# Patient Record
Sex: MALE | Race: OTHER
[De-identification: names, ages, dates, MRNs, and addresses within clinical notes are randomized per-mention and may not be internally consistent; named-entity substitution may affect disease eponyms.]

---

## 2017-03-15 ENCOUNTER — HOSPITAL ENCOUNTER (INPATIENT)
Dept: HOSPITAL 10 - E/R | Age: 67
LOS: 5 days | Discharge: HOME | DRG: 190 | End: 2017-03-20
Attending: INTERNAL MEDICINE | Admitting: INTERNAL MEDICINE
Payer: MEDICARE

## 2017-03-15 VITALS
BODY MASS INDEX: 42.66 KG/M2 | WEIGHT: 315 LBS | BODY MASS INDEX: 42.66 KG/M2 | HEIGHT: 72 IN | WEIGHT: 315 LBS | HEIGHT: 72 IN

## 2017-03-15 VITALS — RESPIRATION RATE: 22 BRPM | SYSTOLIC BLOOD PRESSURE: 130 MMHG | DIASTOLIC BLOOD PRESSURE: 75 MMHG | HEART RATE: 103 BPM

## 2017-03-15 VITALS — HEART RATE: 95 BPM

## 2017-03-15 VITALS — DIASTOLIC BLOOD PRESSURE: 72 MMHG | RESPIRATION RATE: 21 BRPM | SYSTOLIC BLOOD PRESSURE: 126 MMHG

## 2017-03-15 DIAGNOSIS — D64.9: ICD-10-CM

## 2017-03-15 DIAGNOSIS — E66.2: ICD-10-CM

## 2017-03-15 DIAGNOSIS — J98.11: ICD-10-CM

## 2017-03-15 DIAGNOSIS — E11.9: ICD-10-CM

## 2017-03-15 DIAGNOSIS — Z79.52: ICD-10-CM

## 2017-03-15 DIAGNOSIS — R94.31: ICD-10-CM

## 2017-03-15 DIAGNOSIS — I50.33: ICD-10-CM

## 2017-03-15 DIAGNOSIS — J44.1: Primary | ICD-10-CM

## 2017-03-15 DIAGNOSIS — Z79.82: ICD-10-CM

## 2017-03-15 DIAGNOSIS — R09.89: ICD-10-CM

## 2017-03-15 DIAGNOSIS — I11.0: ICD-10-CM

## 2017-03-15 LAB
ABNORMAL IP MESSAGE: 1
ADD SCAN DIFF: NO
ANION GAP SERPL CALC-SCNC: 11 MMOL/L (ref 8–16)
APTT BLD: 30.3 SEC (ref 25–35)
BASOPHILS # BLD AUTO: 0 10^3/UL (ref 0–0.1)
BASOPHILS NFR BLD: 0.5 % (ref 0–2)
BUN SERPL-MCNC: 10 MG/DL (ref 7–20)
CALCIUM SERPL-MCNC: 8.8 MG/DL (ref 8.4–10.2)
CHLORIDE SERPL-SCNC: 102 MMOL/L (ref 97–110)
CK MB SERPL-MCNC: 0.44 NG/ML (ref 0–2.4)
CK SERPL-CCNC: 59 IU/L (ref 23–200)
CO2 SERPL-SCNC: 34 MMOL/L (ref 21–31)
CREAT SERPL-MCNC: 0.68 MG/DL (ref 0.61–1.24)
EOSINOPHIL # BLD: 0.2 10^3/UL (ref 0–0.5)
EOSINOPHIL NFR BLD: 2.6 % (ref 0–7)
ERYTHROCYTE [DISTWIDTH] IN BLOOD BY AUTOMATED COUNT: 17.2 % (ref 11.5–14.5)
GLUCOSE SERPL-MCNC: 88 MG/DL (ref 70–220)
HCT VFR BLD CALC: 38.6 % (ref 42–52)
HGB BLD-MCNC: 10.9 G/DL (ref 14–18)
INR PPP: 0.96
LYMPHOCYTES # BLD AUTO: 1.9 10^3/UL (ref 0.8–2.9)
LYMPHOCYTES NFR BLD AUTO: 21.3 % (ref 15–51)
MCH RBC QN AUTO: 23.4 PG (ref 29–33)
MCHC RBC AUTO-ENTMCNC: 28.2 G/DL (ref 32–37)
MCV RBC AUTO: 82.8 FL (ref 82–101)
MONOCYTES # BLD: 0.9 10^3/UL (ref 0.3–0.9)
MONOCYTES NFR BLD: 10.6 % (ref 0–11)
NEUTROPHILS # BLD: 5.8 10^3/UL (ref 1.6–7.5)
NEUTROPHILS NFR BLD AUTO: 64.8 % (ref 39–77)
NRBC # BLD MANUAL: 0 10^3/UL (ref 0–0)
NRBC BLD QL: 0 /100WBC (ref 0–0)
PLATELET # BLD: 311 10^3/UL (ref 140–415)
PMV BLD AUTO: 10.2 FL (ref 7.4–10.4)
POTASSIUM SERPL-SCNC: 4.3 MMOL/L (ref 3.5–5.1)
PROTHROMBIN TIME: 12.8 SEC (ref 12.2–14.2)
PT RATIO: 1
RBC # BLD AUTO: 4.66 10^6/UL (ref 4.7–6.1)
SODIUM SERPL-SCNC: 143 MMOL/L (ref 135–144)
TROPONIN I SERPL-MCNC: < 0.012 NG/ML (ref 0–0.12)
TROPONIN I SERPL-MCNC: < 0.012 NG/ML (ref 0–0.12)
WBC # BLD AUTO: 8.9 10^3/UL (ref 4.8–10.8)

## 2017-03-15 PROCEDURE — 96376 TX/PRO/DX INJ SAME DRUG ADON: CPT

## 2017-03-15 PROCEDURE — 80053 COMPREHEN METABOLIC PANEL: CPT

## 2017-03-15 PROCEDURE — 96374 THER/PROPH/DIAG INJ IV PUSH: CPT

## 2017-03-15 PROCEDURE — 71010: CPT

## 2017-03-15 PROCEDURE — 94640 AIRWAY INHALATION TREATMENT: CPT

## 2017-03-15 PROCEDURE — 85610 PROTHROMBIN TIME: CPT

## 2017-03-15 PROCEDURE — 96375 TX/PRO/DX INJ NEW DRUG ADDON: CPT

## 2017-03-15 PROCEDURE — 80048 BASIC METABOLIC PNL TOTAL CA: CPT

## 2017-03-15 PROCEDURE — 85025 COMPLETE CBC W/AUTO DIFF WBC: CPT

## 2017-03-15 PROCEDURE — 85730 THROMBOPLASTIN TIME PARTIAL: CPT

## 2017-03-15 PROCEDURE — 80061 LIPID PANEL: CPT

## 2017-03-15 PROCEDURE — 94664 DEMO&/EVAL PT USE INHALER: CPT

## 2017-03-15 PROCEDURE — 84484 ASSAY OF TROPONIN QUANT: CPT

## 2017-03-15 PROCEDURE — 82553 CREATINE MB FRACTION: CPT

## 2017-03-15 PROCEDURE — 36415 COLL VENOUS BLD VENIPUNCTURE: CPT

## 2017-03-15 PROCEDURE — 82550 ASSAY OF CK (CPK): CPT

## 2017-03-15 PROCEDURE — 93005 ELECTROCARDIOGRAM TRACING: CPT

## 2017-03-15 NOTE — RADRPT
PROCEDURE:   XR Chest. 

 

CLINICAL INDICATION:   Shortness of breath.

 

TECHNIQUE:   Single frontal view. 

 

COMPARISON:   12/17/2016. 

 

FINDINGS:

Mild atelectasis at the lung bases is unchanged.  The lungs are otherwise clear. 

The heart size is normal. 

There is no pleural effusion. 

There is no pneumothorax.   

 

IMPRESSION:

1.  Mild atelectasis at the lung bases, unchanged.

2.  Otherwise normal chest radiograph.

3.  No change from 12/17/2016.  

 

RPTAT: QQ

_____________________________________________ 

.Shun Barber MD, MD           Date    Time 

Electronically viewed and signed by .Shun Barber MD, MD on 03/15/2017 17:30 

 

D:  03/15/2017 17:30  T:  03/15/2017 17:30

.R/

## 2017-03-15 NOTE — ERA
ER Documentation


Chief Complaint


Date/Time


DATE: 3/15/17 


TIME: 20:07


Chief Complaint


CHEST PAIN WITH SOB SUDDEN ONSET 1 HR PTA. NO RECENT URI





HPI


Patient is a 66-year-old male with COPD and hypertension who presents with 

chest pain and shortness of breath.  He said this started 1 hour ago.  He has 

also had a cough for the past 3 days.  He has had chills but no fevers.  He had 

2 episodes of vomiting.  He says "I think it is COPD again".  Upon review of 

old medical records the patient has multiple visits with admissions to Dr. Phillips.  He says that he does not currently have a primary doctor.





ROS


All systems reviewed and are negative except as per history of present illness.





Medications


Home Meds


Active Scripts


Prednisone (Prednisone) 20 Mg Tab, 10 MG PO DAILY for 7 Days, TAB


   Prov:DELORIS PHILLIPS MD         12/21/16


Isosorbide Dinitrate* (Isordil*) 10 Mg Tablet, 10 MG PO TID for 28 Days, TAB


   Prov:DELORIS PHILLIPS MD         12/21/16


Reported Medications


Morphine Sulfate* (Ms Contin*) 15 Mg Tablet.sa, 15 MG PO Q12, TAB


   12/18/16


Gabapentin* (Gabapentin*) 600 Mg Tablet, 600 MG PO TID, #90 TAB


   12/17/16


Simvastatin* (Zocor*) 10 Mg Tablet, 10 MG PO QHS, #30 TAB


   12/17/16


Pantoprazole* (Pantoprazole*) 40 Mg Tablet.dr, 40 MG PO AC BREAKFAST, TAB


   12/17/16


Cholecalciferol* (Vitamin D3*) 1,000 Unit Tablet, 1000 UNIT PO DAILY, TAB


   6/28/15


Lisinopril* (Lisinopril*) 20 Mg Tablet, 20 MG PO DAILY, TAB


   5/20/15


Ipratropium Bromide* (Atrovent*) 0.5 Mg/2.5 Ml Neb, 0.5 MG NEB Q4H Y for 

WHEEZING AND SOB, EA


   2/19/15


Amlodipine Besylate* (Amlodipine Besylate*) 10 Mg Tablet, 10 MG PO DAILY, TAB


   2/19/15


Furosemide* (Lasix*) 40 Mg Tablet, 40 MG PO BID, TAB


   2/19/15


Multivitamins* (Multivitamin*) 1 Udcap Capsule, 1 TAB PO DAILY, TAB


   1/31/15


Docusate Sodium* (Colace*) 250 Mg Capsule, 250 MG PO BID, CAP


   1/31/15


Ascorbic Acid (Vitamin C) 500 Mg Tab, 1000 MG PO DAILY, TAB


   12/23/14


Albuterol Sulfate* (Albuterol Sulfate* Neb) 0.083%-3 Ml Neb, 2.5 MG NEB DAILY Y 

for WHEEZING AND SOB, EA


   10/16/14


Potassium Chloride* (K-Dur*) 10 Meq Tab.prt.sr, 10 MEQ PO DAILY, TAB


   10/16/14


Hydrocodone Bit-Acetaminophen* (Norco*)  Mg Tablet, 1 TAB PO QID Y for 

PAIN, TAB


   10/16/14


Aspirin* (Aspirin* Chew) 81 Mg Tab.chew, 81 MG PO DAILY, TAB.CHEW


   10/16/14





Allergies


Allergies:  


Coded Allergies:  


     hydromorphone (Verified  Allergy, Severe, 12/17/16)





PMhx/Soc


History of Surgery:  Yes


Anesthesia Reaction:  No


Hx Neurological Disorder:  No


Hx Respiratory Disorders:  Yes (COPD, ASTHMA, SLEEP APNEA)


Hx Cardiac Disorders:  Yes (HTN, CHF,)


Hx Psychiatric Problems:  Yes (DEPRESSION)


Hx Miscellaneous Medical Probl:  Yes (OSTEO ARTHRITIS, MORBID OBESITY, 

NUERAPATHY)


Hx Alcohol Use:  No


Hx Substance Use:  No


Hx Tobacco Use:  No


Smoking Status:  Former smoker





FmHx


Family History:  No diabetes





Physical Exam


Vitals





Vital Signs








  Date Time  Temp Pulse Resp B/P Pulse Ox O2 Delivery O2 Flow Rate FiO2


 


3/15/17 19:59  92 17 138/98 94 Nasal Cannula 5.0 


 


3/15/17 19:11  88 16 149/77 97 Nasal Cannula 2.0 


 


3/15/17 18:35  91 16 145/98 97 Room Air  


 


3/15/17 17:10       4.0 


 


3/15/17 17:10  89 20  100 Nasal Cannula 4.0 


 


3/15/17 16:10      Nasal Cannula 2 


 


3/15/17 15:25 98.9 90 20 142/84 96   








Physical Exam


Const: No acute distress


Head:   Atraumatic 


Eyes:    Normal Conjunctiva


ENT:    Normal External Ears, Nose and Mouth.


Neck:               Full range of motion..~ No meningismus.


Resp:    Clear to auscultation bilaterally


Cardio:    Regular rate and rhythm, no murmurs


Abd:    Soft, non tender, non distended. Normal bowel sounds


Skin:    No petechiae or rashes


Back:    No midline or flank tenderness


Ext:    No cyanosis, or edema


Neur:    Awake and alert


Psych:    Normal Mood and Affect


Result Diagram:  


3/15/17 1645                                                                   

             3/15/17 1645





Results 24 hrs





 Laboratory Tests








Test


  3/15/17


16:45


 


Activated Partial


Thromboplast Time 30.3Sec 


 


 


Anion Gap 11 


 


Basophils # 0.010^3/ul 


 


Basophils % 0.5% 


 


Blood Urea Nitrogen 10mg/dl 


 


Calcium Level 8.8mg/dl 


 


Carbon Dioxide Level 34mmol/L 


 


Chloride Level 102mmol/L 


 


Creatinine 0.68mg/dl 


 


Eosinophils # 0.210^3/ul 


 


Eosinophils % 2.6% 


 


Glucose Level 88mg/dl 


 


Hematocrit 38.6% 


 


Hemoglobin 10.9g/dl 


 


INR International Normalized


Ratio 0.96 


 


 


Lymphocytes # 1.910^3/ul 


 


Lymphocytes % 21.3% 


 


Mean Corpuscular Hemoglobin 23.4pg 


 


Mean Corpuscular Hemoglobin


Concent 28.2g/dl 


 


 


Mean Corpuscular Volume 82.8fl 


 


Mean Platelet Volume 10.2fl 


 


Monocytes # 0.910^3/ul 


 


Monocytes % 10.6% 


 


Neutrophils # 5.810^3/ul 


 


Neutrophils % 64.8% 


 


Nucleated Red Blood Cells # 0.010^3/ul 


 


Nucleated Red Blood Cells % 0.0/100WBC 


 


Platelet Count 72638^3/UL 


 


Potassium Level 4.3mmol/L 


 


Prothrombin Time 12.8Sec 


 


Prothrombin Time Ratio 1.0 


 


Red Blood Count 4.6610^6/ul 


 


Red Cell Distribution Width 17.2% 


 


Sodium Level 143mmol/L 


 


Troponin I < 0.012ng/ml 


 


White Blood Count 8.910^3/ul 








 Current Medications








 Medications


  (Trade)  Dose


 Ordered  Sig/Adán


 Route


 PRN Reason  Start Time


 Stop Time Status Last Admin


Dose Admin


 


 Aspirin


  (Aspirin)  162 mg  ONCE  STAT


 PO


   3/15/17 16:13


 3/15/17 16:14 DC 3/15/17 16:26


 


 


 Albuterol


  (Proventil


 0.083% (Neb))  5 mg  ONCE  STAT


 NEB


   3/15/17 16:13


 3/15/17 16:14 DC 3/15/17 17:10


 


 


 Ipratropium


 Bromide


  (Atrovent 0.02%


  (Neb))  0.5 mg  ONCE  STAT


 NEB


   3/15/17 16:13


 3/15/17 16:14 DC 3/15/17 17:10


 


 


 Methylprednisolone


 Sodium Succinate


 125 mg  125 mg  ONCE  STAT


 IV


   3/15/17 16:13


 3/15/17 16:14 DC 3/15/17 16:58


 


 


 Azithromycin


  (Zithromax 500mg/


 NS (Pmx))  250 ml @ 


 250 mls/hr  ONCE  ONCE


 IVPB


   3/15/17 16:30


 3/15/17 17:29 DC 3/15/17 16:59


 


 


 Morphine Sulfate


  (morphine)  4 mg  ONCE  STAT


 IV


   3/15/17 17:05


 3/15/17 17:06 DC 3/15/17 17:20


 


 


 Ondansetron HCl


  (Zofran Inj)  4 mg  ONCE  STAT


 IV


   3/15/17 17:05


 3/15/17 17:06 DC 3/15/17 17:20


 


 


 Morphine Sulfate


  (morphine)  4 mg  ONCE  STAT


 IV


   3/15/17 19:04


 3/15/17 19:05 DC 3/15/17 19:07


 


 


 Ondansetron HCl


  (Zofran Inj)  4 mg  ER BRIDGE PRN


 IV


 NAUSEA AND/OR VOMITING  3/15/17 19:30


 3/16/17 19:29   


 


 


 Acetaminophen


  (Tylenol Tab)  650 mg  ER BRIDGE PRN


 PO


 MILD PAIN/FEVER  3/15/17 19:30


 3/16/17 19:29   


 











Procedures/MDM


EKG read by me: 


Rate/Rhythm: Regular rate and rhythm at a normal rate


Intervals:    Normal


Impression:     No evidence of ischemia or arrhythmia





PROCEDURE:   XR Chest. 


 


CLINICAL INDICATION:   Shortness of breath.


 


TECHNIQUE:   Single frontal view. 


 


COMPARISON:   12/17/2016. 


 


FINDINGS:


Mild atelectasis at the lung bases is unchanged.  The lungs are otherwise 

clear. 


The heart size is normal. 


There is no pleural effusion. 


There is no pneumothorax.   


 


IMPRESSION:


1.  Mild atelectasis at the lung bases, unchanged.


2.  Otherwise normal chest radiograph.


3.  No change from 12/17/2016.  


 


RPTAT: QQ


_____________________________________________ 


.Shun Barber MD, MD           Date    Time 


Electronically viewed and signed by .Shun Barber MD, MD on 03/15/2017 17:30 





Patient is a 66-year-old male with COPD and hypertension who presents with 

chest pain and shortness of breath.  I believe he likely has an acute COPD 

exacerbation and he was given Solu-Medrol, albuterol, Atrovent, and Zithromax.  

However I am also concerned about his chest pain as this could be acute 

coronary syndrome.  Therefore he was given aspirin.  The patient will be 

admitted to the care of Dr. Phillips as he has been admitted to Dr. Phillips in the 

past and has Medicare insurance.  I did initially speak with Dr. De Leon per Dr. De Leon said that it Dr. Phillips would like to admit the patient he would be happy 

to allow him to admit as he knows him from previous admissions.  The patient 

will be admitted to a telemetry bed.  I believe that inpatient admission is 

appropriate given the COPD exacerbation combined with chest pain.





Departure


Diagnosis:  


 Primary Impression:  


 COPD exacerbation


 Additional Impression:  


 Chest pain


 Qualified Code:  R07.9 - Chest pain, unspecified type


Condition:  HORTENCIA Clay MD Mar 15, 2017 20:08

## 2017-03-16 VITALS — RESPIRATION RATE: 21 BRPM | SYSTOLIC BLOOD PRESSURE: 139 MMHG | DIASTOLIC BLOOD PRESSURE: 63 MMHG

## 2017-03-16 VITALS — DIASTOLIC BLOOD PRESSURE: 75 MMHG | SYSTOLIC BLOOD PRESSURE: 135 MMHG | RESPIRATION RATE: 22 BRPM

## 2017-03-16 VITALS — DIASTOLIC BLOOD PRESSURE: 70 MMHG | RESPIRATION RATE: 20 BRPM | SYSTOLIC BLOOD PRESSURE: 135 MMHG

## 2017-03-16 VITALS — DIASTOLIC BLOOD PRESSURE: 72 MMHG | SYSTOLIC BLOOD PRESSURE: 145 MMHG | RESPIRATION RATE: 22 BRPM | HEART RATE: 94 BPM

## 2017-03-16 VITALS — SYSTOLIC BLOOD PRESSURE: 142 MMHG | RESPIRATION RATE: 20 BRPM | DIASTOLIC BLOOD PRESSURE: 87 MMHG

## 2017-03-16 VITALS — HEART RATE: 94 BPM

## 2017-03-16 VITALS — HEART RATE: 88 BPM

## 2017-03-16 VITALS — HEART RATE: 82 BPM

## 2017-03-16 VITALS — HEART RATE: 100 BPM

## 2017-03-16 VITALS — HEART RATE: 92 BPM

## 2017-03-16 VITALS — HEART RATE: 104 BPM

## 2017-03-16 LAB
CK MB SERPL-MCNC: 0.67 NG/ML (ref 0–2.4)
CK SERPL-CCNC: 52 IU/L (ref 23–200)
TROPONIN I SERPL-MCNC: < 0.012 NG/ML (ref 0–0.12)

## 2017-03-16 RX ADMIN — THERA TABS SCH TAB: TAB at 08:38

## 2017-03-16 RX ADMIN — OXYCODONE HYDROCHLORIDE AND ACETAMINOPHEN SCH MG: 500 TABLET ORAL at 08:38

## 2017-03-16 RX ADMIN — ISOSORBIDE DINITRATE SCH MG: 10 TABLET ORAL at 09:00

## 2017-03-16 RX ADMIN — VITAMIN D, TAB 1000IU (100/BT) SCH UNIT: 25 TAB at 08:38

## 2017-03-16 RX ADMIN — ISOSORBIDE DINITRATE SCH MG: 10 TABLET ORAL at 13:00

## 2017-03-16 RX ADMIN — ISOSORBIDE DINITRATE SCH MG: 10 TABLET ORAL at 20:46

## 2017-03-16 RX ADMIN — GABAPENTIN SCH MG: 300 CAPSULE ORAL at 20:45

## 2017-03-16 RX ADMIN — MORPHINE SULFATE SCH MG: 15 TABLET, EXTENDED RELEASE ORAL at 20:45

## 2017-03-16 RX ADMIN — IPRATROPIUM BROMIDE AND ALBUTEROL SULFATE SCH ML: .5; 3 SOLUTION RESPIRATORY (INHALATION) at 08:48

## 2017-03-16 RX ADMIN — MORPHINE SULFATE SCH MG: 15 TABLET, EXTENDED RELEASE ORAL at 08:39

## 2017-03-16 RX ADMIN — ENOXAPARIN SODIUM SCH MG: 100 INJECTION SUBCUTANEOUS at 09:00

## 2017-03-16 RX ADMIN — GABAPENTIN SCH MG: 300 CAPSULE ORAL at 13:07

## 2017-03-16 RX ADMIN — IPRATROPIUM BROMIDE AND ALBUTEROL SULFATE SCH ML: .5; 3 SOLUTION RESPIRATORY (INHALATION) at 14:55

## 2017-03-16 RX ADMIN — IPRATROPIUM BROMIDE AND ALBUTEROL SULFATE SCH ML: .5; 3 SOLUTION RESPIRATORY (INHALATION) at 21:08

## 2017-03-16 RX ADMIN — LISINOPRIL SCH MG: 20 TABLET ORAL at 08:41

## 2017-03-16 RX ADMIN — AMLODIPINE BESYLATE SCH MG: 10 TABLET ORAL at 08:40

## 2017-03-16 RX ADMIN — ASPIRIN 81 MG SCH MG: 81 TABLET ORAL at 08:37

## 2017-03-16 RX ADMIN — GABAPENTIN SCH MG: 300 CAPSULE ORAL at 08:37

## 2017-03-16 RX ADMIN — POTASSIUM CHLORIDE SCH MEQ: 10 TABLET, EXTENDED RELEASE ORAL at 08:38

## 2017-03-16 RX ADMIN — CEFTRIAXONE SCH MLS/HR: 1 INJECTION, SOLUTION INTRAVENOUS at 01:05

## 2017-03-16 RX ADMIN — FUROSEMIDE SCH MG: 40 TABLET ORAL at 05:54

## 2017-03-16 RX ADMIN — PANTOPRAZOLE SODIUM SCH MG: 40 TABLET, DELAYED RELEASE ORAL at 08:47

## 2017-03-16 RX ADMIN — FUROSEMIDE SCH MG: 40 TABLET ORAL at 17:31

## 2017-03-16 NOTE — HP
DATE OF ADMISSION: 03/15/2017

 

HISTORY OF PRESENT ILLNESS:  The patient is a 66-year-old male who has history of COPD, CHF, present
ed with shortness of breath, lung congestion.  Previously the patient was discharged with diagnosis 
of COPD exacerbation, congestive heart failure, lung atelectasis, obesity, obesity hypoventilation, 
anemia.  The patient now presents with current complaint.  Previously had a 2D echo done, shows ejec
tion fraction of 65%.

 

PAST MEDICAL HISTORY:  Positive for congestive heart failure, COPD, obesity, obesity hypoventilation
, hypertension, dyslipidemia, diabetes mellitus, history of noncompliance.

 

ALLERGY HISTORY:  HYDROMORPHONE.

 

SOCIAL HISTORY:  Negative at this point.

 

MEDICATION HISTORY:  The patient is on:

1.  Norco.

2.  Albuterol.

3.  Amlodipine.

4.  Ascorbic acid.

5.  Aspirin.

6.  Vitamin D3.

7.  Docusate sodium.

8.  Furosemide.

9.  Gabapentin.

10.  Atrovent.

11.  Isosorbide.

12.  Lisinopril.

13.  Morphine.

14.  Multiple vitamin.

15.  Protonix.

16.  Potassium. 

17.  Prednisone. 

18.  Simvastatin.

 

REVIEW OF SYSTEMS:

HEENT:  Unremarkable.

RESPIRATORY:  Short of breath.

CARDIOVASCULAR:  No chest pain right now.

ABDOMEN:  Unremarkable.

EXTREMITIES:  Swelling.

CENTRAL NERVOUS SYSTEM:  Unremarkable.

 

PHYSICAL EXAMINATION:

GENERAL:  Obese, overweight male, awake, alert.

VITAL SIGNS:  Pulse 94, blood pressure 139/63.

HEAD:  Atraumatic, normocephalic.  

EYES:  Pupils are equal, reactive to light.  No pale conjunctivae or icterus.

NECK:  Supple.

LUNGS:  Rhonchi ____ rales at the bases.

CARDIOVASCULAR:  S1, S2 normal.

ABDOMEN:  Soft, obese.  Bowel sounds present.  No palpable mass.

EXTREMITIES:  No cyanosis, clubbing.  Edema positive of both lower extremities.

CENTRAL NERVOUS SYSTEM:  The patient is awake, alert.  No deficit.

 

LABORATORY DATA:  Hematocrit 38.6.  Sodium 143, potassium 4.3.  

 

CHEST X-RAY:  Lung atelectasis, otherwise normal chest.

 

IMPRESSION:  The patient has: 

1.  Chronic obstructive pulmonary disease exacerbation. 

2.  Lung atelectasis.

3.  Lung congestion.

4.  Hypertension.

5.  Diabetes mellitus history.

6.  Anemia.

7.  Obesity hypoventilation.

 

PLAN:  Continue current treatment.  Cardiology consultation, oxygen, bronchodilator.  The patient wi
ll benefit from weight-losing diet.  The patient also will be on a low-salt diet.

 

 

Dictated By: DELORIS CLEMENS/CORRIE

DD:    03/16/2017 18:27:37

DT:    03/16/2017 19:14:43

Conf#: [id number]

DID#:  [id number]

## 2017-03-16 NOTE — CONS
DATE OF ADMISSION: 03/15/2017

DATE OF CONSULTATION:  03/16/2017

 

 

 

CARDIOLOGY CONSULTATION

 

REASON FOR CONSULTATION:  Congestive heart failure exacerbation, chest pain, assess for acute corona
ry syndrome.

 

REQUESTING PHYSICIAN:  Domingo Phillips MD 

 

HISTORY OF PRESENT ILLNESS:  Mr. Zacarias is a 66-year-old male, known to myself due to multiple prior h
ospital admissions, history of congestive heart failure with preserved left ventricular ejection fra
ction by echo December 2016, hypertension, chronic obstructive pulmonary disease, obesity, hypoventi
lation syndrome, dyslipidemia, diabetes mellitus, who initially presented with complaints of shortne
ss of breath, lower extremity edema and substernal chest pain - somewhat poorly described.  Upon arr
ival in the emergency department, temperature 98.9, blood pressure 142/84, pulse 90, respiratory rat
e 20, saturating 96%.  The patient's labs were notable for white count of 8.9, hemoglobin 10.9, a pl
atelet count 311.  Sodium 143, potassium 4.3, creatinine 0.68, BUN 10, troponin negative.  INR 0.96.
  

 

The patient underwent a chest x-ray revealing mild atelectasis at the lung bases.  The patient's eleanor
ctrocardiogram relieved a normal sinus rhythm, rate 87, with left axis deviation and lateral T-wave 
flattening.  

 

The patient subsequently admitted to the floor, and since admit to the floor, has been initiated on 
baseline angiotensives, bronchodilators, antibiotics, steroids with Lasix diuresis, per patient impr
ovement in symptoms.

 

PAST MEDICAL HISTORY:  As above in HPI.

 

MEDICATIONS CURRENTLY IN HOSPITAL

1.  Norvasc 10 mg daily.

2.  Vitamin C.

3.  Aspirin 81 mg daily.

4.  Vitamin D.

5.  Colace.

6.  Neurontin 600 mg p.o. t.i.d.

7.  Isordil 10 mg t.i.d.

8.  Zestril 20 mg daily.

9.  MS Contin.

10.  Theragran. 

11.  Potassium chloride 10 mEq daily.

12.  Lovenox 40 mg q. daily.

13.  DuoNeb. 

14.  Protonix 40 mg daily.

15.  Lasix 40 mg p.o. b.i.d.

16.  Ceftriaxone.

17.  Solu-Medrol IV q. 6.

18.  Albuterol p.r.n. 

19.  Atrovent p.r.n.

 

ALLERGIES: DILAUDID.

 

SOCIAL HISTORY:  No current tobacco, EtOH or illicit drug use.

 

FAMILY HISTORY:  No history of sudden cardiac death or early CAD.

 

REVIEW OF SYSTEMS

As above in HPI:

CONSTITUTIONAL:  No fevers, chills.

PULMONARY:  No current shortness of breath.

CARDIOVASCULAR:  Positive chest pain.  Congestive heart failure.

GASTROINTESTINAL:  No vomiting.

GENITOURINARY:  No hematuria.

MUSCULOSKELETAL:  Degenerative joint disease.

PSYCHIATRIC:  No documented psychiatric history.

NEUROLOGIC:  No documented history of CVA.

 

PHYSICAL EXAMINATION

VITAL SIGNS:  Temperature of 98.2, blood pressure most recently 139/63, pulse 94, respiratory rate 2
1, saturating 90% on 2 L.

GENERAL:  The patient is alert, awake, complaining of mild shortness of breath.

NECK:  JVP difficult to assess secondary to body habitus.

HEART:  Regular rate and rhythm.  Normal S1, increased S2; I/VI systolic murmur, nondisplaced PMI.

ABDOMEN:  Positive bowel sounds, soft, obese.

EXTREMITIES:  1 to 2+ edema bilaterally, 1+ pulses bilaterally posterior tibial.

 

LABORATORIES:  As above in HPI; since admit, the patient had 2 further troponins return negative, 3 
negative troponins.

 

IMAGING STUDIES:  As above in HPI; no further imaging studies for my review at this time.

 

ELECTROCARDIOGRAM:  As above in HPI; no further electrocardiograms for my review at this time.

 

IMPRESSION

1.  Chest pain, assess for acute coronary syndrome with negative troponins x3 at this time and impro
vement in chest pain.

2.  Congestive heart failure exacerbation, diastolic; acute-on-chronic.

3.  Hypertension, under reasonable control.

4.  Abnormal electrocardiogram, assess for acute coronary syndrome with negative troponins x3 at thi
s time; previously preserved ejection fraction.

5.  Chronic obstructive pulmonary disease.

6.  Shortness of breath, secondary to congestive heart failure and chronic obstructive pulmonary dis
ease.

7.  Anemia.

 

RECOMMENDATIONS

1.  At this time, would maintain patient on telemetry monitoring to follow rhythm and rate control c
losely.

2.  Would continue the patient's Lasix diuresis.  We will change to IV in order to improve diuresis 
increase, following strict I's and O's to grade diuresis closely.

3.  Continue the patient's current Norvasc, Isordil and Zestril to control blood pressure at this ti
me. 

4.  Continue the patient's aspirin for prophylaxis against cardiovascular events.

5.  Continue the patient's steroids, bronchodilators and antibiotics for treatment of COPD exacerbat
ion and we will check a fasting lipid panel for general risk stratification and initiate lipid-lower
ing medication as necessary.

 

Thank you for allowing me to take part in the care of this patient.  I will continue to follow along
 very closely with you, with further recommendations to be made as the patient progresses through hi
s inpatient hospital clinical course.

 

 

Dictated By: HARLEY MALHOTRA/CORRIE

DD:    03/16/2017 20:07:09

DT:    03/16/2017 20:51:27

Conf#: [id number]

DID#:  [id number]

CC: DOMINGO PHILLIPS MD;*EndCC*

## 2017-03-17 VITALS — RESPIRATION RATE: 20 BRPM | SYSTOLIC BLOOD PRESSURE: 139 MMHG | DIASTOLIC BLOOD PRESSURE: 64 MMHG

## 2017-03-17 VITALS — SYSTOLIC BLOOD PRESSURE: 153 MMHG | RESPIRATION RATE: 18 BRPM | DIASTOLIC BLOOD PRESSURE: 77 MMHG

## 2017-03-17 VITALS — DIASTOLIC BLOOD PRESSURE: 68 MMHG | SYSTOLIC BLOOD PRESSURE: 132 MMHG | RESPIRATION RATE: 20 BRPM

## 2017-03-17 VITALS — RESPIRATION RATE: 22 BRPM | SYSTOLIC BLOOD PRESSURE: 133 MMHG | DIASTOLIC BLOOD PRESSURE: 73 MMHG

## 2017-03-17 VITALS — HEART RATE: 80 BPM

## 2017-03-17 VITALS — HEART RATE: 95 BPM

## 2017-03-17 VITALS — HEART RATE: 90 BPM

## 2017-03-17 VITALS — SYSTOLIC BLOOD PRESSURE: 134 MMHG | RESPIRATION RATE: 17 BRPM | DIASTOLIC BLOOD PRESSURE: 62 MMHG

## 2017-03-17 VITALS — HEART RATE: 78 BPM

## 2017-03-17 VITALS — HEART RATE: 79 BPM

## 2017-03-17 VITALS — DIASTOLIC BLOOD PRESSURE: 96 MMHG | RESPIRATION RATE: 18 BRPM | SYSTOLIC BLOOD PRESSURE: 171 MMHG

## 2017-03-17 VITALS — HEART RATE: 85 BPM

## 2017-03-17 LAB
CHOLEST SERPL-MCNC: 160 MG/DL (ref 100–200)
CHOLEST/HDLC SERPL: 1.7 RATIO
CK MB SERPL-MCNC: 0.85 NG/ML (ref 0–2.4)
CK SERPL-CCNC: 32 IU/L (ref 23–200)
HDLC SERPL-MCNC: 92 MG/DL (ref 30–78)
TRIGL SERPL-MCNC: 47 MG/DL (ref 0–149)
TROPONIN I SERPL-MCNC: 0.01 NG/ML (ref 0–0.12)

## 2017-03-17 RX ADMIN — ISOSORBIDE DINITRATE SCH MG: 10 TABLET ORAL at 21:00

## 2017-03-17 RX ADMIN — GABAPENTIN SCH MG: 300 CAPSULE ORAL at 21:03

## 2017-03-17 RX ADMIN — MORPHINE SULFATE SCH MG: 15 TABLET, EXTENDED RELEASE ORAL at 21:04

## 2017-03-17 RX ADMIN — VITAMIN D, TAB 1000IU (100/BT) SCH UNIT: 25 TAB at 08:50

## 2017-03-17 RX ADMIN — LISINOPRIL SCH MG: 20 TABLET ORAL at 08:52

## 2017-03-17 RX ADMIN — PANTOPRAZOLE SODIUM SCH MG: 40 TABLET, DELAYED RELEASE ORAL at 08:50

## 2017-03-17 RX ADMIN — CEFTRIAXONE SCH MLS/HR: 1 INJECTION, SOLUTION INTRAVENOUS at 00:19

## 2017-03-17 RX ADMIN — THERA TABS SCH TAB: TAB at 08:51

## 2017-03-17 RX ADMIN — AMLODIPINE BESYLATE SCH MG: 10 TABLET ORAL at 08:51

## 2017-03-17 RX ADMIN — ENOXAPARIN SODIUM SCH MG: 100 INJECTION SUBCUTANEOUS at 09:00

## 2017-03-17 RX ADMIN — GABAPENTIN SCH MG: 300 CAPSULE ORAL at 12:35

## 2017-03-17 RX ADMIN — IPRATROPIUM BROMIDE AND ALBUTEROL SULFATE SCH ML: .5; 3 SOLUTION RESPIRATORY (INHALATION) at 19:46

## 2017-03-17 RX ADMIN — GABAPENTIN SCH MG: 300 CAPSULE ORAL at 08:50

## 2017-03-17 RX ADMIN — METOLAZONE SCH MG: 5 TABLET ORAL at 17:37

## 2017-03-17 RX ADMIN — POTASSIUM CHLORIDE SCH MEQ: 10 TABLET, EXTENDED RELEASE ORAL at 08:50

## 2017-03-17 RX ADMIN — MORPHINE SULFATE SCH MG: 15 TABLET, EXTENDED RELEASE ORAL at 08:51

## 2017-03-17 RX ADMIN — ASPIRIN 81 MG SCH MG: 81 TABLET ORAL at 08:49

## 2017-03-17 RX ADMIN — IPRATROPIUM BROMIDE AND ALBUTEROL SULFATE SCH ML: .5; 3 SOLUTION RESPIRATORY (INHALATION) at 14:20

## 2017-03-17 RX ADMIN — ISOSORBIDE DINITRATE SCH MG: 10 TABLET ORAL at 09:00

## 2017-03-17 RX ADMIN — OXYCODONE HYDROCHLORIDE AND ACETAMINOPHEN SCH MG: 500 TABLET ORAL at 08:51

## 2017-03-17 RX ADMIN — ISOSORBIDE DINITRATE SCH MG: 10 TABLET ORAL at 12:36

## 2017-03-17 RX ADMIN — IPRATROPIUM BROMIDE AND ALBUTEROL SULFATE SCH ML: .5; 3 SOLUTION RESPIRATORY (INHALATION) at 07:55

## 2017-03-17 NOTE — PN
Date/Time of Note


Date/Time of Note


DATE: 3/17/17 


TIME: 12:03





Assessment/Plan


VTE Prophylaxis


VTE Prophylaxis Intervention:  ambulation





Lines/Catheters


IV Catheter Type (from [facility]):  Saline Lock


Central line still needed:  No


Urinary Cath still in place:  No





Assessment/Plan


Chief Complaint/Hosp Course


1. Chronic obstructive pulmonary disease exacerbation. 


2.  Lung atelectasis, Lung congestion.


3. Morbid obesity


4.  Hypertension.


5.  Diabetes mellitus history.


6.  Anemia.


Problems:  


Assessment/Plan


1. Continue Telemonitoring, today SR


2. Continue breathing treatments





Subjective


24 Hr Interval Summary


Constitutional:  no complaints


Eyes:  no complaints


ENT:  no complaints


Respiratory:  no complaints


Cardiovascular:  no complaints


Gastrointestinal:  no complaints





Exam/Review of Systems


Vital Signs


Vitals





 Vital Signs








  Date Time  Temp Pulse Resp B/P Pulse Ox O2 Delivery O2 Flow Rate FiO2


 


3/17/17 12:00 97.9 83 17 134/62 93   


 


3/17/17 06:16       2.0 


 


3/16/17 21:08      Nasal Cannula  














 Intake and Output   


 


 3/16/17 3/16/17 3/17/17





 15:00 23:00 07:00


 


Intake Total  950 ml 500 ml


 


Output Total  1000 ml 1200 ml


 


Balance  -50 ml -700 ml











Exam


Constitutional:  alert, oriented, other (morbidly obese)


Psych:  nl mood/affect, no complaints


Head:  normocephalic


Eyes:  nl conjunctiva


ENMT:  nl external ears & nose


Neck:  supple


Respiratory:  clear to auscultation


Cardiovascular:  regular rate and rhythm


Gastrointestinal:  soft


Genitourinary - Male:  nl penis





Results


Result Diagram:  


3/15/17 1645                                                                   

             3/15/17 1645





Results 24 hrs





Laboratory Tests








Test


  3/17/17


09:48


 


Cholesterol Level 160  


 


Cholesterol/HDL Ratio 1.7  


 


Creatine Kinase 32  


 


Creatine Kinase Index 2.7  


 


Creatinine Kinase MB (Mass) 0.85  


 


HDL Cholesterol 92  H


 


LDL Cholesterol, Calculated 59  


 


Triglycerides Level 47  


 


Troponin I 0.014  











Medications


Medications





 Current Medications


Amlodipine Besylate (Norvasc) 10 mg DAILY PO  Last administered on 3/17/17at 08:

51; Admin Dose 10 MG;  Start 3/16/17 at 09:00


Ascorbic Acid (Vitamin C) 1,000 mg DAILY PO  Last administered on 3/17/17at 08:

51; Admin Dose 1,000 MG;  Start 3/16/17 at 09:00


Aspirin (Aspirin) 81 mg DAILY PO  Last administered on 3/17/17at 08:49; Admin 

Dose 81 MG;  Start 3/16/17 at 09:00


Cholecalciferol (Vitamin D) 1,000 unit DAILY PO  Last administered on 3/17/17at 

08:50; Admin Dose 1,000 UNIT;  Start 3/16/17 at 09:00


Docusate Sodium (Colace) 250 mg BID PO  Last administered on 3/17/17at 08:49; 

Admin Dose 250 MG;  Start 3/16/17 at 09:00


Gabapentin (Neurontin) 600 mg TID PO  Last administered on 3/17/17at 08:50; 

Admin Dose 600 MG;  Start 3/16/17 at 09:00


Acetaminophen/ Hydrocodone Bitart (Norco (10/325)) 1 tab QID  PRN PO PAIN Last 

administered on 3/16/17at 22:17; Admin Dose 1 TAB;  Start 3/15/17 at 23:30


Isosorbide Dinitrate (Isordil) 10 mg TID PO ;  Start 3/16/17 at 09:00


Lisinopril (Zestril) 20 mg DAILY PO  Last administered on 3/17/17at 08:52; 

Admin Dose 20 MG;  Start 3/16/17 at 09:00


Morphine Sulfate (Ms Contin (Er)) 15 mg Q12 PO  Last administered on 3/17/17at 

08:51; Admin Dose 15 MG;  Start 3/16/17 at 09:00


Multivitamins Therapeutic (Theragran) 1 tab DAILY PO  Last administered on 3/17/

17at 08:51; Admin Dose 1 TAB;  Start 3/16/17 at 09:00


Potassium Chloride (Klor-Con 10) 10 meq DAILY PO  Last administered on 3/17/

17at 08:50; Admin Dose 10 MEQ;  Start 3/16/17 at 09:00


Ondansetron HCl (Zofran Inj) 4 mg Q6H  PRN IV NAUSEA AND/OR VOMITING Last 

administered on 3/16/17at 01:17; Admin Dose 4 MG;  Start 3/15/17 at 23:30


Acetaminophen (Tylenol Tab) 650 mg Q6H  PRN PO PAIN LEVEL 1-3 OR FEVER;  Start 3

/15/17 at 23:30


Acetaminophen (Tylenol Supp) 650 mg Q6H  PRN ID PAIN LEVEL 1-3 OR FEVER;  Start 

3/15/17 at 23:30


Docusate Sodium (Colace) 100 mg Q12H  PRN PO CONSTIPATION;  Start 3/15/17 at 23:

30


Magnesium Hydroxide (Milk Of Mag) 30 ml DAILY  PRN PO CONSTIPATION;  Start 3/15/

17 at 23:30


Bisacodyl (Dulcolax) 5 mg DAILY  PRN PO CONSTIPATION;  Start 3/15/17 at 23:30


Enoxaparin Sodium 40 mg 40 mg DAILY SC ;  Start 3/16/17 at 09:00


Ceftriaxone Sodium (Rocephin) 50 ml @  100 mls/hr Q24H IVPB  Last administered 

on 3/17/17at 00:19; Admin Dose 100 MLS/HR;  Start 3/16/17 at 00:00


Methylprednisolone Sodium Succinate (Solu-Medrol) 40 mg Q6 IV  Last 

administered on 3/17/17at 06:04; Admin Dose 40 MG;  Start 3/16/17 at 00:00











RENAE COON 17, 2017 12:06

## 2017-03-17 NOTE — CONS
Date/Time of Note


Date/Time of Note


DATE: 3/17/17 


TIME: 13:57





Assessment/Plan


Assessment/Plan


Chief Complaint/Hosp Course


IMPRESSION


1.  Chest pain, assess for acute coronary syndrome with negative troponins x3 

at this time and improvement in chest pain.


2.  Congestive heart failure exacerbation, diastolic; acute-on-chronic.-volume 

status slowly improving


3.  Hypertension, under reasonable control.


4.  Abnormal electrocardiogram, assess for acute coronary syndrome with 

negative troponins x3 at this time; previously preserved ejection fraction.


5.  Chronic obstructive pulmonary disease.


6.  Shortness of breath, secondary to congestive heart failure and chronic 

obstructive pulmonary disease.


7.  Anemia.





Recc:


-Tele


-serial ecg's


-Continue isordil/norvasc/zestril


-Continue lasix diuresis


-Consider adding metolazone to increase diuresis


-Contineu steroids/bronchodilators/abx's for treatment of COPD exacerbation


Problems:  





Consultation Date/Type/Reason


Admit Date/Time


Mar 15, 2017 at 19:06


Initial Consult Date


3/16/2017


Type of Consultation:  Cardiology


Reason for Consultation


CHF/chest pain


Referring Provider:  DELORIS PHILLIPS MD





Exam/Review of Systems


Vital Signs


Vitals





 Vital Signs








  Date Time  Temp Pulse Resp B/P Pulse Ox O2 Delivery O2 Flow Rate FiO2


 


3/17/17 12:19  90      


 


3/17/17 12:00 97.9  17 134/62 93   


 


3/17/17 06:16       2.0 


 


3/16/17 21:08      Nasal Cannula  














 Intake and Output   


 


 3/16/17 3/16/17 3/17/17





 15:00 23:00 07:00


 


Intake Total  950 ml 500 ml


 


Output Total  1000 ml 1200 ml


 


Balance  -50 ml -700 ml











Exam


Review of Systems:


CONSTITUTIONAL:  No fevers, chills.


PULMONARY:  mild sob


CARDIOVASCULAR: No chest pain/palpitations


GASTROINTESTINAL:  No nausea/vomiting.


GENITOURINARY:  No hematuria/dysuria.


MUSCULOSKELETAL:  No myagias/arthalgias.


PSYCHIATRIC:  The patient denies depression.


NEUROLOGIC:   No weakness


Constitutional:  alert, oriented


Psych:  no complaints


Head:  normocephalic


ENMT:  mucosa pink and moist


Neck:  jvd (9 cm water), supple


Respiratory:  diminished breath sounds (at bases/B)


Cardiovascular:  regular rate and rhythm


Gastrointestinal:  non-tender, soft


Musculoskeletal:  muscle tone (normal)


Extremities:  pitting pedal edema (trace/B)


Neurological:  other (No focal deficits)





Results


Result Diagram:  


3/15/17 1645                                                                   

             3/15/17 1645





Results 24 hrs





Laboratory Tests








Test


  3/17/17


09:48


 


Cholesterol Level 160  


 


Cholesterol/HDL Ratio 1.7  


 


Creatine Kinase 32  


 


Creatine Kinase Index 2.7  


 


Creatinine Kinase MB (Mass) 0.85  


 


HDL Cholesterol 92  H


 


LDL Cholesterol, Calculated 59  


 


Triglycerides Level 47  


 


Troponin I 0.014  











Medications


Medications





 Current Medications


Amlodipine Besylate (Norvasc) 10 mg DAILY PO  Last administered on 3/17/17at 08:

51; Admin Dose 10 MG;  Start 3/16/17 at 09:00


Ascorbic Acid (Vitamin C) 1,000 mg DAILY PO  Last administered on 3/17/17at 08:

51; Admin Dose 1,000 MG;  Start 3/16/17 at 09:00


Aspirin (Aspirin) 81 mg DAILY PO  Last administered on 3/17/17at 08:49; Admin 

Dose 81 MG;  Start 3/16/17 at 09:00


Cholecalciferol (Vitamin D) 1,000 unit DAILY PO  Last administered on 3/17/17at 

08:50; Admin Dose 1,000 UNIT;  Start 3/16/17 at 09:00


Docusate Sodium (Colace) 250 mg BID PO  Last administered on 3/17/17at 08:49; 

Admin Dose 250 MG;  Start 3/16/17 at 09:00


Gabapentin (Neurontin) 600 mg TID PO  Last administered on 3/17/17at 12:35; 

Admin Dose 600 MG;  Start 3/16/17 at 09:00


Acetaminophen/ Hydrocodone Bitart (Norco (10/325)) 1 tab QID  PRN PO PAIN Last 

administered on 3/16/17at 22:17; Admin Dose 1 TAB;  Start 3/15/17 at 23:30


Isosorbide Dinitrate (Isordil) 10 mg TID PO ;  Start 3/16/17 at 09:00


Lisinopril (Zestril) 20 mg DAILY PO  Last administered on 3/17/17at 08:52; 

Admin Dose 20 MG;  Start 3/16/17 at 09:00


Morphine Sulfate (Ms Contin (Er)) 15 mg Q12 PO  Last administered on 3/17/17at 

08:51; Admin Dose 15 MG;  Start 3/16/17 at 09:00


Multivitamins Therapeutic (Theragran) 1 tab DAILY PO  Last administered on 3/17/

17at 08:51; Admin Dose 1 TAB;  Start 3/16/17 at 09:00


Potassium Chloride (Klor-Con 10) 10 meq DAILY PO  Last administered on 3/17/

17at 08:50; Admin Dose 10 MEQ;  Start 3/16/17 at 09:00


Ondansetron HCl (Zofran Inj) 4 mg Q6H  PRN IV NAUSEA AND/OR VOMITING Last 

administered on 3/16/17at 01:17; Admin Dose 4 MG;  Start 3/15/17 at 23:30


Acetaminophen (Tylenol Tab) 650 mg Q6H  PRN PO PAIN LEVEL 1-3 OR FEVER;  Start 3

/15/17 at 23:30


Acetaminophen (Tylenol Supp) 650 mg Q6H  PRN [place] PAIN LEVEL 1-3 OR FEVER;  Start 

3/15/17 at 23:30


Docusate Sodium (Colace) 100 mg Q12H  PRN PO CONSTIPATION;  Start 3/15/17 at 23:

30


Magnesium Hydroxide (Milk Of Mag) 30 ml DAILY  PRN PO CONSTIPATION;  Start 3/15/

17 at 23:30


Bisacodyl (Dulcolax) 5 mg DAILY  PRN PO CONSTIPATION;  Start 3/15/17 at 23:30


Enoxaparin Sodium 40 mg 40 mg DAILY SC ;  Start 3/16/17 at 09:00


Ceftriaxone Sodium (Rocephin) 50 ml @  100 mls/hr Q24H IVPB  Last administered 

on 3/17/17at 00:19; Admin Dose 100 MLS/HR;  Start 3/16/17 at 00:00


Methylprednisolone Sodium Succinate (Solu-Medrol) 40 mg Q6 IV  Last 

administered on 3/17/17at 12:35; Admin Dose 40 MG;  Start 3/16/17 at 00:00











HARLEY ADAMS 17, 2017 14:01

## 2017-03-18 VITALS — DIASTOLIC BLOOD PRESSURE: 64 MMHG | SYSTOLIC BLOOD PRESSURE: 127 MMHG | RESPIRATION RATE: 19 BRPM

## 2017-03-18 VITALS — DIASTOLIC BLOOD PRESSURE: 57 MMHG | SYSTOLIC BLOOD PRESSURE: 112 MMHG | RESPIRATION RATE: 19 BRPM

## 2017-03-18 VITALS — DIASTOLIC BLOOD PRESSURE: 75 MMHG | SYSTOLIC BLOOD PRESSURE: 124 MMHG | RESPIRATION RATE: 20 BRPM

## 2017-03-18 VITALS — RESPIRATION RATE: 20 BRPM | SYSTOLIC BLOOD PRESSURE: 120 MMHG | DIASTOLIC BLOOD PRESSURE: 66 MMHG

## 2017-03-18 VITALS — HEART RATE: 87 BPM

## 2017-03-18 VITALS — HEART RATE: 81 BPM

## 2017-03-18 VITALS — HEART RATE: 86 BPM

## 2017-03-18 VITALS — SYSTOLIC BLOOD PRESSURE: 152 MMHG | DIASTOLIC BLOOD PRESSURE: 79 MMHG | RESPIRATION RATE: 19 BRPM

## 2017-03-18 VITALS — HEART RATE: 85 BPM

## 2017-03-18 VITALS — SYSTOLIC BLOOD PRESSURE: 138 MMHG | DIASTOLIC BLOOD PRESSURE: 81 MMHG | RESPIRATION RATE: 18 BRPM

## 2017-03-18 VITALS — HEART RATE: 71 BPM

## 2017-03-18 VITALS — HEART RATE: 83 BPM

## 2017-03-18 RX ADMIN — GABAPENTIN SCH MG: 300 CAPSULE ORAL at 20:28

## 2017-03-18 RX ADMIN — ISOSORBIDE DINITRATE SCH MG: 10 TABLET ORAL at 20:28

## 2017-03-18 RX ADMIN — PANTOPRAZOLE SODIUM SCH MG: 40 TABLET, DELAYED RELEASE ORAL at 07:09

## 2017-03-18 RX ADMIN — THERA TABS SCH TAB: TAB at 08:14

## 2017-03-18 RX ADMIN — ENOXAPARIN SODIUM SCH MG: 100 INJECTION SUBCUTANEOUS at 08:20

## 2017-03-18 RX ADMIN — OXYCODONE HYDROCHLORIDE AND ACETAMINOPHEN SCH MG: 500 TABLET ORAL at 08:14

## 2017-03-18 RX ADMIN — IPRATROPIUM BROMIDE AND ALBUTEROL SULFATE SCH ML: .5; 3 SOLUTION RESPIRATORY (INHALATION) at 14:00

## 2017-03-18 RX ADMIN — GABAPENTIN SCH MG: 300 CAPSULE ORAL at 08:14

## 2017-03-18 RX ADMIN — MORPHINE SULFATE SCH MG: 15 TABLET, EXTENDED RELEASE ORAL at 20:27

## 2017-03-18 RX ADMIN — IPRATROPIUM BROMIDE AND ALBUTEROL SULFATE SCH ML: .5; 3 SOLUTION RESPIRATORY (INHALATION) at 08:00

## 2017-03-18 RX ADMIN — IPRATROPIUM BROMIDE AND ALBUTEROL SULFATE SCH ML: .5; 3 SOLUTION RESPIRATORY (INHALATION) at 10:19

## 2017-03-18 RX ADMIN — ISOSORBIDE DINITRATE SCH MG: 10 TABLET ORAL at 12:43

## 2017-03-18 RX ADMIN — VITAMIN D, TAB 1000IU (100/BT) SCH UNIT: 25 TAB at 08:14

## 2017-03-18 RX ADMIN — MORPHINE SULFATE SCH MG: 15 TABLET, EXTENDED RELEASE ORAL at 08:13

## 2017-03-18 RX ADMIN — ISOSORBIDE DINITRATE SCH MG: 10 TABLET ORAL at 08:19

## 2017-03-18 RX ADMIN — ASPIRIN 81 MG SCH MG: 81 TABLET ORAL at 08:14

## 2017-03-18 RX ADMIN — METOLAZONE SCH MG: 5 TABLET ORAL at 05:30

## 2017-03-18 RX ADMIN — POTASSIUM CHLORIDE SCH MEQ: 10 TABLET, EXTENDED RELEASE ORAL at 08:14

## 2017-03-18 RX ADMIN — AMLODIPINE BESYLATE SCH MG: 10 TABLET ORAL at 08:15

## 2017-03-18 RX ADMIN — GABAPENTIN SCH MG: 300 CAPSULE ORAL at 12:53

## 2017-03-18 RX ADMIN — CEFTRIAXONE SCH MLS/HR: 1 INJECTION, SOLUTION INTRAVENOUS at 01:56

## 2017-03-18 RX ADMIN — IPRATROPIUM BROMIDE AND ALBUTEROL SULFATE SCH ML: .5; 3 SOLUTION RESPIRATORY (INHALATION) at 19:36

## 2017-03-18 RX ADMIN — LISINOPRIL SCH MG: 20 TABLET ORAL at 09:00

## 2017-03-18 NOTE — CONS
Date/Time of Note


Date/Time of Note


DATE: 3/18/17 


TIME: 14:55





Assessment/Plan


Assessment/Plan


Additional Assessment/Plan


1.  Chest pain, assess for acute coronary syndrome with negative troponins x3 

at this time and improvement in chest pain- R/o MI - no intervention planned. 


2.  Congestive heart failure exacerbation, diastolic; acute-on-chronic.-volume 

status slowly improving - better fluid status now. 


3.  Hypertension, under reasonable control- BP controlled betyer. 


4.  Abnormal electrocardiogram, assess for acute coronary syndrome with 

negative troponins x3 at this time; previously preserved ejection fraction.


5.  Chronic obstructive pulmonary disease.


6.  Shortness of breath, secondary to congestive heart failure and chronic 

obstructive pulmonary disease- keep euvolemic. 


7.  Anemia.





Consultation Date/Type/Reason


Admit Date/Time


Mar 17, 2017 at 16:45


Initial Consult Date





Type of Consultation:  Cardiology


Referring Provider:  DELORIS PHILLIPS MD





24 HR Interval Summary


Free Text/Dictation


No acute change - better fluid status - con't Me rx now. 





ROS: No fever, no chills, no nausea, no vomiting, no diarrhea/constipation


        No recent weight changes


        No chest pain, no PND, no orthopnea


        No dizziness, blurred vision


        No thirst, no heat or cold intolerance





Exam/Review of Systems


Vital Signs


Vitals





 Vital Signs








  Date Time  Temp Pulse Resp B/P Pulse Ox O2 Delivery O2 Flow Rate FiO2


 


3/18/17 12:30  87      


 


3/18/17 11:49 97.4  19 112/57 95   


 


3/18/17 10:19       2.0 


 


3/18/17 10:19      Nasal Cannula  














 Intake and Output   


 


 3/17/17 3/17/17 3/18/17





 15:00 23:00 07:00


 


Intake Total  1200 ml 


 


Output Total  1900 ml 600 ml


 


Balance  -700 ml -600 ml











Exam


General: WN/WD/NAD, AOx 2-3


HEENT: Unicetric/atraumatic/EOMI (follow commands)


NECK: JVD elevated, no thyromegaly


Lymph: no lymphadenopathy


HEART: regular with no S3, II/VI systolic murmur at apex


LUNGS: Coarse sounds


ABD: soft, NT, ND, +BS


: Intact


Neuro: non focal


SKIN: chronic changes


EXT: trace edema





Results


Result Diagram:  


3/15/17 0645                                                                   

             3/15/17 1645








Medications


Medications





 Current Medications


Amlodipine Besylate (Norvasc) 10 mg DAILY PO  Last administered on 3/18/17at 08:

15; Admin Dose 10 MG;  Start 3/16/17 at 09:00


Ascorbic Acid (Vitamin C) 1,000 mg DAILY PO  Last administered on 3/18/17at 08:

14; Admin Dose 1,000 MG;  Start 3/16/17 at 09:00


Aspirin (Aspirin) 81 mg DAILY PO  Last administered on 3/18/17at 08:14; Admin 

Dose 81 MG;  Start 3/16/17 at 09:00


Cholecalciferol (Vitamin D) 1,000 unit DAILY PO  Last administered on 3/18/17at 

08:14; Admin Dose 1,000 UNIT;  Start 3/16/17 at 09:00


Docusate Sodium (Colace) 250 mg BID PO  Last administered on 3/18/17at 08:14; 

Admin Dose 250 MG;  Start 3/16/17 at 09:00


Gabapentin (Neurontin) 600 mg TID PO  Last administered on 3/18/17at 12:53; 

Admin Dose 600 MG;  Start 3/16/17 at 09:00


Acetaminophen/ Hydrocodone Bitart (Norco (10/325)) 1 tab QID  PRN PO PAIN Last 

administered on 3/18/17at 02:11; Admin Dose 1 TAB;  Start 3/15/17 at 23:30


Isosorbide Dinitrate (Isordil) 10 mg TID PO ;  Start 3/16/17 at 09:00


Lisinopril (Zestril) 20 mg DAILY PO  Last administered on 3/18/17at 09:00; 

Admin Dose 20 MG;  Start 3/16/17 at 09:00


Morphine Sulfate (Ms Contin (Er)) 15 mg Q12 PO  Last administered on 3/18/17at 

08:13; Admin Dose 15 MG;  Start 3/16/17 at 09:00


Multivitamins Therapeutic (Theragran) 1 tab DAILY PO  Last administered on 3/18/

17at 08:14; Admin Dose 1 TAB;  Start 3/16/17 at 09:00


Potassium Chloride (Klor-Con 10) 10 meq DAILY PO  Last administered on 3/18/

17at 08:14; Admin Dose 10 MEQ;  Start 3/16/17 at 09:00


Ondansetron HCl (Zofran Inj) 4 mg Q6H  PRN IV NAUSEA AND/OR VOMITING Last 

administered on 3/16/17at 01:17; Admin Dose 4 MG;  Start 3/15/17 at 23:30


Acetaminophen (Tylenol Tab) 650 mg Q6H  PRN PO PAIN LEVEL 1-3 OR FEVER;  Start 3

/15/17 at 23:30


Acetaminophen (Tylenol Supp) 650 mg Q6H  PRN [place] PAIN LEVEL 1-3 OR FEVER;  Start 

3/15/17 at 23:30


Docusate Sodium (Colace) 100 mg Q12H  PRN PO CONSTIPATION;  Start 3/15/17 at 23:

30


Magnesium Hydroxide (Milk Of Mag) 30 ml DAILY  PRN PO CONSTIPATION Last 

administered on 3/18/17at 02:11; Admin Dose 30 ML;  Start 3/15/17 at 23:30


Bisacodyl (Dulcolax) 5 mg DAILY  PRN PO CONSTIPATION;  Start 3/15/17 at 23:30


Enoxaparin Sodium 40 mg 40 mg DAILY SC ;  Start 3/16/17 at 09:00


Ceftriaxone Sodium (Rocephin) 50 ml @  100 mls/hr Q24H IVPB  Last administered 

on 3/18/17at 01:56; Admin Dose 100 MLS/HR;  Start 3/16/17 at 00:00


Methylprednisolone Sodium Succinate (Solu-Medrol) 40 mg Q6 IV  Last 

administered on 3/18/17at 12:50; Admin Dose 40 MG;  Start 3/16/17 at 00:00


Metolazone (Zaroxolyn) 5 mg DAILY@0530 PO  Last administered on 3/17/17at 17:37

; Admin Dose 5 MG;  Start 3/17/17 at 17:30











ASH LOW MD Mar 18, 2017 14:57

## 2017-03-18 NOTE — PN
Date/Time of Note


Date/Time of Note


DATE: 3/18/17 


TIME: 13:33





Assessment/Plan


VTE Prophylaxis


VTE Prophylaxis Intervention:  ambulation





Lines/Catheters


IV Catheter Type (from [facility]):  Saline Lock


Central line still needed:  No


Urinary Cath still in place:  No





Assessment/Plan


Chief Complaint/Hosp Course


1. Chronic obstructive pulmonary disease exacerbation, pt hasd two episodes of 

chest pain last night relieved with pain meds. 


2.  Lung atelectasis, Lung congestion.


3. Morbid obesity


4.  Hypertension.


5.  Diabetes mellitus history.


6.  Anemia.


Problems:  


Assessment/Plan


1. Contie telemetry monitoring


2, Cardiac enzymes tomorrow





Subjective


24 Hr Interval Summary


Constitutional:  requiring O2


Eyes:  no complaints


ENT:  no complaints


Respiratory:  no complaints


Cardiovascular:  chest pain


Gastrointestinal:  no complaints


Genitourinary:  no complaints


Musculoskeletal:  no complaints


Skin:  no complaints





Exam/Review of Systems


Vital Signs


Vitals





 Vital Signs








  Date Time  Temp Pulse Resp B/P Pulse Ox O2 Delivery O2 Flow Rate FiO2


 


3/18/17 12:30  87      


 


3/18/17 11:49 97.4  19 112/57 95   


 


3/18/17 10:19       2.0 


 


3/18/17 10:19      Nasal Cannula  














 Intake and Output   


 


 3/17/17 3/17/17 3/18/17





 15:00 23:00 07:00


 


Intake Total  1200 ml 


 


Output Total  1900 ml 600 ml


 


Balance  -700 ml -600 ml











Exam


Constitutional:  alert, oriented, well developed


Psych:  nl mood/affect, no complaints


Head:  atraumatic, normocephalic


Eyes:  nl conjunctiva


ENMT:  nl external ears & nose


Neck:  supple


Respiratory:  clear to auscultation


Cardiovascular:  regular rate and rhythm


Gastrointestinal:  soft


Genitourinary - Male:  nl penis





Results


Result Diagram:  


3/15/17 1645                                                                   

             3/15/17 1645








Medications


Medications





 Current Medications


Amlodipine Besylate (Norvasc) 10 mg DAILY PO  Last administered on 3/18/17at 08:

15; Admin Dose 10 MG;  Start 3/16/17 at 09:00


Ascorbic Acid (Vitamin C) 1,000 mg DAILY PO  Last administered on 3/18/17at 08:

14; Admin Dose 1,000 MG;  Start 3/16/17 at 09:00


Aspirin (Aspirin) 81 mg DAILY PO  Last administered on 3/18/17at 08:14; Admin 

Dose 81 MG;  Start 3/16/17 at 09:00


Cholecalciferol (Vitamin D) 1,000 unit DAILY PO  Last administered on 3/18/17at 

08:14; Admin Dose 1,000 UNIT;  Start 3/16/17 at 09:00


Docusate Sodium (Colace) 250 mg BID PO  Last administered on 3/18/17at 08:14; 

Admin Dose 250 MG;  Start 3/16/17 at 09:00


Gabapentin (Neurontin) 600 mg TID PO  Last administered on 3/18/17at 12:53; 

Admin Dose 600 MG;  Start 3/16/17 at 09:00


Acetaminophen/ Hydrocodone Bitart (Norco (10/325)) 1 tab QID  PRN PO PAIN Last 

administered on 3/18/17at 02:11; Admin Dose 1 TAB;  Start 3/15/17 at 23:30


Isosorbide Dinitrate (Isordil) 10 mg TID PO ;  Start 3/16/17 at 09:00


Lisinopril (Zestril) 20 mg DAILY PO  Last administered on 3/18/17at 09:00; 

Admin Dose 20 MG;  Start 3/16/17 at 09:00


Morphine Sulfate (Ms Contin (Er)) 15 mg Q12 PO  Last administered on 3/18/17at 

08:13; Admin Dose 15 MG;  Start 3/16/17 at 09:00


Multivitamins Therapeutic (Theragran) 1 tab DAILY PO  Last administered on 3/18/

17at 08:14; Admin Dose 1 TAB;  Start 3/16/17 at 09:00


Potassium Chloride (Klor-Con 10) 10 meq DAILY PO  Last administered on 3/18/

17at 08:14; Admin Dose 10 MEQ;  Start 3/16/17 at 09:00


Ondansetron HCl (Zofran Inj) 4 mg Q6H  PRN IV NAUSEA AND/OR VOMITING Last 

administered on 3/16/17at 01:17; Admin Dose 4 MG;  Start 3/15/17 at 23:30


Acetaminophen (Tylenol Tab) 650 mg Q6H  PRN PO PAIN LEVEL 1-3 OR FEVER;  Start 3

/15/17 at 23:30


Acetaminophen (Tylenol Supp) 650 mg Q6H  PRN [place] PAIN LEVEL 1-3 OR FEVER;  Start 

3/15/17 at 23:30


Docusate Sodium (Colace) 100 mg Q12H  PRN PO CONSTIPATION;  Start 3/15/17 at 23:

30


Magnesium Hydroxide (Milk Of Mag) 30 ml DAILY  PRN PO CONSTIPATION Last 

administered on 3/18/17at 02:11; Admin Dose 30 ML;  Start 3/15/17 at 23:30


Bisacodyl (Dulcolax) 5 mg DAILY  PRN PO CONSTIPATION;  Start 3/15/17 at 23:30


Enoxaparin Sodium 40 mg 40 mg DAILY SC ;  Start 3/16/17 at 09:00


Ceftriaxone Sodium (Rocephin) 50 ml @  100 mls/hr Q24H IVPB  Last administered 

on 3/18/17at 01:56; Admin Dose 100 MLS/HR;  Start 3/16/17 at 00:00


Methylprednisolone Sodium Succinate (Solu-Medrol) 40 mg Q6 IV  Last 

administered on 3/18/17at 12:50; Admin Dose 40 MG;  Start 3/16/17 at 00:00


Metolazone (Zaroxolyn) 5 mg DAILY@0530 PO  Last administered on 3/17/17at 17:37

; Admin Dose 5 MG;  Start 3/17/17 at 17:30











RENAE COON Mar 18, 2017 13:35

## 2017-03-19 VITALS — RESPIRATION RATE: 20 BRPM | DIASTOLIC BLOOD PRESSURE: 69 MMHG | SYSTOLIC BLOOD PRESSURE: 139 MMHG

## 2017-03-19 VITALS — RESPIRATION RATE: 18 BRPM | SYSTOLIC BLOOD PRESSURE: 140 MMHG | DIASTOLIC BLOOD PRESSURE: 83 MMHG

## 2017-03-19 VITALS — RESPIRATION RATE: 18 BRPM | DIASTOLIC BLOOD PRESSURE: 75 MMHG | SYSTOLIC BLOOD PRESSURE: 130 MMHG

## 2017-03-19 VITALS — HEART RATE: 90 BPM

## 2017-03-19 VITALS — HEART RATE: 82 BPM

## 2017-03-19 VITALS — RESPIRATION RATE: 17 BRPM | DIASTOLIC BLOOD PRESSURE: 64 MMHG | SYSTOLIC BLOOD PRESSURE: 125 MMHG

## 2017-03-19 VITALS — DIASTOLIC BLOOD PRESSURE: 69 MMHG | SYSTOLIC BLOOD PRESSURE: 132 MMHG | RESPIRATION RATE: 18 BRPM

## 2017-03-19 VITALS — HEART RATE: 96 BPM

## 2017-03-19 VITALS — RESPIRATION RATE: 18 BRPM | SYSTOLIC BLOOD PRESSURE: 178 MMHG | DIASTOLIC BLOOD PRESSURE: 98 MMHG

## 2017-03-19 VITALS — HEART RATE: 84 BPM

## 2017-03-19 VITALS — HEART RATE: 69 BPM

## 2017-03-19 VITALS — HEART RATE: 81 BPM

## 2017-03-19 LAB
ADD SCAN DIFF: NO
ALBUMIN SERPL-MCNC: 4 G/DL (ref 3.3–4.9)
ALBUMIN/GLOB SERPL: 1.21 {RATIO}
ALP SERPL-CCNC: 81 IU/L (ref 42–121)
ALT SERPL-CCNC: 21 IU/L (ref 13–69)
ANION GAP SERPL CALC-SCNC: 15 MMOL/L (ref 8–16)
AST SERPL-CCNC: 18 IU/L (ref 15–46)
BASOPHILS # BLD AUTO: 0 10^3/UL (ref 0–0.1)
BASOPHILS NFR BLD: 0 % (ref 0–2)
BILIRUB DIRECT SERPL-MCNC: 0 MG/DL (ref 0–0.2)
BILIRUB SERPL-MCNC: 0 MG/DL (ref 0.2–1.3)
BUN SERPL-MCNC: 20 MG/DL (ref 7–20)
CALCIUM SERPL-MCNC: 8.5 MG/DL (ref 8.4–10.2)
CHLORIDE SERPL-SCNC: 87 MMOL/L (ref 97–110)
CO2 SERPL-SCNC: 39 MMOL/L (ref 21–31)
CREAT SERPL-MCNC: 0.71 MG/DL (ref 0.61–1.24)
EOSINOPHIL # BLD: 0 10^3/UL (ref 0–0.5)
EOSINOPHIL NFR BLD: 0 % (ref 0–7)
ERYTHROCYTE [DISTWIDTH] IN BLOOD BY AUTOMATED COUNT: 16.7 % (ref 11.5–14.5)
GLOBULIN SER-MCNC: 3.3 G/DL (ref 1.3–3.2)
GLUCOSE SERPL-MCNC: 126 MG/DL (ref 70–220)
HCT VFR BLD CALC: 38 % (ref 42–52)
HGB BLD-MCNC: 11.3 G/DL (ref 14–18)
LYMPHOCYTES # BLD AUTO: 0.9 10^3/UL (ref 0.8–2.9)
LYMPHOCYTES NFR BLD AUTO: 8 % (ref 15–51)
MCH RBC QN AUTO: 24.1 PG (ref 29–33)
MCHC RBC AUTO-ENTMCNC: 29.7 G/DL (ref 32–37)
MCV RBC AUTO: 81.2 FL (ref 82–101)
MONOCYTES # BLD: 0.7 10^3/UL (ref 0.3–0.9)
MONOCYTES NFR BLD: 5.7 % (ref 0–11)
NEUTROPHILS # BLD: 10.1 10^3/UL (ref 1.6–7.5)
NEUTROPHILS NFR BLD AUTO: 85.8 % (ref 39–77)
NRBC # BLD MANUAL: 0.1 10^3/UL (ref 0–0)
NRBC BLD QL: 0.5 /100WBC (ref 0–0)
PLATELET # BLD: 315 10^3/UL (ref 140–415)
PMV BLD AUTO: 9.3 FL (ref 7.4–10.4)
POTASSIUM SERPL-SCNC: 3.6 MMOL/L (ref 3.5–5.1)
PROT SERPL-MCNC: 7.3 G/DL (ref 6.1–8.1)
RBC # BLD AUTO: 4.68 10^6/UL (ref 4.7–6.1)
SODIUM SERPL-SCNC: 137 MMOL/L (ref 135–144)
WBC # BLD AUTO: 11.8 10^3/UL (ref 4.8–10.8)

## 2017-03-19 RX ADMIN — MORPHINE SULFATE SCH MG: 15 TABLET, EXTENDED RELEASE ORAL at 08:36

## 2017-03-19 RX ADMIN — CEFTRIAXONE SCH MLS/HR: 1 INJECTION, SOLUTION INTRAVENOUS at 00:00

## 2017-03-19 RX ADMIN — IPRATROPIUM BROMIDE AND ALBUTEROL SULFATE SCH ML: .5; 3 SOLUTION RESPIRATORY (INHALATION) at 20:00

## 2017-03-19 RX ADMIN — ISOSORBIDE DINITRATE SCH MG: 10 TABLET ORAL at 13:11

## 2017-03-19 RX ADMIN — GABAPENTIN SCH MG: 300 CAPSULE ORAL at 13:09

## 2017-03-19 RX ADMIN — MORPHINE SULFATE SCH MG: 15 TABLET, EXTENDED RELEASE ORAL at 21:06

## 2017-03-19 RX ADMIN — ASPIRIN 81 MG SCH MG: 81 TABLET ORAL at 08:36

## 2017-03-19 RX ADMIN — PANTOPRAZOLE SODIUM SCH MG: 40 TABLET, DELAYED RELEASE ORAL at 06:53

## 2017-03-19 RX ADMIN — OXYCODONE HYDROCHLORIDE AND ACETAMINOPHEN SCH MG: 500 TABLET ORAL at 08:36

## 2017-03-19 RX ADMIN — POTASSIUM CHLORIDE SCH MEQ: 10 TABLET, EXTENDED RELEASE ORAL at 08:35

## 2017-03-19 RX ADMIN — ISOSORBIDE DINITRATE SCH MG: 10 TABLET ORAL at 08:36

## 2017-03-19 RX ADMIN — METOLAZONE SCH MG: 5 TABLET ORAL at 06:14

## 2017-03-19 RX ADMIN — THERA TABS SCH TAB: TAB at 08:36

## 2017-03-19 RX ADMIN — AMLODIPINE BESYLATE SCH MG: 10 TABLET ORAL at 08:36

## 2017-03-19 RX ADMIN — GABAPENTIN SCH MG: 300 CAPSULE ORAL at 21:05

## 2017-03-19 RX ADMIN — IPRATROPIUM BROMIDE AND ALBUTEROL SULFATE SCH ML: .5; 3 SOLUTION RESPIRATORY (INHALATION) at 08:00

## 2017-03-19 RX ADMIN — LISINOPRIL SCH MG: 20 TABLET ORAL at 08:36

## 2017-03-19 RX ADMIN — VITAMIN D, TAB 1000IU (100/BT) SCH UNIT: 25 TAB at 08:35

## 2017-03-19 RX ADMIN — GABAPENTIN SCH MG: 300 CAPSULE ORAL at 08:35

## 2017-03-19 RX ADMIN — ENOXAPARIN SODIUM SCH MG: 100 INJECTION SUBCUTANEOUS at 08:39

## 2017-03-19 RX ADMIN — IPRATROPIUM BROMIDE AND ALBUTEROL SULFATE SCH ML: .5; 3 SOLUTION RESPIRATORY (INHALATION) at 13:45

## 2017-03-19 RX ADMIN — ISOSORBIDE DINITRATE SCH MG: 10 TABLET ORAL at 21:07

## 2017-03-19 NOTE — CONS
Date/Time of Note


Date/Time of Note


DATE: 3/19/17 


TIME: 19:24





Assessment/Plan


Assessment/Plan


Additional Assessment/Plan


1.  Chest pain, assess for acute coronary syndrome with negative troponins x3 

at this time and improvement in chest pain- R/o MI - no intervention planned. 


2.  Congestive heart failure exacerbation, diastolic; acute-on-chronic.-volume 

status slowly improving - better fluid status now. 


3.  Hypertension, under reasonable control- BP controlled better. STABLE. 


4.  Abnormal electrocardiogram, assess for acute coronary syndrome with 

negative troponins x3 at this time; previously preserved ejection fraction.


5.  Chronic obstructive pulmonary disease. No wheezing now. 


6.  Shortness of breath, secondary to congestive heart failure and chronic 

obstructive pulmonary disease- keep euvolemic. 


7.  Anemia.





Consultation Date/Type/Reason


Admit Date/Time


Mar 17, 2017 at 16:45


Type of Consultation:  Cardiology


Referring Provider:  DELORIS PHILLIPS MD





24 HR Interval Summary


Free Text/Dictation


NO acute events - Bp stable - con't to follow. 





ROS: No fever, no chills, no nausea, no vomiting, no diarrhea/constipation


        No recent weight changes


        No chest pain, no PND, no orthopnea


        No dizziness, blurred vision


        No thirst, no heat or cold intolerance





Exam/Review of Systems


Vital Signs


Vitals





 Vital Signs








  Date Time  Temp Pulse Resp B/P Pulse Ox O2 Delivery O2 Flow Rate FiO2


 


3/19/17 16:21 98.6 91 18 132/69 94   


 


3/19/17 04:55       2.0 


 


3/19/17 03:59        21


 


3/18/17 19:36      Nasal Cannula  














 Intake and Output   


 


 3/18/17 3/18/17 3/19/17





 15:00 23:00 07:00


 


Intake Total  1200 ml 


 


Output Total  650 ml 300 ml


 


Balance  550 ml -300 ml











Exam


General: WN/WD/NAD, AOx 2-3


HEENT: Unicetric/atraumatic/EOMI (follow commands)


NECK: JVD elevated, no thyromegaly


Lymph: no lymphadenopathy


HEART: regular with no S3, II/VI systolic murmur at apex


LUNGS: Coarse sounds


ABD: soft, NT, ND, +BS


: Intact


Neuro: non focal


SKIN: chronic changes


EXT: trace edema





Results


Result Diagram:  


3/19/17 1212                                                                   

             3/19/17 1212





Results 24 hrs





Laboratory Tests








Test


  3/19/17


12:12


 


Alanine Aminotransferase


(ALT/SGPT) 21  


 


 


Albumin 4.0  


 


Albumin/Globulin Ratio 1.21  


 


Alkaline Phosphatase 81  


 


Anion Gap 15  


 


Aspartate Amino Transf


(AST/SGOT) 18  


 


 


Basophils # 0.0  


 


Basophils % 0.0  


 


Blood Urea Nitrogen 20  


 


Calcium Level 8.5  


 


Carbon Dioxide Level 39  H


 


Chloride Level 87  L


 


Creatinine 0.71  


 


Direct Bilirubin 0.00  


 


Eosinophils # 0.0  


 


Eosinophils % 0.0  


 


Globulin 3.30  H


 


Glucose Level 126  


 


Hematocrit 38.0  L


 


Hemoglobin 11.3  L


 


Indirect Bilirubin 0.0  


 


Lymphocytes # 0.9  


 


Lymphocytes % 8.0  L


 


Mean Corpuscular Hemoglobin 24.1  L


 


Mean Corpuscular Hemoglobin


Concent 29.7  L


 


 


Mean Corpuscular Volume 81.2  L


 


Mean Platelet Volume 9.3  


 


Monocytes # 0.7  


 


Monocytes % 5.7  


 


Neutrophils # 10.1  H


 


Neutrophils % 85.8  H


 


Nucleated Red Blood Cells # 0.1  H


 


Nucleated Red Blood Cells % 0.5  H


 


Platelet Count 315  


 


Potassium Level 3.6  


 


Red Blood Count 4.68  L


 


Red Cell Distribution Width 16.7  H


 


Sodium Level 137  


 


Total Bilirubin 0.0  L


 


Total Protein 7.3  


 


Troponin I < 0.012  


 


White Blood Count 11.8  #H











Medications


Medications





 Current Medications


Amlodipine Besylate (Norvasc) 10 mg DAILY PO  Last administered on 3/19/17at 08:

36; Admin Dose 10 MG;  Start 3/16/17 at 09:00


Ascorbic Acid (Vitamin C) 1,000 mg DAILY PO  Last administered on 3/19/17at 08:

36; Admin Dose 1,000 MG;  Start 3/16/17 at 09:00


Aspirin (Aspirin) 81 mg DAILY PO  Last administered on 3/19/17at 08:36; Admin 

Dose 81 MG;  Start 3/16/17 at 09:00


Cholecalciferol (Vitamin D) 1,000 unit DAILY PO  Last administered on 3/19/17at 

08:35; Admin Dose 1,000 UNIT;  Start 3/16/17 at 09:00


Docusate Sodium (Colace) 250 mg BID PO  Last administered on 3/19/17at 08:35; 

Admin Dose 250 MG;  Start 3/16/17 at 09:00


Gabapentin (Neurontin) 600 mg TID PO  Last administered on 3/19/17at 13:09; 

Admin Dose 600 MG;  Start 3/16/17 at 09:00


Acetaminophen/ Hydrocodone Bitart (Norco (10/325)) 1 tab QID  PRN PO PAIN Last 

administered on 3/19/17at 04:06; Admin Dose 1 TAB;  Start 3/15/17 at 23:30


Isosorbide Dinitrate (Isordil) 10 mg TID PO  Last administered on 3/19/17at 13:

11; Admin Dose 10 MG;  Start 3/16/17 at 09:00


Lisinopril (Zestril) 20 mg DAILY PO  Last administered on 3/19/17at 08:36; 

Admin Dose 20 MG;  Start 3/16/17 at 09:00


Morphine Sulfate (Ms Contin (Er)) 15 mg Q12 PO  Last administered on 3/19/17at 

08:36; Admin Dose 15 MG;  Start 3/16/17 at 09:00


Multivitamins Therapeutic (Theragran) 1 tab DAILY PO  Last administered on 3/19/

17at 08:36; Admin Dose 1 TAB;  Start 3/16/17 at 09:00


Potassium Chloride (Klor-Con 10) 10 meq DAILY PO  Last administered on 3/19/

17at 08:35; Admin Dose 10 MEQ;  Start 3/16/17 at 09:00


Ondansetron HCl (Zofran Inj) 4 mg Q6H  PRN IV NAUSEA AND/OR VOMITING Last 

administered on 3/16/17at 01:17; Admin Dose 4 MG;  Start 3/15/17 at 23:30


Acetaminophen (Tylenol Tab) 650 mg Q6H  PRN PO PAIN LEVEL 1-3 OR FEVER;  Start 3

/15/17 at 23:30


Acetaminophen (Tylenol Supp) 650 mg Q6H  PRN [place] PAIN LEVEL 1-3 OR FEVER;  Start 

3/15/17 at 23:30


Docusate Sodium (Colace) 100 mg Q12H  PRN PO CONSTIPATION Last administered on 3

/18/17at 18:50; Admin Dose 100 MG;  Start 3/15/17 at 23:30


Magnesium Hydroxide (Milk Of Mag) 30 ml DAILY  PRN PO CONSTIPATION Last 

administered on 3/18/17at 18:50; Admin Dose 30 ML;  Start 3/15/17 at 23:30


Bisacodyl (Dulcolax) 5 mg DAILY  PRN PO CONSTIPATION;  Start 3/15/17 at 23:30


Enoxaparin Sodium 40 mg 40 mg DAILY SC  Last administered on 3/19/17at 08:39; 

Admin Dose 40 MG;  Start 3/16/17 at 09:00


Ceftriaxone Sodium (Rocephin) 50 ml @  100 mls/hr Q24H IVPB  Last administered 

on 3/19/17at 00:00; Admin Dose 100 MLS/HR;  Start 3/16/17 at 00:00


Methylprednisolone Sodium Succinate (Solu-Medrol) 40 mg Q6 IV  Last 

administered on 3/19/17at 17:34; Admin Dose 40 MG;  Start 3/16/17 at 00:00


Metolazone (Zaroxolyn) 5 mg DAILY@0530 PO  Last administered on 3/19/17at 06:14

; Admin Dose 5 MG;  Start 3/17/17 at 17:30











ASH LOW MD Mar 19, 2017 19:25

## 2017-03-19 NOTE — PN
Date/Time of Note


Date/Time of Note


DATE: 3/19/17 


TIME: 15:38





Assessment/Plan


VTE Prophylaxis


VTE Prophylaxis Intervention:  other





Lines/Catheters


IV Catheter Type (from [facility]):  Saline Lock


Urinary Cath still in place:  No





Assessment/Plan


Chief Complaint/Hosp Course


 


IMPRESSION:  The patient has: 


1.  Chronic obstructive pulmonary disease exacerbation. 


2.  Lung atelectasis.


3.  Lung congestion.


4.  Hypertension.


5.  Diabetes mellitus history.


6.  Anemia.


7.  Obesity hypoventilation.


plan same


Problems:  





Subjective


24 Hr Interval Summary


Respiratory:  shortness of breath (+)


Cardiovascular:  no complaints


Gastrointestinal:  no complaints





Exam/Review of Systems


Vital Signs


Vitals





 Vital Signs








  Date Time  Temp Pulse Resp B/P Pulse Ox O2 Delivery O2 Flow Rate FiO2


 


3/19/17 12:09 97.9 86 20 139/69 93   


 


3/19/17 04:55       2.0 


 


3/19/17 03:59        21


 


3/18/17 19:36      Nasal Cannula  














 Intake and Output   


 


 3/18/17 3/18/17 3/19/17





 15:00 23:00 07:00


 


Intake Total  1200 ml 


 


Output Total  650 ml 300 ml


 


Balance  550 ml -300 ml











Exam


Neck:  supple


Respiratory:  diminished breath sounds


Cardiovascular:  regular rate and rhythm


Gastrointestinal:  soft





Results


Result Diagram:  


3/19/17 1212                                                                   

             3/19/17 1212





Results 24 hrs





Laboratory Tests








Test


  3/19/17


12:12


 


Alanine Aminotransferase


(ALT/SGPT) 21  


 


 


Albumin 4.0  


 


Albumin/Globulin Ratio 1.21  


 


Alkaline Phosphatase 81  


 


Anion Gap 15  


 


Aspartate Amino Transf


(AST/SGOT) 18  


 


 


Basophils # 0.0  


 


Basophils % 0.0  


 


Blood Urea Nitrogen 20  


 


Calcium Level 8.5  


 


Carbon Dioxide Level 39  H


 


Chloride Level 87  L


 


Creatinine 0.71  


 


Direct Bilirubin 0.00  


 


Eosinophils # 0.0  


 


Eosinophils % 0.0  


 


Globulin 3.30  H


 


Glucose Level 126  


 


Hematocrit 38.0  L


 


Hemoglobin 11.3  L


 


Indirect Bilirubin 0.0  


 


Lymphocytes # 0.9  


 


Lymphocytes % 8.0  L


 


Mean Corpuscular Hemoglobin 24.1  L


 


Mean Corpuscular Hemoglobin


Concent 29.7  L


 


 


Mean Corpuscular Volume 81.2  L


 


Mean Platelet Volume 9.3  


 


Monocytes # 0.7  


 


Monocytes % 5.7  


 


Neutrophils # 10.1  H


 


Neutrophils % 85.8  H


 


Nucleated Red Blood Cells # 0.1  H


 


Nucleated Red Blood Cells % 0.5  H


 


Platelet Count 315  


 


Potassium Level 3.6  


 


Red Blood Count 4.68  L


 


Red Cell Distribution Width 16.7  H


 


Sodium Level 137  


 


Total Bilirubin 0.0  L


 


Total Protein 7.3  


 


Troponin I < 0.012  


 


White Blood Count 11.8  #H











Medications


Medications





 Current Medications


Amlodipine Besylate (Norvasc) 10 mg DAILY PO  Last administered on 3/19/17at 08:

36; Admin Dose 10 MG;  Start 3/16/17 at 09:00


Ascorbic Acid (Vitamin C) 1,000 mg DAILY PO  Last administered on 3/19/17at 08:

36; Admin Dose 1,000 MG;  Start 3/16/17 at 09:00


Aspirin (Aspirin) 81 mg DAILY PO  Last administered on 3/19/17at 08:36; Admin 

Dose 81 MG;  Start 3/16/17 at 09:00


Cholecalciferol (Vitamin D) 1,000 unit DAILY PO  Last administered on 3/19/17at 

08:35; Admin Dose 1,000 UNIT;  Start 3/16/17 at 09:00


Docusate Sodium (Colace) 250 mg BID PO  Last administered on 3/19/17at 08:35; 

Admin Dose 250 MG;  Start 3/16/17 at 09:00


Gabapentin (Neurontin) 600 mg TID PO  Last administered on 3/19/17at 13:09; 

Admin Dose 600 MG;  Start 3/16/17 at 09:00


Acetaminophen/ Hydrocodone Bitart (Norco (10/325)) 1 tab QID  PRN PO PAIN Last 

administered on 3/19/17at 04:06; Admin Dose 1 TAB;  Start 3/15/17 at 23:30


Isosorbide Dinitrate (Isordil) 10 mg TID PO  Last administered on 3/19/17at 13:

11; Admin Dose 10 MG;  Start 3/16/17 at 09:00


Lisinopril (Zestril) 20 mg DAILY PO  Last administered on 3/19/17at 08:36; 

Admin Dose 20 MG;  Start 3/16/17 at 09:00


Morphine Sulfate (Ms Contin (Er)) 15 mg Q12 PO  Last administered on 3/19/17at 

08:36; Admin Dose 15 MG;  Start 3/16/17 at 09:00


Multivitamins Therapeutic (Theragran) 1 tab DAILY PO  Last administered on 3/19/

17at 08:36; Admin Dose 1 TAB;  Start 3/16/17 at 09:00


Potassium Chloride (Klor-Con 10) 10 meq DAILY PO  Last administered on 3/19/

17at 08:35; Admin Dose 10 MEQ;  Start 3/16/17 at 09:00


Ondansetron HCl (Zofran Inj) 4 mg Q6H  PRN IV NAUSEA AND/OR VOMITING Last 

administered on 3/16/17at 01:17; Admin Dose 4 MG;  Start 3/15/17 at 23:30


Acetaminophen (Tylenol Tab) 650 mg Q6H  PRN PO PAIN LEVEL 1-3 OR FEVER;  Start 3

/15/17 at 23:30


Acetaminophen (Tylenol Supp) 650 mg Q6H  PRN DC PAIN LEVEL 1-3 OR FEVER;  Start 

3/15/17 at 23:30


Docusate Sodium (Colace) 100 mg Q12H  PRN PO CONSTIPATION Last administered on 3

/18/17at 18:50; Admin Dose 100 MG;  Start 3/15/17 at 23:30


Magnesium Hydroxide (Milk Of Mag) 30 ml DAILY  PRN PO CONSTIPATION Last 

administered on 3/18/17at 18:50; Admin Dose 30 ML;  Start 3/15/17 at 23:30


Bisacodyl (Dulcolax) 5 mg DAILY  PRN PO CONSTIPATION;  Start 3/15/17 at 23:30


Enoxaparin Sodium 40 mg 40 mg DAILY SC  Last administered on 3/19/17at 08:39; 

Admin Dose 40 MG;  Start 3/16/17 at 09:00


Ceftriaxone Sodium (Rocephin) 50 ml @  100 mls/hr Q24H IVPB  Last administered 

on 3/19/17at 00:00; Admin Dose 100 MLS/HR;  Start 3/16/17 at 00:00


Methylprednisolone Sodium Succinate (Solu-Medrol) 40 mg Q6 IV  Last 

administered on 3/19/17at 13:09; Admin Dose 40 MG;  Start 3/16/17 at 00:00


Metolazone (Zaroxolyn) 5 mg DAILY@0530 PO  Last administered on 3/19/17at 06:14

; Admin Dose 5 MG;  Start 3/17/17 at 17:30











DELORIS PHILLIPS MD Mar 19, 2017 15:39

## 2017-03-20 VITALS — SYSTOLIC BLOOD PRESSURE: 111 MMHG | RESPIRATION RATE: 17 BRPM | DIASTOLIC BLOOD PRESSURE: 62 MMHG

## 2017-03-20 VITALS — SYSTOLIC BLOOD PRESSURE: 137 MMHG | RESPIRATION RATE: 17 BRPM | DIASTOLIC BLOOD PRESSURE: 83 MMHG

## 2017-03-20 VITALS — RESPIRATION RATE: 20 BRPM | SYSTOLIC BLOOD PRESSURE: 128 MMHG | DIASTOLIC BLOOD PRESSURE: 60 MMHG

## 2017-03-20 VITALS — RESPIRATION RATE: 20 BRPM | SYSTOLIC BLOOD PRESSURE: 134 MMHG | DIASTOLIC BLOOD PRESSURE: 74 MMHG

## 2017-03-20 VITALS — DIASTOLIC BLOOD PRESSURE: 71 MMHG | SYSTOLIC BLOOD PRESSURE: 148 MMHG | RESPIRATION RATE: 20 BRPM

## 2017-03-20 VITALS — HEART RATE: 3 BPM

## 2017-03-20 VITALS — HEART RATE: 75 BPM

## 2017-03-20 VITALS — HEART RATE: 86 BPM

## 2017-03-20 RX ADMIN — GABAPENTIN SCH MG: 300 CAPSULE ORAL at 14:33

## 2017-03-20 RX ADMIN — IPRATROPIUM BROMIDE AND ALBUTEROL SULFATE SCH ML: .5; 3 SOLUTION RESPIRATORY (INHALATION) at 14:51

## 2017-03-20 RX ADMIN — METOLAZONE SCH MG: 5 TABLET ORAL at 05:34

## 2017-03-20 RX ADMIN — PANTOPRAZOLE SODIUM SCH MG: 40 TABLET, DELAYED RELEASE ORAL at 09:33

## 2017-03-20 RX ADMIN — POTASSIUM CHLORIDE SCH MEQ: 10 TABLET, EXTENDED RELEASE ORAL at 09:31

## 2017-03-20 RX ADMIN — CEFTRIAXONE SCH MLS/HR: 1 INJECTION, SOLUTION INTRAVENOUS at 02:32

## 2017-03-20 RX ADMIN — OXYCODONE HYDROCHLORIDE AND ACETAMINOPHEN SCH MG: 500 TABLET ORAL at 09:33

## 2017-03-20 RX ADMIN — GABAPENTIN SCH MG: 300 CAPSULE ORAL at 09:33

## 2017-03-20 RX ADMIN — AMLODIPINE BESYLATE SCH MG: 10 TABLET ORAL at 09:33

## 2017-03-20 RX ADMIN — ASPIRIN 81 MG SCH MG: 81 TABLET ORAL at 09:34

## 2017-03-20 RX ADMIN — ISOSORBIDE DINITRATE SCH MG: 10 TABLET ORAL at 09:34

## 2017-03-20 RX ADMIN — VITAMIN D, TAB 1000IU (100/BT) SCH UNIT: 25 TAB at 09:34

## 2017-03-20 RX ADMIN — MORPHINE SULFATE SCH MG: 15 TABLET, EXTENDED RELEASE ORAL at 09:32

## 2017-03-20 RX ADMIN — IPRATROPIUM BROMIDE AND ALBUTEROL SULFATE SCH ML: .5; 3 SOLUTION RESPIRATORY (INHALATION) at 08:00

## 2017-03-20 RX ADMIN — THERA TABS SCH TAB: TAB at 09:31

## 2017-03-20 RX ADMIN — LISINOPRIL SCH MG: 20 TABLET ORAL at 09:35

## 2017-03-20 RX ADMIN — ENOXAPARIN SODIUM SCH MG: 100 INJECTION SUBCUTANEOUS at 09:00

## 2017-03-20 RX ADMIN — ISOSORBIDE DINITRATE SCH MG: 10 TABLET ORAL at 14:33

## 2017-03-20 NOTE — PDOCDIS
Discharge Instructions


CONDITION


Patient Condition:  Good





HOME CARE INSTRUCTIONS:


Special Diet:  2 GM NA





ACTIVITY:








Activity Restrictions:   Slowly Increase Activity











FOLLOW UP/APPOINTMENTS


Appointments


f/u dr gao 3 wks,


see dr schulz 2 wks


see pcp 1 wk











DELORIS PHILLIPS MD Mar 20, 2017 15:14

## 2017-03-20 NOTE — CONS
Date/Time of Note


Date/Time of Note


DATE: 3/20/17 


TIME: 17:18





Assessment/Plan


Assessment/Plan


Chief Complaint/Hosp Course


IMPRESSION


1.  Chest pain, assess for acute coronary syndrome with negative troponins x3 

at this time and improvement in chest pain.


2.  Congestive heart failure exacerbation, diastolic; acute-on-chronic.-volume 

status slowly improving


3.  Hypertension, under reasonable control.


4.  Abnormal electrocardiogram, assess for acute coronary syndrome with 

negative troponins x3 at this time; previously preserved ejection fraction.


5.  Chronic obstructive pulmonary disease.


6.  Shortness of breath, secondary to congestive heart failure and chronic 

obstructive pulmonary disease.


7.  Anemia.





Recc:


-Tele


-serial ecg's


-Continue isordil/norvasc/zestril


-Change lasix to PO and contnue metolazone


-Continue steroids/bronchodilators/abx's for treatment of COPD exacerbation


-D/C planning if stable and asymptomatic


Problems:  





Consultation Date/Type/Reason


Admit Date/Time


Mar 17, 2017 at 16:45


Initial Consult Date


3/16/2017


Type of Consultation:  Cardiology


Reason for Consultation


CHF


Referring Provider:  DELORIS PHILLIPS MD





Exam/Review of Systems


Vital Signs


Vitals





 Vital Signs








  Date Time  Temp Pulse Resp B/P Pulse Ox O2 Delivery O2 Flow Rate FiO2


 


3/20/17 15:56 98.3 93 20 128/60 92   


 


3/20/17 13:37        21


 


3/19/17 04:55       2.0 


 


3/18/17 19:36      Nasal Cannula  














 Intake and Output   


 


 3/19/17 3/19/17 3/20/17





 15:00 23:00 07:00


 


Intake Total  2200 ml 2000 ml


 


Output Total   1800 ml


 


Balance  2200 ml 200 ml











Exam


Review of Systems:


CONSTITUTIONAL:  No fevers, chills.


PULMONARY:  No sob


CARDIOVASCULAR: No chest pain/palpitations


GASTROINTESTINAL:  No nausea/vomiting.


GENITOURINARY:  No hematuria/dysuria.


MUSCULOSKELETAL:  No myagias/arthalgias.


PSYCHIATRIC:  The patient denies depression.


NEUROLOGIC:   No weakness


Constitutional:  alert, oriented


Psych:  no complaints


Head:  normocephalic


ENMT:  mucosa pink and moist


Neck:  jvd (8 cm water), supple


Respiratory:  clear to auscultation


Cardiovascular:  regular rate and rhythm


Gastrointestinal:  non-tender, soft


Musculoskeletal:  muscle tone (normal)


Extremities:  edema (none)


Neurological:  other (No focal deficits)





Results


Result Diagram:  


3/19/17 1212                                                                   

             3/19/17 1212








Medications


Medications





 Current Medications


Amlodipine Besylate (Norvasc) 10 mg DAILY PO  Last administered on 3/20/17at 09:

33; Admin Dose 10 MG;  Start 3/16/17 at 09:00


Ascorbic Acid (Vitamin C) 1,000 mg DAILY PO  Last administered on 3/20/17at 09:

33; Admin Dose 1,000 MG;  Start 3/16/17 at 09:00


Aspirin (Aspirin) 81 mg DAILY PO  Last administered on 3/20/17at 09:34; Admin 

Dose 81 MG;  Start 3/16/17 at 09:00


Cholecalciferol (Vitamin D) 1,000 unit DAILY PO  Last administered on 3/20/17at 

09:34; Admin Dose 1,000 UNIT;  Start 3/16/17 at 09:00


Docusate Sodium (Colace) 250 mg BID PO  Last administered on 3/20/17at 09:33; 

Admin Dose 250 MG;  Start 3/16/17 at 09:00


Gabapentin (Neurontin) 600 mg TID PO  Last administered on 3/20/17at 14:33; 

Admin Dose 600 MG;  Start 3/16/17 at 09:00


Acetaminophen/ Hydrocodone Bitart (Norco (10/325)) 1 tab QID  PRN PO PAIN Last 

administered on 3/19/17at 04:06; Admin Dose 1 TAB;  Start 3/15/17 at 23:30


Isosorbide Dinitrate (Isordil) 10 mg TID PO  Last administered on 3/20/17at 14:

33; Admin Dose 10 MG;  Start 3/16/17 at 09:00


Lisinopril (Zestril) 20 mg DAILY PO  Last administered on 3/20/17at 09:35; 

Admin Dose 20 MG;  Start 3/16/17 at 09:00


Morphine Sulfate (Ms Contin (Er)) 15 mg Q12 PO  Last administered on 3/20/17at 

09:32; Admin Dose 15 MG;  Start 3/16/17 at 09:00


Multivitamins Therapeutic (Theragran) 1 tab DAILY PO  Last administered on 3/20/

17at 09:31; Admin Dose 1 TAB;  Start 3/16/17 at 09:00


Potassium Chloride (Klor-Con 10) 10 meq DAILY PO  Last administered on 3/20/

17at 09:31; Admin Dose 10 MEQ;  Start 3/16/17 at 09:00


Ondansetron HCl (Zofran Inj) 4 mg Q6H  PRN IV NAUSEA AND/OR VOMITING Last 

administered on 3/16/17at 01:17; Admin Dose 4 MG;  Start 3/15/17 at 23:30


Acetaminophen (Tylenol Tab) 650 mg Q6H  PRN PO PAIN LEVEL 1-3 OR FEVER;  Start 3

/15/17 at 23:30


Acetaminophen (Tylenol Supp) 650 mg Q6H  PRN VT PAIN LEVEL 1-3 OR FEVER;  Start 

3/15/17 at 23:30


Docusate Sodium (Colace) 100 mg Q12H  PRN PO CONSTIPATION Last administered on 3

/18/17at 18:50; Admin Dose 100 MG;  Start 3/15/17 at 23:30


Magnesium Hydroxide (Milk Of Mag) 30 ml DAILY  PRN PO CONSTIPATION Last 

administered on 3/18/17at 18:50; Admin Dose 30 ML;  Start 3/15/17 at 23:30


Bisacodyl (Dulcolax) 5 mg DAILY  PRN PO CONSTIPATION;  Start 3/15/17 at 23:30


Enoxaparin Sodium 40 mg 40 mg DAILY SC  Last administered on 3/19/17at 08:39; 

Admin Dose 40 MG;  Start 3/16/17 at 09:00


Ceftriaxone Sodium (Rocephin) 50 ml @  100 mls/hr Q24H IVPB  Last administered 

on 3/20/17at 02:32; Admin Dose 100 MLS/HR;  Start 3/16/17 at 00:00


Methylprednisolone Sodium Succinate (Solu-Medrol) 40 mg Q6 IV  Last 

administered on 3/20/17at 05:34; Admin Dose 40 MG;  Start 3/16/17 at 00:00


Metolazone (Zaroxolyn) 5 mg DAILY@0530 PO  Last administered on 3/20/17at 05:34

; Admin Dose 5 MG;  Start 3/17/17 at 17:30











HARLEY ADAMS 20, 2017 17:20

## 2017-03-21 NOTE — RADRPT
Vent Rate: 70 bpm

RR Interval: 0 msec

AK Interval: 176 msec

QRS Duration: 100 msec

QT Interval: 416 msec

QTC Interval: 449 msec

P-R-T Axis: 56 - -10 - 45 degrees

 

 Normal sinus rhythm

 Normal ECG

 

Electronically Signed By: Tonny Blount

[id number]

## 2017-03-25 NOTE — DS
Date/Time of Note


Date/Time of Note


DATE: 3/25/17 


TIME: 20:43





Discharge Summary


Admission/Discharge Info


Admit Date/Time


Mar 17, 2017 at 16:45


Discharge Date/Time


Mar 20, 2017 at 17:52


Final Diagnosis


1. CHF exacerbation


2. COPD


3. Anemia


4. Sleep apnea


5. Abnormal ECG


6. Morbid obesity


7. Hypertension


Patient Condition:  Serious


Consults


Dr Manrique


Procedures


ECG


Hx of Present Illness


Pt was admitted for CHF exacerbation, was stabilized, diuretics were used, 

oxygen therapy


Hospital Course


IMPRESSION


1.  Chest pain, assess for acute coronary syndrome with negative troponins x3 

at this time and improvement in chest pain.


2.  Congestive heart failure exacerbation, diastolic; acute-on-chronic.-volume 

status slowly improving


3.  Hypertension, under reasonable control.


4.  Abnormal electrocardiogram, assess for acute coronary syndrome with 

negative troponins x3 at this time; previously preserved ejection fraction.


5.  Chronic obstructive pulmonary disease.


6.  Shortness of breath, secondary to congestive heart failure and chronic 

obstructive pulmonary disease.


7.  Anemia.





Recc:


-Tele


-serial ecg's


-Continue isordil/norvasc/zestril


-Change lasix to PO and contnue metolazone


-Continue steroids/bronchodilators/abx's for treatment of COPD exacerbation


-D/C planning if stable and asymptomatic


Home Meds


Active Scripts


Prednisone* (Prednisone*) 10 Mg Tab, 10 MG PO DAILY for 10 Days, TAB


   Prov:DELORIS PHILLIPS MD         3/20/17


[Furosemide] 10 MG/ML SOLN No Conflict Check, 40 MG IV BID DIURETICS for 14 Days


   Prov:DELORIS PHILLIPS MD         3/20/17


Isosorbide Dinitrate* (Isordil*) 10 Mg Tablet, 10 MG PO TID for 28 Days, TAB


   Prov:DELORIS PHILLIPS MD         12/21/16


Reported Medications


Morphine Sulfate* (Ms Contin*) 15 Mg Tablet.sa, 15 MG PO Q12, TAB


   12/18/16


Gabapentin* (Gabapentin*) 600 Mg Tablet, 600 MG PO TID, #90 TAB


   12/17/16


Simvastatin* (Zocor*) 10 Mg Tablet, 10 MG PO QHS, #30 TAB


   12/17/16


Pantoprazole* (Pantoprazole*) 40 Mg Tablet.dr, 40 MG PO AC BREAKFAST, TAB


   12/17/16


Cholecalciferol* (Vitamin D3*) 1,000 Unit Tablet, 1000 UNIT PO DAILY, TAB


   6/28/15


Lisinopril* (Lisinopril*) 20 Mg Tablet, 20 MG PO DAILY, TAB


   5/20/15


Ipratropium Bromide* (Atrovent*) 0.5 Mg/2.5 Ml Neb, 0.5 MG NEB Q4H Y for 

WHEEZING AND SOB, EA


   2/19/15


Amlodipine Besylate* (Amlodipine Besylate*) 10 Mg Tablet, 10 MG PO DAILY, TAB


   2/19/15


Furosemide* (Lasix*) 40 Mg Tablet, 40 MG PO BID, TAB


   2/19/15


Multivitamins* (Multivitamin*) 1 Udcap Capsule, 1 TAB PO DAILY, TAB


   1/31/15


Docusate Sodium* (Colace*) 250 Mg Capsule, 250 MG PO BID, CAP


   1/31/15


Ascorbic Acid (Vitamin C) 500 Mg Tab, 1000 MG PO DAILY, TAB


   12/23/14


Albuterol Sulfate* (Albuterol Sulfate* Neb) 0.083%-3 Ml Neb, 2.5 MG NEB DAILY Y 

for WHEEZING AND SOB, EA


   10/16/14


Potassium Chloride* (K-Dur*) 10 Meq Tab.prt.sr, 10 MEQ PO DAILY, TAB


   10/16/14


Aspirin* (Aspirin* Chew) 81 Mg Tab.chew, 81 MG PO DAILY, TAB.CHEW


   10/16/14


Discontinued Reported Medications


Hydrocodone Bit-Acetaminophen* (Norco*)  Mg Tablet, 1 TAB PO QID Y for 

PAIN, TAB


   10/16/14


Discontinued Scripts


Prednisone (Prednisone) 20 Mg Tab, 10 MG PO DAILY for 7 Days, TAB


   Prov:DELORIS PHILLIPS MD         12/21/16


Follow-up Plan


1. Follow up PCP in 1 week











RENAE COON Mar 25, 2017 20:46

## 2017-04-15 ENCOUNTER — HOSPITAL ENCOUNTER (INPATIENT)
Dept: HOSPITAL 10 - E/R | Age: 67
LOS: 3 days | Discharge: HOME | DRG: 191 | End: 2017-04-18
Attending: INTERNAL MEDICINE | Admitting: INTERNAL MEDICINE
Payer: MEDICARE

## 2017-04-15 VITALS
HEIGHT: 72 IN | BODY MASS INDEX: 42.66 KG/M2 | WEIGHT: 315 LBS | WEIGHT: 315 LBS | HEIGHT: 72 IN | BODY MASS INDEX: 42.66 KG/M2

## 2017-04-15 DIAGNOSIS — E11.9: ICD-10-CM

## 2017-04-15 DIAGNOSIS — I11.0: ICD-10-CM

## 2017-04-15 DIAGNOSIS — E66.01: ICD-10-CM

## 2017-04-15 DIAGNOSIS — J81.1: ICD-10-CM

## 2017-04-15 DIAGNOSIS — J44.1: Primary | ICD-10-CM

## 2017-04-15 DIAGNOSIS — I50.9: ICD-10-CM

## 2017-04-15 DIAGNOSIS — G47.33: ICD-10-CM

## 2017-04-15 LAB
ADD SCAN DIFF: NO
ANION GAP SERPL CALC-SCNC: 12 MMOL/L (ref 8–16)
APTT BLD: 26.1 SEC (ref 25–35)
BASOPHILS # BLD AUTO: 0 10^3/UL (ref 0–0.1)
BASOPHILS NFR BLD: 0.1 % (ref 0–2)
BUN SERPL-MCNC: 25 MG/DL (ref 7–20)
CALCIUM SERPL-MCNC: 8 MG/DL (ref 8.4–10.2)
CHLORIDE SERPL-SCNC: 95 MMOL/L (ref 97–110)
CO2 SERPL-SCNC: 36 MMOL/L (ref 21–31)
CREAT SERPL-MCNC: 0.98 MG/DL (ref 0.61–1.24)
EOSINOPHIL # BLD: 0.1 10^3/UL (ref 0–0.5)
EOSINOPHIL NFR BLD: 1.2 % (ref 0–7)
ERYTHROCYTE [DISTWIDTH] IN BLOOD BY AUTOMATED COUNT: 17.1 % (ref 11.5–14.5)
GLUCOSE SERPL-MCNC: 99 MG/DL (ref 70–220)
HCT VFR BLD CALC: 38.9 % (ref 42–52)
HGB BLD-MCNC: 11.3 G/DL (ref 14–18)
INR PPP: 0.91
LYMPHOCYTES # BLD AUTO: 2.1 10^3/UL (ref 0.8–2.9)
LYMPHOCYTES NFR BLD AUTO: 19.9 % (ref 15–51)
MCH RBC QN AUTO: 24.6 PG (ref 29–33)
MCHC RBC AUTO-ENTMCNC: 29 G/DL (ref 32–37)
MCV RBC AUTO: 84.6 FL (ref 82–101)
MONOCYTES # BLD: 1.4 10^3/UL (ref 0.3–0.9)
MONOCYTES NFR BLD: 13.4 % (ref 0–11)
NEUTROPHILS # BLD: 6.8 10^3/UL (ref 1.6–7.5)
NEUTROPHILS NFR BLD AUTO: 63.4 % (ref 39–77)
NRBC # BLD MANUAL: 0.2 10^3/UL (ref 0–0)
NRBC BLD QL: 1.5 /100WBC (ref 0–0)
PLATELET # BLD: 345 10^3/UL (ref 140–415)
PMV BLD AUTO: 8.5 FL (ref 7.4–10.4)
POTASSIUM SERPL-SCNC: 3.8 MMOL/L (ref 3.5–5.1)
PROTHROMBIN TIME: 12.2 SEC (ref 12.2–14.2)
PT RATIO: 1
RBC # BLD AUTO: 4.6 10^6/UL (ref 4.7–6.1)
SODIUM SERPL-SCNC: 139 MMOL/L (ref 135–144)
TROPONIN I SERPL-MCNC: < 0.012 NG/ML (ref 0–0.12)
WBC # BLD AUTO: 10.8 10^3/UL (ref 4.8–10.8)

## 2017-04-15 PROCEDURE — 80053 COMPREHEN METABOLIC PANEL: CPT

## 2017-04-15 PROCEDURE — 94664 DEMO&/EVAL PT USE INHALER: CPT

## 2017-04-15 PROCEDURE — 80048 BASIC METABOLIC PNL TOTAL CA: CPT

## 2017-04-15 PROCEDURE — 82550 ASSAY OF CK (CPK): CPT

## 2017-04-15 PROCEDURE — 96375 TX/PRO/DX INJ NEW DRUG ADDON: CPT

## 2017-04-15 PROCEDURE — 36415 COLL VENOUS BLD VENIPUNCTURE: CPT

## 2017-04-15 PROCEDURE — 85610 PROTHROMBIN TIME: CPT

## 2017-04-15 PROCEDURE — 93005 ELECTROCARDIOGRAM TRACING: CPT

## 2017-04-15 PROCEDURE — 71010: CPT

## 2017-04-15 PROCEDURE — 96374 THER/PROPH/DIAG INJ IV PUSH: CPT

## 2017-04-15 PROCEDURE — 84484 ASSAY OF TROPONIN QUANT: CPT

## 2017-04-15 PROCEDURE — 82553 CREATINE MB FRACTION: CPT

## 2017-04-15 PROCEDURE — 83880 ASSAY OF NATRIURETIC PEPTIDE: CPT

## 2017-04-15 PROCEDURE — 85730 THROMBOPLASTIN TIME PARTIAL: CPT

## 2017-04-15 PROCEDURE — 85025 COMPLETE CBC W/AUTO DIFF WBC: CPT

## 2017-04-15 NOTE — ERA
ER Documentation


Chief Complaint


Date/Time


DATE: 4/15/17 


TIME: 22:34


Chief Complaint


CP, SOB x 30 min, was code green in main lobby, won't state means





HPI


This 66-year-old male comes emergency room because he had chest pain that was 

sudden onset 30 minutes prior on his left chest described as a sharp pressure-

like pain. He also has shortness of breath and believes it may be his COPD.  

States that he has congestive heart failure. Accompanied by nausea without 

vomiting. Patient immediately requests narcotic pain medication with IV 

Benadryl.





ROS


All systems reviewed and are negative except as per history of present illness.





Medications


Home Meds


Active Scripts


Prednisone* (Prednisone*) 10 Mg Tab, 10 MG PO DAILY for 10 Days, TAB


   Prov:DELORIS PHILLIPS MD         3/20/17


[Furosemide] 10 MG/ML SOLN No Conflict Check, 40 MG IV BID DIURETICS for 14 Days


   Prov:DELORIS PHILLIPS MD         3/20/17


Isosorbide Dinitrate* (Isordil*) 10 Mg Tablet, 10 MG PO TID for 28 Days, TAB


   Prov:DELORIS PHILLIPS MD         12/21/16


Reported Medications


Morphine Sulfate* (Ms Contin*) 15 Mg Tablet.sa, 15 MG PO Q12, TAB


   12/18/16


Gabapentin* (Gabapentin*) 600 Mg Tablet, 600 MG PO TID, #90 TAB


   12/17/16


Simvastatin* (Zocor*) 10 Mg Tablet, 10 MG PO QHS, #30 TAB


   12/17/16


Pantoprazole* (Pantoprazole*) 40 Mg Tablet.dr, 40 MG PO AC BREAKFAST, TAB


   12/17/16


Cholecalciferol* (Vitamin D3*) 1,000 Unit Tablet, 1000 UNIT PO DAILY, TAB


   6/28/15


Lisinopril* (Lisinopril*) 20 Mg Tablet, 20 MG PO DAILY, TAB


   5/20/15


Ipratropium Bromide* (Atrovent*) 0.5 Mg/2.5 Ml Neb, 0.5 MG NEB Q4H Y for 

WHEEZING AND SOB, EA


   2/19/15


Amlodipine Besylate* (Amlodipine Besylate*) 10 Mg Tablet, 10 MG PO DAILY, TAB


   2/19/15


Furosemide* (Lasix*) 40 Mg Tablet, 40 MG PO BID, TAB


   2/19/15


Multivitamins* (Multivitamin*) 1 Udcap Capsule, 1 TAB PO DAILY, TAB


   1/31/15


Docusate Sodium* (Colace*) 250 Mg Capsule, 250 MG PO BID, CAP


   1/31/15


Ascorbic Acid (Vitamin C) 500 Mg Tab, 1000 MG PO DAILY, TAB


   12/23/14


Albuterol Sulfate* (Albuterol Sulfate* Neb) 0.083%-3 Ml Neb, 2.5 MG NEB DAILY Y 

for WHEEZING AND SOB, EA


   10/16/14


Potassium Chloride* (K-Dur*) 10 Meq Tab.prt.sr, 10 MEQ PO DAILY, TAB


   10/16/14


Aspirin* (Aspirin* Chew) 81 Mg Tab.chew, 81 MG PO DAILY, TAB.CHEW


   10/16/14





Allergies


Allergies:  


Coded Allergies:  


     hydromorphone (Verified  Allergy, Severe, 12/17/16)





PMhx/Soc


Medical and Surgical Hx:  pt denies Surgical Hx


History of Surgery:  No


Anesthesia Reaction:  No


Hx Neurological Disorder:  Yes (Nueropathy)


Hx Respiratory Disorders:  Yes (COPD,Asthma sleep apnea)


Hx Cardiac Disorders:  Yes (HTN CHF)


Hx Psychiatric Problems:  No


Hx Miscellaneous Medical Probl:  No


Hx Alcohol Use:  No


Hx Substance Use:  No


Hx Tobacco Use:  No


Smoking Status:  Never smoker





Physical Exam


Vitals





Vital Signs








  Date Time  Temp Pulse Resp B/P Pulse Ox O2 Delivery O2 Flow Rate FiO2


 


4/15/17 22:14  89 22 129/71 92 Nasal Cannula 2.0 


 


4/15/17 21:10  82 18 170/108 100 Room Air  


 


4/15/17 21:01     95  3.0 


 


4/15/17 20:59  92 16  95 Nasal Cannula 3.0 


 


4/15/17 20:47      Nasal Cannula 2 


 


4/15/17 20:07 100.0 102 33 144/71 90   








Physical Exam


Const:  []           No distress


Head:   Atraumatic 


Eyes:    Normal Conjunctiva


ENT:    Normal External Ears, Nose and Mouth.


Neck:               Full range of motion..~ No meningismus.


Resp:   Mild bilateral expiratory wheezing, coughing on exam. Wheezing clears 

with cough.


Cardio:    Regular rate and rhythm, no murmurs


Abd:    Soft, non tender, non distended. Normal bowel sounds


Skin:    No petechiae or rashes


Back:    No midline or flank tenderness


Ext:    No cyanosis, or edema


Neur:    Awake and alert and oriented 3, no focal deficits


Psych:    Normal Mood and Affect


Result Diagram:  


4/15/17 2030                                                                   

             4/15/17 2030





Results 24 hrs





 Laboratory Tests








Test


  4/15/17


20:30


 


White Blood Count 10.810^3/ul 


 


Red Blood Count 4.6010^6/ul 


 


Hemoglobin 11.3g/dl 


 


Hematocrit 38.9% 


 


Mean Corpuscular Volume 84.6fl 


 


Mean Corpuscular Hemoglobin 24.6pg 


 


Mean Corpuscular Hemoglobin


Concent 29.0g/dl 


 


 


Red Cell Distribution Width 17.1% 


 


Platelet Count 35367^3/UL 


 


Mean Platelet Volume 8.5fl 


 


Neutrophils % 63.4% 


 


Lymphocytes % 19.9% 


 


Monocytes % 13.4% 


 


Eosinophils % 1.2% 


 


Basophils % 0.1% 


 


Nucleated Red Blood Cells % 1.5/100WBC 


 


Neutrophils # 6.810^3/ul 


 


Lymphocytes # 2.110^3/ul 


 


Monocytes # 1.410^3/ul 


 


Eosinophils # 0.110^3/ul 


 


Basophils # 0.010^3/ul 


 


Nucleated Red Blood Cells # 0.210^3/ul 


 


Prothrombin Time 12.2Sec 


 


Prothrombin Time Ratio 1.0 


 


INR International Normalized


Ratio 0.91 


 


 


Activated Partial


Thromboplast Time 26.1Sec 


 


 


Sodium Level 139mmol/L 


 


Potassium Level 3.8mmol/L 


 


Chloride Level 95mmol/L 


 


Carbon Dioxide Level 36mmol/L 


 


Anion Gap 12 


 


Blood Urea Nitrogen 25mg/dl 


 


Creatinine 0.98mg/dl 


 


Glucose Level 99mg/dl 


 


Calcium Level 8.0mg/dl 


 


Troponin I < 0.012ng/ml 


 


B-Type Natriuretic Peptide 159PG/ML 








 Current Medications








 Medications


  (Trade)  Dose


 Ordered  Sig/Adán


 Route


 PRN Reason  Start Time


 Stop Time Status Last Admin


Dose Admin


 


 Aspirin


  (Aspirin)  325 mg  ONCE  STAT


 PO


   4/15/17 20:08


 4/15/17 20:09 DC 4/15/17 20:38


 


 


 Ondansetron HCl


  (Zofran Inj)  8 mg  ONCE  STAT


 IV


   4/15/17 20:36


 4/15/17 20:38 DC 4/15/17 20:59


 


 


 Nitroglycerin


  (Nitroglycerin


  (Sl Tab) 0.4 Mg)  1 tab  ONCE  ONCE


 SL


   4/15/17 21:00


 4/15/17 21:01 DC 4/15/17 20:58


 


 


 Albuterol


  (Proventil


 0.083% (Neb))  5 mg  ONCE  STAT


 HHN


   4/15/17 20:42


 4/15/17 20:43 DC 4/15/17 20:56


 


 


 Ipratropium


 Bromide


  (Atrovent 0.02%


  (Neb))  0.5 mg  ONCE  ONCE


 HHN


   4/15/17 21:00


 4/15/17 21:01 DC 4/15/17 20:56


 


 


 Furosemide


  (Lasix)  40 mg  STK-MED ONCE


 .ROUTE


   4/15/17 21:00


 4/15/17 21:01 DC  


 


 


 Morphine Sulfate


  (morphine)  4 mg  ONCE  STAT


 IV


   4/15/17 22:01


 4/15/17 22:02 DC 4/15/17 22:08


 


 


 Prednisone


  (Prednisone)  60 mg  ONCE  ONCE


 PO


   4/15/17 22:30


 4/15/17 22:31 DC 4/15/17 22:08


 


 


 Ondansetron HCl


  (Zofran Inj)  4 mg  ER BRIDGE PRN


 IV


 NAUSEA AND/OR VOMITING  4/15/17 22:30


 4/16/17 22:29   


 


 


 Acetaminophen


  (Tylenol Tab)  650 mg  ER BRIDGE PRN


 PO


 MILD PAIN/FEVER  4/15/17 22:30


 4/16/17 22:29   


 











Procedures/MDM


66-year-old male with hypertension as a risk factor with acute onset chest 

pain. Initial troponin is negative. Patient also had mild wheezing is likely 

experiencing a COPD exacerbation. He was given a breathing treatment and 60 mg 

prednisone pill. He is also given 325 mg aspirin. Was given 3 nitroglycerin 

which did not change his chest pain significantly. He has a nonischemic EKG.  

does have normocytic anemia with no reports of dark stools or GI bleeding. 

Elevated lab bicarbonate consistent with contraction alkalosis. I reviewed his 

EMR which does show an echocardiogram with some diastolic heart failure. No 

signs of heart failure currently. Wheezing was completely resolved after 

breathing treatment. I then did give the patient 4 mg of morphine but refused 

to give Benadryl IV for chest pain.  Chest pain was decreased at this point. I 

have low suspicion for PE as the patient is without risk factors. He did have a 

transient mildly elevated heart rate but he was yelling at staff that were 

trying to start his IV at the time. Spoke with Dr. Phillips who will be admitting 

the patient for chest pain and COPD exacerbation.





EKG interpretation: Sinus tachycardia rate of 101, left axis deviation, no ST-T 

wave changes concerning for acute ischemia.


Cardiac monitor interpretation: Normal sinus rhythm without arrhythmia


Chest x-ray interpretation:  I see no acute process. Cardiomegaly. No infiltrate

, no pneumothorax, no pulmonary edema, no acute fractures.





Departure


Diagnosis:  


 Primary Impression:  


 Chest pain


 Additional Impression:  


 COPD exacerbation


Condition:  Stable











HUSAM ERVIN DO Apr 15, 2017 22:42

## 2017-04-15 NOTE — RADRPT
PROCEDURE:   XR Chest. 

 

CLINICAL INDICATION:  Chest pain.

 

TECHNIQUE:  Single frontal view of the chest.

 

COMPARISON:   03/15/2017. No prior report is available for correlation.

 

FINDINGS:

Cardiomegaly.  Mild bibasilar atelectasis versus airspace disease. No signs of pleural fluid or pneu
mothorax are seen. The osseous structures and soft tissues are unremarkable. 

 

IMPRESSION:

Mild failure.

 

RPTAT: UU

_____________________________________________ 

Physician Camille           Date    Time 

Electronically viewed and signed by JOSEFA Wong Physician on 04/15/2017 21:50 

 

D:  04/15/2017 21:50  T:  04/15/2017 21:50

RS/

## 2017-04-16 VITALS — DIASTOLIC BLOOD PRESSURE: 72 MMHG | RESPIRATION RATE: 22 BRPM | SYSTOLIC BLOOD PRESSURE: 155 MMHG

## 2017-04-16 VITALS — RESPIRATION RATE: 20 BRPM | SYSTOLIC BLOOD PRESSURE: 121 MMHG | DIASTOLIC BLOOD PRESSURE: 75 MMHG

## 2017-04-16 VITALS — HEART RATE: 104 BPM

## 2017-04-16 VITALS — RESPIRATION RATE: 20 BRPM | DIASTOLIC BLOOD PRESSURE: 76 MMHG | SYSTOLIC BLOOD PRESSURE: 146 MMHG

## 2017-04-16 VITALS — RESPIRATION RATE: 20 BRPM | DIASTOLIC BLOOD PRESSURE: 71 MMHG | SYSTOLIC BLOOD PRESSURE: 124 MMHG

## 2017-04-16 VITALS — RESPIRATION RATE: 20 BRPM | SYSTOLIC BLOOD PRESSURE: 118 MMHG | DIASTOLIC BLOOD PRESSURE: 74 MMHG

## 2017-04-16 VITALS — HEART RATE: 92 BPM

## 2017-04-16 VITALS — SYSTOLIC BLOOD PRESSURE: 133 MMHG | DIASTOLIC BLOOD PRESSURE: 70 MMHG | RESPIRATION RATE: 16 BRPM

## 2017-04-16 VITALS — HEART RATE: 89 BPM

## 2017-04-16 VITALS — HEART RATE: 93 BPM

## 2017-04-16 VITALS — HEART RATE: 94 BPM

## 2017-04-16 VITALS — HEART RATE: 80 BPM

## 2017-04-16 LAB
ABNORMAL IP MESSAGE: 1
ADD SCAN DIFF: NO
ALBUMIN SERPL-MCNC: 3.4 G/DL (ref 3.3–4.9)
ALBUMIN/GLOB SERPL: 1.17 {RATIO}
ALP SERPL-CCNC: 84 IU/L (ref 42–121)
ALT SERPL-CCNC: 27 IU/L (ref 13–69)
ANION GAP SERPL CALC-SCNC: 12 MMOL/L (ref 8–16)
AST SERPL-CCNC: 30 IU/L (ref 15–46)
BASOPHILS # BLD AUTO: 0 10^3/UL (ref 0–0.1)
BASOPHILS NFR BLD: 0.2 % (ref 0–2)
BILIRUB DIRECT SERPL-MCNC: 0 MG/DL (ref 0–0.2)
BILIRUB SERPL-MCNC: 0 MG/DL (ref 0.2–1.3)
BUN SERPL-MCNC: 20 MG/DL (ref 7–20)
CALCIUM SERPL-MCNC: 8.3 MG/DL (ref 8.4–10.2)
CHLORIDE SERPL-SCNC: 97 MMOL/L (ref 97–110)
CK MB SERPL-MCNC: 0.98 NG/ML (ref 0–2.4)
CK SERPL-CCNC: 24 IU/L (ref 23–200)
CO2 SERPL-SCNC: 32 MMOL/L (ref 21–31)
CREAT SERPL-MCNC: 0.73 MG/DL (ref 0.61–1.24)
EOSINOPHIL # BLD: 0 10^3/UL (ref 0–0.5)
EOSINOPHIL NFR BLD: 0.1 % (ref 0–7)
ERYTHROCYTE [DISTWIDTH] IN BLOOD BY AUTOMATED COUNT: 16.8 % (ref 11.5–14.5)
GLOBULIN SER-MCNC: 2.9 G/DL (ref 1.3–3.2)
GLUCOSE SERPL-MCNC: 101 MG/DL (ref 70–220)
HCT VFR BLD CALC: 36.3 % (ref 42–52)
HGB BLD-MCNC: 10.5 G/DL (ref 14–18)
LYMPHOCYTES # BLD AUTO: 0.9 10^3/UL (ref 0.8–2.9)
LYMPHOCYTES NFR BLD AUTO: 8.8 % (ref 15–51)
MCH RBC QN AUTO: 24.1 PG (ref 29–33)
MCHC RBC AUTO-ENTMCNC: 28.9 G/DL (ref 32–37)
MCV RBC AUTO: 83.3 FL (ref 82–101)
MONOCYTES # BLD: 0.9 10^3/UL (ref 0.3–0.9)
MONOCYTES NFR BLD: 9.5 % (ref 0–11)
NEUTROPHILS # BLD: 7.7 10^3/UL (ref 1.6–7.5)
NEUTROPHILS NFR BLD AUTO: 78.9 % (ref 39–77)
NRBC # BLD MANUAL: 0.1 10^3/UL (ref 0–0)
NRBC BLD QL: 1.1 /100WBC (ref 0–0)
PLATELET # BLD: 321 10^3/UL (ref 140–415)
PMV BLD AUTO: 8.7 FL (ref 7.4–10.4)
POTASSIUM SERPL-SCNC: 4.4 MMOL/L (ref 3.5–5.1)
PROT SERPL-MCNC: 6.3 G/DL (ref 6.1–8.1)
RBC # BLD AUTO: 4.36 10^6/UL (ref 4.7–6.1)
SODIUM SERPL-SCNC: 137 MMOL/L (ref 135–144)
TROPONIN I SERPL-MCNC: < 0.012 NG/ML (ref 0–0.12)
WBC # BLD AUTO: 9.8 10^3/UL (ref 4.8–10.8)

## 2017-04-16 RX ADMIN — GABAPENTIN SCH MG: 300 CAPSULE ORAL at 10:21

## 2017-04-16 RX ADMIN — MORPHINE SULFATE SCH MG: 15 TABLET, EXTENDED RELEASE ORAL at 10:23

## 2017-04-16 RX ADMIN — OXYCODONE HYDROCHLORIDE AND ACETAMINOPHEN SCH MG: 500 TABLET ORAL at 10:21

## 2017-04-16 RX ADMIN — AMLODIPINE BESYLATE SCH MG: 10 TABLET ORAL at 10:22

## 2017-04-16 RX ADMIN — LISINOPRIL SCH MG: 20 TABLET ORAL at 10:22

## 2017-04-16 RX ADMIN — MORPHINE SULFATE PRN MG: 2 INJECTION, SOLUTION INTRAMUSCULAR; INTRAVENOUS at 17:18

## 2017-04-16 RX ADMIN — VITAMIN D, TAB 1000IU (100/BT) SCH UNIT: 25 TAB at 10:21

## 2017-04-16 RX ADMIN — ASPIRIN 81 MG SCH MG: 81 TABLET ORAL at 10:21

## 2017-04-16 RX ADMIN — MORPHINE SULFATE PRN MG: 2 INJECTION, SOLUTION INTRAMUSCULAR; INTRAVENOUS at 13:01

## 2017-04-16 RX ADMIN — THERA TABS SCH TAB: TAB at 10:21

## 2017-04-16 RX ADMIN — GABAPENTIN SCH MG: 300 CAPSULE ORAL at 20:40

## 2017-04-16 RX ADMIN — MORPHINE SULFATE PRN MG: 2 INJECTION, SOLUTION INTRAMUSCULAR; INTRAVENOUS at 06:42

## 2017-04-16 RX ADMIN — MORPHINE SULFATE SCH MG: 15 TABLET, EXTENDED RELEASE ORAL at 20:40

## 2017-04-16 RX ADMIN — PANTOPRAZOLE SODIUM SCH MG: 40 TABLET, DELAYED RELEASE ORAL at 17:14

## 2017-04-16 RX ADMIN — POTASSIUM CHLORIDE SCH MEQ: 10 TABLET, EXTENDED RELEASE ORAL at 10:22

## 2017-04-16 RX ADMIN — GABAPENTIN SCH MG: 300 CAPSULE ORAL at 13:01

## 2017-04-17 VITALS — DIASTOLIC BLOOD PRESSURE: 67 MMHG | SYSTOLIC BLOOD PRESSURE: 133 MMHG | RESPIRATION RATE: 20 BRPM

## 2017-04-17 VITALS — HEART RATE: 77 BPM

## 2017-04-17 VITALS — DIASTOLIC BLOOD PRESSURE: 72 MMHG | RESPIRATION RATE: 18 BRPM | SYSTOLIC BLOOD PRESSURE: 125 MMHG

## 2017-04-17 VITALS — RESPIRATION RATE: 20 BRPM | SYSTOLIC BLOOD PRESSURE: 131 MMHG | DIASTOLIC BLOOD PRESSURE: 70 MMHG

## 2017-04-17 VITALS — HEART RATE: 88 BPM

## 2017-04-17 VITALS — HEART RATE: 86 BPM

## 2017-04-17 VITALS — DIASTOLIC BLOOD PRESSURE: 88 MMHG | RESPIRATION RATE: 18 BRPM | SYSTOLIC BLOOD PRESSURE: 142 MMHG

## 2017-04-17 VITALS — HEART RATE: 90 BPM | DIASTOLIC BLOOD PRESSURE: 72 MMHG | SYSTOLIC BLOOD PRESSURE: 135 MMHG | RESPIRATION RATE: 18 BRPM

## 2017-04-17 VITALS — HEART RATE: 89 BPM

## 2017-04-17 VITALS — HEART RATE: 82 BPM

## 2017-04-17 VITALS — HEART RATE: 91 BPM

## 2017-04-17 LAB
ALBUMIN SERPL-MCNC: 3.4 G/DL (ref 3.3–4.9)
ALBUMIN/GLOB SERPL: 1.21 {RATIO}
ALP SERPL-CCNC: 113 IU/L (ref 42–121)
ALT SERPL-CCNC: 26 IU/L (ref 13–69)
ANION GAP SERPL CALC-SCNC: 13 MMOL/L (ref 8–16)
AST SERPL-CCNC: 19 IU/L (ref 15–46)
BILIRUB DIRECT SERPL-MCNC: 0 MG/DL (ref 0–0.2)
BILIRUB SERPL-MCNC: 0 MG/DL (ref 0.2–1.3)
BUN SERPL-MCNC: 20 MG/DL (ref 7–20)
CALCIUM SERPL-MCNC: 8.4 MG/DL (ref 8.4–10.2)
CHLORIDE SERPL-SCNC: 93 MMOL/L (ref 97–110)
CO2 SERPL-SCNC: 35 MMOL/L (ref 21–31)
CREAT SERPL-MCNC: 0.75 MG/DL (ref 0.61–1.24)
GLOBULIN SER-MCNC: 2.8 G/DL (ref 1.3–3.2)
GLUCOSE SERPL-MCNC: 133 MG/DL (ref 70–220)
POTASSIUM SERPL-SCNC: 4 MMOL/L (ref 3.5–5.1)
PROT SERPL-MCNC: 6.2 G/DL (ref 6.1–8.1)
SODIUM SERPL-SCNC: 137 MMOL/L (ref 135–144)

## 2017-04-17 RX ADMIN — MORPHINE SULFATE SCH MG: 15 TABLET, EXTENDED RELEASE ORAL at 08:35

## 2017-04-17 RX ADMIN — GABAPENTIN SCH MG: 300 CAPSULE ORAL at 08:34

## 2017-04-17 RX ADMIN — LISINOPRIL SCH MG: 20 TABLET ORAL at 08:36

## 2017-04-17 RX ADMIN — PANTOPRAZOLE SODIUM SCH MG: 40 TABLET, DELAYED RELEASE ORAL at 08:34

## 2017-04-17 RX ADMIN — POTASSIUM CHLORIDE SCH MEQ: 10 TABLET, EXTENDED RELEASE ORAL at 08:36

## 2017-04-17 RX ADMIN — FUROSEMIDE SCH MG: 40 TABLET ORAL at 05:15

## 2017-04-17 RX ADMIN — AMLODIPINE BESYLATE SCH MG: 10 TABLET ORAL at 08:35

## 2017-04-17 RX ADMIN — ASPIRIN 81 MG SCH MG: 81 TABLET ORAL at 08:34

## 2017-04-17 RX ADMIN — MORPHINE SULFATE SCH MG: 15 TABLET, EXTENDED RELEASE ORAL at 21:53

## 2017-04-17 RX ADMIN — OXYCODONE HYDROCHLORIDE AND ACETAMINOPHEN SCH MG: 500 TABLET ORAL at 08:34

## 2017-04-17 RX ADMIN — THERA TABS SCH TAB: TAB at 08:35

## 2017-04-17 RX ADMIN — PANTOPRAZOLE SODIUM SCH MG: 40 TABLET, DELAYED RELEASE ORAL at 17:37

## 2017-04-17 RX ADMIN — GABAPENTIN SCH MG: 300 CAPSULE ORAL at 12:17

## 2017-04-17 RX ADMIN — FUROSEMIDE SCH MG: 40 TABLET ORAL at 17:37

## 2017-04-17 RX ADMIN — VITAMIN D, TAB 1000IU (100/BT) SCH UNIT: 25 TAB at 08:35

## 2017-04-17 RX ADMIN — GABAPENTIN SCH MG: 300 CAPSULE ORAL at 21:52

## 2017-04-17 NOTE — PDOCDIS
Discharge Instructions


CONDITION


Patient Condition:  Stable





HOME CARE INSTRUCTIONS:


Special Diet:  2GM NA





ACTIVITY:








Activity Restrictions:   Slowly Increase Activity











FOLLOW UP/APPOINTMENTS


Appointments


f/u pcp 1 wk











DELORIS PHILLIPS MD Apr 17, 2017 17:58

## 2017-04-17 NOTE — PQ
Date/Time of Note


Date/Time of Note


DATE: 4/17/17 


TIME: 08:48





Physician Query


                                     Documentation Clarification





Dear Dr. Phillips,





A review of the medical record found a need for documentation clarification. 





ER progress note - COPD exacerbation  + prednisone 60 mg p.o.


                             Wheezing was completely resolved after breathing 

treatment.


H & P underlying COPD








Do you concur with the ER Dx of COPD exacerbation?





(  x ) Yes


(   )  No 


(   ) unable to determine


(     ) ______________








Please provide your response by clicking edit document,  making  your choice (

x ), click ok/save and finally click sign. 


You may also  document your response  on  your progress notes.





Thank you for your time.





Param Palmer RN, BSN, CCS, CCDS


Clinical Documentation Improvement Specialist


Health Information Management, [facility] Coding Services


[phone]


Room # 1525 - Coding


[address]~ [address]











PARAM PALMER Apr 17, 2017 08:53


DELORIS PHILLIPS MD Apr 18, 2017 10:04

## 2017-04-17 NOTE — PN
Date/Time of Note


Date/Time of Note


DATE: 4/17/17 


TIME: 17:56





Assessment/Plan


VTE Prophylaxis


VTE Prophylaxis Intervention:  other





Lines/Catheters


IV Catheter Type (from Nrsg):  Saline Lock





Assessment/Plan


Chief Complaint/Hosp Course


IMPRESSION:


1.  Pulmonary edema.better


2.  Rule out ischemic heart disease.


3.  EKG shows sinus rhythm, nonspecific ST-T changes.


4.  Diabetes mellitus.


5.  Hypertension.


6.  Obesity.


7.  Sleep apnea.  


8.  The patient has underlying chronic obstructive pulmonary disease.


plan diuretic


Problems:  





Subjective


24 Hr Interval Summary


Respiratory:  shortness of breath (better)





Exam/Review of Systems


Vital Signs


Vitals





 Vital Signs








  Date Time  Temp Pulse Resp B/P Pulse Ox O2 Delivery O2 Flow Rate FiO2


 


4/17/17 17:46       2.0 


 


4/17/17 16:09  82      


 


4/17/17 15:35 97.8  20 131/70 94   


 


4/17/17 08:00      Nasal Cannula  


 


4/16/17 05:00        96














 Intake and Output   


 


 4/16/17 4/16/17 4/17/17





 15:00 23:00 07:00


 


Intake Total  1560 ml 610 ml


 


Output Total  2750 ml 1240 ml


 


Balance  -1190 ml -630 ml











Exam


Respiratory:  diminished breath sounds


Cardiovascular:  regular rate and rhythm


Gastrointestinal:  soft


Musculoskeletal:  nl extremities to inspection


Extremities:  normal pulses





Results


Result Diagram:  


4/16/17 0700                                                                   

             4/17/17 1335





Results 24 hrs





Laboratory Tests








Test


  4/17/17


13:35


 


Sodium Level 137  


 


Potassium Level 4.0  


 


Chloride Level 93  L


 


Carbon Dioxide Level 35  H


 


Anion Gap 13  


 


Blood Urea Nitrogen 20  


 


Creatinine 0.75  


 


Glucose Level 133  


 


Calcium Level 8.4  


 


Total Bilirubin 0.0  L


 


Direct Bilirubin 0.00  


 


Indirect Bilirubin 0.0  


 


Aspartate Amino Transf


(AST/SGOT) 19  


 


 


Alanine Aminotransferase


(ALT/SGPT) 26  


 


 


Alkaline Phosphatase 113  


 


Total Protein 6.2  


 


Albumin 3.4  


 


Globulin 2.80  


 


Albumin/Globulin Ratio 1.21  











Medications


Medications





 Current Medications


Amlodipine Besylate (Norvasc) 10 mg DAILY PO  Last administered on 4/17/17at 08:

35; Admin Dose 10 MG;  Start 4/16/17 at 09:00


Ascorbic Acid (Vitamin C) 1,000 mg DAILY PO  Last administered on 4/17/17at 08:

34; Admin Dose 1,000 MG;  Start 4/16/17 at 09:00


Aspirin (Aspirin) 81 mg DAILY PO  Last administered on 4/17/17at 08:34; Admin 

Dose 81 MG;  Start 4/16/17 at 09:00


Cholecalciferol (Vitamin D) 1,000 unit DAILY PO  Last administered on 4/17/17at 

08:35; Admin Dose 1,000 UNIT;  Start 4/16/17 at 09:00


Docusate Sodium (Colace) 250 mg BID PO  Last administered on 4/17/17at 08:35; 

Admin Dose 250 MG;  Start 4/16/17 at 09:00


Gabapentin (Neurontin) 600 mg TID PO  Last administered on 4/17/17at 12:17; 

Admin Dose 600 MG;  Start 4/16/17 at 09:00


Lisinopril (Zestril) 20 mg DAILY PO  Last administered on 4/17/17at 08:36; 

Admin Dose 20 MG;  Start 4/16/17 at 09:00


Multivitamins Therapeutic (Theragran) 1 tab DAILY PO  Last administered on 4/17/ 17at 08:35; Admin Dose 1 TAB;  Start 4/16/17 at 09:00


Potassium Chloride (Klor-Con 10) 10 meq DAILY PO  Last administered on 4/17/ 17at 08:36; Admin Dose 10 MEQ;  Start 4/16/17 at 09:00


Morphine Sulfate (Ms Contin (Er)) 15 mg Q12 PO  Last administered on 4/17/17at 

08:35; Admin Dose 15 MG;  Start 4/16/17 at 09:00


Morphine Sulfate (morphine) 2 mg Q4H  PRN IV PAIN Last administered on 4/16/ 17at 17:18; Admin Dose 2 MG;  Start 4/16/17 at 05:30


Prednisone (Prednisone) 10 mg DAILY PO  Last administered on 4/17/17at 08:34; 

Admin Dose 10 MG;  Start 4/17/17 at 09:00


Acetaminophen/ Hydrocodone Bitart (Norco (5/325)) 1 tab Q6H  PRN PO PAIN;  

Start 4/17/17 at 18:00











DELORIS PHILLIPS MD Apr 17, 2017 17:57

## 2017-04-18 VITALS — DIASTOLIC BLOOD PRESSURE: 69 MMHG | RESPIRATION RATE: 22 BRPM | SYSTOLIC BLOOD PRESSURE: 136 MMHG

## 2017-04-18 VITALS — DIASTOLIC BLOOD PRESSURE: 73 MMHG | RESPIRATION RATE: 20 BRPM | SYSTOLIC BLOOD PRESSURE: 132 MMHG

## 2017-04-18 VITALS — RESPIRATION RATE: 19 BRPM | DIASTOLIC BLOOD PRESSURE: 89 MMHG | SYSTOLIC BLOOD PRESSURE: 168 MMHG

## 2017-04-18 VITALS — HEART RATE: 82 BPM

## 2017-04-18 VITALS — HEART RATE: 90 BPM

## 2017-04-18 VITALS — HEART RATE: 81 BPM

## 2017-04-18 RX ADMIN — GABAPENTIN SCH MG: 300 CAPSULE ORAL at 08:24

## 2017-04-18 RX ADMIN — THERA TABS SCH TAB: TAB at 08:25

## 2017-04-18 RX ADMIN — POTASSIUM CHLORIDE SCH MEQ: 10 TABLET, EXTENDED RELEASE ORAL at 08:24

## 2017-04-18 RX ADMIN — FUROSEMIDE SCH MG: 40 TABLET ORAL at 06:00

## 2017-04-18 RX ADMIN — AMLODIPINE BESYLATE SCH MG: 10 TABLET ORAL at 08:25

## 2017-04-18 RX ADMIN — PANTOPRAZOLE SODIUM SCH MG: 40 TABLET, DELAYED RELEASE ORAL at 08:24

## 2017-04-18 RX ADMIN — VITAMIN D, TAB 1000IU (100/BT) SCH UNIT: 25 TAB at 08:26

## 2017-04-18 RX ADMIN — OXYCODONE HYDROCHLORIDE AND ACETAMINOPHEN SCH MG: 500 TABLET ORAL at 08:26

## 2017-04-18 RX ADMIN — LISINOPRIL SCH MG: 20 TABLET ORAL at 08:25

## 2017-04-18 RX ADMIN — ASPIRIN 81 MG SCH MG: 81 TABLET ORAL at 08:24

## 2017-04-18 RX ADMIN — MORPHINE SULFATE SCH MG: 15 TABLET, EXTENDED RELEASE ORAL at 08:24

## 2017-05-24 ENCOUNTER — HOSPITAL ENCOUNTER (INPATIENT)
Dept: HOSPITAL 10 - E/R | Age: 67
LOS: 8 days | Discharge: LEFT BEFORE DISCHARGE | DRG: 190 | End: 2017-06-01
Attending: INTERNAL MEDICINE | Admitting: INTERNAL MEDICINE
Payer: COMMERCIAL

## 2017-05-24 VITALS
WEIGHT: 315 LBS | BODY MASS INDEX: 42.66 KG/M2 | HEIGHT: 72 IN | HEIGHT: 72 IN | BODY MASS INDEX: 42.66 KG/M2 | WEIGHT: 315 LBS

## 2017-05-24 VITALS — TEMPERATURE: 98.8 F

## 2017-05-24 DIAGNOSIS — I11.0: ICD-10-CM

## 2017-05-24 DIAGNOSIS — E11.9: ICD-10-CM

## 2017-05-24 DIAGNOSIS — Z87.891: ICD-10-CM

## 2017-05-24 DIAGNOSIS — E66.2: ICD-10-CM

## 2017-05-24 DIAGNOSIS — I50.33: ICD-10-CM

## 2017-05-24 DIAGNOSIS — Z79.82: ICD-10-CM

## 2017-05-24 DIAGNOSIS — J20.9: ICD-10-CM

## 2017-05-24 DIAGNOSIS — D64.9: ICD-10-CM

## 2017-05-24 DIAGNOSIS — I25.10: ICD-10-CM

## 2017-05-24 DIAGNOSIS — E78.5: ICD-10-CM

## 2017-05-24 DIAGNOSIS — J44.1: Primary | ICD-10-CM

## 2017-05-24 DIAGNOSIS — J44.0: ICD-10-CM

## 2017-05-24 LAB
ADD SCAN DIFF: NO
ALBUMIN SERPL-MCNC: 3.5 G/DL (ref 3.3–4.9)
ALBUMIN/GLOB SERPL: 1.12 {RATIO}
ALP SERPL-CCNC: 76 IU/L (ref 42–121)
ALT SERPL-CCNC: 34 IU/L (ref 13–69)
ANION GAP SERPL CALC-SCNC: 8 MMOL/L (ref 8–16)
APTT BLD: 29.6 SEC (ref 25–35)
AST SERPL-CCNC: 18 IU/L (ref 15–46)
BASOPHILS # BLD AUTO: 0 10^3/UL (ref 0–0.1)
BASOPHILS NFR BLD: 0.3 % (ref 0–2)
BILIRUB DIRECT SERPL-MCNC: 0 MG/DL (ref 0–0.2)
BILIRUB SERPL-MCNC: 0 MG/DL (ref 0.2–1.3)
BNP SERPL-MCNC: 80 PG/ML (ref 0–125)
BUN SERPL-MCNC: 14 MG/DL (ref 7–20)
CALCIUM SERPL-MCNC: 8.6 MG/DL (ref 8.4–10.2)
CHLORIDE SERPL-SCNC: 101 MMOL/L (ref 97–110)
CO2 SERPL-SCNC: 34 MMOL/L (ref 21–31)
CREAT SERPL-MCNC: 0.72 MG/DL (ref 0.61–1.24)
EOSINOPHIL # BLD: 0.2 10^3/UL (ref 0–0.5)
EOSINOPHIL NFR BLD: 2.8 % (ref 0–7)
ERYTHROCYTE [DISTWIDTH] IN BLOOD BY AUTOMATED COUNT: 18.6 % (ref 11.5–14.5)
GLOBULIN SER-MCNC: 3.1 G/DL (ref 1.3–3.2)
GLUCOSE SERPL-MCNC: 95 MG/DL (ref 70–220)
HCT VFR BLD CALC: 35.6 % (ref 42–52)
HGB BLD-MCNC: 10.4 G/DL (ref 14–18)
INR PPP: 0.96
LYMPHOCYTES # BLD AUTO: 1.7 10^3/UL (ref 0.8–2.9)
LYMPHOCYTES NFR BLD AUTO: 19.1 % (ref 15–51)
MCH RBC QN AUTO: 24.4 PG (ref 29–33)
MCHC RBC AUTO-ENTMCNC: 29.2 G/DL (ref 32–37)
MCV RBC AUTO: 83.6 FL (ref 82–101)
MONOCYTES # BLD: 0.9 10^3/UL (ref 0.3–0.9)
MONOCYTES NFR BLD: 10.6 % (ref 0–11)
NEUTROPHILS # BLD: 5.8 10^3/UL (ref 1.6–7.5)
NEUTROPHILS NFR BLD AUTO: 66.5 % (ref 39–77)
NRBC # BLD MANUAL: 0 10^3/UL (ref 0–0)
NRBC BLD QL: 0 /100WBC (ref 0–0)
PLATELET # BLD: 250 10^3/UL (ref 140–415)
PMV BLD AUTO: 8.7 FL (ref 7.4–10.4)
POTASSIUM SERPL-SCNC: 3.8 MMOL/L (ref 3.5–5.1)
PROT SERPL-MCNC: 6.6 G/DL (ref 6.1–8.1)
PROTHROMBIN TIME: 12.8 SEC (ref 12.2–14.2)
PT RATIO: 1
RBC # BLD AUTO: 4.26 10^6/UL (ref 4.7–6.1)
SODIUM SERPL-SCNC: 139 MMOL/L (ref 135–144)
TROPONIN I SERPL-MCNC: < 0.012 NG/ML (ref 0–0.12)
WBC # BLD AUTO: 8.7 10^3/UL (ref 4.8–10.8)

## 2017-05-24 PROCEDURE — 82728 ASSAY OF FERRITIN: CPT

## 2017-05-24 PROCEDURE — 84484 ASSAY OF TROPONIN QUANT: CPT

## 2017-05-24 PROCEDURE — 93005 ELECTROCARDIOGRAM TRACING: CPT

## 2017-05-24 PROCEDURE — 83880 ASSAY OF NATRIURETIC PEPTIDE: CPT

## 2017-05-24 PROCEDURE — 96375 TX/PRO/DX INJ NEW DRUG ADDON: CPT

## 2017-05-24 PROCEDURE — 94664 DEMO&/EVAL PT USE INHALER: CPT

## 2017-05-24 PROCEDURE — 80053 COMPREHEN METABOLIC PANEL: CPT

## 2017-05-24 PROCEDURE — 84439 ASSAY OF FREE THYROXINE: CPT

## 2017-05-24 PROCEDURE — 84443 ASSAY THYROID STIM HORMONE: CPT

## 2017-05-24 PROCEDURE — 94640 AIRWAY INHALATION TREATMENT: CPT

## 2017-05-24 PROCEDURE — 82270 OCCULT BLOOD FECES: CPT

## 2017-05-24 PROCEDURE — 85025 COMPLETE CBC W/AUTO DIFF WBC: CPT

## 2017-05-24 PROCEDURE — 85730 THROMBOPLASTIN TIME PARTIAL: CPT

## 2017-05-24 PROCEDURE — 70450 CT HEAD/BRAIN W/O DYE: CPT

## 2017-05-24 PROCEDURE — 71010: CPT

## 2017-05-24 PROCEDURE — 83540 ASSAY OF IRON: CPT

## 2017-05-24 PROCEDURE — 82607 VITAMIN B-12: CPT

## 2017-05-24 PROCEDURE — 96374 THER/PROPH/DIAG INJ IV PUSH: CPT

## 2017-05-24 PROCEDURE — 36415 COLL VENOUS BLD VENIPUNCTURE: CPT

## 2017-05-24 PROCEDURE — 80048 BASIC METABOLIC PNL TOTAL CA: CPT

## 2017-05-24 PROCEDURE — 80061 LIPID PANEL: CPT

## 2017-05-24 PROCEDURE — 82746 ASSAY OF FOLIC ACID SERUM: CPT

## 2017-05-24 PROCEDURE — 85610 PROTHROMBIN TIME: CPT

## 2017-05-25 VITALS — RESPIRATION RATE: 17 BRPM | HEART RATE: 89 BPM | SYSTOLIC BLOOD PRESSURE: 153 MMHG | DIASTOLIC BLOOD PRESSURE: 86 MMHG

## 2017-05-25 VITALS — SYSTOLIC BLOOD PRESSURE: 135 MMHG | RESPIRATION RATE: 18 BRPM | DIASTOLIC BLOOD PRESSURE: 75 MMHG

## 2017-05-25 VITALS — SYSTOLIC BLOOD PRESSURE: 148 MMHG | DIASTOLIC BLOOD PRESSURE: 64 MMHG | RESPIRATION RATE: 17 BRPM

## 2017-05-25 VITALS — RESPIRATION RATE: 18 BRPM | DIASTOLIC BLOOD PRESSURE: 79 MMHG | SYSTOLIC BLOOD PRESSURE: 118 MMHG

## 2017-05-25 VITALS — RESPIRATION RATE: 18 BRPM | SYSTOLIC BLOOD PRESSURE: 123 MMHG | DIASTOLIC BLOOD PRESSURE: 70 MMHG

## 2017-05-25 VITALS — DIASTOLIC BLOOD PRESSURE: 62 MMHG | RESPIRATION RATE: 24 BRPM | SYSTOLIC BLOOD PRESSURE: 133 MMHG

## 2017-05-25 VITALS — HEART RATE: 91 BPM

## 2017-05-25 VITALS — HEART RATE: 90 BPM

## 2017-05-25 VITALS — HEART RATE: 102 BPM

## 2017-05-25 VITALS — HEART RATE: 95 BPM

## 2017-05-25 VITALS — HEART RATE: 86 BPM

## 2017-05-25 VITALS — HEART RATE: 89 BPM

## 2017-05-25 LAB
ABNORMAL IP MESSAGE: 1
ADD SCAN DIFF: NO
ANION GAP SERPL CALC-SCNC: 13 MMOL/L (ref 8–16)
BASOPHILS # BLD AUTO: 0 10^3/UL (ref 0–0.1)
BASOPHILS NFR BLD: 0.3 % (ref 0–2)
BUN SERPL-MCNC: 11 MG/DL (ref 7–20)
CALCIUM SERPL-MCNC: 8.1 MG/DL (ref 8.4–10.2)
CHLORIDE SERPL-SCNC: 98 MMOL/L (ref 97–110)
CO2 SERPL-SCNC: 34 MMOL/L (ref 21–31)
CREAT SERPL-MCNC: 0.67 MG/DL (ref 0.61–1.24)
EOSINOPHIL # BLD: 0.2 10^3/UL (ref 0–0.5)
EOSINOPHIL NFR BLD: 2.8 % (ref 0–7)
ERYTHROCYTE [DISTWIDTH] IN BLOOD BY AUTOMATED COUNT: 18.6 % (ref 11.5–14.5)
GLUCOSE SERPL-MCNC: 124 MG/DL (ref 70–220)
HCT VFR BLD CALC: 34.9 % (ref 42–52)
HGB BLD-MCNC: 10 G/DL (ref 14–18)
LYMPHOCYTES # BLD AUTO: 1.3 10^3/UL (ref 0.8–2.9)
LYMPHOCYTES NFR BLD AUTO: 17.2 % (ref 15–51)
MCH RBC QN AUTO: 24.4 PG (ref 29–33)
MCHC RBC AUTO-ENTMCNC: 28.7 G/DL (ref 32–37)
MCV RBC AUTO: 85.1 FL (ref 82–101)
MONOCYTES # BLD: 1.1 10^3/UL (ref 0.3–0.9)
MONOCYTES NFR BLD: 14.9 % (ref 0–11)
NEUTROPHILS # BLD: 4.7 10^3/UL (ref 1.6–7.5)
NEUTROPHILS NFR BLD AUTO: 64.1 % (ref 39–77)
NRBC # BLD MANUAL: 0 10^3/UL (ref 0–0)
NRBC BLD QL: 0 /100WBC (ref 0–0)
PLATELET # BLD: 247 10^3/UL (ref 140–415)
PMV BLD AUTO: 8.7 FL (ref 7.4–10.4)
POTASSIUM SERPL-SCNC: 3.7 MMOL/L (ref 3.5–5.1)
RBC # BLD AUTO: 4.1 10^6/UL (ref 4.7–6.1)
SODIUM SERPL-SCNC: 141 MMOL/L (ref 135–144)
WBC # BLD AUTO: 7.4 10^3/UL (ref 4.8–10.8)

## 2017-05-25 RX ADMIN — GABAPENTIN SCH MG: 300 CAPSULE ORAL at 12:56

## 2017-05-25 RX ADMIN — IPRATROPIUM BROMIDE AND ALBUTEROL SULFATE SCH ML: .5; 3 SOLUTION RESPIRATORY (INHALATION) at 15:12

## 2017-05-25 RX ADMIN — GABAPENTIN SCH MG: 300 CAPSULE ORAL at 21:18

## 2017-05-25 RX ADMIN — IPRATROPIUM BROMIDE AND ALBUTEROL SULFATE SCH ML: .5; 3 SOLUTION RESPIRATORY (INHALATION) at 21:07

## 2017-05-25 RX ADMIN — LISINOPRIL SCH MG: 20 TABLET ORAL at 08:21

## 2017-05-25 RX ADMIN — MORPHINE SULFATE SCH MG: 15 TABLET, EXTENDED RELEASE ORAL at 21:19

## 2017-05-25 RX ADMIN — DEXAMETHASONE SODIUM PHOSPHATE PRN MG: 10 INJECTION, SOLUTION INTRAMUSCULAR; INTRAVENOUS at 08:19

## 2017-05-25 RX ADMIN — MORPHINE SULFATE PRN MG: 2 INJECTION, SOLUTION INTRAMUSCULAR; INTRAVENOUS at 12:56

## 2017-05-25 RX ADMIN — MORPHINE SULFATE PRN MG: 2 INJECTION, SOLUTION INTRAMUSCULAR; INTRAVENOUS at 03:28

## 2017-05-25 RX ADMIN — ASPIRIN SCH MG: 325 TABLET, DELAYED RELEASE ORAL at 08:21

## 2017-05-25 RX ADMIN — ENOXAPARIN SODIUM SCH MG: 100 INJECTION SUBCUTANEOUS at 08:22

## 2017-05-25 RX ADMIN — CEFTRIAXONE SCH MLS/HR: 1 INJECTION, SOLUTION INTRAVENOUS at 14:59

## 2017-05-25 RX ADMIN — PANTOPRAZOLE SODIUM SCH MG: 40 TABLET, DELAYED RELEASE ORAL at 06:34

## 2017-05-25 RX ADMIN — MORPHINE SULFATE SCH MG: 15 TABLET, EXTENDED RELEASE ORAL at 08:21

## 2017-05-25 RX ADMIN — MORPHINE SULFATE PRN MG: 2 INJECTION, SOLUTION INTRAMUSCULAR; INTRAVENOUS at 08:19

## 2017-05-25 RX ADMIN — MORPHINE SULFATE PRN MG: 2 INJECTION, SOLUTION INTRAMUSCULAR; INTRAVENOUS at 17:26

## 2017-05-25 RX ADMIN — OXYCODONE HYDROCHLORIDE AND ACETAMINOPHEN SCH MG: 500 TABLET ORAL at 08:20

## 2017-05-25 RX ADMIN — POTASSIUM CHLORIDE SCH MEQ: 10 TABLET, EXTENDED RELEASE ORAL at 08:21

## 2017-05-25 RX ADMIN — VITAMIN D, TAB 1000IU (100/BT) SCH UNIT: 25 TAB at 08:21

## 2017-05-25 RX ADMIN — AMLODIPINE BESYLATE SCH MG: 10 TABLET ORAL at 08:20

## 2017-05-25 RX ADMIN — THERA TABS SCH TAB: TAB at 08:20

## 2017-05-25 RX ADMIN — ATORVASTATIN CALCIUM SCH MG: 10 TABLET, FILM COATED ORAL at 21:19

## 2017-05-25 RX ADMIN — DEXAMETHASONE SODIUM PHOSPHATE PRN MG: 10 INJECTION, SOLUTION INTRAMUSCULAR; INTRAVENOUS at 03:28

## 2017-05-25 RX ADMIN — GABAPENTIN SCH MG: 300 CAPSULE ORAL at 08:20

## 2017-05-25 RX ADMIN — MORPHINE SULFATE PRN MG: 2 INJECTION, SOLUTION INTRAMUSCULAR; INTRAVENOUS at 21:22

## 2017-05-25 NOTE — RADRPT
PROCEDURE:   XR Chest. 

 

CLINICAL INDICATION:   Chest Pain. 

 

TECHNIQUE:   Single frontal view of the chest.

 

COMPARISON:  03/15/2017

 

FINDINGS:

Cardiomegaly with mild bibasilar atelectasis versus airspace disease. No signs of pleural fluid or p
neumothorax are seen. The osseous structures and soft tissues are unremarkable. 

 

IMPRESSION:

Mild failure.

 

RPTAT: UU

_____________________________________________ 

Physician Camille           Date    Time 

Electronically viewed and signed by JOSEFA Wong Physician on 05/25/2017 00:44 

 

D:  05/25/2017 00:44  T:  05/25/2017 00:44

RS/

## 2017-05-25 NOTE — HP
DATE OF ADMISSION: 2017

 

CHIEF COMPLAINT:  Chest pain and shortness of breath.

 

HISTORY OF PRESENT ILLNESS:  The patient is a 66-year-old gentleman, well known to me from previous 
several admissions.  The patient has a history of hypertension, COPD, obesity, chronic diastolic hea
rt failure, with recurrent acute on chronic exacerbation.  Patient came to ER yesterday with left up
per chest pain on and off for the last several days and also increasing shortness of breath, a dry c
ough and increasing leg edema.  The patient did not have any documented fever or chills.  The patien
t did not have any hemoptysis.  No reported diaphoresis.  No reported dysuria or hematuria.  The pat
ient, however, was orthopneic.  Pain in the chest was in the left upper chest.  It was not increasin
g with breathing or cough, and there was no correlation with activity.  Patient is morbidly obese.  
The patient did not have any dysuria or hematuria.  The patient was seen in the ER and was noted to 
have a hemoglobin of 10.  The patient has chronic anemia and his hemoglobin back in  was as low 
as 9.8.  The patient denied any rectal bleed, no reported bleeding from any site.  The patient's gutierrez
rayray revealed a BUN of 14, creatinine of 0.7.  BNP, however, was only 80.  Despite a history of di
astolic heart failure the patient does tend to have normal or only a slightly elevated BNP during hi
s previous admissions.  Chest x-ray revealed mild failure.  The patient is being admitted for [facility] evaluation and management.  Patient did not have any headache, dizziness or syncope.

 

REVIEW OF SYSTEMS:  Rest of review of systems was unremarkable.

 

PAST MEDICAL HISTORY:  As stated above.  In addition, the patient has a history of anxiety in the [facility].

 

ALLERGIES: ALLERGIC TO HYDROMORPHONE, although the patient is able to take morphine.

 

SOCIAL HISTORY:  The patient has exposure to secondhand smoking.  No history of alcohol abuse.

 

FAMILY HISTORY:  Noncontributory.  The patient had a negative Lexiscan stress test back in .

 

ALLERGIES:  NONE.

 

PAST SURGICAL HISTORY:  None.

 

FAMILY HISTORY:  Mother  with a history of coronary artery disease and chronic kidney diseas
e.  Father  with a history of prostate cancer.

 

SOCIAL HISTORY:  The patient was born in [place].  , has 4 children. No smoking, no alcohol
.  Previously employed as a .

 

PHYSICAL EXAMINATION:

GENERAL:  The patient is conscious, awake and alert.

VITAL SIGNS:  Temperature 97.5, pulse 86, respirations 18, blood pressure 138/75, O2 saturation 94% 
on 2 liters nasal cannula.

HEENT:  Atraumatic, normocephalic.  No eye discharge or redness.  Oropharynx is clear.

NECK:  Supple.  No mass, no thyromegaly.

CHEST:  Revealed diminished air entry at the bases and bilateral rhonchi.

CARDIOVASCULAR:  S1, S2 normal.  No murmur.

ABDOMEN:  Soft, obese, nontender.

EXTREMITIES:  Grossly edematous.  No clubbing or cyanosis.

NEUROLOGIC:  The patient is awake, alert, with no gross focal deficits.

 

IMPRESSION:

1.  Chronic obstructive pulmonary disease exacerbation.

2.  Possible decompensated diastolic heart failure.

3.  Hypertension.

4.  Morbid obesity.

5.  Hypertension.

6.  Morbid obesity.

 

PLAN: The patient will be admitted on the telemetry floor.  Patient will be given IV Lasix and will 
be continued on aspirin, amlodipine and lisinopril.  The patient has chronic pain syndrome for which
 he has been taking morphine, which will be continued.  Will use Lovenox for deep venous thrombosis 
prophylaxis.  The patient will also be given breathing treatments, steroid and IV Rocephin for possi
ble chronic obstructive pulmonary disease exacerbation.  The patient has been advised to lose weight
.  The patient so far has ruled out for a MI.  A cardiac consult from Dr. Manrique has been requested
.  The patient did have an echocardiogram done back in 2016 which revealed an EF of 65%.
  Will continue to follow him closely.

 

 

Dictated By: DEVAUGHN ELIAS/CORRIE

DD:    2017 13:50:31

DT:    2017 14:36:05

Conf#: [id number]

DID#:  [id number]

## 2017-05-25 NOTE — CONS
DATE OF ADMISSION: 05/25/2017

DATE OF CONSULTATION:  05/25/2017

 

 

 

TYPE OF CONSULTATION:  Cardiology. 

 

REASON FOR CONSULTATION:  Shortness of breath, congestive heart failure.

 

REQUESTING PHYSICIAN:  Dr. John Anaya 

 

HISTORY OF PRESENT ILLNESS:  Mr. Zacarias is a 66-year-old male known to myself from multiple prior hosp
ital admissions with history of congestive heart failure with most recently preserved left ventricul
ar ejection fraction, hypertension, chronic obstructive pulmonary disease, obesity hypoventilation s
yndrome, dyslipidemia, diabetes mellitus who presented with complaints of shortness of breath, lower
 extremity edema.  Upon arrival, temperature 98.5, blood pressure 171/80, pulse 100, respiratory rat
e 20, saturating 95%.  The patient's labs revealed a white blood cell count of 8.7, hemoglobin 10.4,
 platelet count of 250.  Sodium 139, potassium 3.8, creatinine 0.7, BUN 14, AST 18, ALT 34.  INR 0.9
6.  The patient underwent a chest x-ray revealing mild bibasilar atelectasis versus airspace disease
 and mild failure.  The patient's electrocardiogram was normal sinus rhythm, rate of 96 with left ax
is deviation, incomplete right bundle branch block, secondary repolarization abnormalities.  The pat
ient subsequently admitted to the floor and since admit to the floor has had improvement in systolic
 blood pressures.  The patient has been started on bronchodilators, steroids and has been given Lasi
x diuresis.

 

PAST MEDICAL HISTORY:  As above in HPI.

 

MEDICATIONS CURRENTLY IN HOSPITAL:

1.  Lipitor 5 mg daily.

2.  DuoNeb.

3.  Solu-Medrol 40 mg IV q.12.

4.  Ceftriaxone.

5.  Norvasc 10 mg daily.

6.  Vitamin C.

7.  Vitamin D.

8.  Colace.

9.  Neurontin 900 mg p.o. t.i.d.

10.  Zestril 20 mg daily.

11.  Morphine ____ mg q.12.

12.  Theragran.

13.  Potassium chloride 10 mEq daily.

14.  Aspirin 325 mg daily.

15.  Lovenox ____ mg subQ daily.

16.  Lasix 40 mg IV b.i.d.

17.  Protonix 40 mg p.o. daily.

18.  Zofran p.r.n.

19.  Norco p.r.n. 

20.  Tylenol p.r.n.

21.  Morphine p.r.n.

 

ALLERGIES:  DILAUDID.

 

SOCIAL HISTORY:  No current tobacco, ETOH, illicit drug use.

 

FAMILY HISTORY:  No history of sudden cardiac death or early CAD.

 

REVIEW OF SYSTEMS:  As above in HPI.

CONSTITUTIONAL:  No fevers, chills.

PULMONARY:  Shortness of breath.

CARDIOVASCULAR:  Congestive heart failure.

GASTROINTESTINAL:  No vomiting.

GENITOURINARY:  No hematuria.

MUSCULOSKELETAL:  Degenerative joint disease.

PSYCHIATRIC:  No documented psychiatric history.

NEUROLOGIC:  No documented history of CVA.

ENDOCRINE:  No documented history of diabetes mellitus.

 

PHYSICAL EXAMINATION:

VITAL SIGNS:  Temperature of 98.3, blood pressure 133/62, pulse 91, respiratory rate 24, saturating 
92%.

GENERAL:  The patient is alert, awake, complaining of shortness of breath.

NECK:  JVP difficult to assess secondary to body habitus.

HEART:  Regular rate and rhythm.  Normal S1, increased S2.  I/VI systolic murmur.  Nondisplaced PMI.

ABDOMEN:  Positive bowel sounds.  Soft.

EXTREMITIES:  2+ to 3+ edema bilaterally.  Difficult to palpate distal pulses bilaterally at posteri
or tibial.

 

LABORATORY DATA:  As above in HPI with most recent from today:  Sodium 141, potassium 3.7, creatinin
e 0.6, BUN 11.  White blood cell count 7.4, hemoglobin 10.0, platelet count of 247.  INR 0.96.

 

IMAGING STUDIES:  As above in HPI.  No further imaging studies for my review at this time.

 

ELECTROCARDIOGRAM:  As above in HPI.  No further electrocardiograms for my review at this time.

 

IMPRESSION:

1.  Congestive heart failure exacerbation, diastolic by echocardiogram 12/2016, acute on chronic.

2.  Hypertension, under reasonable control currently.

3.  Shortness of breath secondary to #1.

4.  Chronic obstructive pulmonary disease exacerbation.

5.  Coronary artery disease by medications.

6.  Dyslipidemia.

7.  Obesity.

 

RECOMMENDATIONS:

1.  At this time will continue to follow the patient's rhythm closely on telemetry monitoring.

2.  Would continue the patient's Lasix diuresis, following strict I's and O's to grade diuresis clos
ely as well as creatinine.

3.  Continue the patient's current Norvasc and Zestril for control of blood pressure with currently 
reasonable blood pressures.

4.  Continue the patient's current statin therapy and adjust it according to a fasting lipid panel t
hat should be checked.

5.  Continue the patient's current steroids and bronchodilators and antibiotics.  Continue to follow
 the patient's respiratory status closely.

6.  Will consider repeat 2D echo to re-assess the patient's ejection fraction.

7.  Will continue to check serial EKGs, assess for any ischemic changes and complete a rule-out for 
myocardial infarction to ensure that the patient's EKG abnormalities are chronic in nature and not d
ue to any recent acute coronary syndrome.

 

Thank you for allowing me to take part in the care of this patient.  I will continue to follow along
 very closely with you with further recommendations to be made as the patient progresses through his
 inpatient hospital clinical course.

 

 

Dictated By: HARLEY MALHOTRA/CORRIE

DD:    05/25/2017 20:26:23

DT:    05/25/2017 22:08:42

Conf#: [id number]

DID#:  [id number]

CC: JOHN ANAYA MD;*EndCC*

## 2017-05-25 NOTE — ERA
ER Documentation


Chief Complaint


Date/Time


DATE: 5/25/17 


TIME: 00:33


Chief Complaint


chest pain w/  sob since 1 hour ago





HPI


This is a pleasant 66-year-old male with a history of high blood pressure, high 

cholesterol, CHF who said he has had a cough for 1 week that has developed into 

some green productive sputum.  Is complaining of some left sided anterior chest 

pain is described as sharp and constant for the past 6 hours.  He is also 

having shortness of breath past hour.  No diaphoresis no radiation of pain no 

palpitations syncope dizziness abdominal pain nausea vomiting diarrhea.  

Patient says he has had chills but has not taken his temperature does not know 

if he had a fever.





ROS


All systems reviewed and are negative except as per history of present illness.





Medications


Home Meds


Reported Medications


Ondansetron Hcl* (Ondansetron Hcl*) 4 Mg Tablet, 4 MG PO Q4H Y for NAUSEA AND 

OR VOMITING, TAB


   5/24/17


Gabapentin* (Gabapentin*) 300 Mg Capsule, 900 MG PO TID, #270 CAP


   5/24/17


Hydrocodone Bit-Acetaminophen (Hydrocodone Bit-Acetaminophen)  Mg Tablet, 

1 TAB PO TID Y for PAIN, TAB


   4/16/17


Morphine Sulfate* (Ms Contin*) 15 Mg Tablet.sa, 15 MG PO Q12, TAB


   4/16/17


Simvastatin* (Zocor*) 10 Mg Tablet, 10 MG PO QHS, #30 TAB


   12/17/16


Pantoprazole* (Pantoprazole*) 40 Mg Tablet.dr, 40 MG PO AC BREAKFAST, TAB


   12/17/16


Cholecalciferol* (Vitamin D3*) 1,000 Unit Tablet, 1000 UNIT PO DAILY, TAB


   6/28/15


Lisinopril* (Lisinopril*) 20 Mg Tablet, 20 MG PO DAILY, TAB


   5/20/15


Amlodipine Besylate* (Amlodipine Besylate*) 10 Mg Tablet, 10 MG PO DAILY, TAB


   2/19/15


Furosemide* (Lasix*) 40 Mg Tablet, 40 MG PO BID, TAB


   2/19/15


Multivitamins* (Multivitamin*) 1 Udcap Capsule, 1 TAB PO DAILY, TAB


   1/31/15


Docusate Sodium* (Colace*) 250 Mg Capsule, 250 MG PO BID, CAP


   1/31/15


Ascorbic Acid (Vitamin C) 500 Mg Tab, 1000 MG PO DAILY, TAB


   12/23/14


Potassium Chloride* (K-Dur*) 10 Meq Tab.prt.sr, 10 MEQ PO DAILY, TAB


   10/16/14


Aspirin* (Aspirin* Chew) 81 Mg Tab.chew, 81 MG PO DAILY, TAB.CHEW


   10/16/14


Discontinued Reported Medications


Carisoprodol-Aspirin (Soma Compound) 300-200MG Tab, 1 TAB PO TID Y for MUSCLE 

SPASMS, TAB


   4/16/17


Morphine Sulfate* (Ms Contin*) 15 Mg Tablet.sa, 15 MG PO Q12, TAB


   12/18/16


Gabapentin* (Gabapentin*) 600 Mg Tablet, 600 MG PO TID, #90 TAB


   12/17/16


Ipratropium Bromide* (Atrovent*) 0.5 Mg/2.5 Ml Neb, 0.5 MG NEB Q4H Y for 

WHEEZING AND SOB, EA


   2/19/15


Albuterol Sulfate* (Albuterol Sulfate* Neb) 0.083%-3 Ml Neb, 2.5 MG NEB DAILY Y 

for WHEEZING AND SOB, EA


   10/16/14


Discontinued Scripts


Prednisone* (Prednisone*) 10 Mg Tab, 10 MG PO DAILY for 10 Days, TAB


   Prov:DELORIS PHILLIPS MD         3/20/17


[Furosemide] 10 MG/ML SOLN No Conflict Check, 40 MG IV BID DIURETICS for 14 Days


   Prov:DELORIS PHILLIPS MD         3/20/17


Isosorbide Dinitrate* (Isordil*) 10 Mg Tablet, 10 MG PO TID for 28 Days, TAB


   Prov:DELORIS PHILLIPS MD         12/21/16





Allergies


Allergies:  


Coded Allergies:  


     hydromorphone (Unverified  Allergy, Severe, 5/24/17)





PMhx/Soc


History of Surgery:  No


Anesthesia Reaction:  No


Hx Neurological Disorder:  No


Hx Respiratory Disorders:  Yes (COPD)


Hx Cardiac Disorders:  Yes (HTN , CHF)


Hx Psychiatric Problems:  No


Hx Miscellaneous Medical Probl:  No


Hx Alcohol Use:  No


Hx Substance Use:  No


Hx Tobacco Use:  Yes


Smoking Status:  Former smoker





FmHx


Family History:  No coronary disease





Physical Exam


Vitals





Vital Signs








  Date Time  Temp Pulse Resp B/P Pulse Ox O2 Delivery O2 Flow Rate FiO2


 


5/24/17 23:53  77 22  96 Nasal Cannula 2.0 


 


5/24/17 23:50     96  2.0 


 


5/24/17 22:33 98.8 82 22 135/73 95 Nasal Cannula 2.0 


 


5/24/17 22:33      Nasal Cannula 2 


 


5/24/17 20:05 98.5 100 20 171/80 95   








Physical Exam


Const:      Well-developed, well-nourished, obese


 Head:        Atraumatic, normocephalic


 Eyes:       Normal Conjunctiva, PERRLA, EOMI, normal sclera, no nystagmus


 ENT:         Normal External Ears, Nose and Mouth, moist mucus membranes.


 Neck:        Full range of motion.  No meningismus, no lymphadenopathy.


 Resp:         Clear to auscultation bilaterally, no wheezing, rhonchi, rales


 Cardio:       Regular rate and rhythm, no murmurs, S1 S2 present


 Abd:         Soft, non tender x 4, non distended. Normal bowel sounds, no 

guarding or rebound, no pulsitile abdominal masses or bruits


 Skin:         No petechiae or rashes, no ecchymosis , no maculopapular rash


 Back:        No midline or flank tenderness


 Ext:          No cyanosis, or edema, FROM x 4, normal inspection, 

neurovascularly intact x 4


 Neur:        Awake and alert, STR 5/5 x 4, sensation intact x 4, no focal 

findings, cerebellum intact


 Psych:        Normal Mood and Affect


Result Diagram:  


5/24/17 2245 5/24/17 2245





Results 24 hrs





 Laboratory Tests








Test


  5/24/17


22:45


 


White Blood Count 8.710^3/ul 


 


Red Blood Count 4.2610^6/ul 


 


Hemoglobin 10.4g/dl 


 


Hematocrit 35.6% 


 


Mean Corpuscular Volume 83.6fl 


 


Mean Corpuscular Hemoglobin 24.4pg 


 


Mean Corpuscular Hemoglobin


Concent 29.2g/dl 


 


 


Red Cell Distribution Width 18.6% 


 


Platelet Count 00559^3/UL 


 


Mean Platelet Volume 8.7fl 


 


Neutrophils % 66.5% 


 


Lymphocytes % 19.1% 


 


Monocytes % 10.6% 


 


Eosinophils % 2.8% 


 


Basophils % 0.3% 


 


Nucleated Red Blood Cells % 0.0/100WBC 


 


Neutrophils # 5.810^3/ul 


 


Lymphocytes # 1.710^3/ul 


 


Monocytes # 0.910^3/ul 


 


Eosinophils # 0.210^3/ul 


 


Basophils # 0.010^3/ul 


 


Nucleated Red Blood Cells # 0.010^3/ul 


 


Prothrombin Time 12.8Sec 


 


Prothrombin Time Ratio 1.0 


 


INR International Normalized


Ratio 0.96 


 


 


Activated Partial


Thromboplast Time 29.6Sec 


 


 


Sodium Level 139mmol/L 


 


Potassium Level 3.8mmol/L 


 


Chloride Level 101mmol/L 


 


Carbon Dioxide Level 34mmol/L 


 


Anion Gap 8 


 


Blood Urea Nitrogen 14mg/dl 


 


Creatinine 0.72mg/dl 


 


Glucose Level 95mg/dl 


 


Calcium Level 8.6mg/dl 


 


Total Bilirubin 0.0mg/dl 


 


Direct Bilirubin 0.00mg/dl 


 


Indirect Bilirubin 0.0mg/dl 


 


Aspartate Amino Transf


(AST/SGOT) 18IU/L 


 


 


Alanine Aminotransferase


(ALT/SGPT) 34IU/L 


 


 


Alkaline Phosphatase 76IU/L 


 


Troponin I < 0.012ng/ml 


 


B-Type Natriuretic Peptide 80PG/ML 


 


Total Protein 6.6g/dl 


 


Albumin 3.5g/dl 


 


Globulin 3.10g/dl 


 


Albumin/Globulin Ratio 1.12 








 Current Medications








 Medications


  (Trade)  Dose


 Ordered  Sig/Adán


 Route


 PRN Reason  Start Time


 Stop Time Status Last Admin


Dose Admin


 


 Aspirin


  (Aspirin)  325 mg  ONCE  STAT


 PO


   5/24/17 21:48


 5/24/17 21:50 DC 5/24/17 22:45


 


 


 Morphine Sulfate


  (morphine)  4 mg  ONCE  STAT


 IV


   5/24/17 21:48


 5/24/17 21:50 DC 5/24/17 22:45


 


 


 Ondansetron HCl


  (Zofran Inj)  4 mg  ONCE  STAT


 IV


   5/24/17 21:48


 5/24/17 21:50 DC 5/24/17 22:45


 


 


 Albuterol


  (Proventil


 0.083% (Neb))  5 mg  ONCE  STAT


 NEB


   5/24/17 23:44


 5/24/17 23:46 DC 5/24/17 23:53


 


 


 Ipratropium


 Bromide


  (Atrovent 0.02%


  (Neb))  0.5 mg  ONCE  STAT


 NEB


   5/24/17 23:44


 5/24/17 23:46 DC 5/24/17 23:52


 











Procedures/MDM


EKG: 


Rate/Rhythm:             Will sinus rhythm, left axis deviation


QRS, ST, QT:    NORMAL NH, QRS, QT]


Impression:      NORMAL EKG














PROCEDURE:   XR Chest. 


 


CLINICAL INDICATION:   Chest Pain. 


 


TECHNIQUE:   Single frontal view of the chest.


 


COMPARISON:  03/15/2017


 


FINDINGS:


Cardiomegaly with mild bibasilar atelectasis versus airspace disease. No signs 

of pleural fluid or pneumothorax are seen. The osseous structures and soft 

tissues are unremarkable. 


 


IMPRESSION:


Mild failure.


 


RPTAT: UU


_____________________________________________ 


Physician Camille           Date    Time 


Electronically viewed and signed by JOSEFA Wong Physician on 05/25/2017 00:44 


 


D:  05/25/2017 00:44  T:  05/25/2017 00:44


RS/





CC: MERA CALDERON DO

















No evidence of pneumonia.  The patient's cough is likely from bronchitis.  The 

patient's chest pain is not reproducible to palpation it is not clear the 

etiology of his pain





I will admit the patient for pulmonary therapy and cardiac workup/rule out.





Patient's oxygen saturation is 90-93% on 2 L nasal cannula.





Departure


Diagnosis:  


 Primary Impression:  


 CHF (congestive heart failure)


 Qualified Code:  I50.9 - Acute on chronic congestive heart failure, 

unspecified congestive heart failure type


 Additional Impressions:  


 Bronchitis


 Hypoxia


Condition:  Stable











MERA CALDERON DO May 25, 2017 00:43

## 2017-05-26 VITALS — DIASTOLIC BLOOD PRESSURE: 78 MMHG | SYSTOLIC BLOOD PRESSURE: 131 MMHG | RESPIRATION RATE: 24 BRPM

## 2017-05-26 VITALS — DIASTOLIC BLOOD PRESSURE: 59 MMHG | SYSTOLIC BLOOD PRESSURE: 130 MMHG | RESPIRATION RATE: 15 BRPM

## 2017-05-26 VITALS — SYSTOLIC BLOOD PRESSURE: 131 MMHG | RESPIRATION RATE: 18 BRPM | DIASTOLIC BLOOD PRESSURE: 80 MMHG

## 2017-05-26 VITALS — RESPIRATION RATE: 19 BRPM | SYSTOLIC BLOOD PRESSURE: 125 MMHG | DIASTOLIC BLOOD PRESSURE: 67 MMHG

## 2017-05-26 VITALS — RESPIRATION RATE: 18 BRPM | SYSTOLIC BLOOD PRESSURE: 137 MMHG | DIASTOLIC BLOOD PRESSURE: 64 MMHG

## 2017-05-26 VITALS — HEART RATE: 94 BPM

## 2017-05-26 VITALS — HEART RATE: 90 BPM

## 2017-05-26 VITALS — HEART RATE: 92 BPM

## 2017-05-26 VITALS — SYSTOLIC BLOOD PRESSURE: 129 MMHG | DIASTOLIC BLOOD PRESSURE: 62 MMHG | RESPIRATION RATE: 22 BRPM

## 2017-05-26 VITALS — HEART RATE: 103 BPM

## 2017-05-26 VITALS — HEART RATE: 98 BPM

## 2017-05-26 LAB
ABNORMAL IP MESSAGE: 1
ADD SCAN DIFF: NO
ANION GAP SERPL CALC-SCNC: 8 MMOL/L (ref 8–16)
BASOPHILS # BLD AUTO: 0 10^3/UL (ref 0–0.1)
BASOPHILS NFR BLD: 0.1 % (ref 0–2)
BUN SERPL-MCNC: 10 MG/DL (ref 7–20)
CALCIUM SERPL-MCNC: 8.7 MG/DL (ref 8.4–10.2)
CHLORIDE SERPL-SCNC: 98 MMOL/L (ref 97–110)
CHOLEST SERPL-MCNC: 192 MG/DL (ref 100–200)
CHOLEST/HDLC SERPL: 2.4 RATIO
CO2 SERPL-SCNC: 34 MMOL/L (ref 21–31)
CREAT SERPL-MCNC: 0.6 MG/DL (ref 0.61–1.24)
EOSINOPHIL # BLD: 0 10^3/UL (ref 0–0.5)
EOSINOPHIL NFR BLD: 0 % (ref 0–7)
ERYTHROCYTE [DISTWIDTH] IN BLOOD BY AUTOMATED COUNT: 18.2 % (ref 11.5–14.5)
FERRITIN SERPL-MCNC: 9.1 NG/ML (ref 11.1–264)
FOLATE SERPL-MCNC: > 20 NG/ML (ref 2.8–20)
GLUCOSE SERPL-MCNC: 148 MG/DL (ref 70–220)
HCT VFR BLD CALC: 35.8 % (ref 42–52)
HDLC SERPL-MCNC: 77 MG/DL (ref 30–78)
HGB BLD-MCNC: 10.2 G/DL (ref 14–18)
IRON SERPL-MCNC: 32 UG/DL (ref 35–150)
LYMPHOCYTES # BLD AUTO: 0.8 10^3/UL (ref 0.8–2.9)
LYMPHOCYTES NFR BLD AUTO: 7.2 % (ref 15–51)
MCH RBC QN AUTO: 24.2 PG (ref 29–33)
MCHC RBC AUTO-ENTMCNC: 28.5 G/DL (ref 32–37)
MCV RBC AUTO: 85 FL (ref 82–101)
MONOCYTES # BLD: 0.6 10^3/UL (ref 0.3–0.9)
MONOCYTES NFR BLD: 5.4 % (ref 0–11)
NEUTROPHILS # BLD: 10 10^3/UL (ref 1.6–7.5)
NEUTROPHILS NFR BLD AUTO: 86.4 % (ref 39–77)
NRBC # BLD MANUAL: 0 10^3/UL (ref 0–0)
NRBC BLD QL: 0 /100WBC (ref 0–0)
PLATELET # BLD: 257 10^3/UL (ref 140–415)
PMV BLD AUTO: 9 FL (ref 7.4–10.4)
POTASSIUM SERPL-SCNC: 4.4 MMOL/L (ref 3.5–5.1)
RBC # BLD AUTO: 4.21 10^6/UL (ref 4.7–6.1)
SODIUM SERPL-SCNC: 136 MMOL/L (ref 135–144)
TIBC SERPL-MCNC: 342 UG/DL (ref 241–421)
TRIGL SERPL-MCNC: 93 MG/DL (ref 0–149)
TSH SERPL-ACNC: 0.12 MIU/L (ref 0.47–4.68)
VIT B12 SERPL-MCNC: 541 PG/ML (ref 239–931)
WBC # BLD AUTO: 11.6 10^3/UL (ref 4.8–10.8)

## 2017-05-26 RX ADMIN — METOPROLOL TARTRATE SCH MG: 25 TABLET, FILM COATED ORAL at 21:43

## 2017-05-26 RX ADMIN — LISINOPRIL SCH MG: 20 TABLET ORAL at 08:51

## 2017-05-26 RX ADMIN — VITAMIN D, TAB 1000IU (100/BT) SCH UNIT: 25 TAB at 08:53

## 2017-05-26 RX ADMIN — IPRATROPIUM BROMIDE AND ALBUTEROL SULFATE SCH ML: .5; 3 SOLUTION RESPIRATORY (INHALATION) at 07:56

## 2017-05-26 RX ADMIN — MORPHINE SULFATE PRN MG: 2 INJECTION, SOLUTION INTRAMUSCULAR; INTRAVENOUS at 13:43

## 2017-05-26 RX ADMIN — IPRATROPIUM BROMIDE AND ALBUTEROL SULFATE SCH ML: .5; 3 SOLUTION RESPIRATORY (INHALATION) at 14:38

## 2017-05-26 RX ADMIN — MORPHINE SULFATE SCH MG: 15 TABLET, EXTENDED RELEASE ORAL at 21:42

## 2017-05-26 RX ADMIN — GABAPENTIN SCH MG: 300 CAPSULE ORAL at 08:37

## 2017-05-26 RX ADMIN — MORPHINE SULFATE SCH MG: 15 TABLET, EXTENDED RELEASE ORAL at 08:52

## 2017-05-26 RX ADMIN — OXYCODONE HYDROCHLORIDE AND ACETAMINOPHEN SCH MG: 500 TABLET ORAL at 08:53

## 2017-05-26 RX ADMIN — CEFTRIAXONE SCH MLS/HR: 1 INJECTION, SOLUTION INTRAVENOUS at 16:27

## 2017-05-26 RX ADMIN — GABAPENTIN SCH MG: 300 CAPSULE ORAL at 13:42

## 2017-05-26 RX ADMIN — AMLODIPINE BESYLATE SCH MG: 10 TABLET ORAL at 08:37

## 2017-05-26 RX ADMIN — IPRATROPIUM BROMIDE AND ALBUTEROL SULFATE SCH ML: .5; 3 SOLUTION RESPIRATORY (INHALATION) at 20:11

## 2017-05-26 RX ADMIN — GABAPENTIN SCH MG: 300 CAPSULE ORAL at 21:40

## 2017-05-26 RX ADMIN — MORPHINE SULFATE PRN MG: 2 INJECTION, SOLUTION INTRAMUSCULAR; INTRAVENOUS at 03:02

## 2017-05-26 RX ADMIN — MORPHINE SULFATE PRN MG: 2 INJECTION, SOLUTION INTRAMUSCULAR; INTRAVENOUS at 22:26

## 2017-05-26 RX ADMIN — ASPIRIN SCH MG: 325 TABLET, DELAYED RELEASE ORAL at 08:52

## 2017-05-26 RX ADMIN — PANTOPRAZOLE SODIUM SCH MG: 40 TABLET, DELAYED RELEASE ORAL at 05:53

## 2017-05-26 RX ADMIN — ENOXAPARIN SODIUM SCH MG: 100 INJECTION SUBCUTANEOUS at 08:54

## 2017-05-26 RX ADMIN — THERA TABS SCH TAB: TAB at 08:52

## 2017-05-26 RX ADMIN — POTASSIUM CHLORIDE SCH MEQ: 10 TABLET, EXTENDED RELEASE ORAL at 08:51

## 2017-05-26 RX ADMIN — ATORVASTATIN CALCIUM SCH MG: 10 TABLET, FILM COATED ORAL at 21:41

## 2017-05-26 RX ADMIN — MORPHINE SULFATE PRN MG: 2 INJECTION, SOLUTION INTRAMUSCULAR; INTRAVENOUS at 18:03

## 2017-05-26 NOTE — CONS
Date/Time of Note


Date/Time of Note


DATE: 5/26/17 


TIME: 16:16





Assessment/Plan


Assessment/Plan


Chief Complaint/Hosp Course


IMPRESSION:


1.  Congestive heart failure exacerbation, diastolic by echocardiogram 12/2016, 

acute on chronic.


2.  Hypertension, under reasonable control currently.


3.  Shortness of breath secondary to #1.


4.  Chronic obstructive pulmonary disease exacerbation.


5.  Coronary artery disease by medications.-negative troponin x 3


6.  Dyslipidemia-LDL (^ HDL 77.


7.  Obesity.





Recc:


-Tele


-Continue norvasc/zestril


-Continue asa


-Continue lasix diuresis and follow volume status closely


-Continue steroids/bronchodilators/abx's


-F/U cx data


-Continue statin


Problems:  





Consultation Date/Type/Reason


Admit Date/Time


May 25, 2017 at 00:53


Initial Consult Date


5/25/2017


Type of Consultation:  Cardiology


Reason for Consultation


CHF


Referring Provider:  DEVAUGHN TALAMANTES MD





Exam/Review of Systems


Vital Signs


Vitals





 Vital Signs








  Date Time  Temp Pulse Resp B/P Pulse Ox O2 Delivery O2 Flow Rate FiO2


 


5/26/17 16:08 98.1 103 18 137/64 91   


 


5/26/17 14:39      Nasal Cannula 3.0 














 Intake and Output   


 


 5/25/17 5/25/17 5/26/17





 15:00 23:00 07:00


 


Intake Total  1010 ml 


 


Output Total  1900 ml 


 


Balance  -890 ml 











Exam


Review of Systems:


CONSTITUTIONAL:  No fevers, chills.


PULMONARY:  moderate sob


CARDIOVASCULAR: No chest pain/palpitations


GASTROINTESTINAL:  No nausea/vomiting.


GENITOURINARY:  No hematuria/dysuria.


MUSCULOSKELETAL:  No myagias/arthalgias.


PSYCHIATRIC:  The patient denies depression.


NEUROLOGIC:   No weakness


Constitutional:  alert


Psych:  no complaints


Head:  normocephalic


ENMT:  mucosa pink and moist


Neck:  jvd (9 cm water), supple


Respiratory:  diminished breath sounds (at bases/B)


Cardiovascular:  regular rate and rhythm


Gastrointestinal:  non-tender, soft


Musculoskeletal:  muscle tone (normal)


Extremities:  pitting pedal edema


Neurological:  other (No focal deficits)





Results


Result Diagram:  


5/26/17 1129                                                                   

             5/26/17 1129





Results 24 hrs





Laboratory Tests








Test


  5/26/17


00:29 5/26/17


11:29 5/26/17


14:37


 


Troponin I < 0.012   < 0.012   < 0.012  


 


White Blood Count  11.6  #H 


 


Red Blood Count  4.21  L 


 


Hemoglobin  10.2  L 


 


Hematocrit  35.8  L 


 


Mean Corpuscular Volume  85.0   


 


Mean Corpuscular Hemoglobin  24.2  L 


 


Mean Corpuscular Hemoglobin


Concent 


  28.5  L


  


 


 


Red Cell Distribution Width  18.2  H 


 


Platelet Count  257   


 


Mean Platelet Volume  9.0   


 


Neutrophils %  86.4  H 


 


Lymphocytes %  7.2  L 


 


Monocytes %  5.4   


 


Eosinophils %  0.0   


 


Basophils %  0.1   


 


Nucleated Red Blood Cells %  0.0   


 


Neutrophils #  10.0  H 


 


Lymphocytes #  0.8   


 


Monocytes #  0.6   


 


Eosinophils #  0.0   


 


Basophils #  0.0   


 


Nucleated Red Blood Cells #  0.0   


 


Sodium Level  136   


 


Potassium Level  4.4   


 


Chloride Level  98   


 


Carbon Dioxide Level  34  H 


 


Anion Gap  8   


 


Blood Urea Nitrogen  10   


 


Creatinine  0.60  L 


 


Glucose Level  148   


 


Calcium Level  8.7   


 


Iron Level  32  L 


 


Total Iron Binding Capacity  342   


 


Percent Iron Saturation  9  L 


 


Ferritin  9.1  L 


 


Triglycerides Level  93   


 


Cholesterol Level  192   


 


LDL Cholesterol, Calculated  96   


 


HDL Cholesterol  77   


 


Cholesterol/HDL Ratio  2.4   


 


Vitamin B12 Level  541   


 


Folate  > 20.0  H 


 


Thyroid Stimulating Hormone


(TSH) 


  0.125  L


  


 


 


Free Thyroxine  0.80   











Medications


Medications





 Current Medications


Amlodipine Besylate (Norvasc) 10 mg DAILY PO  Last administered on 5/26/17at 08:

37; Admin Dose 10 MG;  Start 5/25/17 at 09:00


Ascorbic Acid (Vitamin C) 1,000 mg DAILY PO  Last administered on 5/26/17at 08:

53; Admin Dose 1,000 MG;  Start 5/25/17 at 09:00


Cholecalciferol (Vitamin D) 1,000 unit DAILY PO  Last administered on 5/26/17at 

08:53; Admin Dose 1,000 UNIT;  Start 5/25/17 at 09:00


Docusate Sodium (Colace) 250 mg BID PO  Last administered on 5/26/17at 08:52; 

Admin Dose 250 MG;  Start 5/25/17 at 09:00


Gabapentin (Neurontin) 900 mg TID PO  Last administered on 5/26/17at 13:42; 

Admin Dose 900 MG;  Start 5/25/17 at 09:00


Lisinopril (Zestril) 20 mg DAILY PO  Last administered on 5/26/17at 08:51; 

Admin Dose 20 MG;  Start 5/25/17 at 09:00


Morphine Sulfate (Ms Contin (Er)) 15 mg Q12 PO  Last administered on 5/26/17at 

08:52; Admin Dose 15 MG;  Start 5/25/17 at 09:00


Multivitamins Therapeutic (Theragran) 1 tab DAILY PO  Last administered on 5/26/ 17at 08:52; Admin Dose 1 TAB;  Start 5/25/17 at 09:00


Ondansetron HCl (Zofran Tab) 4 mg Q4H  PRN PO NAUSEA AND/OR VOMITING;  Start 5/ 25/17 at 03:00


Potassium Chloride (Klor-Con 10) 10 meq DAILY PO  Last administered on 5/26/ 17at 08:51; Admin Dose 10 MEQ;  Start 5/25/17 at 09:00


Atorvastatin Calcium (Lipitor) 5 mg DAILY@21 PO  Last administered on 5/25/17at 

21:19; Admin Dose 5 MG;  Start 5/25/17 at 21:00


Aspirin (Ecotrin) 325 mg DAILY PO  Last administered on 5/26/17at 08:52; Admin 

Dose 325 MG;  Start 5/25/17 at 09:00


Acetaminophen (Tylenol Tab) 650 mg Q6H  PRN PO PAIN AND OR ELEVATED TEMP;  

Start 5/25/17 at 03:00


Morphine Sulfate (morphine) 2 mg Q4H  PRN IV PAIN Last administered on 5/26/ 17at 13:43; Admin Dose 2 MG;  Start 5/25/17 at 03:00


Enoxaparin Sodium (Lovenox) 40 mg DAILY SC  Last administered on 5/26/17at 08:54

; Admin Dose 40 MG;  Start 5/25/17 at 09:00


Ondansetron HCl (Zofran Inj) 4 mg Q4H  PRN IV NAUSEA AND/OR VOMITING Last 

administered on 5/25/17at 08:19; Admin Dose 4 MG;  Start 5/25/17 at 03:30


Pantoprazole (Protonix Tab) 40 mg DAILY@06 PO  Last administered on 5/26/17at 05

:53; Admin Dose 40 MG;  Start 5/25/17 at 06:00


Acetaminophen/ Hydrocodone Bitart (Norco (10/325)) 1 tab Q6H  PRN PO PAIN;  

Start 5/25/17 at 03:30


Methylprednisolone Sodium Succinate 40 mg 40 mg Q12 IV  Last administered on 5/ 26/17at 08:51; Admin Dose 40 MG;  Start 5/25/17 at 14:30


Ceftriaxone Sodium (Rocephin) 50 ml @  100 mls/hr Q24H IVPB  Last administered 

on 5/25/17at 14:59; Admin Dose 100 MLS/HR;  Start 5/25/17 at 14:30











HARLEY ADAMS May 26, 2017 16:21

## 2017-05-26 NOTE — RADRPT
Vent Rate: 97 bpm

RR Interval: 0 msec

DC Interval: 168 msec

QRS Duration: 120 msec

QT Interval: 376 msec

QTC Interval: 477 msec

P-R-T Axis: 59 - -37 - 61 degrees

 

 Normal sinus rhythm

 Left axis deviation

 Nonspecific intraventricular conduction delay

 Abnormal ECG

 

Electronically Signed By: Sina David

[id number]

## 2017-05-26 NOTE — PN
Date/Time of Note


Date/Time of Note


DATE: 5/26/17 


TIME: 20:15





Assessment/Plan


VTE Prophylaxis


VTE Prophylaxis Intervention:  LMWH





Lines/Catheters


IV Catheter Type (from [facility]):  Saline Lock


Urinary Cath still in place:  No





Assessment/Plan


Chief Complaint/Hosp Course


Patient states that he feels  slightly better, continues on supplemental oxygen

, complains of cough, tachycardic.


Problems:  


Assessment/Plan


- COPD exacerbation, continue steroids, breathing treatments.


- Acute on chronic Diastolic Congestive heart failure with exacerbation, 

Continue Lasix, monitor fluid balance. Dr Manrique is following in cardiology 

consultation.


- Acute respiratory failure 2 to #1and #2.


- Bronchitis, continue Ceftriaxone.


- Hypertension, continue current antihypertensive regimen.


- Dyslipidemia, continue statin


- Morbid obesity.


Plan of care d/w Dr Anaya.





Exam/Review of Systems


Vital Signs


Vitals





 Vital Signs








  Date Time  Temp Pulse Resp B/P Pulse Ox O2 Delivery O2 Flow Rate FiO2


 


5/26/17 20:11  96 18  93 Nasal Cannula 3.0 


 


5/26/17 19:42 98.2   130/59    














 Intake and Output   


 


 5/25/17 5/25/17 5/26/17





 15:00 23:00 07:00


 


Intake Total  1010 ml 


 


Output Total  1900 ml 


 


Balance  -890 ml 











Exam


Constitutional:  alert, oriented


Head:  normocephalic


Neck:  supple


Respiratory:  diminished breath sounds


Cardiovascular:  nl pulses, regular rate and rhythm


Extremities:  edema, normal pulses


Skin:  nl turgor





Results


Result Diagram:  


5/26/17 1129                                                                   

             5/26/17 1129





Results 24 hrs





Laboratory Tests








Test


  5/26/17


00:29 5/26/17


11:29 5/26/17


14:37


 


Troponin I < 0.012   < 0.012   < 0.012  


 


White Blood Count  11.6  #H 


 


Red Blood Count  4.21  L 


 


Hemoglobin  10.2  L 


 


Hematocrit  35.8  L 


 


Mean Corpuscular Volume  85.0   


 


Mean Corpuscular Hemoglobin  24.2  L 


 


Mean Corpuscular Hemoglobin


Concent 


  28.5  L


  


 


 


Red Cell Distribution Width  18.2  H 


 


Platelet Count  257   


 


Mean Platelet Volume  9.0   


 


Neutrophils %  86.4  H 


 


Lymphocytes %  7.2  L 


 


Monocytes %  5.4   


 


Eosinophils %  0.0   


 


Basophils %  0.1   


 


Nucleated Red Blood Cells %  0.0   


 


Neutrophils #  10.0  H 


 


Lymphocytes #  0.8   


 


Monocytes #  0.6   


 


Eosinophils #  0.0   


 


Basophils #  0.0   


 


Nucleated Red Blood Cells #  0.0   


 


Sodium Level  136   


 


Potassium Level  4.4   


 


Chloride Level  98   


 


Carbon Dioxide Level  34  H 


 


Anion Gap  8   


 


Blood Urea Nitrogen  10   


 


Creatinine  0.60  L 


 


Glucose Level  148   


 


Calcium Level  8.7   


 


Iron Level  32  L 


 


Total Iron Binding Capacity  342   


 


Percent Iron Saturation  9  L 


 


Ferritin  9.1  L 


 


Triglycerides Level  93   


 


Cholesterol Level  192   


 


LDL Cholesterol, Calculated  96   


 


HDL Cholesterol  77   


 


Cholesterol/HDL Ratio  2.4   


 


Vitamin B12 Level  541   


 


Folate  > 20.0  H 


 


Thyroid Stimulating Hormone


(TSH) 


  0.125  L


  


 


 


Free Thyroxine  0.80   











Medications


Medications





 Current Medications


Amlodipine Besylate (Norvasc) 10 mg DAILY PO  Last administered on 5/26/17at 08:

37; Admin Dose 10 MG;  Start 5/25/17 at 09:00


Ascorbic Acid (Vitamin C) 1,000 mg DAILY PO  Last administered on 5/26/17at 08:

53; Admin Dose 1,000 MG;  Start 5/25/17 at 09:00


Cholecalciferol (Vitamin D) 1,000 unit DAILY PO  Last administered on 5/26/17at 

08:53; Admin Dose 1,000 UNIT;  Start 5/25/17 at 09:00


Docusate Sodium (Colace) 250 mg BID PO  Last administered on 5/26/17at 08:52; 

Admin Dose 250 MG;  Start 5/25/17 at 09:00


Gabapentin (Neurontin) 900 mg TID PO  Last administered on 5/26/17at 13:42; 

Admin Dose 900 MG;  Start 5/25/17 at 09:00


Lisinopril (Zestril) 20 mg DAILY PO  Last administered on 5/26/17at 08:51; 

Admin Dose 20 MG;  Start 5/25/17 at 09:00


Morphine Sulfate (Ms Contin (Er)) 15 mg Q12 PO  Last administered on 5/26/17at 

08:52; Admin Dose 15 MG;  Start 5/25/17 at 09:00


Multivitamins Therapeutic (Theragran) 1 tab DAILY PO  Last administered on 5/26/ 17at 08:52; Admin Dose 1 TAB;  Start 5/25/17 at 09:00


Ondansetron HCl (Zofran Tab) 4 mg Q4H  PRN PO NAUSEA AND/OR VOMITING;  Start 5/ 25/17 at 03:00


Potassium Chloride (Klor-Con 10) 10 meq DAILY PO  Last administered on 5/26/ 17at 08:51; Admin Dose 10 MEQ;  Start 5/25/17 at 09:00


Atorvastatin Calcium (Lipitor) 5 mg DAILY@21 PO  Last administered on 5/25/17at 

21:19; Admin Dose 5 MG;  Start 5/25/17 at 21:00


Aspirin (Ecotrin) 325 mg DAILY PO  Last administered on 5/26/17at 08:52; Admin 

Dose 325 MG;  Start 5/25/17 at 09:00


Acetaminophen (Tylenol Tab) 650 mg Q6H  PRN PO PAIN AND OR ELEVATED TEMP;  

Start 5/25/17 at 03:00


Morphine Sulfate (morphine) 2 mg Q4H  PRN IV PAIN Last administered on 5/26/ 17at 18:03; Admin Dose 2 MG;  Start 5/25/17 at 03:00


Enoxaparin Sodium (Lovenox) 40 mg DAILY SC  Last administered on 5/26/17at 08:54

; Admin Dose 40 MG;  Start 5/25/17 at 09:00


Ondansetron HCl (Zofran Inj) 4 mg Q4H  PRN IV NAUSEA AND/OR VOMITING Last 

administered on 5/25/17at 08:19; Admin Dose 4 MG;  Start 5/25/17 at 03:30


Pantoprazole (Protonix Tab) 40 mg DAILY@06 PO  Last administered on 5/26/17at 05

:53; Admin Dose 40 MG;  Start 5/25/17 at 06:00


Acetaminophen/ Hydrocodone Bitart (Norco (10/325)) 1 tab Q6H  PRN PO PAIN;  

Start 5/25/17 at 03:30


Methylprednisolone Sodium Succinate 40 mg 40 mg Q12 IV  Last administered on 5/ 26/17at 08:51; Admin Dose 40 MG;  Start 5/25/17 at 14:30


Ceftriaxone Sodium (Rocephin) 50 ml @  100 mls/hr Q24H IVPB  Last administered 

on 5/26/17at 16:27; Admin Dose 100 MLS/HR;  Start 5/25/17 at 14:30


Metoprolol Tartrate (Lopressor) 25 mg BID PO ;  Start 5/26/17 at 21:00











DARIO NORRIS May 26, 2017 20:24

## 2017-05-27 VITALS — SYSTOLIC BLOOD PRESSURE: 128 MMHG | RESPIRATION RATE: 18 BRPM | DIASTOLIC BLOOD PRESSURE: 68 MMHG

## 2017-05-27 VITALS — DIASTOLIC BLOOD PRESSURE: 71 MMHG | RESPIRATION RATE: 18 BRPM | SYSTOLIC BLOOD PRESSURE: 134 MMHG

## 2017-05-27 VITALS — HEART RATE: 78 BPM

## 2017-05-27 VITALS — HEART RATE: 93 BPM

## 2017-05-27 VITALS — RESPIRATION RATE: 18 BRPM | DIASTOLIC BLOOD PRESSURE: 72 MMHG | SYSTOLIC BLOOD PRESSURE: 154 MMHG

## 2017-05-27 VITALS — SYSTOLIC BLOOD PRESSURE: 132 MMHG | DIASTOLIC BLOOD PRESSURE: 68 MMHG | RESPIRATION RATE: 15 BRPM

## 2017-05-27 VITALS — HEART RATE: 90 BPM

## 2017-05-27 VITALS — SYSTOLIC BLOOD PRESSURE: 132 MMHG | DIASTOLIC BLOOD PRESSURE: 62 MMHG

## 2017-05-27 VITALS — HEART RATE: 114 BPM

## 2017-05-27 VITALS — HEART RATE: 89 BPM

## 2017-05-27 VITALS — SYSTOLIC BLOOD PRESSURE: 119 MMHG | DIASTOLIC BLOOD PRESSURE: 67 MMHG | RESPIRATION RATE: 18 BRPM

## 2017-05-27 VITALS — HEART RATE: 83 BPM

## 2017-05-27 LAB
ANION GAP SERPL CALC-SCNC: 15 MMOL/L (ref 8–16)
BUN SERPL-MCNC: 14 MG/DL (ref 7–20)
CALCIUM SERPL-MCNC: 8.8 MG/DL (ref 8.4–10.2)
CHLORIDE SERPL-SCNC: 96 MMOL/L (ref 97–110)
CO2 SERPL-SCNC: 34 MMOL/L (ref 21–31)
CREAT SERPL-MCNC: 0.63 MG/DL (ref 0.61–1.24)
GLUCOSE SERPL-MCNC: 153 MG/DL (ref 70–220)
POTASSIUM SERPL-SCNC: 4.3 MMOL/L (ref 3.5–5.1)
SODIUM SERPL-SCNC: 141 MMOL/L (ref 135–144)

## 2017-05-27 RX ADMIN — MORPHINE SULFATE SCH MG: 15 TABLET, EXTENDED RELEASE ORAL at 21:05

## 2017-05-27 RX ADMIN — IPRATROPIUM BROMIDE AND ALBUTEROL SULFATE SCH ML: .5; 3 SOLUTION RESPIRATORY (INHALATION) at 19:46

## 2017-05-27 RX ADMIN — PANTOPRAZOLE SODIUM SCH MG: 40 TABLET, DELAYED RELEASE ORAL at 09:52

## 2017-05-27 RX ADMIN — GABAPENTIN SCH MG: 300 CAPSULE ORAL at 21:04

## 2017-05-27 RX ADMIN — METOPROLOL TARTRATE SCH MG: 25 TABLET, FILM COATED ORAL at 21:06

## 2017-05-27 RX ADMIN — IPRATROPIUM BROMIDE AND ALBUTEROL SULFATE SCH ML: .5; 3 SOLUTION RESPIRATORY (INHALATION) at 13:14

## 2017-05-27 RX ADMIN — GABAPENTIN SCH MG: 300 CAPSULE ORAL at 12:08

## 2017-05-27 RX ADMIN — ASPIRIN SCH MG: 325 TABLET, DELAYED RELEASE ORAL at 09:55

## 2017-05-27 RX ADMIN — MORPHINE SULFATE PRN MG: 2 INJECTION, SOLUTION INTRAMUSCULAR; INTRAVENOUS at 12:08

## 2017-05-27 RX ADMIN — MORPHINE SULFATE PRN MG: 2 INJECTION, SOLUTION INTRAMUSCULAR; INTRAVENOUS at 16:39

## 2017-05-27 RX ADMIN — ENOXAPARIN SODIUM SCH MG: 100 INJECTION SUBCUTANEOUS at 09:00

## 2017-05-27 RX ADMIN — MORPHINE SULFATE SCH MG: 15 TABLET, EXTENDED RELEASE ORAL at 09:53

## 2017-05-27 RX ADMIN — ATORVASTATIN CALCIUM SCH MG: 10 TABLET, FILM COATED ORAL at 21:05

## 2017-05-27 RX ADMIN — CEFTRIAXONE SCH MLS/HR: 1 INJECTION, SOLUTION INTRAVENOUS at 14:18

## 2017-05-27 RX ADMIN — VITAMIN D, TAB 1000IU (100/BT) SCH UNIT: 25 TAB at 09:55

## 2017-05-27 RX ADMIN — METOPROLOL TARTRATE SCH MG: 25 TABLET, FILM COATED ORAL at 09:56

## 2017-05-27 RX ADMIN — MORPHINE SULFATE PRN MG: 2 INJECTION, SOLUTION INTRAMUSCULAR; INTRAVENOUS at 02:46

## 2017-05-27 RX ADMIN — OXYCODONE HYDROCHLORIDE AND ACETAMINOPHEN SCH MG: 500 TABLET ORAL at 09:55

## 2017-05-27 RX ADMIN — MORPHINE SULFATE PRN MG: 2 INJECTION, SOLUTION INTRAMUSCULAR; INTRAVENOUS at 20:03

## 2017-05-27 RX ADMIN — AMLODIPINE BESYLATE SCH MG: 10 TABLET ORAL at 09:56

## 2017-05-27 RX ADMIN — THERA TABS SCH TAB: TAB at 09:53

## 2017-05-27 RX ADMIN — LISINOPRIL SCH MG: 20 TABLET ORAL at 09:55

## 2017-05-27 RX ADMIN — POTASSIUM CHLORIDE SCH MEQ: 10 TABLET, EXTENDED RELEASE ORAL at 09:55

## 2017-05-27 RX ADMIN — IPRATROPIUM BROMIDE AND ALBUTEROL SULFATE SCH ML: .5; 3 SOLUTION RESPIRATORY (INHALATION) at 08:15

## 2017-05-27 RX ADMIN — GABAPENTIN SCH MG: 300 CAPSULE ORAL at 09:53

## 2017-05-27 NOTE — CONS
Date/Time of Note


Date/Time of Note


DATE: 5/27/17 


TIME: 16:10





Assessment/Plan


Assessment/Plan


Additional Assessment/Plan


1.  Acute exacerbation of Congestive heart failure exacerbation, diastolic


2.  Hypertension


3.  Chronic obstructive pulmonary disease exacerbation.


4. Dyslipidemia-


5.  Obesity.


6. Possible Sleep Apnea





Heart failure clinically compensated





Continue Lasix


Continue Metoprolol and Norvasc


Continue Lisinopril


Continue ASA


Continue Lipitor


Continue empiric antibiotics


Continue nebs as scheduled


Continue Methylprednisone


Counseled to lose weight





Consultation Date/Type/Reason


Admit Date/Time


May 25, 2017 at 00:53


Psychological:  no complaints





Past Surgical History


Past Surgical Hx:  noncontributory





Social History


Smoking Status:  Former smoker





Exam/Review of Systems


Vital Signs


Vitals





 Vital Signs








  Date Time  Temp Pulse Resp B/P Pulse Ox O2 Delivery O2 Flow Rate FiO2


 


5/27/17 15:32 97.8 92 18 119/67 96   


 


5/27/17 13:15      Nasal Cannula 3.0 














 Intake and Output   


 


 5/26/17 5/26/17 5/27/17





 15:00 23:00 07:00


 


Intake Total 1600 ml  1200 ml


 


Output Total 1000 ml  1100 ml


 


Balance 600 ml  100 ml











Exam


Constitutional:  alert, oriented


Head:  atraumatic, normocephalic


Neck:  non-tender, supple


Respiratory:  diminished breath sounds


Cardiovascular:  regular rate and rhythm


Gastrointestinal:  non-tender, soft


Extremities:  edema, normal pulses





Results


Result Diagram:  


5/26/17 1129                                                                   

             5/27/17 1025





Results 24 hrs





Laboratory Tests








Test


  5/27/17


01:15 5/27/17


10:25


 


Stool Occult Blood NEGATIVE   


 


Sodium Level  141  


 


Potassium Level  4.3  


 


Chloride Level  96  L


 


Carbon Dioxide Level  34  H


 


Anion Gap  15  #


 


Blood Urea Nitrogen  14  


 


Creatinine  0.63  


 


Glucose Level  153  


 


Calcium Level  8.8  











Medications


Medications





 Current Medications


Amlodipine Besylate (Norvasc) 10 mg DAILY PO  Last administered on 5/27/17at 09:

56; Admin Dose 10 MG;  Start 5/25/17 at 09:00


Ascorbic Acid (Vitamin C) 1,000 mg DAILY PO  Last administered on 5/27/17at 09:

55; Admin Dose 1,000 MG;  Start 5/25/17 at 09:00


Cholecalciferol (Vitamin D) 1,000 unit DAILY PO  Last administered on 5/27/17at 

09:55; Admin Dose 1,000 UNIT;  Start 5/25/17 at 09:00


Docusate Sodium (Colace) 250 mg BID PO  Last administered on 5/27/17at 09:55; 

Admin Dose 250 MG;  Start 5/25/17 at 09:00


Gabapentin (Neurontin) 900 mg TID PO  Last administered on 5/27/17at 12:08; 

Admin Dose 900 MG;  Start 5/25/17 at 09:00


Lisinopril (Zestril) 20 mg DAILY PO  Last administered on 5/27/17at 09:55; 

Admin Dose 20 MG;  Start 5/25/17 at 09:00


Morphine Sulfate (Ms Contin (Er)) 15 mg Q12 PO  Last administered on 5/27/17at 

09:53; Admin Dose 15 MG;  Start 5/25/17 at 09:00


Multivitamins Therapeutic (Theragran) 1 tab DAILY PO  Last administered on 5/27/ 17at 09:53; Admin Dose 1 TAB;  Start 5/25/17 at 09:00


Ondansetron HCl (Zofran Tab) 4 mg Q4H  PRN PO NAUSEA AND/OR VOMITING Last 

administered on 5/26/17at 21:40; Admin Dose 4 MG;  Start 5/25/17 at 03:00


Potassium Chloride (Klor-Con 10) 10 meq DAILY PO  Last administered on 5/27/ 17at 09:55; Admin Dose 10 MEQ;  Start 5/25/17 at 09:00


Atorvastatin Calcium (Lipitor) 5 mg DAILY@21 PO  Last administered on 5/26/17at 

21:41; Admin Dose 5 MG;  Start 5/25/17 at 21:00


Aspirin (Ecotrin) 325 mg DAILY PO  Last administered on 5/27/17at 09:55; Admin 

Dose 325 MG;  Start 5/25/17 at 09:00


Acetaminophen (Tylenol Tab) 650 mg Q6H  PRN PO PAIN AND OR ELEVATED TEMP;  

Start 5/25/17 at 03:00


Morphine Sulfate (morphine) 2 mg Q4H  PRN IV PAIN Last administered on 5/27/ 17at 12:08; Admin Dose 2 MG;  Start 5/25/17 at 03:00


Enoxaparin Sodium (Lovenox) 40 mg DAILY SC  Last administered on 5/26/17at 08:54

; Admin Dose 40 MG;  Start 5/25/17 at 09:00


Ondansetron HCl (Zofran Inj) 4 mg Q4H  PRN IV NAUSEA AND/OR VOMITING Last 

administered on 5/25/17at 08:19; Admin Dose 4 MG;  Start 5/25/17 at 03:30


Pantoprazole (Protonix Tab) 40 mg DAILY@06 PO  Last administered on 5/26/17at 05

:53; Admin Dose 40 MG;  Start 5/25/17 at 06:00


Acetaminophen/ Hydrocodone Bitart (Norco (10/325)) 1 tab Q6H  PRN PO PAIN;  

Start 5/25/17 at 03:30


Methylprednisolone Sodium Succinate 40 mg 40 mg Q12 IV  Last administered on 5/ 26/17at 21:43; Admin Dose 40 MG;  Start 5/25/17 at 14:30


Ceftriaxone Sodium (Rocephin) 50 ml @  100 mls/hr Q24H IVPB  Last administered 

on 5/27/17at 14:18; Admin Dose 100 MLS/HR;  Start 5/25/17 at 14:30


Metoprolol Tartrate (Lopressor) 25 mg BID PO  Last administered on 5/27/17at 09:

56; Admin Dose 25 MG;  Start 5/26/17 at 21:00











ORALIA BOWSER M.D. May 27, 2017 16:15

## 2017-05-27 NOTE — PN
Date/Time of Note


Date/Time of Note


DATE: 5/27/17 


TIME: 13:20





Assessment/Plan


VTE Prophylaxis


VTE Prophylaxis Intervention:  other





Lines/Catheters


[facility] Type (from [facility]):  Saline Lock


Urinary Cath still in place:  No





Assessment/Plan


Chief Complaint/Hosp Course


- COPD exacerbation, continue steroids, breathing treatments.


- Acute on chronic Diastolic Congestive heart failure with exacerbation, 

Continue Lasix, monitor fluid balance. Dr Manrique is following in cardiology 

consultation.


- Acute respiratory failure 2 to #1and #2.


- Bronchitis, continue Ceftriaxone.


- Hypertension, continue current antihypertensive regimen.


- Dyslipidemia, continue statin


- Morbid obesity.


Problems:  





Subjective


24 Hr Interval Summary


Free Text/Dictation


Patient is still short of breath





Exam/Review of Systems


Vital Signs


Vitals





 Vital Signs








  Date Time  Temp Pulse Resp B/P Pulse Ox O2 Delivery O2 Flow Rate FiO2


 


5/27/17 13:15  99 18   Nasal Cannula 3.0 


 


5/27/17 11:20 97.9   134/71 100   














 Intake and Output   


 


 5/26/17 5/26/17 5/27/17





 15:00 23:00 07:00


 


Intake Total 1600 ml  1200 ml


 


Output Total 1000 ml  1100 ml


 


Balance 600 ml  100 ml











Exam


Constitutional:  well developed


Head:  atraumatic, normocephalic


Neck:  supple


Respiratory:  diminished breath sounds, wheezing


Cardiovascular:  regular rate and rhythm


Gastrointestinal:  non-tender, soft


Extremities:  normal pulses





Results


Result Diagram:  


5/26/17 1129                                                                   

             5/27/17 1025





Results 24 hrs





Laboratory Tests








Test


  5/26/17


14:37 5/27/17


01:15 5/27/17


10:25


 


Troponin I < 0.012    


 


Stool Occult Blood  NEGATIVE   


 


Sodium Level   141  


 


Potassium Level   4.3  


 


Chloride Level   96  L


 


Carbon Dioxide Level   34  H


 


Anion Gap   15  #


 


Blood Urea Nitrogen   14  


 


Creatinine   0.63  


 


Glucose Level   153  


 


Calcium Level   8.8  











Medications


Medications





 Current Medications


Amlodipine Besylate (Norvasc) 10 mg DAILY PO  Last administered on 5/27/17at 09:

56; Admin Dose 10 MG;  Start 5/25/17 at 09:00


Ascorbic Acid (Vitamin C) 1,000 mg DAILY PO  Last administered on 5/27/17at 09:

55; Admin Dose 1,000 MG;  Start 5/25/17 at 09:00


Cholecalciferol (Vitamin D) 1,000 unit DAILY PO  Last administered on 5/27/17at 

09:55; Admin Dose 1,000 UNIT;  Start 5/25/17 at 09:00


Docusate Sodium (Colace) 250 mg BID PO  Last administered on 5/27/17at 09:55; 

Admin Dose 250 MG;  Start 5/25/17 at 09:00


Gabapentin (Neurontin) 900 mg TID PO  Last administered on 5/27/17at 12:08; 

Admin Dose 900 MG;  Start 5/25/17 at 09:00


Lisinopril (Zestril) 20 mg DAILY PO  Last administered on 5/27/17at 09:55; 

Admin Dose 20 MG;  Start 5/25/17 at 09:00


Morphine Sulfate (Ms Contin (Er)) 15 mg Q12 PO  Last administered on 5/27/17at 

09:53; Admin Dose 15 MG;  Start 5/25/17 at 09:00


Multivitamins Therapeutic (Theragran) 1 tab DAILY PO  Last administered on 5/27/ 17at 09:53; Admin Dose 1 TAB;  Start 5/25/17 at 09:00


Ondansetron HCl (Zofran Tab) 4 mg Q4H  PRN PO NAUSEA AND/OR VOMITING Last 

administered on 5/26/17at 21:40; Admin Dose 4 MG;  Start 5/25/17 at 03:00


Potassium Chloride (Klor-Con 10) 10 meq DAILY PO  Last administered on 5/27/ 17at 09:55; Admin Dose 10 MEQ;  Start 5/25/17 at 09:00


Atorvastatin Calcium (Lipitor) 5 mg DAILY@21 PO  Last administered on 5/26/17at 

21:41; Admin Dose 5 MG;  Start 5/25/17 at 21:00


Aspirin (Ecotrin) 325 mg DAILY PO  Last administered on 5/27/17at 09:55; Admin 

Dose 325 MG;  Start 5/25/17 at 09:00


Acetaminophen (Tylenol Tab) 650 mg Q6H  PRN PO PAIN AND OR ELEVATED TEMP;  

Start 5/25/17 at 03:00


Morphine Sulfate (morphine) 2 mg Q4H  PRN IV PAIN Last administered on 5/27/ 17at 12:08; Admin Dose 2 MG;  Start 5/25/17 at 03:00


Enoxaparin Sodium (Lovenox) 40 mg DAILY SC  Last administered on 5/26/17at 08:54

; Admin Dose 40 MG;  Start 5/25/17 at 09:00


Ondansetron HCl (Zofran Inj) 4 mg Q4H  PRN IV NAUSEA AND/OR VOMITING Last 

administered on 5/25/17at 08:19; Admin Dose 4 MG;  Start 5/25/17 at 03:30


Pantoprazole (Protonix Tab) 40 mg DAILY@06 PO  Last administered on 5/26/17at 05

:53; Admin Dose 40 MG;  Start 5/25/17 at 06:00


Acetaminophen/ Hydrocodone Bitart (Norco (10/325)) 1 tab Q6H  PRN PO PAIN;  

Start 5/25/17 at 03:30


Methylprednisolone Sodium Succinate 40 mg 40 mg Q12 IV  Last administered on 5/ 26/17at 21:43; Admin Dose 40 MG;  Start 5/25/17 at 14:30


Ceftriaxone Sodium (Rocephin) 50 ml @  100 mls/hr Q24H IVPB  Last administered 

on 5/26/17at 16:27; Admin Dose 100 MLS/HR;  Start 5/25/17 at 14:30


Metoprolol Tartrate (Lopressor) 25 mg BID PO  Last administered on 5/27/17at 09:

56; Admin Dose 25 MG;  Start 5/26/17 at 21:00











SHYANN MACK May 27, 2017 13:21

## 2017-05-28 VITALS — DIASTOLIC BLOOD PRESSURE: 85 MMHG | RESPIRATION RATE: 18 BRPM | SYSTOLIC BLOOD PRESSURE: 135 MMHG

## 2017-05-28 VITALS — RESPIRATION RATE: 19 BRPM | SYSTOLIC BLOOD PRESSURE: 114 MMHG | DIASTOLIC BLOOD PRESSURE: 64 MMHG

## 2017-05-28 VITALS — RESPIRATION RATE: 16 BRPM | DIASTOLIC BLOOD PRESSURE: 75 MMHG | SYSTOLIC BLOOD PRESSURE: 138 MMHG

## 2017-05-28 VITALS — DIASTOLIC BLOOD PRESSURE: 62 MMHG | SYSTOLIC BLOOD PRESSURE: 133 MMHG | RESPIRATION RATE: 21 BRPM

## 2017-05-28 VITALS — SYSTOLIC BLOOD PRESSURE: 111 MMHG | DIASTOLIC BLOOD PRESSURE: 58 MMHG | RESPIRATION RATE: 18 BRPM

## 2017-05-28 VITALS — DIASTOLIC BLOOD PRESSURE: 66 MMHG | SYSTOLIC BLOOD PRESSURE: 129 MMHG | RESPIRATION RATE: 18 BRPM

## 2017-05-28 VITALS — HEART RATE: 70 BPM

## 2017-05-28 VITALS — HEART RATE: 78 BPM

## 2017-05-28 VITALS — HEART RATE: 75 BPM

## 2017-05-28 VITALS — HEART RATE: 87 BPM

## 2017-05-28 VITALS — HEART RATE: 88 BPM

## 2017-05-28 VITALS — HEART RATE: 83 BPM

## 2017-05-28 LAB
ANION GAP SERPL CALC-SCNC: 7 MMOL/L (ref 8–16)
BUN SERPL-MCNC: 18 MG/DL (ref 7–20)
CALCIUM SERPL-MCNC: 8.4 MG/DL (ref 8.4–10.2)
CHLORIDE SERPL-SCNC: 97 MMOL/L (ref 97–110)
CO2 SERPL-SCNC: 39 MMOL/L (ref 21–31)
CREAT SERPL-MCNC: 0.65 MG/DL (ref 0.61–1.24)
GLUCOSE SERPL-MCNC: 138 MG/DL (ref 70–220)
POTASSIUM SERPL-SCNC: 4.1 MMOL/L (ref 3.5–5.1)
SODIUM SERPL-SCNC: 139 MMOL/L (ref 135–144)

## 2017-05-28 RX ADMIN — VITAMIN D, TAB 1000IU (100/BT) SCH UNIT: 25 TAB at 09:23

## 2017-05-28 RX ADMIN — ASPIRIN SCH MG: 325 TABLET, DELAYED RELEASE ORAL at 09:21

## 2017-05-28 RX ADMIN — CEFTRIAXONE SCH MLS/HR: 1 INJECTION, SOLUTION INTRAVENOUS at 14:07

## 2017-05-28 RX ADMIN — MORPHINE SULFATE PRN MG: 2 INJECTION, SOLUTION INTRAMUSCULAR; INTRAVENOUS at 00:01

## 2017-05-28 RX ADMIN — ATORVASTATIN CALCIUM SCH MG: 10 TABLET, FILM COATED ORAL at 20:25

## 2017-05-28 RX ADMIN — THERA TABS SCH TAB: TAB at 09:21

## 2017-05-28 RX ADMIN — LISINOPRIL SCH MG: 20 TABLET ORAL at 09:23

## 2017-05-28 RX ADMIN — IPRATROPIUM BROMIDE AND ALBUTEROL SULFATE SCH ML: .5; 3 SOLUTION RESPIRATORY (INHALATION) at 08:38

## 2017-05-28 RX ADMIN — MORPHINE SULFATE PRN MG: 2 INJECTION, SOLUTION INTRAMUSCULAR; INTRAVENOUS at 18:21

## 2017-05-28 RX ADMIN — POTASSIUM CHLORIDE SCH MEQ: 10 TABLET, EXTENDED RELEASE ORAL at 09:21

## 2017-05-28 RX ADMIN — MORPHINE SULFATE PRN MG: 2 INJECTION, SOLUTION INTRAMUSCULAR; INTRAVENOUS at 23:50

## 2017-05-28 RX ADMIN — MORPHINE SULFATE SCH MG: 15 TABLET, EXTENDED RELEASE ORAL at 09:32

## 2017-05-28 RX ADMIN — AMLODIPINE BESYLATE SCH MG: 10 TABLET ORAL at 09:22

## 2017-05-28 RX ADMIN — OXYCODONE HYDROCHLORIDE AND ACETAMINOPHEN SCH MG: 500 TABLET ORAL at 09:23

## 2017-05-28 RX ADMIN — ENOXAPARIN SODIUM SCH MG: 100 INJECTION SUBCUTANEOUS at 09:26

## 2017-05-28 RX ADMIN — IPRATROPIUM BROMIDE AND ALBUTEROL SULFATE SCH ML: .5; 3 SOLUTION RESPIRATORY (INHALATION) at 13:57

## 2017-05-28 RX ADMIN — IPRATROPIUM BROMIDE AND ALBUTEROL SULFATE SCH ML: .5; 3 SOLUTION RESPIRATORY (INHALATION) at 19:36

## 2017-05-28 RX ADMIN — METOPROLOL TARTRATE SCH MG: 25 TABLET, FILM COATED ORAL at 09:22

## 2017-05-28 RX ADMIN — GABAPENTIN SCH MG: 300 CAPSULE ORAL at 20:23

## 2017-05-28 RX ADMIN — MORPHINE SULFATE PRN MG: 2 INJECTION, SOLUTION INTRAMUSCULAR; INTRAVENOUS at 14:18

## 2017-05-28 RX ADMIN — METOPROLOL TARTRATE SCH MG: 25 TABLET, FILM COATED ORAL at 20:24

## 2017-05-28 RX ADMIN — PANTOPRAZOLE SODIUM SCH MG: 40 TABLET, DELAYED RELEASE ORAL at 05:51

## 2017-05-28 RX ADMIN — GABAPENTIN SCH MG: 300 CAPSULE ORAL at 14:07

## 2017-05-28 RX ADMIN — MORPHINE SULFATE SCH MG: 15 TABLET, EXTENDED RELEASE ORAL at 20:24

## 2017-05-28 RX ADMIN — GABAPENTIN SCH MG: 300 CAPSULE ORAL at 09:25

## 2017-05-28 RX ADMIN — MORPHINE SULFATE PRN MG: 2 INJECTION, SOLUTION INTRAMUSCULAR; INTRAVENOUS at 04:14

## 2017-05-28 NOTE — CONS
Date/Time of Note


Date/Time of Note


DATE: 5/28/17 


TIME: 14:28





Assessment/Plan


Assessment/Plan


Additional Assessment/Plan


1.  Acute exacerbation of Congestive heart failure exacerbation, diastolic


2.  Hypertension


3.  Chronic obstructive pulmonary disease exacerbation.


4. Dyslipidemia-


5.  Obesity.


6. Possible Sleep Apnea





Heart failure clinically compensated





Continue Lasix


Continue Metoprolol and Norvasc


Continue Lisinopril


Continue ASA


Continue Lipitor


Continue empiric antibiotics


Continue nebs as scheduled


Continue Methylprednisone


Counseled to lose weight





Consultation Date/Type/Reason


Admit Date/Time


May 25, 2017 at 00:53


Initial Consult Date





Type of Consultation:  Cardiology


Referring Provider:  DEVAUGHN TALAMANTES MD





Exam/Review of Systems


Vital Signs


Vitals





 Vital Signs








  Date Time  Temp Pulse Resp B/P Pulse Ox O2 Delivery O2 Flow Rate FiO2


 


5/28/17 13:59  89 20  91 Nasal Cannula 3.0 


 


5/28/17 11:25 98.3   133/62    














 Intake and Output   


 


 5/27/17 5/27/17 5/28/17





 15:00 23:00 07:00


 


Intake Total   900 ml


 


Output Total   1000 ml


 


Balance   -100 ml











Exam


Constitutional:  alert, oriented


Head:  atraumatic, normocephalic


Neck:  non-tender, supple


Respiratory:  diminished breath sounds


Cardiovascular:  regular rate and rhythm


Gastrointestinal:  non-tender, soft


Extremities:  edema, normal pulses





Results


Result Diagram:  


5/26/17 1129                                                                   

             5/28/17 0930





Results 24 hrs





Laboratory Tests








Test


  5/28/17


09:30


 


Sodium Level 139  


 


Potassium Level 4.1  


 


Chloride Level 97  


 


Carbon Dioxide Level 39  H


 


Anion Gap 7  #L


 


Blood Urea Nitrogen 18  


 


Creatinine 0.65  


 


Glucose Level 138  


 


Calcium Level 8.4  











Medications


Medications





 Current Medications


Amlodipine Besylate (Norvasc) 10 mg DAILY PO  Last administered on 5/28/17at 09:

22; Admin Dose 10 MG;  Start 5/25/17 at 09:00


Ascorbic Acid (Vitamin C) 1,000 mg DAILY PO  Last administered on 5/28/17at 09:

23; Admin Dose 1,000 MG;  Start 5/25/17 at 09:00


Cholecalciferol (Vitamin D) 1,000 unit DAILY PO  Last administered on 5/28/17at 

09:23; Admin Dose 1,000 UNIT;  Start 5/25/17 at 09:00


Docusate Sodium (Colace) 250 mg BID PO  Last administered on 5/28/17at 09:21; 

Admin Dose 250 MG;  Start 5/25/17 at 09:00


Gabapentin (Neurontin) 900 mg TID PO  Last administered on 5/28/17at 14:07; 

Admin Dose 900 MG;  Start 5/25/17 at 09:00


Lisinopril (Zestril) 20 mg DAILY PO  Last administered on 5/28/17at 09:23; 

Admin Dose 20 MG;  Start 5/25/17 at 09:00


Morphine Sulfate (Ms Contin (Er)) 15 mg Q12 PO  Last administered on 5/28/17at 

09:32; Admin Dose 15 MG;  Start 5/25/17 at 09:00


Multivitamins Therapeutic (Theragran) 1 tab DAILY PO  Last administered on 5/28/ 17at 09:21; Admin Dose 1 TAB;  Start 5/25/17 at 09:00


Ondansetron HCl (Zofran Tab) 4 mg Q4H  PRN PO NAUSEA AND/OR VOMITING Last 

administered on 5/26/17at 21:40; Admin Dose 4 MG;  Start 5/25/17 at 03:00


Potassium Chloride (Klor-Con 10) 10 meq DAILY PO  Last administered on 5/28/ 17at 09:21; Admin Dose 10 MEQ;  Start 5/25/17 at 09:00


Atorvastatin Calcium (Lipitor) 5 mg DAILY@21 PO  Last administered on 5/27/17at 

21:05; Admin Dose 5 MG;  Start 5/25/17 at 21:00


Aspirin (Ecotrin) 325 mg DAILY PO  Last administered on 5/28/17at 09:21; Admin 

Dose 325 MG;  Start 5/25/17 at 09:00


Acetaminophen (Tylenol Tab) 650 mg Q6H  PRN PO PAIN AND OR ELEVATED TEMP;  

Start 5/25/17 at 03:00


Enoxaparin Sodium (Lovenox) 40 mg DAILY SC  Last administered on 5/28/17at 09:26

; Admin Dose 40 MG;  Start 5/25/17 at 09:00


Ondansetron HCl (Zofran Inj) 4 mg Q4H  PRN IV NAUSEA AND/OR VOMITING Last 

administered on 5/25/17at 08:19; Admin Dose 4 MG;  Start 5/25/17 at 03:30


Pantoprazole (Protonix Tab) 40 mg DAILY@06 PO  Last administered on 5/28/17at 05

:51; Admin Dose 40 MG;  Start 5/25/17 at 06:00


Acetaminophen/ Hydrocodone Bitart (Norco (10/325)) 1 tab Q6H  PRN PO PAIN;  

Start 5/25/17 at 03:30


Methylprednisolone Sodium Succinate 40 mg 40 mg Q12 IV  Last administered on 5/ 27/17at 21:04; Admin Dose 40 MG;  Start 5/25/17 at 14:30


Ceftriaxone Sodium (Rocephin) 50 ml @  100 mls/hr Q24H IVPB  Last administered 

on 5/28/17at 14:07; Admin Dose 100 MLS/HR;  Start 5/25/17 at 14:30


Metoprolol Tartrate (Lopressor) 25 mg BID PO  Last administered on 5/28/17at 09:

22; Admin Dose 25 MG;  Start 5/26/17 at 21:00


Morphine Sulfate (morphine) 3 mg Q4H  PRN IV PAIN Last administered on 5/28/ 17at 14:18; Admin Dose 3 MG;  Start 5/28/17 at 15:00











ORALIA BOWSER M.D. May 28, 2017 14:29

## 2017-05-28 NOTE — PN
Date/Time of Note


Date/Time of Note


DATE: 5/28/17 


TIME: 13:31





Assessment/Plan


VTE Prophylaxis


VTE Prophylaxis Intervention:  other





Lines/Catheters


IV Catheter Type (from [facility]):  Saline Lock


Urinary Cath still in place:  No





Assessment/Plan


Chief Complaint/Hosp Course


- COPD exacerbation, continue steroids, breathing treatments.


- Acute on chronic Diastolic Congestive heart failure with exacerbation, 

Continue Lasix, monitor fluid balance. Dr Manrique is following in cardiology 

consultation.


- Acute respiratory failure 2 to #1and #2.


- Bronchitis, continue Ceftriaxone.


- Hypertension, continue current antihypertensive regimen.


- Dyslipidemia, continue statin


- Morbid obesity.


Problems:  





Subjective


24 Hr Interval Summary


Free Text/Dictation


Patient continues to have shortness of breath





Exam/Review of Systems


Vital Signs


Vitals





 Vital Signs








  Date Time  Temp Pulse Resp B/P Pulse Ox O2 Delivery O2 Flow Rate FiO2


 


5/28/17 12:22  78      


 


5/28/17 11:25 98.3  21 133/62 93   


 


5/28/17 08:41      Nasal Cannula 3.0 














 Intake and Output   


 


 5/27/17 5/27/17 5/28/17





 15:00 23:00 07:00


 


Intake Total   900 ml


 


Output Total   1000 ml


 


Balance   -100 ml











Exam


Constitutional:  well developed


Head:  atraumatic, normocephalic


Neck:  supple


Respiratory:  diminished breath sounds


Cardiovascular:  regular rate and rhythm


Gastrointestinal:  non-tender, soft


Extremities:  edema





Results


Result Diagram:  


5/26/17 1129                                                                   

             5/28/17 0930





Results 24 hrs





Laboratory Tests








Test


  5/28/17


09:30


 


Sodium Level 139  


 


Potassium Level 4.1  


 


Chloride Level 97  


 


Carbon Dioxide Level 39  H


 


Anion Gap 7  #L


 


Blood Urea Nitrogen 18  


 


Creatinine 0.65  


 


Glucose Level 138  


 


Calcium Level 8.4  











Medications


Medications





 Current Medications


Amlodipine Besylate (Norvasc) 10 mg DAILY PO  Last administered on 5/28/17at 09:

22; Admin Dose 10 MG;  Start 5/25/17 at 09:00


Ascorbic Acid (Vitamin C) 1,000 mg DAILY PO  Last administered on 5/28/17at 09:

23; Admin Dose 1,000 MG;  Start 5/25/17 at 09:00


Cholecalciferol (Vitamin D) 1,000 unit DAILY PO  Last administered on 5/28/17at 

09:23; Admin Dose 1,000 UNIT;  Start 5/25/17 at 09:00


Docusate Sodium (Colace) 250 mg BID PO  Last administered on 5/28/17at 09:21; 

Admin Dose 250 MG;  Start 5/25/17 at 09:00


Gabapentin (Neurontin) 900 mg TID PO  Last administered on 5/28/17at 09:25; 

Admin Dose 900 MG;  Start 5/25/17 at 09:00


Lisinopril (Zestril) 20 mg DAILY PO  Last administered on 5/28/17at 09:23; 

Admin Dose 20 MG;  Start 5/25/17 at 09:00


Morphine Sulfate (Ms Contin (Er)) 15 mg Q12 PO  Last administered on 5/28/17at 

09:32; Admin Dose 15 MG;  Start 5/25/17 at 09:00


Multivitamins Therapeutic (Theragran) 1 tab DAILY PO  Last administered on 5/28/ 17at 09:21; Admin Dose 1 TAB;  Start 5/25/17 at 09:00


Ondansetron HCl (Zofran Tab) 4 mg Q4H  PRN PO NAUSEA AND/OR VOMITING Last 

administered on 5/26/17at 21:40; Admin Dose 4 MG;  Start 5/25/17 at 03:00


Potassium Chloride (Klor-Con 10) 10 meq DAILY PO  Last administered on 5/28/ 17at 09:21; Admin Dose 10 MEQ;  Start 5/25/17 at 09:00


Atorvastatin Calcium (Lipitor) 5 mg DAILY@21 PO  Last administered on 5/27/17at 

21:05; Admin Dose 5 MG;  Start 5/25/17 at 21:00


Aspirin (Ecotrin) 325 mg DAILY PO  Last administered on 5/28/17at 09:21; Admin 

Dose 325 MG;  Start 5/25/17 at 09:00


Acetaminophen (Tylenol Tab) 650 mg Q6H  PRN PO PAIN AND OR ELEVATED TEMP;  

Start 5/25/17 at 03:00


Morphine Sulfate (morphine) 2 mg Q4H  PRN IV PAIN Last administered on 5/28/ 17at 04:14; Admin Dose 2 MG;  Start 5/25/17 at 03:00


Enoxaparin Sodium (Lovenox) 40 mg DAILY SC  Last administered on 5/28/17at 09:26

; Admin Dose 40 MG;  Start 5/25/17 at 09:00


Ondansetron HCl (Zofran Inj) 4 mg Q4H  PRN IV NAUSEA AND/OR VOMITING Last 

administered on 5/25/17at 08:19; Admin Dose 4 MG;  Start 5/25/17 at 03:30


Pantoprazole (Protonix Tab) 40 mg DAILY@06 PO  Last administered on 5/28/17at 05

:51; Admin Dose 40 MG;  Start 5/25/17 at 06:00


Acetaminophen/ Hydrocodone Bitart (Norco (10/325)) 1 tab Q6H  PRN PO PAIN;  

Start 5/25/17 at 03:30


Methylprednisolone Sodium Succinate 40 mg 40 mg Q12 IV  Last administered on 5/ 27/17at 21:04; Admin Dose 40 MG;  Start 5/25/17 at 14:30


Ceftriaxone Sodium (Rocephin) 50 ml @  100 mls/hr Q24H IVPB  Last administered 

on 5/27/17at 14:18; Admin Dose 100 MLS/HR;  Start 5/25/17 at 14:30


Metoprolol Tartrate (Lopressor) 25 mg BID PO  Last administered on 5/28/17at 09:

22; Admin Dose 25 MG;  Start 5/26/17 at 21:00











SHYANN MACK May 28, 2017 13:32

## 2017-05-29 VITALS — HEART RATE: 75 BPM

## 2017-05-29 VITALS — SYSTOLIC BLOOD PRESSURE: 125 MMHG | DIASTOLIC BLOOD PRESSURE: 69 MMHG | RESPIRATION RATE: 18 BRPM

## 2017-05-29 VITALS — SYSTOLIC BLOOD PRESSURE: 129 MMHG | RESPIRATION RATE: 19 BRPM | DIASTOLIC BLOOD PRESSURE: 72 MMHG

## 2017-05-29 VITALS — DIASTOLIC BLOOD PRESSURE: 63 MMHG | SYSTOLIC BLOOD PRESSURE: 129 MMHG | RESPIRATION RATE: 18 BRPM

## 2017-05-29 VITALS — RESPIRATION RATE: 18 BRPM | DIASTOLIC BLOOD PRESSURE: 73 MMHG | SYSTOLIC BLOOD PRESSURE: 125 MMHG

## 2017-05-29 VITALS — HEART RATE: 77 BPM

## 2017-05-29 VITALS — SYSTOLIC BLOOD PRESSURE: 122 MMHG | RESPIRATION RATE: 21 BRPM | DIASTOLIC BLOOD PRESSURE: 59 MMHG

## 2017-05-29 VITALS — DIASTOLIC BLOOD PRESSURE: 76 MMHG | RESPIRATION RATE: 19 BRPM | SYSTOLIC BLOOD PRESSURE: 132 MMHG

## 2017-05-29 VITALS — HEART RATE: 76 BPM

## 2017-05-29 VITALS — HEART RATE: 79 BPM

## 2017-05-29 VITALS — HEART RATE: 90 BPM

## 2017-05-29 LAB
ANION GAP SERPL CALC-SCNC: 5 MMOL/L (ref 8–16)
BUN SERPL-MCNC: 17 MG/DL (ref 7–20)
CALCIUM SERPL-MCNC: 8.2 MG/DL (ref 8.4–10.2)
CHLORIDE SERPL-SCNC: 98 MMOL/L (ref 97–110)
CO2 SERPL-SCNC: 39 MMOL/L (ref 21–31)
CREAT SERPL-MCNC: 0.59 MG/DL (ref 0.61–1.24)
GLUCOSE SERPL-MCNC: 111 MG/DL (ref 70–220)
POTASSIUM SERPL-SCNC: 3.9 MMOL/L (ref 3.5–5.1)
SODIUM SERPL-SCNC: 138 MMOL/L (ref 135–144)

## 2017-05-29 RX ADMIN — MORPHINE SULFATE PRN MG: 2 INJECTION, SOLUTION INTRAMUSCULAR; INTRAVENOUS at 04:47

## 2017-05-29 RX ADMIN — MORPHINE SULFATE SCH MG: 15 TABLET, EXTENDED RELEASE ORAL at 20:41

## 2017-05-29 RX ADMIN — MORPHINE SULFATE PRN MG: 2 INJECTION, SOLUTION INTRAMUSCULAR; INTRAVENOUS at 09:42

## 2017-05-29 RX ADMIN — IPRATROPIUM BROMIDE AND ALBUTEROL SULFATE SCH ML: .5; 3 SOLUTION RESPIRATORY (INHALATION) at 13:47

## 2017-05-29 RX ADMIN — MORPHINE SULFATE SCH MG: 15 TABLET, EXTENDED RELEASE ORAL at 08:30

## 2017-05-29 RX ADMIN — VITAMIN D, TAB 1000IU (100/BT) SCH UNIT: 25 TAB at 09:43

## 2017-05-29 RX ADMIN — ENOXAPARIN SODIUM SCH MG: 100 INJECTION SUBCUTANEOUS at 09:00

## 2017-05-29 RX ADMIN — LISINOPRIL SCH MG: 20 TABLET ORAL at 08:31

## 2017-05-29 RX ADMIN — GABAPENTIN SCH MG: 300 CAPSULE ORAL at 13:23

## 2017-05-29 RX ADMIN — MORPHINE SULFATE PRN MG: 2 INJECTION, SOLUTION INTRAMUSCULAR; INTRAVENOUS at 17:35

## 2017-05-29 RX ADMIN — DEXAMETHASONE SODIUM PHOSPHATE PRN MG: 10 INJECTION, SOLUTION INTRAMUSCULAR; INTRAVENOUS at 09:42

## 2017-05-29 RX ADMIN — MORPHINE SULFATE PRN MG: 2 INJECTION, SOLUTION INTRAMUSCULAR; INTRAVENOUS at 13:23

## 2017-05-29 RX ADMIN — IPRATROPIUM BROMIDE AND ALBUTEROL SULFATE SCH ML: .5; 3 SOLUTION RESPIRATORY (INHALATION) at 08:26

## 2017-05-29 RX ADMIN — ATORVASTATIN CALCIUM SCH MG: 10 TABLET, FILM COATED ORAL at 20:41

## 2017-05-29 RX ADMIN — POTASSIUM CHLORIDE SCH MEQ: 10 TABLET, EXTENDED RELEASE ORAL at 08:30

## 2017-05-29 RX ADMIN — GABAPENTIN SCH MG: 300 CAPSULE ORAL at 20:41

## 2017-05-29 RX ADMIN — ASPIRIN SCH MG: 325 TABLET, DELAYED RELEASE ORAL at 08:30

## 2017-05-29 RX ADMIN — MORPHINE SULFATE PRN MG: 2 INJECTION, SOLUTION INTRAMUSCULAR; INTRAVENOUS at 21:30

## 2017-05-29 RX ADMIN — PANTOPRAZOLE SODIUM SCH MG: 40 TABLET, DELAYED RELEASE ORAL at 05:13

## 2017-05-29 RX ADMIN — OXYCODONE HYDROCHLORIDE AND ACETAMINOPHEN SCH MG: 500 TABLET ORAL at 09:42

## 2017-05-29 RX ADMIN — CEFTRIAXONE SCH MLS/HR: 1 INJECTION, SOLUTION INTRAVENOUS at 13:23

## 2017-05-29 RX ADMIN — GABAPENTIN SCH MG: 300 CAPSULE ORAL at 09:43

## 2017-05-29 RX ADMIN — THERA TABS SCH TAB: TAB at 09:43

## 2017-05-29 RX ADMIN — IPRATROPIUM BROMIDE AND ALBUTEROL SULFATE SCH ML: .5; 3 SOLUTION RESPIRATORY (INHALATION) at 20:15

## 2017-05-29 RX ADMIN — AMLODIPINE BESYLATE SCH MG: 10 TABLET ORAL at 08:30

## 2017-05-29 RX ADMIN — METOPROLOL TARTRATE SCH MG: 25 TABLET, FILM COATED ORAL at 20:33

## 2017-05-29 RX ADMIN — METOPROLOL TARTRATE SCH MG: 25 TABLET, FILM COATED ORAL at 08:31

## 2017-05-29 RX ADMIN — ENOXAPARIN SODIUM SCH MG: 100 INJECTION SUBCUTANEOUS at 10:00

## 2017-05-29 NOTE — CONS
Date/Time of Note


Date/Time of Note


DATE: 5/29/17 


TIME: 12:36





Assessment/Plan


Assessment/Plan


Additional Assessment/Plan


1.  Acute exacerbation of Congestive heart failure exacerbation, diastolic


2.  Hypertension


3.  Chronic obstructive pulmonary disease exacerbation.


4. Dyslipidemia-


5.  Obesity.


6. Possible Sleep Apnea





Heart failure clinically compensated





Continue Lasix


Continue Metoprolol and Norvasc


Continue Lisinopril


Continue ASA


Continue Lipitor


Continue empiric antibiotics


Continue nebs as scheduled


Continue Methylprednisone


Counseled to lose weight





Consultation Date/Type/Reason


Admit Date/Time


May 25, 2017 at 00:53


Type of Consultation:  Cardiology


Referring Provider:  DEVAUGHN TALAMANTES MD





Exam/Review of Systems


Vital Signs


Vitals





 Vital Signs








  Date Time  Temp Pulse Resp B/P Pulse Ox O2 Delivery O2 Flow Rate FiO2


 


5/29/17 12:03  77      


 


5/29/17 11:46 98.0  18 125/73 91   


 


5/29/17 10:16      Room Air  


 


5/29/17 08:26       3.0 














 Intake and Output   


 


 5/28/17 5/28/17 5/29/17





 15:00 23:00 07:00


 


Intake Total  950 ml 800 ml


 


Output Total  1400 ml 1200 ml


 


Balance  -450 ml -400 ml











Exam


Constitutional:  alert, oriented


Head:  atraumatic, normocephalic


Neck:  non-tender, supple


Respiratory:  diminished breath sounds


Cardiovascular:  regular rate and rhythm


Gastrointestinal:  non-tender, soft


Extremities:  edema, normal pulses





Results


Result Diagram:  


5/26/17 1129                                                                   

             5/29/17 0950





Results 24 hrs





Laboratory Tests








Test


  5/29/17


09:50


 


Sodium Level 138  


 


Potassium Level 3.9  


 


Chloride Level 98  


 


Carbon Dioxide Level 39  H


 


Anion Gap 5  L


 


Blood Urea Nitrogen 17  


 


Creatinine 0.59  L


 


Glucose Level 111  


 


Calcium Level 8.2  L











Medications


Medications





 Current Medications


Amlodipine Besylate (Norvasc) 10 mg DAILY PO  Last administered on 5/29/17at 08:

30; Admin Dose 10 MG;  Start 5/25/17 at 09:00


Ascorbic Acid (Vitamin C) 1,000 mg DAILY PO  Last administered on 5/29/17at 09:

42; Admin Dose 1,000 MG;  Start 5/25/17 at 09:00


Cholecalciferol (Vitamin D) 1,000 unit DAILY PO  Last administered on 5/29/17at 

09:43; Admin Dose 1,000 UNIT;  Start 5/25/17 at 09:00


Docusate Sodium (Colace) 250 mg BID PO  Last administered on 5/29/17at 09:43; 

Admin Dose 250 MG;  Start 5/25/17 at 09:00


Gabapentin (Neurontin) 900 mg TID PO  Last administered on 5/29/17at 09:43; 

Admin Dose 900 MG;  Start 5/25/17 at 09:00


Lisinopril (Zestril) 20 mg DAILY PO  Last administered on 5/29/17at 08:31; 

Admin Dose 20 MG;  Start 5/25/17 at 09:00


Morphine Sulfate (Ms Contin (Er)) 15 mg Q12 PO  Last administered on 5/29/17at 

08:30; Admin Dose 15 MG;  Start 5/25/17 at 09:00


Multivitamins Therapeutic (Theragran) 1 tab DAILY PO  Last administered on 5/29/ 17at 09:43; Admin Dose 1 TAB;  Start 5/25/17 at 09:00


Ondansetron HCl (Zofran Tab) 4 mg Q4H  PRN PO NAUSEA AND/OR VOMITING Last 

administered on 5/26/17at 21:40; Admin Dose 4 MG;  Start 5/25/17 at 03:00


Potassium Chloride (Klor-Con 10) 10 meq DAILY PO  Last administered on 5/29/ 17at 08:30; Admin Dose 10 MEQ;  Start 5/25/17 at 09:00


Atorvastatin Calcium (Lipitor) 5 mg DAILY@21 PO  Last administered on 5/28/17at 

20:25; Admin Dose 5 MG;  Start 5/25/17 at 21:00


Aspirin (Ecotrin) 325 mg DAILY PO  Last administered on 5/29/17at 08:30; Admin 

Dose 325 MG;  Start 5/25/17 at 09:00


Acetaminophen (Tylenol Tab) 650 mg Q6H  PRN PO PAIN AND OR ELEVATED TEMP;  

Start 5/25/17 at 03:00


Enoxaparin Sodium (Lovenox) 40 mg DAILY SC  Last administered on 5/28/17at 09:26

; Admin Dose 40 MG;  Start 5/25/17 at 09:00


Ondansetron HCl (Zofran Inj) 4 mg Q4H  PRN IV NAUSEA AND/OR VOMITING Last 

administered on 5/29/17at 09:42; Admin Dose 4 MG;  Start 5/25/17 at 03:30


Pantoprazole (Protonix Tab) 40 mg DAILY@06 PO  Last administered on 5/29/17at 05

:13; Admin Dose 40 MG;  Start 5/25/17 at 06:00


Acetaminophen/ Hydrocodone Bitart (Norco (10/325)) 1 tab Q6H  PRN PO PAIN;  

Start 5/25/17 at 03:30


Methylprednisolone Sodium Succinate 40 mg 40 mg Q12 IV  Last administered on 5/ 27/17at 21:04; Admin Dose 40 MG;  Start 5/25/17 at 14:30


Ceftriaxone Sodium (Rocephin) 50 ml @  100 mls/hr Q24H IVPB  Last administered 

on 5/28/17at 14:07; Admin Dose 100 MLS/HR;  Start 5/25/17 at 14:30


Metoprolol Tartrate (Lopressor) 25 mg BID PO  Last administered on 5/29/17at 08:

31; Admin Dose 25 MG;  Start 5/26/17 at 21:00


Morphine Sulfate (morphine) 3 mg Q4H  PRN IV PAIN Last administered on 5/29/ 17at 09:42; Admin Dose 3 MG;  Start 5/28/17 at 15:00











ORALIA BOWSER M.D. May 29, 2017 12:36

## 2017-05-29 NOTE — PN
Date/Time of Note


Date/Time of Note


DATE: 5/29/17 


TIME: 10:55





Assessment/Plan


VTE Prophylaxis


VTE Prophylaxis Intervention:  other





Lines/Catheters


IV Catheter Type (from [facility]):  Saline Lock


Urinary Cath still in place:  No





Assessment/Plan


Chief Complaint/Hosp Course


- COPD exacerbation, continue steroids, breathing treatments.


- Acute on chronic Diastolic Congestive heart failure with exacerbation, 

Continue Lasix, monitor fluid balance. Dr Manrique is following in cardiology 

consultation.


- Acute respiratory failure 2 to #1and #2.


- Bronchitis, continue Ceftriaxone.


- Hypertension, continue current antihypertensive regimen.


- Dyslipidemia, continue statin


- Morbid obesity.


Problems:  





Subjective


24 Hr Interval Summary


Free Text/Dictation


Patient is being noncompliant





Exam/Review of Systems


Vital Signs


Vitals





 Vital Signs








  Date Time  Temp Pulse Resp B/P Pulse Ox O2 Delivery O2 Flow Rate FiO2


 


5/29/17 10:16     93 Room Air  


 


5/29/17 08:26  92 18    3.0 


 


5/29/17 07:46 98.6   132/76    














 Intake and Output   


 


 5/28/17 5/28/17 5/29/17





 15:00 23:00 07:00


 


Intake Total  950 ml 800 ml


 


Output Total  1400 ml 1200 ml


 


Balance  -450 ml -400 ml











Exam


Constitutional:  well developed


Head:  atraumatic, normocephalic


Neck:  supple


Respiratory:  diminished breath sounds


Cardiovascular:  regular rate and rhythm


Gastrointestinal:  non-tender, soft


Extremities:  normal pulses





Results


Result Diagram:  


5/26/17 1129                                                                   

             5/29/17 0950





Results 24 hrs





Laboratory Tests








Test


  5/29/17


09:50


 


Sodium Level 138  


 


Potassium Level 3.9  


 


Chloride Level 98  


 


Carbon Dioxide Level Pending  


 


Anion Gap Pending  


 


Blood Urea Nitrogen 17  


 


Creatinine 0.59  L


 


Glucose Level 111  


 


Calcium Level 8.2  L











Medications


Medications





 Current Medications


Amlodipine Besylate (Norvasc) 10 mg DAILY PO  Last administered on 5/29/17at 08:

30; Admin Dose 10 MG;  Start 5/25/17 at 09:00


Ascorbic Acid (Vitamin C) 1,000 mg DAILY PO  Last administered on 5/29/17at 09:

42; Admin Dose 1,000 MG;  Start 5/25/17 at 09:00


Cholecalciferol (Vitamin D) 1,000 unit DAILY PO  Last administered on 5/29/17at 

09:43; Admin Dose 1,000 UNIT;  Start 5/25/17 at 09:00


Docusate Sodium (Colace) 250 mg BID PO  Last administered on 5/29/17at 09:43; 

Admin Dose 250 MG;  Start 5/25/17 at 09:00


Gabapentin (Neurontin) 900 mg TID PO  Last administered on 5/29/17at 09:43; 

Admin Dose 900 MG;  Start 5/25/17 at 09:00


Lisinopril (Zestril) 20 mg DAILY PO  Last administered on 5/29/17at 08:31; 

Admin Dose 20 MG;  Start 5/25/17 at 09:00


Morphine Sulfate (Ms Contin (Er)) 15 mg Q12 PO  Last administered on 5/29/17at 

08:30; Admin Dose 15 MG;  Start 5/25/17 at 09:00


Multivitamins Therapeutic (Theragran) 1 tab DAILY PO  Last administered on 5/29/ 17at 09:43; Admin Dose 1 TAB;  Start 5/25/17 at 09:00


Ondansetron HCl (Zofran Tab) 4 mg Q4H  PRN PO NAUSEA AND/OR VOMITING Last 

administered on 5/26/17at 21:40; Admin Dose 4 MG;  Start 5/25/17 at 03:00


Potassium Chloride (Klor-Con 10) 10 meq DAILY PO  Last administered on 5/29/ 17at 08:30; Admin Dose 10 MEQ;  Start 5/25/17 at 09:00


Atorvastatin Calcium (Lipitor) 5 mg DAILY@21 PO  Last administered on 5/28/17at 

20:25; Admin Dose 5 MG;  Start 5/25/17 at 21:00


Aspirin (Ecotrin) 325 mg DAILY PO  Last administered on 5/29/17at 08:30; Admin 

Dose 325 MG;  Start 5/25/17 at 09:00


Acetaminophen (Tylenol Tab) 650 mg Q6H  PRN PO PAIN AND OR ELEVATED TEMP;  

Start 5/25/17 at 03:00


Enoxaparin Sodium (Lovenox) 40 mg DAILY SC  Last administered on 5/28/17at 09:26

; Admin Dose 40 MG;  Start 5/25/17 at 09:00


Ondansetron HCl (Zofran Inj) 4 mg Q4H  PRN IV NAUSEA AND/OR VOMITING Last 

administered on 5/29/17at 09:42; Admin Dose 4 MG;  Start 5/25/17 at 03:30


Pantoprazole (Protonix Tab) 40 mg DAILY@06 PO  Last administered on 5/29/17at 05

:13; Admin Dose 40 MG;  Start 5/25/17 at 06:00


Acetaminophen/ Hydrocodone Bitart (Norco (10/325)) 1 tab Q6H  PRN PO PAIN;  

Start 5/25/17 at 03:30


Methylprednisolone Sodium Succinate 40 mg 40 mg Q12 IV  Last administered on 5/ 27/17at 21:04; Admin Dose 40 MG;  Start 5/25/17 at 14:30


Ceftriaxone Sodium (Rocephin) 50 ml @  100 mls/hr Q24H IVPB  Last administered 

on 5/28/17at 14:07; Admin Dose 100 MLS/HR;  Start 5/25/17 at 14:30


Metoprolol Tartrate (Lopressor) 25 mg BID PO  Last administered on 5/29/17at 08:

31; Admin Dose 25 MG;  Start 5/26/17 at 21:00


Morphine Sulfate (morphine) 3 mg Q4H  PRN IV PAIN Last administered on 5/29/ 17at 09:42; Admin Dose 3 MG;  Start 5/28/17 at 15:00











SHYANN MACK May 29, 2017 10:56

## 2017-05-30 VITALS — RESPIRATION RATE: 18 BRPM | DIASTOLIC BLOOD PRESSURE: 57 MMHG | SYSTOLIC BLOOD PRESSURE: 111 MMHG

## 2017-05-30 VITALS — HEART RATE: 78 BPM

## 2017-05-30 VITALS — DIASTOLIC BLOOD PRESSURE: 75 MMHG | SYSTOLIC BLOOD PRESSURE: 125 MMHG | RESPIRATION RATE: 19 BRPM

## 2017-05-30 VITALS — HEART RATE: 90 BPM

## 2017-05-30 VITALS — SYSTOLIC BLOOD PRESSURE: 124 MMHG | DIASTOLIC BLOOD PRESSURE: 58 MMHG | RESPIRATION RATE: 18 BRPM

## 2017-05-30 VITALS — RESPIRATION RATE: 18 BRPM | SYSTOLIC BLOOD PRESSURE: 129 MMHG | DIASTOLIC BLOOD PRESSURE: 60 MMHG

## 2017-05-30 VITALS — HEART RATE: 109 BPM

## 2017-05-30 VITALS — RESPIRATION RATE: 22 BRPM | DIASTOLIC BLOOD PRESSURE: 60 MMHG | SYSTOLIC BLOOD PRESSURE: 129 MMHG

## 2017-05-30 VITALS — HEART RATE: 80 BPM

## 2017-05-30 VITALS — SYSTOLIC BLOOD PRESSURE: 126 MMHG | DIASTOLIC BLOOD PRESSURE: 59 MMHG | RESPIRATION RATE: 20 BRPM

## 2017-05-30 VITALS — HEART RATE: 96 BPM

## 2017-05-30 RX ADMIN — PANTOPRAZOLE SODIUM SCH MG: 40 TABLET, DELAYED RELEASE ORAL at 06:17

## 2017-05-30 RX ADMIN — GABAPENTIN SCH MG: 300 CAPSULE ORAL at 14:37

## 2017-05-30 RX ADMIN — GABAPENTIN SCH MG: 300 CAPSULE ORAL at 09:30

## 2017-05-30 RX ADMIN — ATORVASTATIN CALCIUM SCH MG: 10 TABLET, FILM COATED ORAL at 22:00

## 2017-05-30 RX ADMIN — METOPROLOL TARTRATE SCH MG: 25 TABLET, FILM COATED ORAL at 09:31

## 2017-05-30 RX ADMIN — GABAPENTIN SCH MG: 300 CAPSULE ORAL at 21:29

## 2017-05-30 RX ADMIN — MORPHINE SULFATE PRN MG: 2 INJECTION, SOLUTION INTRAMUSCULAR; INTRAVENOUS at 11:39

## 2017-05-30 RX ADMIN — ATORVASTATIN CALCIUM SCH MG: 10 TABLET, FILM COATED ORAL at 21:27

## 2017-05-30 RX ADMIN — ENOXAPARIN SODIUM SCH MG: 100 INJECTION SUBCUTANEOUS at 09:00

## 2017-05-30 RX ADMIN — CEFTRIAXONE SCH MLS/HR: 1 INJECTION, SOLUTION INTRAVENOUS at 14:37

## 2017-05-30 RX ADMIN — THERA TABS SCH TAB: TAB at 09:32

## 2017-05-30 RX ADMIN — IPRATROPIUM BROMIDE AND ALBUTEROL SULFATE SCH ML: .5; 3 SOLUTION RESPIRATORY (INHALATION) at 19:15

## 2017-05-30 RX ADMIN — VITAMIN D, TAB 1000IU (100/BT) SCH UNIT: 25 TAB at 09:31

## 2017-05-30 RX ADMIN — MORPHINE SULFATE SCH MG: 15 TABLET, EXTENDED RELEASE ORAL at 09:29

## 2017-05-30 RX ADMIN — IPRATROPIUM BROMIDE AND ALBUTEROL SULFATE SCH ML: .5; 3 SOLUTION RESPIRATORY (INHALATION) at 14:37

## 2017-05-30 RX ADMIN — ATORVASTATIN CALCIUM SCH MG: 10 TABLET, FILM COATED ORAL at 21:45

## 2017-05-30 RX ADMIN — ASPIRIN SCH MG: 325 TABLET, DELAYED RELEASE ORAL at 09:30

## 2017-05-30 RX ADMIN — LISINOPRIL SCH MG: 20 TABLET ORAL at 09:32

## 2017-05-30 RX ADMIN — GABAPENTIN SCH MG: 300 CAPSULE ORAL at 22:00

## 2017-05-30 RX ADMIN — MORPHINE SULFATE PRN MG: 2 INJECTION, SOLUTION INTRAMUSCULAR; INTRAVENOUS at 23:20

## 2017-05-30 RX ADMIN — IPRATROPIUM BROMIDE AND ALBUTEROL SULFATE SCH ML: .5; 3 SOLUTION RESPIRATORY (INHALATION) at 08:38

## 2017-05-30 RX ADMIN — OXYCODONE HYDROCHLORIDE AND ACETAMINOPHEN SCH MG: 500 TABLET ORAL at 09:32

## 2017-05-30 RX ADMIN — AMLODIPINE BESYLATE SCH MG: 10 TABLET ORAL at 09:32

## 2017-05-30 RX ADMIN — METOPROLOL TARTRATE SCH MG: 25 TABLET, FILM COATED ORAL at 21:45

## 2017-05-30 RX ADMIN — METOPROLOL TARTRATE SCH MG: 25 TABLET, FILM COATED ORAL at 21:28

## 2017-05-30 RX ADMIN — POTASSIUM CHLORIDE SCH MEQ: 10 TABLET, EXTENDED RELEASE ORAL at 09:30

## 2017-05-30 RX ADMIN — MORPHINE SULFATE PRN MG: 2 INJECTION, SOLUTION INTRAMUSCULAR; INTRAVENOUS at 06:17

## 2017-05-30 RX ADMIN — MORPHINE SULFATE SCH MG: 15 TABLET, EXTENDED RELEASE ORAL at 21:28

## 2017-05-30 RX ADMIN — GABAPENTIN SCH MG: 300 CAPSULE ORAL at 21:45

## 2017-05-30 NOTE — CONS
Date/Time of Note


Date/Time of Note


DATE: 5/30/17 


TIME: 10:13





Assessment/Plan


Assessment/Plan


Additional Assessment/Plan


1.  Congestive heart failure exacerbation, diastolic by echocardiogram 12/2016, 

acute on chronic - con't to remove fluid as tolerated. 


2.  Hypertension, under reasonable control currently - well Rx, con't med rx. 


3.  Shortness of breath secondary to #1 - better now, con't to remove fluid. 


4.  Chronic obstructive pulmonary disease exacerbation - no wheezing now. 


5.  Coronary artery disease by medications.-negative troponin x 3


6.  Dyslipidemia-LDL (^ HDL 77.


7.  Obesity.





Consultation Date/Type/Reason


Admit Date/Time


May 25, 2017 at 00:53


Initial Consult Date





Type of Consultation:  Cardiology


Referring Provider:  DEVAUGHN TALAMANTES MD





24 HR Interval Summary


Free Text/Dictation


No acute change - BP stable - good urine outpt.





ROS: No fever, no chills, no nausea, no vomiting, no diarrhea/constipation


        No recent weight changes


        No chest pain, no PND, no orthopnea - BETTER SOB now


        No dizziness, blurred vision


        No thirst, no heat or cold intolerance





Exam/Review of Systems


Vital Signs


Vitals





 Vital Signs








  Date Time  Temp Pulse Resp B/P Pulse Ox O2 Delivery O2 Flow Rate FiO2


 


5/30/17 08:38  80 16  97 Nasal Cannula 3.0 


 


5/30/17 07:22 98.0   125/75    














 Intake and Output   


 


 5/29/17 5/29/17 5/30/17





 15:00 23:00 07:00


 


Intake Total  1150 ml 


 


Output Total  700 ml 


 


Balance  450 ml 











Exam


General: WN/WD/NAD, AOx 3


HEENT: Unicetric/atraumatic/EOMI (follow commands)


NECK: JVD elevated 8 cm , no thyromegaly


Lymph: no lymphadenopathy


HEART: regular with no S3, II/VI systolic murmur at apex


LUNGS: Coarse sounds


ABD: soft, NT, ND, +BS


: Intact


Neuro: non focal


SKIN: chronic changes


EXT: 2+ edema





Results


Result Diagram:  


5/26/17 1129                                                                   

             5/29/17 0950








Medications


Medications





 Current Medications


Amlodipine Besylate (Norvasc) 10 mg DAILY PO  Last administered on 5/30/17at 09:

32; Admin Dose 10 MG;  Start 5/25/17 at 09:00


Ascorbic Acid (Vitamin C) 1,000 mg DAILY PO  Last administered on 5/30/17at 09:

32; Admin Dose 1,000 MG;  Start 5/25/17 at 09:00


Cholecalciferol (Vitamin D) 1,000 unit DAILY PO  Last administered on 5/30/17at 

09:31; Admin Dose 1,000 UNIT;  Start 5/25/17 at 09:00


Docusate Sodium (Colace) 250 mg BID PO  Last administered on 5/30/17at 09:30; 

Admin Dose 250 MG;  Start 5/25/17 at 09:00


Gabapentin (Neurontin) 900 mg TID PO  Last administered on 5/30/17at 09:30; 

Admin Dose 900 MG;  Start 5/25/17 at 09:00


Lisinopril (Zestril) 20 mg DAILY PO  Last administered on 5/30/17at 09:32; 

Admin Dose 20 MG;  Start 5/25/17 at 09:00


Morphine Sulfate (Ms Contin (Er)) 15 mg Q12 PO  Last administered on 5/30/17at 

09:29; Admin Dose 15 MG;  Start 5/25/17 at 09:00


Multivitamins Therapeutic (Theragran) 1 tab DAILY PO  Last administered on 5/30/ 17at 09:32; Admin Dose 1 TAB;  Start 5/25/17 at 09:00


Ondansetron HCl (Zofran Tab) 4 mg Q4H  PRN PO NAUSEA AND/OR VOMITING Last 

administered on 5/26/17at 21:40; Admin Dose 4 MG;  Start 5/25/17 at 03:00


Potassium Chloride (Klor-Con 10) 10 meq DAILY PO  Last administered on 5/30/ 17at 09:30; Admin Dose 10 MEQ;  Start 5/25/17 at 09:00


Atorvastatin Calcium (Lipitor) 5 mg DAILY@21 PO  Last administered on 5/29/17at 

20:41; Admin Dose 5 MG;  Start 5/25/17 at 21:00


Aspirin (Ecotrin) 325 mg DAILY PO  Last administered on 5/30/17at 09:30; Admin 

Dose 325 MG;  Start 5/25/17 at 09:00


Acetaminophen (Tylenol Tab) 650 mg Q6H  PRN PO PAIN AND OR ELEVATED TEMP;  

Start 5/25/17 at 03:00


Enoxaparin Sodium (Lovenox) 40 mg DAILY SC  Last administered on 5/28/17at 09:26

; Admin Dose 40 MG;  Start 5/25/17 at 09:00


Ondansetron HCl (Zofran Inj) 4 mg Q4H  PRN IV NAUSEA AND/OR VOMITING Last 

administered on 5/29/17at 09:42; Admin Dose 4 MG;  Start 5/25/17 at 03:30


Pantoprazole (Protonix Tab) 40 mg DAILY@06 PO  Last administered on 5/30/17at 06

:17; Admin Dose 40 MG;  Start 5/25/17 at 06:00


Acetaminophen/ Hydrocodone Bitart (Norco (10/325)) 1 tab Q6H  PRN PO PAIN;  

Start 5/25/17 at 03:30


Methylprednisolone Sodium Succinate 40 mg 40 mg Q12 IV  Last administered on 5/ 27/17at 21:04; Admin Dose 40 MG;  Start 5/25/17 at 14:30


Ceftriaxone Sodium (Rocephin) 50 ml @  100 mls/hr Q24H IVPB  Last administered 

on 5/29/17at 13:23; Admin Dose 100 MLS/HR;  Start 5/25/17 at 14:30


Metoprolol Tartrate (Lopressor) 25 mg BID PO  Last administered on 5/30/17at 09:

31; Admin Dose 25 MG;  Start 5/26/17 at 21:00


Morphine Sulfate (morphine) 3 mg Q4H  PRN IV PAIN Last administered on 5/30/ 17at 06:17; Admin Dose 3 MG;  Start 5/28/17 at 15:00











ASH LOW MD May 30, 2017 10:19

## 2017-05-30 NOTE — PN
Date/Time of Note


Date/Time of Note


DATE: 5/30/17 


TIME: 14:57





Assessment/Plan


VTE Prophylaxis


VTE Prophylaxis Intervention:  SCD's





Lines/Catheters


IV Catheter Type (from [facility]):  Saline Lock


Urinary Cath still in place:  No





Assessment/Plan


Chief Complaint/Hosp Course


Complains of shortness of breath on exertion, comfortable at rest, tachycardic 

at times, denies any fever.  Complaints regarding the food choices however 

patient understand he has to be on cardiac diet, will order dietary consult


Assessment/Plan


- COPD exacerbation, continue steroids, breathing treatments.


- Acute on chronic Diastolic Congestive heart failure with exacerbation, 

Continue Lasix, monitor fluid balance. Dr Manrique is following in cardiology 

consultation.


- Acute respiratory failure 2 to #1and #2.


- Bronchitis, continue Ceftriaxone.


- Hypertension, blood pressure is well controlled on current regimen.


- Dyslipidemia, continue statin


- Morbid obesity.


Further recommendations based on clinical course.  Plan of care discussed with 

Dr. Anaya.


Problems:  





Exam/Review of Systems


Vital Signs


Vitals





 Vital Signs








  Date Time  Temp Pulse Resp B/P Pulse Ox O2 Delivery O2 Flow Rate FiO2


 


5/30/17 14:37  83 18  96 Nasal Cannula 3.0 


 


5/30/17 07:22 98.0   125/75    














 Intake and Output   


 


 5/29/17 5/29/17 5/30/17





 14:59 22:59 06:59


 


Intake Total  1150 ml 


 


Output Total  700 ml 


 


Balance  450 ml 











Exam


Constitutional:  alert, oriented


Head:  normocephalic


Neck:  supple


Respiratory:  diminished breath sounds


Cardiovascular:  nl pulses, regular rate and rhythm


Extremities:  edema, normal pulses


Skin:  nl turgor





Results


Result Diagram:  


5/26/17 1129                                                                   

             5/29/17 0950








Medications


Medications





 Current Medications


Amlodipine Besylate (Norvasc) 10 mg DAILY PO  Last administered on 5/30/17at 09:

32; Admin Dose 10 MG;  Start 5/25/17 at 09:00


Ascorbic Acid (Vitamin C) 1,000 mg DAILY PO  Last administered on 5/30/17at 09:

32; Admin Dose 1,000 MG;  Start 5/25/17 at 09:00


Cholecalciferol (Vitamin D) 1,000 unit DAILY PO  Last administered on 5/30/17at 

09:31; Admin Dose 1,000 UNIT;  Start 5/25/17 at 09:00


Docusate Sodium (Colace) 250 mg BID PO  Last administered on 5/30/17at 09:30; 

Admin Dose 250 MG;  Start 5/25/17 at 09:00


Gabapentin (Neurontin) 900 mg TID PO  Last administered on 5/30/17at 14:37; 

Admin Dose 900 MG;  Start 5/25/17 at 09:00


Lisinopril (Zestril) 20 mg DAILY PO  Last administered on 5/30/17at 09:32; 

Admin Dose 20 MG;  Start 5/25/17 at 09:00


Morphine Sulfate (Ms Contin (Er)) 15 mg Q12 PO  Last administered on 5/30/17at 

09:29; Admin Dose 15 MG;  Start 5/25/17 at 09:00


Multivitamins Therapeutic (Theragran) 1 tab DAILY PO  Last administered on 5/30/ 17at 09:32; Admin Dose 1 TAB;  Start 5/25/17 at 09:00


Ondansetron HCl (Zofran Tab) 4 mg Q4H  PRN PO NAUSEA AND/OR VOMITING Last 

administered on 5/26/17at 21:40; Admin Dose 4 MG;  Start 5/25/17 at 03:00


Potassium Chloride (Klor-Con 10) 10 meq DAILY PO  Last administered on 5/30/ 17at 09:30; Admin Dose 10 MEQ;  Start 5/25/17 at 09:00


Atorvastatin Calcium (Lipitor) 5 mg DAILY@21 PO  Last administered on 5/29/17at 

20:41; Admin Dose 5 MG;  Start 5/25/17 at 21:00


Aspirin (Ecotrin) 325 mg DAILY PO  Last administered on 5/30/17at 09:30; Admin 

Dose 325 MG;  Start 5/25/17 at 09:00


Acetaminophen (Tylenol Tab) 650 mg Q6H  PRN PO PAIN AND OR ELEVATED TEMP;  

Start 5/25/17 at 03:00


Enoxaparin Sodium (Lovenox) 40 mg DAILY SC  Last administered on 5/28/17at 09:26

; Admin Dose 40 MG;  Start 5/25/17 at 09:00


Ondansetron HCl (Zofran Inj) 4 mg Q4H  PRN IV NAUSEA AND/OR VOMITING Last 

administered on 5/29/17at 09:42; Admin Dose 4 MG;  Start 5/25/17 at 03:30


Pantoprazole (Protonix Tab) 40 mg DAILY@06 PO  Last administered on 5/30/17at 06

:17; Admin Dose 40 MG;  Start 5/25/17 at 06:00


Acetaminophen/ Hydrocodone Bitart (Norco (10/325)) 1 tab Q6H  PRN PO PAIN Last 

administered on 5/30/17at 13:00; Admin Dose 1 TAB;  Start 5/25/17 at 03:30


Methylprednisolone Sodium Succinate 40 mg 40 mg Q12 IV  Last administered on 5/ 27/17at 21:04; Admin Dose 40 MG;  Start 5/25/17 at 14:30


Ceftriaxone Sodium (Rocephin) 50 ml @  100 mls/hr Q24H IVPB  Last administered 

on 5/30/17at 14:37; Admin Dose 100 MLS/HR;  Start 5/25/17 at 14:30


Metoprolol Tartrate (Lopressor) 25 mg BID PO  Last administered on 5/30/17at 09:

31; Admin Dose 25 MG;  Start 5/26/17 at 21:00


Morphine Sulfate (morphine) 3 mg Q4H  PRN IV PAIN Last administered on 5/30/ 17at 11:39; Admin Dose 3 MG;  Start 5/28/17 at 15:00











DARIO NORRIS May 30, 2017 15:01

## 2017-05-31 VITALS — SYSTOLIC BLOOD PRESSURE: 119 MMHG | RESPIRATION RATE: 18 BRPM | DIASTOLIC BLOOD PRESSURE: 67 MMHG

## 2017-05-31 VITALS — SYSTOLIC BLOOD PRESSURE: 136 MMHG | RESPIRATION RATE: 18 BRPM | DIASTOLIC BLOOD PRESSURE: 69 MMHG

## 2017-05-31 VITALS — SYSTOLIC BLOOD PRESSURE: 124 MMHG | RESPIRATION RATE: 17 BRPM | DIASTOLIC BLOOD PRESSURE: 71 MMHG

## 2017-05-31 VITALS — HEART RATE: 86 BPM

## 2017-05-31 VITALS — RESPIRATION RATE: 18 BRPM | SYSTOLIC BLOOD PRESSURE: 121 MMHG | DIASTOLIC BLOOD PRESSURE: 59 MMHG

## 2017-05-31 VITALS — RESPIRATION RATE: 17 BRPM | DIASTOLIC BLOOD PRESSURE: 68 MMHG | SYSTOLIC BLOOD PRESSURE: 151 MMHG

## 2017-05-31 VITALS — DIASTOLIC BLOOD PRESSURE: 77 MMHG | SYSTOLIC BLOOD PRESSURE: 131 MMHG | RESPIRATION RATE: 17 BRPM

## 2017-05-31 VITALS — HEART RATE: 73 BPM

## 2017-05-31 VITALS — HEART RATE: 92 BPM

## 2017-05-31 VITALS — HEART RATE: 76 BPM

## 2017-05-31 VITALS — HEART RATE: 90 BPM

## 2017-05-31 LAB
ANION GAP SERPL CALC-SCNC: 5 MMOL/L (ref 8–16)
BUN SERPL-MCNC: 13 MG/DL (ref 7–20)
CALCIUM SERPL-MCNC: 8.8 MG/DL (ref 8.4–10.2)
CHLORIDE SERPL-SCNC: 95 MMOL/L (ref 97–110)
CO2 SERPL-SCNC: 38 MMOL/L (ref 21–31)
CREAT SERPL-MCNC: 0.6 MG/DL (ref 0.61–1.24)
GLUCOSE SERPL-MCNC: 111 MG/DL (ref 70–220)
POTASSIUM SERPL-SCNC: 4 MMOL/L (ref 3.5–5.1)
SODIUM SERPL-SCNC: 134 MMOL/L (ref 135–144)

## 2017-05-31 RX ADMIN — THERA TABS SCH TAB: TAB at 09:41

## 2017-05-31 RX ADMIN — AMLODIPINE BESYLATE SCH MG: 10 TABLET ORAL at 09:41

## 2017-05-31 RX ADMIN — MORPHINE SULFATE PRN MG: 2 INJECTION, SOLUTION INTRAMUSCULAR; INTRAVENOUS at 04:33

## 2017-05-31 RX ADMIN — METOPROLOL TARTRATE SCH MG: 25 TABLET, FILM COATED ORAL at 09:00

## 2017-05-31 RX ADMIN — POTASSIUM CHLORIDE SCH MEQ: 10 TABLET, EXTENDED RELEASE ORAL at 09:41

## 2017-05-31 RX ADMIN — IPRATROPIUM BROMIDE AND ALBUTEROL SULFATE SCH ML: .5; 3 SOLUTION RESPIRATORY (INHALATION) at 14:52

## 2017-05-31 RX ADMIN — IPRATROPIUM BROMIDE AND ALBUTEROL SULFATE SCH ML: .5; 3 SOLUTION RESPIRATORY (INHALATION) at 08:00

## 2017-05-31 RX ADMIN — ASPIRIN SCH MG: 325 TABLET, DELAYED RELEASE ORAL at 09:42

## 2017-05-31 RX ADMIN — GABAPENTIN SCH MG: 300 CAPSULE ORAL at 09:42

## 2017-05-31 RX ADMIN — VITAMIN D, TAB 1000IU (100/BT) SCH UNIT: 25 TAB at 09:42

## 2017-05-31 RX ADMIN — CEFTRIAXONE SCH MLS/HR: 1 INJECTION, SOLUTION INTRAVENOUS at 14:29

## 2017-05-31 RX ADMIN — ATORVASTATIN CALCIUM SCH MG: 10 TABLET, FILM COATED ORAL at 20:28

## 2017-05-31 RX ADMIN — GABAPENTIN SCH MG: 300 CAPSULE ORAL at 20:28

## 2017-05-31 RX ADMIN — ENOXAPARIN SODIUM SCH MG: 100 INJECTION SUBCUTANEOUS at 09:00

## 2017-05-31 RX ADMIN — IPRATROPIUM BROMIDE AND ALBUTEROL SULFATE SCH ML: .5; 3 SOLUTION RESPIRATORY (INHALATION) at 20:59

## 2017-05-31 RX ADMIN — PANTOPRAZOLE SODIUM SCH MG: 40 TABLET, DELAYED RELEASE ORAL at 06:04

## 2017-05-31 RX ADMIN — LISINOPRIL SCH MG: 20 TABLET ORAL at 09:41

## 2017-05-31 RX ADMIN — OXYCODONE HYDROCHLORIDE AND ACETAMINOPHEN SCH MG: 500 TABLET ORAL at 09:42

## 2017-05-31 RX ADMIN — MORPHINE SULFATE SCH MG: 15 TABLET, EXTENDED RELEASE ORAL at 09:42

## 2017-05-31 RX ADMIN — GABAPENTIN SCH MG: 300 CAPSULE ORAL at 12:52

## 2017-05-31 RX ADMIN — MORPHINE SULFATE SCH MG: 15 TABLET, EXTENDED RELEASE ORAL at 20:30

## 2017-05-31 RX ADMIN — MORPHINE SULFATE SCH MG: 15 TABLET, EXTENDED RELEASE ORAL at 09:00

## 2017-05-31 RX ADMIN — METOPROLOL TARTRATE SCH MG: 25 TABLET, FILM COATED ORAL at 20:30

## 2017-05-31 NOTE — CONS
Date/Time of Note


Date/Time of Note


DATE: 5/31/17 


TIME: 16:01





Assessment/Plan


Assessment/Plan


Chief Complaint/Hosp Course


IMPRESSION:


1.  Congestive heart failure exacerbation, diastolic by echocardiogram 12/2016, 

acute on chronic.


2.  Hypertension, under reasonable control currently.


3.  Shortness of breath secondary to #1.


4.  Chronic obstructive pulmonary disease exacerbation.


5.  Coronary artery disease by medications.-negative troponin x 3


6.  Dyslipidemia-LDL 96 HDL 77.


7.  Obesity.





Recc:


-Tele


-Continue norvasc/zestril


-Continue BB as patient will comply with


-Continue asa


-Continue lasix diuresis and follow volume status closely as patient will comply


-Continue steroids/bronchodilators/abx's as patient will comply


-F/U cx data


-Continue statin


Problems:  





Consultation Date/Type/Reason


Admit Date/Time


May 25, 2017 at 00:53


Initial Consult Date


5/25/2017


Type of Consultation:  Cardiology


Reason for Consultation


CHF


Referring Provider:  DEVAUGHN TALAMANTES MD





Exam/Review of Systems


Vital Signs


Vitals





 Vital Signs








  Date Time  Temp Pulse Resp B/P Pulse Ox O2 Delivery O2 Flow Rate FiO2


 


5/31/17 15:41 98.1 81 18 119/67 97   


 


5/31/17 14:52      Nasal Cannula 3.0 32














 Intake and Output   


 


 5/30/17 5/30/17 5/31/17





 15:00 23:00 07:00


 


Intake Total  850 ml 


 


Output Total  1400 ml 


 


Balance  -550 ml 











Exam


Review of Systems:


CONSTITUTIONAL:  No fevers, chills.


PULMONARY: mild sob


CARDIOVASCULAR: No chest pain/palpitations


GASTROINTESTINAL:  No nausea/vomiting.


GENITOURINARY:  No hematuria/dysuria.


MUSCULOSKELETAL:  No myagias/arthalgias.


PSYCHIATRIC:  The patient denies depression.


NEUROLOGIC:   No weakness


Constitutional:  alert, oriented


Psych:  no complaints


Head:  normocephalic


ENMT:  mucosa pink and moist


Neck:  jvd (9 cm water), supple


Respiratory:  diminished breath sounds (at bases/B)


Cardiovascular:  regular rate and rhythm


Gastrointestinal:  non-tender, other (obese), soft


Musculoskeletal:  muscle tone (normal)


Extremities:  edema (none)


Neurological:  other (No focal deficits)





Results


Result Diagram:  


5/31/17 1426





Results 24 hrs





Laboratory Tests








Test


  5/31/17


14:26


 


Sodium Level 134  L


 


Potassium Level 4.0  


 


Chloride Level 95  L


 


Carbon Dioxide Level 38  H


 


Anion Gap 5  L


 


Blood Urea Nitrogen 13  


 


Creatinine 0.60  L


 


Glucose Level 111  


 


Calcium Level 8.8  











Medications


Medications





 Current Medications


Amlodipine Besylate (Norvasc) 10 mg DAILY PO  Last administered on 5/31/17at 09:

41; Admin Dose 10 MG;  Start 5/25/17 at 09:00


Ascorbic Acid (Vitamin C) 1,000 mg DAILY PO  Last administered on 5/31/17at 09:

42; Admin Dose 1,000 MG;  Start 5/25/17 at 09:00


Cholecalciferol (Vitamin D) 1,000 unit DAILY PO  Last administered on 5/31/17at 

09:42; Admin Dose 1,000 UNIT;  Start 5/25/17 at 09:00


Docusate Sodium (Colace) 250 mg BID PO  Last administered on 5/30/17at 21:45; 

Admin Dose 250 MG;  Start 5/25/17 at 09:00


Gabapentin (Neurontin) 900 mg TID PO  Last administered on 5/31/17at 12:52; 

Admin Dose 900 MG;  Start 5/25/17 at 09:00


Lisinopril (Zestril) 20 mg DAILY PO  Last administered on 5/31/17at 09:41; 

Admin Dose 20 MG;  Start 5/25/17 at 09:00


Morphine Sulfate (Ms Contin (Er)) 15 mg Q12 PO  Last administered on 5/30/17at 

21:28; Admin Dose 15 MG;  Start 5/25/17 at 09:00


Multivitamins Therapeutic (Theragran) 1 tab DAILY PO  Last administered on 5/31/ 17at 09:41; Admin Dose 1 TAB;  Start 5/25/17 at 09:00


Ondansetron HCl (Zofran Tab) 4 mg Q4H  PRN PO NAUSEA AND/OR VOMITING Last 

administered on 5/26/17at 21:40; Admin Dose 4 MG;  Start 5/25/17 at 03:00


Potassium Chloride (Klor-Con 10) 10 meq DAILY PO  Last administered on 5/31/ 17at 09:41; Admin Dose 10 MEQ;  Start 5/25/17 at 09:00


Atorvastatin Calcium (Lipitor) 5 mg DAILY@21 PO  Last administered on 5/30/17at 

21:45; Admin Dose 5 MG;  Start 5/25/17 at 21:00


Aspirin (Ecotrin) 325 mg DAILY PO  Last administered on 5/31/17at 09:42; Admin 

Dose 325 MG;  Start 5/25/17 at 09:00


Acetaminophen (Tylenol Tab) 650 mg Q6H  PRN PO PAIN AND OR ELEVATED TEMP;  

Start 5/25/17 at 03:00


Enoxaparin Sodium (Lovenox) 40 mg DAILY SC  Last administered on 5/28/17at 09:26

; Admin Dose 40 MG;  Start 5/25/17 at 09:00


Ondansetron HCl (Zofran Inj) 4 mg Q4H  PRN IV NAUSEA AND/OR VOMITING Last 

administered on 5/29/17at 09:42; Admin Dose 4 MG;  Start 5/25/17 at 03:30


Pantoprazole (Protonix Tab) 40 mg DAILY@06 PO  Last administered on 5/31/17at 06

:04; Admin Dose 40 MG;  Start 5/25/17 at 06:00


Acetaminophen/ Hydrocodone Bitart 1 tab 1 tab Q6H  PRN PO PAIN Last 

administered on 5/31/17at 09:57; Admin Dose 1 TAB;  Start 5/25/17 at 03:30


Ceftriaxone Sodium (Rocephin) 50 ml @  100 mls/hr Q24H IVPB  Last administered 

on 5/31/17at 14:29; Admin Dose 100 MLS/HR;  Start 5/25/17 at 14:30


Metoprolol Tartrate (Lopressor) 25 mg BID PO  Last administered on 5/30/17at 09:

31; Admin Dose 25 MG;  Start 5/26/17 at 21:00


Prednisone (Prednisone) 40 mg DAILY PO ;  Start 6/1/17 at 09:00











HARLEY ADAMS May 31, 2017 16:04

## 2017-05-31 NOTE — PN
Date/Time of Note


Date/Time of Note


DATE: 5/31/17 


TIME: 14:01





Assessment/Plan


VTE Prophylaxis


VTE Prophylaxis Intervention:  SCD's





Lines/Catheters


IV C[facility]ter Type (from [facility]):  Saline Lock


Urinary Cath still in place:  No





Assessment/Plan


Chief Complaint/Hosp Course


Patient denies any chest pain denies short of shortness of breath, refused 

metoprolol Lovenox Solu-Medrol, and Lasix.  I had a long conversation with 

patient regarding medications, patient stated that he will take Lasix later 

today, however does not want to take Lovenox states that he can ambulate 

frequently. 


Assessment/Plan


- COPD exacerbation, resolving, taper steroids, breathing treatments.


- Acute on chronic Diastolic Congestive heart failure with exacerbation, 

Continue Lasix, monitor fluid balance. Dr Manrique is following in cardiology 

consultation.


- Acute respiratory failure 2 to #1and #2, resolving.


- Bronchitis, continue Ceftriaxone.


- Hypertension, blood pressure is well controlled on current regimen.


- Dyslipidemia, continue statin


- Morbid obesity.


Further recommendations based on clinical course.  Plan of care discussed with 

Dr. Anaya.


Problems:  





Exam/Review of Systems


Vital Signs


Vitals





 Vital Signs








  Date Time  Temp Pulse Resp B/P Pulse Ox O2 Delivery O2 Flow Rate FiO2


 


5/31/17 12:32  92      


 


5/31/17 11:28 98.1  18 121/59 94   


 


5/30/17 19:15      Nasal Cannula 3.0 














 Intake and Output   


 


 5/30/17 5/30/17 5/31/17





 15:00 23:00 07:00


 


Intake Total  850 ml 


 


Output Total  1400 ml 


 


Balance  -550 ml 











Exam


Constitutional:  alert, oriented


Head:  normocephalic


Neck:  supple


Respiratory:  clear to auscultation


Cardiovascular:  nl pulses, regular rate and rhythm


Gastrointestinal:  non-tender, soft


Musculoskeletal:  nl extremities to inspection


Extremities:  edema (Improved compared to admission)





Results


Result Diagram:  


5/29/17 0950








Medications


Medications





 Current Medications


Amlodipine Besylate (Norvasc) 10 mg DAILY PO  Last administered on 5/31/17at 09:

41; Admin Dose 10 MG;  Start 5/25/17 at 09:00


Ascorbic Acid (Vitamin C) 1,000 mg DAILY PO  Last administered on 5/31/17at 09:

42; Admin Dose 1,000 MG;  Start 5/25/17 at 09:00


Cholecalciferol (Vitamin D) 1,000 unit DAILY PO  Last administered on 5/31/17at 

09:42; Admin Dose 1,000 UNIT;  Start 5/25/17 at 09:00


Docusate Sodium (Colace) 250 mg BID PO  Last administered on 5/30/17at 21:45; 

Admin Dose 250 MG;  Start 5/25/17 at 09:00


Gabapentin (Neurontin) 900 mg TID PO  Last administered on 5/31/17at 12:52; 

Admin Dose 900 MG;  Start 5/25/17 at 09:00


Lisinopril (Zestril) 20 mg DAILY PO  Last administered on 5/31/17at 09:41; 

Admin Dose 20 MG;  Start 5/25/17 at 09:00


Morphine Sulfate (Ms Contin (Er)) 15 mg Q12 PO  Last administered on 5/30/17at 

21:28; Admin Dose 15 MG;  Start 5/25/17 at 09:00


Multivitamins Therapeutic (Theragran) 1 tab DAILY PO  Last administered on 5/31/ 17at 09:41; Admin Dose 1 TAB;  Start 5/25/17 at 09:00


Ondansetron HCl (Zofran Tab) 4 mg Q4H  PRN PO NAUSEA AND/OR VOMITING Last 

administered on 5/26/17at 21:40; Admin Dose 4 MG;  Start 5/25/17 at 03:00


Potassium Chloride (Klor-Con 10) 10 meq DAILY PO  Last administered on 5/31/ 17at 09:41; Admin Dose 10 MEQ;  Start 5/25/17 at 09:00


Atorvastatin Calcium (Lipitor) 5 mg DAILY@21 PO  Last administered on 5/30/17at 

21:45; Admin Dose 5 MG;  Start 5/25/17 at 21:00


Aspirin (Ecotrin) 325 mg DAILY PO  Last administered on 5/31/17at 09:42; Admin 

Dose 325 MG;  Start 5/25/17 at 09:00


Acetaminophen (Tylenol Tab) 650 mg Q6H  PRN PO PAIN AND OR ELEVATED TEMP;  

Start 5/25/17 at 03:00


Enoxaparin Sodium (Lovenox) 40 mg DAILY SC  Last administered on 5/28/17at 09:26

; Admin Dose 40 MG;  Start 5/25/17 at 09:00


Ondansetron HCl (Zofran Inj) 4 mg Q4H  PRN IV NAUSEA AND/OR VOMITING Last 

administered on 5/29/17at 09:42; Admin Dose 4 MG;  Start 5/25/17 at 03:30


Pantoprazole (Protonix Tab) 40 mg DAILY@06 PO  Last administered on 5/31/17at 06

:04; Admin Dose 40 MG;  Start 5/25/17 at 06:00


Acetaminophen/ Hydrocodone Bitart (Norco (10/325)) 1 tab Q6H  PRN PO PAIN Last 

administered on 5/31/17at 09:57; Admin Dose 1 TAB;  Start 5/25/17 at 03:30


Methylprednisolone Sodium Succinate 40 mg 40 mg Q12 IV  Last administered on 5/ 27/17at 21:04; Admin Dose 40 MG;  Start 5/25/17 at 14:30


Ceftriaxone Sodium (Rocephin) 50 ml @  100 mls/hr Q24H IVPB  Last administered 

on 5/30/17at 14:37; Admin Dose 100 MLS/HR;  Start 5/25/17 at 14:30


Metoprolol Tartrate (Lopressor) 25 mg BID PO  Last administered on 5/30/17at 09:

31; Admin Dose 25 MG;  Start 5/26/17 at 21:00











DARIO NORRIS May 31, 2017 14:04

## 2017-06-01 VITALS — SYSTOLIC BLOOD PRESSURE: 122 MMHG | DIASTOLIC BLOOD PRESSURE: 69 MMHG | RESPIRATION RATE: 18 BRPM

## 2017-06-01 VITALS — HEART RATE: 96 BPM

## 2017-06-01 VITALS — DIASTOLIC BLOOD PRESSURE: 70 MMHG | SYSTOLIC BLOOD PRESSURE: 132 MMHG | RESPIRATION RATE: 17 BRPM

## 2017-06-01 VITALS — DIASTOLIC BLOOD PRESSURE: 70 MMHG | RESPIRATION RATE: 17 BRPM | SYSTOLIC BLOOD PRESSURE: 139 MMHG

## 2017-06-01 VITALS — HEART RATE: 94 BPM

## 2017-06-01 VITALS — HEART RATE: 82 BPM

## 2017-06-01 VITALS — SYSTOLIC BLOOD PRESSURE: 136 MMHG | RESPIRATION RATE: 18 BRPM | DIASTOLIC BLOOD PRESSURE: 81 MMHG

## 2017-06-01 VITALS — HEART RATE: 78 BPM

## 2017-06-01 RX ADMIN — GABAPENTIN SCH MG: 300 CAPSULE ORAL at 12:33

## 2017-06-01 RX ADMIN — PANTOPRAZOLE SODIUM SCH MG: 40 TABLET, DELAYED RELEASE ORAL at 05:21

## 2017-06-01 RX ADMIN — METOPROLOL TARTRATE SCH MG: 25 TABLET, FILM COATED ORAL at 09:00

## 2017-06-01 RX ADMIN — GABAPENTIN SCH MG: 300 CAPSULE ORAL at 09:21

## 2017-06-01 RX ADMIN — LISINOPRIL SCH MG: 20 TABLET ORAL at 09:27

## 2017-06-01 RX ADMIN — MORPHINE SULFATE SCH MG: 15 TABLET, EXTENDED RELEASE ORAL at 09:00

## 2017-06-01 RX ADMIN — OXYCODONE HYDROCHLORIDE AND ACETAMINOPHEN SCH MG: 500 TABLET ORAL at 09:26

## 2017-06-01 RX ADMIN — IPRATROPIUM BROMIDE AND ALBUTEROL SULFATE SCH ML: .5; 3 SOLUTION RESPIRATORY (INHALATION) at 13:27

## 2017-06-01 RX ADMIN — IPRATROPIUM BROMIDE AND ALBUTEROL SULFATE SCH ML: .5; 3 SOLUTION RESPIRATORY (INHALATION) at 08:00

## 2017-06-01 RX ADMIN — THERA TABS SCH TAB: TAB at 09:23

## 2017-06-01 RX ADMIN — ENOXAPARIN SODIUM SCH MG: 100 INJECTION SUBCUTANEOUS at 09:00

## 2017-06-01 RX ADMIN — POTASSIUM CHLORIDE SCH MEQ: 10 TABLET, EXTENDED RELEASE ORAL at 09:26

## 2017-06-01 RX ADMIN — AMLODIPINE BESYLATE SCH MG: 10 TABLET ORAL at 09:27

## 2017-06-01 RX ADMIN — VITAMIN D, TAB 1000IU (100/BT) SCH UNIT: 25 TAB at 09:26

## 2017-06-01 RX ADMIN — ASPIRIN SCH MG: 325 TABLET, DELAYED RELEASE ORAL at 09:00

## 2017-06-01 NOTE — RADRPT
PROCEDURE:  Noncontrast CT Head. 

 

CLINICAL INDICATION:  Pain.

 

TECHNIQUE: Noncontrast CT of the head was obtained. The administered radiation dose was CTDI vol =  
45 mGy, DLP = 810 mGy-cm.

 

COMPARISON: 10/05/13 

 

FINDINGS:

 

The ventricles and cortical sulci are mildly enlarged.  There is mild decreased attenuation within t
he periventricular and subcortical white matter compatible with chronic microvascular changes.  Thes
e have slightly progressed compared to the prior.

 

  There is no acute intracranial hemorrhage or extra-axial fluid collection. There is no mass effect
. No midline shift is identified. There is no loss of gray-white differentiation to suggest acute in
farction.

 

The orbits are within normal limits. The paranasal sinuses are well aerated.

 

No destructive osseous lesion is identified.

 

IMPRESSION:

 

No acute findings.  Mild diffuse parenchymal volume loss and chronic microvascular changes which hav
e slightly mass.

 

RPTAT: HIKT

_____________________________________________ 

.Jonnie Mckeon MD, MD           Date    Time 

Electronically viewed and signed by .Jonnie Mckeon MD, MD on 06/01/2017 03:54 

 

D:  06/01/2017 03:54  T:  06/01/2017 03:54

.T/

## 2017-07-16 ENCOUNTER — HOSPITAL ENCOUNTER (INPATIENT)
Dept: HOSPITAL 10 - E/R | Age: 67
LOS: 7 days | Discharge: HOME | DRG: 292 | End: 2017-07-23
Attending: INTERNAL MEDICINE | Admitting: INTERNAL MEDICINE
Payer: COMMERCIAL

## 2017-07-16 VITALS
WEIGHT: 315 LBS | BODY MASS INDEX: 39.17 KG/M2 | HEIGHT: 75 IN | WEIGHT: 315 LBS | HEIGHT: 75 IN | BODY MASS INDEX: 39.17 KG/M2

## 2017-07-16 DIAGNOSIS — I50.33: ICD-10-CM

## 2017-07-16 DIAGNOSIS — J44.1: ICD-10-CM

## 2017-07-16 DIAGNOSIS — I11.0: Primary | ICD-10-CM

## 2017-07-16 DIAGNOSIS — K21.9: ICD-10-CM

## 2017-07-16 DIAGNOSIS — E78.5: ICD-10-CM

## 2017-07-16 DIAGNOSIS — E66.01: ICD-10-CM

## 2017-07-16 DIAGNOSIS — Z87.891: ICD-10-CM

## 2017-07-16 DIAGNOSIS — E87.0: ICD-10-CM

## 2017-07-16 DIAGNOSIS — R07.89: ICD-10-CM

## 2017-07-16 LAB
ABNORMAL IP MESSAGE: 1
ADD SCAN DIFF: NO
ADD UMIC: NO
ALBUMIN SERPL-MCNC: 4.7 G/DL (ref 3.3–4.9)
ALBUMIN/GLOB SERPL: 1.74 {RATIO}
ALP SERPL-CCNC: 49 IU/L (ref 42–121)
ALT SERPL-CCNC: 35 IU/L (ref 13–69)
ANION GAP SERPL CALC-SCNC: 15 MMOL/L (ref 8–16)
APTT BLD: 31.5 SEC (ref 25–35)
AST SERPL-CCNC: 21 IU/L (ref 15–46)
BASOPHILS # BLD AUTO: 0 10^3/UL (ref 0–0.1)
BASOPHILS NFR BLD: 0.4 % (ref 0–2)
BILIRUB DIRECT SERPL-MCNC: 0 MG/DL (ref 0–0.2)
BILIRUB SERPL-MCNC: 0.1 MG/DL (ref 0.2–1.3)
BNP SERPL-MCNC: 39 PG/ML (ref 0–125)
BUN SERPL-MCNC: 8 MG/DL (ref 7–20)
CALCIUM SERPL-MCNC: 9 MG/DL (ref 8.4–10.2)
CHLORIDE SERPL-SCNC: 103 MMOL/L (ref 97–110)
CK MB SERPL-MCNC: 0.47 NG/ML (ref 0–2.4)
CK SERPL-CCNC: 45 IU/L (ref 23–200)
CO2 SERPL-SCNC: 34 MMOL/L (ref 21–31)
COLOR UR: COLORLESS
CREAT SERPL-MCNC: 0.74 MG/DL (ref 0.61–1.24)
EOSINOPHIL # BLD: 0.1 10^3/UL (ref 0–0.5)
EOSINOPHIL NFR BLD: 2.7 % (ref 0–7)
ERYTHROCYTE [DISTWIDTH] IN BLOOD BY AUTOMATED COUNT: 17.6 % (ref 11.5–14.5)
GLOBULIN SER-MCNC: 2.7 G/DL (ref 1.3–3.2)
GLUCOSE SERPL-MCNC: 83 MG/DL (ref 70–220)
GLUCOSE UR STRIP-MCNC: NEGATIVE MG/DL
HCT VFR BLD CALC: 38.5 % (ref 42–52)
HGB BLD-MCNC: 11 G/DL (ref 14–18)
INR PPP: 0.97
KETONES UR STRIP.AUTO-MCNC: NEGATIVE MG/DL
LYMPHOCYTES # BLD AUTO: 1.8 10^3/UL (ref 0.8–2.9)
LYMPHOCYTES NFR BLD AUTO: 36.9 % (ref 15–51)
MCH RBC QN AUTO: 24.6 PG (ref 29–33)
MCHC RBC AUTO-ENTMCNC: 28.6 G/DL (ref 32–37)
MCV RBC AUTO: 85.9 FL (ref 82–101)
MONOCYTES # BLD: 0.6 10^3/UL (ref 0.3–0.9)
MONOCYTES NFR BLD: 13.1 % (ref 0–11)
NEUTROPHILS # BLD: 2.2 10^3/UL (ref 1.6–7.5)
NEUTROPHILS NFR BLD AUTO: 46.5 % (ref 39–77)
NITRITE UR QL STRIP.AUTO: NEGATIVE MG/DL
NRBC # BLD MANUAL: 0 10^3/UL (ref 0–0)
NRBC BLD QL: 0 /100WBC (ref 0–0)
PLATELET # BLD: 314 10^3/UL (ref 140–415)
PMV BLD AUTO: 10.1 FL (ref 7.4–10.4)
POTASSIUM SERPL-SCNC: 4 MMOL/L (ref 3.5–5.1)
PROT SERPL-MCNC: 7.4 G/DL (ref 6.1–8.1)
PROTHROMBIN TIME: 12.9 SEC (ref 12.2–14.2)
PT RATIO: 1
RBC # BLD AUTO: 4.48 10^6/UL (ref 4.7–6.1)
RBC # UR AUTO: NEGATIVE MG/DL
SODIUM SERPL-SCNC: 148 MMOL/L (ref 135–144)
TROPONIN I SERPL-MCNC: < 0.012 NG/ML (ref 0–0.12)
UR ASCORBIC ACID: NEGATIVE MG/DL
UR BILIRUBIN (DIP): NEGATIVE MG/DL
UR CLARITY: CLEAR
UR PH (DIP): 6 (ref 5–9)
UR SPECIFIC GRAVITY (DIP): 1 (ref 1–1.03)
UR TOTAL PROTEIN (DIP): NEGATIVE MG/DL
UROBILINOGEN UR STRIP-ACNC: NEGATIVE MG/DL
WBC # BLD AUTO: 4.8 10^3/UL (ref 4.8–10.8)
WBC # UR STRIP: NEGATIVE LEU/UL

## 2017-07-16 PROCEDURE — 96374 THER/PROPH/DIAG INJ IV PUSH: CPT

## 2017-07-16 PROCEDURE — 94645 CONT INHLJ TX EACH ADDL HOUR: CPT

## 2017-07-16 PROCEDURE — 93306 TTE W/DOPPLER COMPLETE: CPT

## 2017-07-16 PROCEDURE — 85610 PROTHROMBIN TIME: CPT

## 2017-07-16 PROCEDURE — 93005 ELECTROCARDIOGRAM TRACING: CPT

## 2017-07-16 PROCEDURE — 71010: CPT

## 2017-07-16 PROCEDURE — 80053 COMPREHEN METABOLIC PANEL: CPT

## 2017-07-16 PROCEDURE — 85730 THROMBOPLASTIN TIME PARTIAL: CPT

## 2017-07-16 PROCEDURE — 80048 BASIC METABOLIC PNL TOTAL CA: CPT

## 2017-07-16 PROCEDURE — 83880 ASSAY OF NATRIURETIC PEPTIDE: CPT

## 2017-07-16 PROCEDURE — 96375 TX/PRO/DX INJ NEW DRUG ADDON: CPT

## 2017-07-16 PROCEDURE — 84484 ASSAY OF TROPONIN QUANT: CPT

## 2017-07-16 PROCEDURE — 85025 COMPLETE CBC W/AUTO DIFF WBC: CPT

## 2017-07-16 PROCEDURE — 82553 CREATINE MB FRACTION: CPT

## 2017-07-16 PROCEDURE — 94644 CONT INHLJ TX 1ST HOUR: CPT

## 2017-07-16 PROCEDURE — 82550 ASSAY OF CK (CPK): CPT

## 2017-07-16 PROCEDURE — 81003 URINALYSIS AUTO W/O SCOPE: CPT

## 2017-07-16 PROCEDURE — 96376 TX/PRO/DX INJ SAME DRUG ADON: CPT

## 2017-07-16 PROCEDURE — 80061 LIPID PANEL: CPT

## 2017-07-16 NOTE — RADRPT
PROCEDURE:   XR Chest. 

 

CLINICAL INDICATION:    67-year-old male with chest pain.

 

TECHNIQUE:   Single frontal view  of the chest was obtained. 

 

COMPARISON:   Chest x-ray 03/15/2017 05:16 p.m. 

 

FINDINGS:

The soft tissues are generous.  Monitor electrodes project across the chest.  There are degenerative
 osteophytes in the thoracic spine.  The heart is enlarged. The cardiomediastinal silhouette and hil
ar structures are normal. The pulmonary vasculature is normal.  There is a left-sided aorta. There i
s a suboptimal inspiration with compressive atelectasis in the bases of the lungs.  The costophrenic
 angles are normal.

 

 

IMPRESSION:

 

1. Mild cardiomegaly.

2.  Spondylosis of the thoracic spine.

3.  Suboptimal inspiration with compressive atelectasis in the bases of the lungs

 

 

RPTAT:[id number]

_____________________________________________ 

Physician Octavio           Date    Time 

Electronically viewed and signed by Maicol Cabrera Physician on 07/16/2017 15:58 

 

D:  07/16/2017 15:58  T:  07/16/2017 15:58

/

## 2017-07-16 NOTE — ERA
ER Documentation


Chief Complaint


Date/Time


DATE: 17 


TIME: 15:59


Chief Complaint


CHEST PAIN WITH SHORTNESS OF BREATH





HPI


This is a 67-year-old -American male with a known history of COPD, 

congestive heart failure, hypertension, acid reflux, diverticulosis, neuropathy 

and chronic low back pain.  The patient indicates that 1 hour prior to arrival 

he developed a sudden onset of chest pressure that radiated to the left arm 

with no radiation to the back.  The patient had associated symptoms of feeling 

dizzy, nausea and shortness of breath.  He indicates he has had no recent 

travel or prolonged immobilization and denies any calf pain.  Indicates his 

symptoms are similar to his previous COPD exacerbations and congestive heart 

failure exacerbations.  He utilizes nebulizer inhaler at home with no 

improvement of his symptoms.  He also indicates he has had a nonproductive 

cough.  He has had no fevers or shaking or chills.  He did have 2 episodes of 

nonbloody nonbilious emesis en route to the hospital.  He indicates that the 

chest pain has been persistent for 1 hour.  He did not take aspirin or 

nitroglycerin.  He indicates had no fevers or shaking no chills.  His primary 

care physician is Dr. Gerber





ROS


All systems reviewed and are negative except as per history of present illness.





Medications


Home Meds


Reported Medications


Ondansetron Hcl* (Ondansetron Hcl*) 4 Mg Tablet, 4 MG PO Q4H Y for NAUSEA AND 

OR VOMITING, TAB


   17


Gabapentin* (Gabapentin*) 300 Mg Capsule, 900 MG PO TID, #270 CAP


   17


Hydrocodone Bit-Acetaminophen (Hydrocodone Bit-Acetaminophen)  Mg Tablet, 

1 TAB PO TID Y for PAIN, TAB


   17


Morphine Sulfate* (Ms Contin*) 15 Mg Tablet.sa, 15 MG PO Q12, TAB


   17


Simvastatin* (Zocor*) 10 Mg Tablet, 10 MG PO QHS, #30 TAB


   16


Pantoprazole* (Pantoprazole*) 40 Mg Tablet.dr, 40 MG PO AC BREAKFAST, TAB


   16


Cholecalciferol* (Vitamin D3*) 1,000 Unit Tablet, 1000 UNIT PO DAILY, TAB


   6/28/15


Lisinopril* (Lisinopril*) 20 Mg Tablet, 20 MG PO DAILY, TAB


   5/20/15


Amlodipine Besylate* (Amlodipine Besylate*) 10 Mg Tablet, 10 MG PO DAILY, TAB


   2/19/15


Furosemide* (Lasix*) 40 Mg Tablet, 40 MG PO BID, TAB


   2/19/15


Multivitamins* (Multivitamin*) 1 Udcap Capsule, 1 TAB PO DAILY, TAB


   1/31/15


Docusate Sodium* (Colace*) 250 Mg Capsule, 250 MG PO BID, CAP


   1/31/15


Ascorbic Acid (Vitamin C) 500 Mg Tab, 1000 MG PO DAILY, TAB


   14


Potassium Chloride* (K-Dur*) 10 Meq Tab.prt.sr, 10 MEQ PO DAILY, TAB


   10/16/14


Aspirin* (Aspirin* Chew) 81 Mg Tab.chew, 81 MG PO DAILY, TAB.CHEW


   10/16/14





Allergies


Allergies:  


Coded Allergies:  


     hydromorphone (Verified  Allergy, Severe, 17)





PMhx/Soc


History of Surgery:  No


Anesthesia Reaction:  No


Hx Neurological Disorder:  Yes


Hx Respiratory Disorders:  Yes (COPD)


Hx Cardiac Disorders:  Yes (HTN, CHF)


Hx Psychiatric Problems:  No


Hx Miscellaneous Medical Probl:  No


Hx Alcohol Use:  No


Hx Substance Use:  No


Hx Tobacco Use:  Yes





Physical Exam


Vitals





Vital Signs








  Date Time  Temp Pulse Resp B/P Pulse Ox O2 Delivery O2 Flow Rate FiO2


 


17 17:59      Nasal Cannula 4 


 


17 14:26 98.1 82 20 137/88 92   








Physical Exam


Constitutional:Well-developed. Well-nourished.  In mild respiratory distress.


HEENT:Normocephalic. Atraumatic.Pupils were equal round reactive to light. 

Moist mucous membranes.No tonsillar exudates.


Neck: No nuchal rigidity. No lymphadenopathy. No posterior cervical spine 

tenderness or step-offs.


Respiratory: Not using accessory muscles of respiration.  Wheezing on end 

auscultation bilaterally.  No rales or rhonchi.


Cardiovascular: Regular rate regular rhythm.No murmurs. No rubs were 

appreciated.S1, S2 normal. Distal pulses are palpable 2+ bilaterally.


GI: Abdomen was soft. Nontender. Non Distended. No pulsatile abdominal masses 

or bruits. No rebound. No guarding. Bowel sounds were present and normal. 


Muscle skeletal: Full range of motion of both the upper and lower extremities 

bilaterally.Normal muscle tone.No assymetrical calf tenderness or swelling. 


Skin: No petechia, no purpura. No lesions on the palms or the soles of the 

feet. No maculopapular rash.  2+ pitting edema the bilateral lower extremities.


NEURO: Patient was alert, awake, orientated x3.No facial droop.  Gait not 

observed as patient stated he was experiencing too much dyspnea to ambulate at 

this time.  Speech had regular rate and rhythm. No focal neurological deficits.


Result Diagram:  


17 1625





Results 24 hrs





 Laboratory Tests








Test


  17


16:25


 


White Blood Count 4.810^3/ul 


 


Red Blood Count 4.4810^6/ul 


 


Hemoglobin 11.0g/dl 


 


Hematocrit 38.5% 


 


Mean Corpuscular Volume 85.9fl 


 


Mean Corpuscular Hemoglobin 24.6pg 


 


Mean Corpuscular Hemoglobin


Concent 28.6g/dl 


 


 


Red Cell Distribution Width 17.6% 


 


Platelet Count 41278^3/UL 


 


Mean Platelet Volume 10.1fl 


 


Neutrophils % 46.5% 


 


Lymphocytes % 36.9% 


 


Monocytes % 13.1% 


 


Eosinophils % 2.7% 


 


Basophils % 0.4% 


 


Nucleated Red Blood Cells % 0.0/100WBC 


 


Neutrophils # 2.210^3/ul 


 


Lymphocytes # 1.810^3/ul 


 


Monocytes # 0.610^3/ul 


 


Eosinophils # 0.110^3/ul 


 


Basophils # 0.010^3/ul 


 


Nucleated Red Blood Cells # 0.010^3/ul 








 Current Medications








 Medications


  (Trade)  Dose


 Ordered  Sig/Adán


 Route


 PRN Reason  Start Time


 Stop Time Status Last Admin


Dose Admin


 


 Aspirin


  (Aspirin)  325 mg  ONCE  STAT


 PO


   17 15:26


 17 15:31 DC 17 16:17


 


 


 Albuterol


  (Proventil 0.5%


  (Neb))  10 mg  ONCE  STAT


 INH


   17 15:26


 17 15:31 DC 17 17:31


 


 


 Ipratropium


 Bromide


  (Atrovent 0.02%


  (Neb))  1 mg  ONCE  STAT


 INH


   17 15:26


 17 15:32 DC 17 17:31


 


 


 Morphine Sulfate


  (morphine)  4 mg  ONCE  STAT


 IV


   17 15:36


 17 15:38 DC 17 16:11


 


 


 Ondansetron HCl


  (Zofran Inj)  4 mg  ONCE  STAT


 IV


   17 15:36


 17 15:38 DC 17 16:11


 


 


 Furosemide


  (Lasix)  40 mg  ONCE  ONCE


 IV


   17 16:00


 17 16:01 DC 17 16:17


 


 


 Nitroglycerin


  (Nitroglycerin


  (Sl Tab) 0.4 Mg)  1 tab  Q5M UP TO 3 DOSES PRN


 SL


 CHEST PAIN  17 16:30


     


 











Procedures/MDM


The patient presented to the emergency department with chest pain. My clinical 

evaluation and workup was to distinguish minor causes of chest pain from acute 

life threatening conditions such as myocardial infarction, pulmonary embolism, 

aortic dissection, esophageal rupture, cardiac tamponade.





The patient was placed on a cardiac monitor and continuous pulse oximetry.  IV 

access established by nursing staff.  The patient received nebulizer treatments 

of continuous albuterol and Atrovent was also given 125 mg of Solu-Medrol.  The 

patient was requesting Lasix as he states this helps his symptoms for suspected 

congestive heart failure exacerbation.  He was given 40 mg of Lasix 

intravenously.  He was also given aspirin and nitroglycerin with improvement of 

his chest discomfort





12 Lead EKG tracing ordered and reviewed by myself showed: 


Normal sinus rhythm of 78 bpm and no arrhythmia.


[place] interval normal.


QRS duration normal.


No ST segment elevation


No ST segment depression. No changes consistent with acute ischemia. 





The patient's symptoms had improved but did not completely resolve with respect 

to his respiratory distress.  My clinical suspicion was low for pulmonary 

embolism as the patient appeared to be experiencing a CHF and COPD 

exacerbation.  He will be admitted in serious condition for continuous 

nebulizer treatments and serial 12-lead EKG tracings and cardiac set of enzymes 

under the care of his primary care physician Dr. Anaya.  He will go to the 

telemetry service





I obtained a 2 view chest radiograph which indicated followin. Mild cardiomegaly.


2.  Spondylosis of the thoracic spine.


3.  Suboptimal inspiration with compressive atelectasis in the bases of the 

lungs





Patient's respiratory distress significantly improved after the nebulizer 

treatments as stated above.  He had no leukocytosis and I did not administer 

antibiotics as this did not appear to be an infectious process such as 

bronchitis or pneumonia





Departure


Diagnosis:  


 Primary Impression:  


 Chest pain


 Qualified Code:  R07.9 - Chest pain, unspecified type


 Additional Impressions:  


 COPD (chronic obstructive pulmonary disease)


 Qualified Code:  J44.1 - Chronic obstructive pulmonary disease with acute 

exacerbation


 CHF (congestive heart failure)


 Qualified Code:  I50.43 - Acute on chronic combined systolic and diastolic 

congestive heart failure


Condition:  Serious











MARKO PENA 2017 16:03

## 2017-07-17 VITALS — DIASTOLIC BLOOD PRESSURE: 76 MMHG | SYSTOLIC BLOOD PRESSURE: 137 MMHG | RESPIRATION RATE: 18 BRPM

## 2017-07-17 VITALS — HEART RATE: 62 BPM

## 2017-07-17 VITALS — HEART RATE: 80 BPM

## 2017-07-17 VITALS — HEART RATE: 87 BPM

## 2017-07-17 VITALS — SYSTOLIC BLOOD PRESSURE: 139 MMHG | DIASTOLIC BLOOD PRESSURE: 82 MMHG | RESPIRATION RATE: 18 BRPM | HEART RATE: 80 BPM

## 2017-07-17 LAB
CK MB SERPL-MCNC: 0.61 NG/ML (ref 0–2.4)
CK SERPL-CCNC: 60 IU/L (ref 23–200)
TROPONIN I SERPL-MCNC: < 0.012 NG/ML (ref 0–0.12)

## 2017-07-17 RX ADMIN — HYDROMORPHONE HYDROCHLORIDE PRN MG: 2 INJECTION, SOLUTION INTRAMUSCULAR; INTRAVENOUS; SUBCUTANEOUS at 20:48

## 2017-07-17 RX ADMIN — LISINOPRIL SCH MG: 20 TABLET ORAL at 16:07

## 2017-07-17 RX ADMIN — AMLODIPINE BESYLATE SCH MG: 10 TABLET ORAL at 16:08

## 2017-07-17 RX ADMIN — ATORVASTATIN CALCIUM SCH MG: 10 TABLET, FILM COATED ORAL at 21:13

## 2017-07-17 RX ADMIN — LEVOFLOXACIN SCH MLS/HR: 750 INJECTION, SOLUTION INTRAVENOUS at 16:07

## 2017-07-17 RX ADMIN — OXYCODONE HYDROCHLORIDE AND ACETAMINOPHEN SCH MG: 500 TABLET ORAL at 16:08

## 2017-07-17 RX ADMIN — HYDROMORPHONE HYDROCHLORIDE PRN MG: 2 INJECTION, SOLUTION INTRAMUSCULAR; INTRAVENOUS; SUBCUTANEOUS at 16:04

## 2017-07-17 RX ADMIN — ASPIRIN 81 MG SCH MG: 81 TABLET ORAL at 16:08

## 2017-07-17 RX ADMIN — POTASSIUM CHLORIDE SCH MEQ: 20 TABLET, EXTENDED RELEASE ORAL at 21:13

## 2017-07-17 NOTE — HP
Date/Time of Note


Date/Time of Note


DATE: 7/17/17 


TIME: 14:42





Assessment/Plan


VTE Prophylaxis


VTE Prophylaxis Intervention:  LMWH





Lines/Catheters


IV Catheter Type (from [facility]):  Saline Lock





Assessment/Plan


Assessment/Plan


-Chest pain, rule out acute coronary syndrome.  Will obtain cardiac enzymes 

every 8 hours 3.  Dr. Manrique is asked to see patient in cardiology 

consultation.  Continue aspirin.


-COPD exacerbation, continue breathing treatments and steroids.


-Possible bronchitis, continue Levaquin.


-Acute on chronic diastolic dysfunction congestive heart failure


-GERD, continue Protonix


-Morbid obesity with BMI of 50





Further recommendations based on clinical course.  Plan of care was discussed 

with Dr. Anaya.





HPI/ROS


Admit Date/Time


Admit Date/Time


Jul 16, 2017 at 20:25





Hx of Present Illness


The patient is 67-year-old -American gentleman known to me from previous 

admission.  Patient has extensive medical history including morbid obesity, 

congestive heart failure, COPD, hypertension, gastroesophageal reflux disease, 

neuropathy, chronic back pain, and anxiety.  Patient presented to the emergency 

room with complaints of sudden onset of chest pressure that radiates to the 

left upper arm and back, he also complains of productive cough, nausea and 

vomiting.  Patient had 2 episodes of nonbloody nonbilious emesis on the way to 

the emergency room.  Patient denies any fever.  He underwent chest x-ray which 

revealed mild cardiomegaly.  Patient was giving aspirin, nitroglycerin, also 

was getting Lasix ,  breathing treatment, and Solu-Medrol with improvement in 

symptoms.  Patient is admitted for further evaluation and management to 

telemetry floor.





ROS


12 point review of system is negative unless mentioned in HPI.





PMH/Family/Social


Past Medical History


Medical History:  congestive heart failure, GERD, hypertension, other (COPD)





Past Surgical History


Past Surgical Hx:  no surgical history





Family History


Significant Family History:  heart disease, cancer, renal disease





Social History


Alcohol Use:  none


Smoking Status:  Former smoker


Drug Use:  none





Exam/Review of Systems


Vital Signs


Vitals





 Vital Signs








  Date Time  Temp Pulse Resp B/P Pulse Ox O2 Delivery O2 Flow Rate FiO2


 


7/17/17 12:58  62      


 


7/17/17 12:25   18 143/85 95 Nasal Cannula 2.0 


 


7/16/17 14:26 98.1       











Exam


Constitutional:  alert, oriented, other (Obese)


Head:  atraumatic, normocephalic


Eyes:  nl conjunctiva


ENMT:  nl external ears & nose


Neck:  supple


Respiratory:  normal air movement


Cardiovascular:  nl pulses, regular rate and rhythm


Gastrointestinal:  soft, tender


Extremities:  edema


Neurological:  nl mental status


Skin:  nl turgor





Labs


Result Diagram:  


7/16/17 1625                                                                   

             7/16/17 1730














DARIO NORRIS Jul 17, 2017 14:53

## 2017-07-18 VITALS — HEART RATE: 85 BPM

## 2017-07-18 VITALS — RESPIRATION RATE: 18 BRPM | SYSTOLIC BLOOD PRESSURE: 133 MMHG | DIASTOLIC BLOOD PRESSURE: 61 MMHG

## 2017-07-18 VITALS — DIASTOLIC BLOOD PRESSURE: 61 MMHG | SYSTOLIC BLOOD PRESSURE: 130 MMHG | RESPIRATION RATE: 18 BRPM

## 2017-07-18 VITALS — RESPIRATION RATE: 19 BRPM | DIASTOLIC BLOOD PRESSURE: 72 MMHG | SYSTOLIC BLOOD PRESSURE: 137 MMHG

## 2017-07-18 VITALS — RESPIRATION RATE: 15 BRPM | DIASTOLIC BLOOD PRESSURE: 72 MMHG | SYSTOLIC BLOOD PRESSURE: 137 MMHG | HEART RATE: 94 BPM

## 2017-07-18 VITALS — HEART RATE: 81 BPM

## 2017-07-18 VITALS — SYSTOLIC BLOOD PRESSURE: 139 MMHG | RESPIRATION RATE: 18 BRPM | DIASTOLIC BLOOD PRESSURE: 65 MMHG

## 2017-07-18 VITALS — DIASTOLIC BLOOD PRESSURE: 76 MMHG | HEART RATE: 82 BPM | RESPIRATION RATE: 15 BRPM | SYSTOLIC BLOOD PRESSURE: 137 MMHG

## 2017-07-18 VITALS — DIASTOLIC BLOOD PRESSURE: 67 MMHG | SYSTOLIC BLOOD PRESSURE: 131 MMHG | RESPIRATION RATE: 19 BRPM

## 2017-07-18 VITALS — HEART RATE: 90 BPM

## 2017-07-18 VITALS — HEART RATE: 89 BPM

## 2017-07-18 LAB
ABNORMAL IP MESSAGE: 1
ADD SCAN DIFF: NO
ALBUMIN SERPL-MCNC: 4.2 G/DL (ref 3.3–4.9)
ALBUMIN/GLOB SERPL: 1.27 {RATIO}
ALP SERPL-CCNC: 49 IU/L (ref 42–121)
ALT SERPL-CCNC: 32 IU/L (ref 13–69)
ANION GAP SERPL CALC-SCNC: 18 MMOL/L (ref 8–16)
AST SERPL-CCNC: 17 IU/L (ref 15–46)
BASOPHILS # BLD AUTO: 0 10^3/UL (ref 0–0.1)
BASOPHILS NFR BLD: 0 % (ref 0–2)
BILIRUB DIRECT SERPL-MCNC: 0 MG/DL (ref 0–0.2)
BILIRUB SERPL-MCNC: 0 MG/DL (ref 0.2–1.3)
BUN SERPL-MCNC: 14 MG/DL (ref 7–20)
CALCIUM SERPL-MCNC: 8.8 MG/DL (ref 8.4–10.2)
CHLORIDE SERPL-SCNC: 96 MMOL/L (ref 97–110)
CHOLEST SERPL-MCNC: 185 MG/DL (ref 100–200)
CHOLEST/HDLC SERPL: 2.8 RATIO
CK MB SERPL-MCNC: 0.76 NG/ML (ref 0–2.4)
CK SERPL-CCNC: 48 IU/L (ref 23–200)
CO2 SERPL-SCNC: 36 MMOL/L (ref 21–31)
CREAT SERPL-MCNC: 0.77 MG/DL (ref 0.61–1.24)
EOSINOPHIL # BLD: 0 10^3/UL (ref 0–0.5)
EOSINOPHIL NFR BLD: 0 % (ref 0–7)
ERYTHROCYTE [DISTWIDTH] IN BLOOD BY AUTOMATED COUNT: 17.2 % (ref 11.5–14.5)
GLOBULIN SER-MCNC: 3.3 G/DL (ref 1.3–3.2)
GLUCOSE SERPL-MCNC: 129 MG/DL (ref 70–220)
HCT VFR BLD CALC: 39.5 % (ref 42–52)
HDLC SERPL-MCNC: 66 MG/DL (ref 30–78)
HGB BLD-MCNC: 11.4 G/DL (ref 14–18)
LYMPHOCYTES # BLD AUTO: 0.7 10^3/UL (ref 0.8–2.9)
LYMPHOCYTES NFR BLD AUTO: 12.4 % (ref 15–51)
MCH RBC QN AUTO: 24.7 PG (ref 29–33)
MCHC RBC AUTO-ENTMCNC: 28.9 G/DL (ref 32–37)
MCV RBC AUTO: 85.7 FL (ref 82–101)
MONOCYTES # BLD: 0.2 10^3/UL (ref 0.3–0.9)
MONOCYTES NFR BLD: 3.3 % (ref 0–11)
NEUTROPHILS # BLD: 5 10^3/UL (ref 1.6–7.5)
NEUTROPHILS NFR BLD AUTO: 83.8 % (ref 39–77)
NRBC # BLD MANUAL: 0 10^3/UL (ref 0–0)
NRBC BLD QL: 0 /100WBC (ref 0–0)
PLATELET # BLD: 346 10^3/UL (ref 140–415)
PMV BLD AUTO: 8.9 FL (ref 7.4–10.4)
POTASSIUM SERPL-SCNC: 4.3 MMOL/L (ref 3.5–5.1)
PROT SERPL-MCNC: 7.5 G/DL (ref 6.1–8.1)
RBC # BLD AUTO: 4.61 10^6/UL (ref 4.7–6.1)
SODIUM SERPL-SCNC: 146 MMOL/L (ref 135–144)
TRIGL SERPL-MCNC: 50 MG/DL (ref 0–149)
TROPONIN I SERPL-MCNC: < 0.012 NG/ML (ref 0–0.12)
WBC # BLD AUTO: 6 10^3/UL (ref 4.8–10.8)

## 2017-07-18 RX ADMIN — ATORVASTATIN CALCIUM SCH MG: 10 TABLET, FILM COATED ORAL at 21:05

## 2017-07-18 RX ADMIN — HYDROMORPHONE HYDROCHLORIDE PRN MG: 2 INJECTION, SOLUTION INTRAMUSCULAR; INTRAVENOUS; SUBCUTANEOUS at 02:43

## 2017-07-18 RX ADMIN — LISINOPRIL SCH MG: 20 TABLET ORAL at 09:15

## 2017-07-18 RX ADMIN — POTASSIUM CHLORIDE SCH MEQ: 20 TABLET, EXTENDED RELEASE ORAL at 21:05

## 2017-07-18 RX ADMIN — ASPIRIN 81 MG SCH MG: 81 TABLET ORAL at 09:15

## 2017-07-18 RX ADMIN — HYDROMORPHONE HYDROCHLORIDE PRN MG: 2 INJECTION, SOLUTION INTRAMUSCULAR; INTRAVENOUS; SUBCUTANEOUS at 19:12

## 2017-07-18 RX ADMIN — LEVOFLOXACIN SCH MLS/HR: 750 INJECTION, SOLUTION INTRAVENOUS at 16:56

## 2017-07-18 RX ADMIN — OXYCODONE HYDROCHLORIDE AND ACETAMINOPHEN SCH MG: 500 TABLET ORAL at 09:14

## 2017-07-18 RX ADMIN — PANTOPRAZOLE SODIUM SCH MG: 40 TABLET, DELAYED RELEASE ORAL at 09:15

## 2017-07-18 RX ADMIN — ENOXAPARIN SODIUM SCH MG: 100 INJECTION SUBCUTANEOUS at 09:18

## 2017-07-18 RX ADMIN — POTASSIUM CHLORIDE SCH MEQ: 20 TABLET, EXTENDED RELEASE ORAL at 09:15

## 2017-07-18 RX ADMIN — HYDROMORPHONE HYDROCHLORIDE PRN MG: 2 INJECTION, SOLUTION INTRAMUSCULAR; INTRAVENOUS; SUBCUTANEOUS at 14:47

## 2017-07-18 RX ADMIN — AMLODIPINE BESYLATE SCH MG: 10 TABLET ORAL at 09:16

## 2017-07-18 NOTE — RADRPT
Echocardiogram Report

 

Patient Name:  ELSA FERNANDEZ    Gender:       Male

MRN:           [id number]          Accession #:  [id number]

Birth Date:    1950      Study Date:   18-Jul-2017

Sonographer:   [facility]  Location:     [id number]

 

Ref. Physician: HARLEY MANRIQUE

Quality: Technically Difficult Study

 

Procedures: Transthoracic echocardiogram with complete 2D, M-Mode, and 

doppler examination.

Indications: Congestive Heart Failure.

 

2D/M Mode                          Doppler

Measurement  Value  Normal Ranges  Measurement     Value  Normal Ranges

LVIDd 2D     5.3    3.5 - 5.6 cm   AV Peak Bora     1.7    m/sec

LVIDs 2D     3.3    2.1 - 4.1 cm   AV Peak PG      11.1   mmHg

LVPWd 2D     1.5    0.6 - 1.1 cm   LVOT Peak Bora   1.1    m/sec

IVSd 2D      1.5    0.6 - 1.1 cm   LVOT Peak PG    5.2    mmHg

AoR Diam 2D  3.1    2.0 - 3.7 cm   MV E Peak Bora   0.7    m/sec

EDV 2D       132.9  cm3            MV A Peak Bora   0.8    m/sec

ESV 2D       35.8   cm3            MV E/A          0.8

LA Dimen 2D  3.9    2.3 - 4.0 cm   MV Decel Time   190    msec

MV Decel Fajardo  4

MV E/A          0.8

 

Findings

Left Ventricle: Normal left ventricular systolic function.  Normal left 

ventricular cavity size.  Moderate concentric left ventricular 

hypertrophy.  Ejection fraction is visually estimated at 60 %.  Abnormal 

Diastolic Function.

Right Ventricle: Normal right ventricular size.  Normal right 

ventricular systolic function.

Left Atrium: The left atrium is normal in size.

Right Atrium: The right atrium is normal in size.

Mitral Valve: Mild mitral leaflet calcification.  Mild mitral annular 

calcification.  Trace mitral regurgitation.

Aortic Valve: No significant aortic stenosis or insufficiency.  Aortic 

sclerosis without stenosis.

Tricuspid Valve: Normal appearance of the tricuspid valve.  Unable to 

obtain RVSP due to minimal presence of tricuspid regurgitation.  There 

is trace tricuspid regurgitation.

Pericardium: Normal pericardium with no significant pericardial effusion.

Aorta: Normal aortic root.

IVC: Normal size and normal respiratory collapse consistent with normal 

right atrial pressure.

 

Conclusions

1.Normal left ventricular systolic function.  Normal left ventricular 

cavity size.  Moderate concentric left ventricular hypertrophy.  

Ejection fraction is visually estimated at 60 %.  Abnormal Diastolic 

Function.

2.Mild mitral leaflet calcification.  Mild mitral annular 

calcification.  Trace mitral regurgitation.

3.Normal appearance of the tricuspid valve.  Unable to obtain RVSP due 

to minimal presence of tricuspid regurgitation.  There is trace 

tricuspid regurgitation.

 

Electronically Signed By:

Harley Manrique

18-Jul-2017 17:07:32  -0700

 

Patient Name: ELSA FERNANDEZ

MRN: [id number]

Study Date: 18-Jul-2017 [id number]

## 2017-07-18 NOTE — PN
Date/Time of Note


Date/Time of Note


DATE: 7/18/17 


TIME: 16:48





Assessment/Plan


VTE Prophylaxis


VTE Prophylaxis Intervention:  SCD's





Lines/Catheters


IV Catheter Type (from [facility]):  Saline Lock


Urinary Cath still in place:  No





Assessment/Plan


Chief Complaint/Hosp Course


Patient remained hemodynamically stable, complains of occasional shortness of 

breath on exertion, denies fever.


Assessment/Plan


-Chest pain, rule out acute coronary syndrome.  Cardiac enzymes are negative 3 

Dr. Manrique is following in cardiology consultation.  Continue aspirin.


-COPD exacerbation, continue breathing treatments and steroids.


-Possible bronchitis, continue Levaquin.


-Acute on chronic diastolic dysfunction congestive heart failure


-GERD, continue Protonix


-Morbid obesity with BMI of 50





Further recommendations based on clinical course.  Plan of care was discussed 

with Dr. Anaya.


Problems:  





Exam/Review of Systems


Vital Signs


Vitals





 Vital Signs








  Date Time  Temp Pulse Resp B/P Pulse Ox O2 Delivery O2 Flow Rate FiO2


 


7/18/17 16:40  85      


 


7/18/17 16:05 98.5  18 130/61 98   


 


7/18/17 15:32       2.0 


 


7/17/17 19:30      Nasal Cannula  














 Intake and Output   


 


 7/17/17 7/17/17 7/18/17





 15:00 23:00 07:00


 


Intake Total  750 ml 1200 ml


 


Output Total  900 ml 1850 ml


 


Balance  -150 ml -650 ml











Exam


Constitutional:  alert, oriented


Head:  normocephalic


Neck:  supple


Respiratory:  diminished breath sounds


Cardiovascular:  nl pulses


Gastrointestinal:  non-tender, soft


Musculoskeletal:  nl extremities to inspection


Extremities:  normal pulses





Results


Result Diagram:  


7/18/17 0742                                                                   

             7/18/17 0742





Results 24 hrs





Laboratory Tests








Test


  7/18/17


07:42


 


White Blood Count 6.0  #


 


Red Blood Count 4.61  L


 


Hemoglobin 11.4  L


 


Hematocrit 39.5  L


 


Mean Corpuscular Volume 85.7  


 


Mean Corpuscular Hemoglobin 24.7  L


 


Mean Corpuscular Hemoglobin


Concent 28.9  L


 


 


Red Cell Distribution Width 17.2  H


 


Platelet Count 346  


 


Mean Platelet Volume 8.9  


 


Neutrophils % 83.8  H


 


Lymphocytes % 12.4  L


 


Monocytes % 3.3  


 


Eosinophils % 0.0  


 


Basophils % 0.0  


 


Nucleated Red Blood Cells % 0.0  


 


Neutrophils # 5.0  


 


Lymphocytes # 0.7  L


 


Monocytes # 0.2  L


 


Eosinophils # 0.0  


 


Basophils # 0.0  


 


Nucleated Red Blood Cells # 0.0  


 


Sodium Level 146  H


 


Potassium Level 4.3  


 


Chloride Level 96  L


 


Carbon Dioxide Level 36  H


 


Anion Gap 18  H


 


Blood Urea Nitrogen 14  


 


Creatinine 0.77  


 


Glucose Level 129  #


 


Calcium Level 8.8  


 


Total Bilirubin 0.0  L


 


Direct Bilirubin 0.00  


 


Indirect Bilirubin 0.0  


 


Aspartate Amino Transf


(AST/SGOT) 17  


 


 


Alanine Aminotransferase


(ALT/SGPT) 32  


 


 


Alkaline Phosphatase 49  


 


Creatine Kinase 48  


 


Creatine Kinase Index 1.6  


 


Creatinine Kinase MB (Mass) 0.76  


 


Troponin I < 0.012  


 


Total Protein 7.5  


 


Albumin 4.2  


 


Globulin 3.30  H


 


Albumin/Globulin Ratio 1.27  


 


Triglycerides Level 50  


 


Cholesterol Level 185  


 


LDL Cholesterol, Calculated 109  


 


HDL Cholesterol 66  


 


Cholesterol/HDL Ratio 2.8  











Medications


Medications





 Current Medications


Acetaminophen (Tylenol Tab) 650 mg Q4H  PRN PO PAIN AND OR ELEVATED TEMP;  

Start 7/17/17 at 15:00


Potassium Chloride (Klor-Con 20) 20 meq BID PO  Last administered on 7/18/17at 

09:15; Admin Dose 20 MEQ;  Start 7/17/17 at 21:00


Amlodipine Besylate (Norvasc) 10 mg DAILY PO  Last administered on 7/18/17at 09:

16; Admin Dose 10 MG;  Start 7/17/17 at 15:30


Ascorbic Acid (Vitamin C) 1,000 mg DAILY PO  Last administered on 7/18/17at 09:

14; Admin Dose 1,000 MG;  Start 7/17/17 at 15:30


Docusate Sodium (Colace) 250 mg BID PO  Last administered on 7/18/17at 09:17; 

Admin Dose 250 MG;  Start 7/17/17 at 15:30


Lisinopril (Zestril) 20 mg DAILY PO  Last administered on 7/18/17at 09:15; 

Admin Dose 20 MG;  Start 7/17/17 at 15:30


Atorvastatin Calcium (Lipitor) 10 mg DAILY@21 PO  Last administered on 7/17/ 17at 21:13; Admin Dose 10 MG;  Start 7/17/17 at 21:00


Ondansetron HCl (Zofran Inj) 4 mg Q6H  PRN IV NAUSEA AND/OR VOMITING Last 

administered on 7/17/17at 16:04; Admin Dose 4 MG;  Start 7/17/17 at 15:30


Aspirin (Aspirin) 81 mg DAILY PO  Last administered on 7/18/17at 09:15; Admin 

Dose 81 MG;  Start 7/17/17 at 16:00


Methylprednisolone Sodium Succinate (Solu-Medrol) 60 mg Q8 IV  Last 

administered on 7/18/17at 14:04; Admin Dose 60 MG;  Start 7/17/17 at 16:00


Nitroglycerin (Nitroglycerin (Sl Tab) 0.4 Mg) 1 tab Q5M  PRN SL CHEST PAIN;  

Start 7/17/17 at 15:30


Acetaminophen (Tylenol Supp) 650 mg Q6H  PRN [place] PAIN LEVEL 1-3 OR FEVER;  Start 

7/17/17 at 15:30


Acetaminophen/ Hydrocodone Bitart (Norco (5/325)) 1 tab Q6H  PRN PO PAIN LEVEL 4

-6;  Start 7/17/17 at 15:30


Hydromorphone HCl (Dilaudid) 2 mg Q4H  PRN IV PAIN LEVEL 7-10 Last administered 

on 7/18/17at 14:47; Admin Dose 2 MG;  Start 7/17/17 at 15:30


Enoxaparin Sodium 40 mg 40 mg DAILY SC  Last administered on 7/18/17at 09:18; 

Admin Dose 40 MG;  Start 7/18/17 at 09:00


Levofloxacin/ Dextrose (Levaquin 750 Mg/ D5W 150 ml (Pmx)) 150 ml @  100 mls/hr 

Q24H IVPB  Last administered on 7/17/17at 16:07; Admin Dose 100 MLS/HR;  Start 7 /17/17 at 16:00











DARIO NORRIS Jul 18, 2017 16:51

## 2017-07-18 NOTE — CONS
Date/Time of Note


Date/Time of Note


DATE: 7/18/17 


TIME: 16:50





Assessment/Plan


Assessment/Plan


Chief Complaint/Hosp Course


IMP:


1.CHF-diastolic acute on chronic


2.Chest pain-improved. Negative trop x 3


3.HTN


4.COPD


5.Obesity


6.HL


7.Hypernatremia





Recc:


-Tele


-serial ecg's


-Continue lasix diuresis with slight increase and follow volume status closely


-Continue statin/asa


-Continue norvasc


-Continue steroids/bronchodilators/abx's


Problems:  





Consultation Date/Type/Reason


Admit Date/Time


Jul 16, 2017 at 20:25


Initial Consult Date


7/17/17


Type of Consultation:  Cardiology


Reason for Consultation


CHF/CP


Referring Provider:  DEVAUGHN TALAMANTES MD





Exam/Review of Systems


Vital Signs


Vitals





 Vital Signs








  Date Time  Temp Pulse Resp B/P Pulse Ox O2 Delivery O2 Flow Rate FiO2


 


7/18/17 16:40  85      


 


7/18/17 16:05 98.5  18 130/61 98   


 


7/18/17 15:32       2.0 


 


7/17/17 19:30      Nasal Cannula  














 Intake and Output   


 


 7/17/17 7/17/17 7/18/17





 15:00 23:00 07:00


 


Intake Total  750 ml 1200 ml


 


Output Total  900 ml 1850 ml


 


Balance  -150 ml -650 ml











Exam


Review of Systems:


CONSTITUTIONAL:  No fevers, chills.


PULMONARY:  mild sob-improving


CARDIOVASCULAR: No chest pain/palpitations


GASTROINTESTINAL:  No nausea/vomiting.


GENITOURINARY:  No hematuria/dysuria.


MUSCULOSKELETAL:  No myagias/arthalgias.


PSYCHIATRIC:  The patient denies depression.


NEUROLOGIC:   No weakness


Constitutional:  alert


Psych:  no complaints


Head:  normocephalic


ENMT:  mucosa pink and moist


Neck:  jvd (9 cm water), supple


Respiratory:  diminished breath sounds (at bases/B)


Cardiovascular:  regular rate and rhythm


Gastrointestinal:  non-tender, soft


Musculoskeletal:  muscle tone (normal)


Extremities:  pitting pedal edema (bilateral LE)


Neurological:  other (No focal deficits)





Results


Result Diagram:  


7/18/17 0742                                                                   

             7/18/17 0742





Results 24 hrs





Laboratory Tests








Test


  7/18/17


07:42


 


White Blood Count 6.0  #


 


Red Blood Count 4.61  L


 


Hemoglobin 11.4  L


 


Hematocrit 39.5  L


 


Mean Corpuscular Volume 85.7  


 


Mean Corpuscular Hemoglobin 24.7  L


 


Mean Corpuscular Hemoglobin


Concent 28.9  L


 


 


Red Cell Distribution Width 17.2  H


 


Platelet Count 346  


 


Mean Platelet Volume 8.9  


 


Neutrophils % 83.8  H


 


Lymphocytes % 12.4  L


 


Monocytes % 3.3  


 


Eosinophils % 0.0  


 


Basophils % 0.0  


 


Nucleated Red Blood Cells % 0.0  


 


Neutrophils # 5.0  


 


Lymphocytes # 0.7  L


 


Monocytes # 0.2  L


 


Eosinophils # 0.0  


 


Basophils # 0.0  


 


Nucleated Red Blood Cells # 0.0  


 


Sodium Level 146  H


 


Potassium Level 4.3  


 


Chloride Level 96  L


 


Carbon Dioxide Level 36  H


 


Anion Gap 18  H


 


Blood Urea Nitrogen 14  


 


Creatinine 0.77  


 


Glucose Level 129  #


 


Calcium Level 8.8  


 


Total Bilirubin 0.0  L


 


Direct Bilirubin 0.00  


 


Indirect Bilirubin 0.0  


 


Aspartate Amino Transf


(AST/SGOT) 17  


 


 


Alanine Aminotransferase


(ALT/SGPT) 32  


 


 


Alkaline Phosphatase 49  


 


Creatine Kinase 48  


 


Creatine Kinase Index 1.6  


 


Creatinine Kinase MB (Mass) 0.76  


 


Troponin I < 0.012  


 


Total Protein 7.5  


 


Albumin 4.2  


 


Globulin 3.30  H


 


Albumin/Globulin Ratio 1.27  


 


Triglycerides Level 50  


 


Cholesterol Level 185  


 


LDL Cholesterol, Calculated 109  


 


HDL Cholesterol 66  


 


Cholesterol/HDL Ratio 2.8  











Medications


Medications





 Current Medications


Acetaminophen (Tylenol Tab) 650 mg Q4H  PRN PO PAIN AND OR ELEVATED TEMP;  

Start 7/17/17 at 15:00


Potassium Chloride (Klor-Con 20) 20 meq BID PO  Last administered on 7/18/17at 

09:15; Admin Dose 20 MEQ;  Start 7/17/17 at 21:00


Amlodipine Besylate (Norvasc) 10 mg DAILY PO  Last administered on 7/18/17at 09:

16; Admin Dose 10 MG;  Start 7/17/17 at 15:30


Ascorbic Acid (Vitamin C) 1,000 mg DAILY PO  Last administered on 7/18/17at 09:

14; Admin Dose 1,000 MG;  Start 7/17/17 at 15:30


Docusate Sodium (Colace) 250 mg BID PO  Last administered on 7/18/17at 09:17; 

Admin Dose 250 MG;  Start 7/17/17 at 15:30


Lisinopril (Zestril) 20 mg DAILY PO  Last administered on 7/18/17at 09:15; 

Admin Dose 20 MG;  Start 7/17/17 at 15:30


Atorvastatin Calcium (Lipitor) 10 mg DAILY@21 PO  Last administered on 7/17/ 17at 21:13; Admin Dose 10 MG;  Start 7/17/17 at 21:00


Ondansetron HCl (Zofran Inj) 4 mg Q6H  PRN IV NAUSEA AND/OR VOMITING Last 

administered on 7/17/17at 16:04; Admin Dose 4 MG;  Start 7/17/17 at 15:30


Aspirin (Aspirin) 81 mg DAILY PO  Last administered on 7/18/17at 09:15; Admin 

Dose 81 MG;  Start 7/17/17 at 16:00


Methylprednisolone Sodium Succinate (Solu-Medrol) 60 mg Q8 IV  Last 

administered on 7/18/17at 14:04; Admin Dose 60 MG;  Start 7/17/17 at 16:00


Nitroglycerin (Nitroglycerin (Sl Tab) 0.4 Mg) 1 tab Q5M  PRN SL CHEST PAIN;  

Start 7/17/17 at 15:30


Acetaminophen (Tylenol Supp) 650 mg Q6H  PRN [place] PAIN LEVEL 1-3 OR FEVER;  Start 

7/17/17 at 15:30


Acetaminophen/ Hydrocodone Bitart (Norco (5/325)) 1 tab Q6H  PRN PO PAIN LEVEL 4

-6;  Start 7/17/17 at 15:30


Hydromorphone HCl (Dilaudid) 2 mg Q4H  PRN IV PAIN LEVEL 7-10 Last administered 

on 7/18/17at 14:47; Admin Dose 2 MG;  Start 7/17/17 at 15:30


Enoxaparin Sodium 40 mg 40 mg DAILY SC  Last administered on 7/18/17at 09:18; 

Admin Dose 40 MG;  Start 7/18/17 at 09:00


Levofloxacin/ Dextrose (Levaquin 750 Mg/ D5W 150 ml (Pmx)) 150 ml @  100 mls/hr 

Q24H IVPB  Last administered on 7/17/17at 16:07; Admin Dose 100 MLS/HR;  Start 7 /17/17 at 16:00











HARLEY ADAMS Jul 18, 2017 16:54

## 2017-07-19 VITALS — RESPIRATION RATE: 20 BRPM | SYSTOLIC BLOOD PRESSURE: 127 MMHG | DIASTOLIC BLOOD PRESSURE: 62 MMHG

## 2017-07-19 VITALS — DIASTOLIC BLOOD PRESSURE: 75 MMHG | SYSTOLIC BLOOD PRESSURE: 139 MMHG | RESPIRATION RATE: 20 BRPM

## 2017-07-19 VITALS — HEART RATE: 90 BPM

## 2017-07-19 VITALS — DIASTOLIC BLOOD PRESSURE: 63 MMHG | SYSTOLIC BLOOD PRESSURE: 129 MMHG | RESPIRATION RATE: 19 BRPM

## 2017-07-19 VITALS — RESPIRATION RATE: 20 BRPM | SYSTOLIC BLOOD PRESSURE: 154 MMHG | DIASTOLIC BLOOD PRESSURE: 75 MMHG

## 2017-07-19 VITALS — HEART RATE: 79 BPM

## 2017-07-19 VITALS — HEART RATE: 86 BPM

## 2017-07-19 VITALS — DIASTOLIC BLOOD PRESSURE: 81 MMHG | SYSTOLIC BLOOD PRESSURE: 157 MMHG | RESPIRATION RATE: 19 BRPM

## 2017-07-19 VITALS — SYSTOLIC BLOOD PRESSURE: 118 MMHG | DIASTOLIC BLOOD PRESSURE: 58 MMHG | RESPIRATION RATE: 20 BRPM

## 2017-07-19 VITALS — HEART RATE: 80 BPM

## 2017-07-19 VITALS — HEART RATE: 85 BPM

## 2017-07-19 VITALS — HEART RATE: 89 BPM

## 2017-07-19 LAB
ANION GAP SERPL CALC-SCNC: 21 MMOL/L (ref 8–16)
BUN SERPL-MCNC: 24 MG/DL (ref 7–20)
CALCIUM SERPL-MCNC: 9 MG/DL (ref 8.4–10.2)
CHLORIDE SERPL-SCNC: 98 MMOL/L (ref 97–110)
CO2 SERPL-SCNC: 30 MMOL/L (ref 21–31)
CREAT SERPL-MCNC: 0.82 MG/DL (ref 0.61–1.24)
GLUCOSE SERPL-MCNC: 124 MG/DL (ref 70–220)
POTASSIUM SERPL-SCNC: 5 MMOL/L (ref 3.5–5.1)
SODIUM SERPL-SCNC: 144 MMOL/L (ref 135–144)

## 2017-07-19 RX ADMIN — ATORVASTATIN CALCIUM SCH MG: 10 TABLET, FILM COATED ORAL at 20:53

## 2017-07-19 RX ADMIN — HYDROMORPHONE HYDROCHLORIDE PRN MG: 2 INJECTION, SOLUTION INTRAMUSCULAR; INTRAVENOUS; SUBCUTANEOUS at 09:31

## 2017-07-19 RX ADMIN — HYDROMORPHONE HYDROCHLORIDE PRN MG: 2 INJECTION, SOLUTION INTRAMUSCULAR; INTRAVENOUS; SUBCUTANEOUS at 02:00

## 2017-07-19 RX ADMIN — HYDROMORPHONE HYDROCHLORIDE PRN MG: 2 INJECTION, SOLUTION INTRAMUSCULAR; INTRAVENOUS; SUBCUTANEOUS at 18:42

## 2017-07-19 RX ADMIN — HYDROMORPHONE HYDROCHLORIDE PRN MG: 2 INJECTION, SOLUTION INTRAMUSCULAR; INTRAVENOUS; SUBCUTANEOUS at 05:57

## 2017-07-19 RX ADMIN — ASPIRIN 81 MG SCH MG: 81 TABLET ORAL at 09:21

## 2017-07-19 RX ADMIN — LISINOPRIL SCH MG: 20 TABLET ORAL at 09:22

## 2017-07-19 RX ADMIN — HYDROMORPHONE HYDROCHLORIDE PRN MG: 2 INJECTION, SOLUTION INTRAMUSCULAR; INTRAVENOUS; SUBCUTANEOUS at 13:48

## 2017-07-19 RX ADMIN — POTASSIUM CHLORIDE SCH MEQ: 20 TABLET, EXTENDED RELEASE ORAL at 20:53

## 2017-07-19 RX ADMIN — OXYCODONE HYDROCHLORIDE AND ACETAMINOPHEN SCH MG: 500 TABLET ORAL at 09:21

## 2017-07-19 RX ADMIN — LEVOFLOXACIN SCH MLS/HR: 750 INJECTION, SOLUTION INTRAVENOUS at 16:00

## 2017-07-19 RX ADMIN — POTASSIUM CHLORIDE SCH MEQ: 20 TABLET, EXTENDED RELEASE ORAL at 09:21

## 2017-07-19 RX ADMIN — PANTOPRAZOLE SODIUM SCH MG: 40 TABLET, DELAYED RELEASE ORAL at 09:21

## 2017-07-19 RX ADMIN — ENOXAPARIN SODIUM SCH MG: 100 INJECTION SUBCUTANEOUS at 09:30

## 2017-07-19 RX ADMIN — AMLODIPINE BESYLATE SCH MG: 10 TABLET ORAL at 09:22

## 2017-07-19 NOTE — CONS
Date/Time of Note


Date/Time of Note


DATE: 7/19/17 


TIME: 14:59





Assessment/Plan


Assessment/Plan


Chief Complaint/Hosp Course


IMP:


1.CHF-diastolic acute on chronic


2.Chest pain-improved. Negative trop x 3


3.HTN


4.COPD


5.Obesity


6.HL


7.Hypernatremia





Recc:


-Tele


-serial ecg's


-Continue lasix diuresis and follow volume status closely


-Continue statin/asa


-Continue norvasc


-Continue steroids/bronchodilators/abx's


Problems:  





Consultation Date/Type/Reason


Admit Date/Time


Jul 16, 2017 at 20:25


Initial Consult Date


7/17/17


Type of Consultation:  Cardiology


Reason for Consultation


CHF


Referring Provider:  DEVAUGHN TALAMANTES MD





Exam/Review of Systems


Vital Signs


Vitals





 Vital Signs








  Date Time  Temp Pulse Resp B/P Pulse Ox O2 Delivery O2 Flow Rate FiO2


 


7/19/17 12:39  90      


 


7/19/17 11:46 98.2  20 118/58 90   


 


7/19/17 08:47      Nasal Cannula 2.0 














 Intake and Output   


 


 7/18/17 7/18/17 7/19/17





 15:00 23:00 07:00


 


Intake Total  900 ml 800 ml


 


Output Total   1500 ml


 


Balance  900 ml -700 ml











Exam


Review of Systems:


CONSTITUTIONAL:  No fevers, chills.


PULMONARY:  No sob


CARDIOVASCULAR: No chest pain/palpitations


GASTROINTESTINAL:  No nausea/vomiting.


GENITOURINARY:  No hematuria/dysuria.


MUSCULOSKELETAL:  No myagias/arthalgias.


PSYCHIATRIC:  The patient denies depression.


NEUROLOGIC:   No weakness


Constitutional:  alert


Psych:  no complaints


Head:  normocephalic


ENMT:  mucosa pink and moist


Neck:  supple


Respiratory:  clear to auscultation


Cardiovascular:  regular rate and rhythm


Gastrointestinal:  soft


Musculoskeletal:  muscle tone (normal)


Extremities:  pitting pedal edema (BIlateral)


Neurological:  other (No focal deficits)





Results


Result Diagram:  


7/18/17 0742                                                                   

             7/19/17 0811





Results 24 hrs





Laboratory Tests








Test


  7/19/17


08:11


 


Sodium Level 144  


 


Potassium Level 5.0  


 


Chloride Level 98  


 


Carbon Dioxide Level 30  


 


Anion Gap 21  H


 


Blood Urea Nitrogen 24  H


 


Creatinine 0.82  


 


Glucose Level 124  


 


Calcium Level 9.0  











Medications


Medications





 Current Medications


Acetaminophen (Tylenol Tab) 650 mg Q4H  PRN PO PAIN AND OR ELEVATED TEMP;  

Start 7/17/17 at 15:00


Potassium Chloride (Klor-Con 20) 20 meq BID PO  Last administered on 7/19/17at 

09:21; Admin Dose 20 MEQ;  Start 7/17/17 at 21:00


Amlodipine Besylate (Norvasc) 10 mg DAILY PO  Last administered on 7/19/17at 09:

22; Admin Dose 10 MG;  Start 7/17/17 at 15:30


Ascorbic Acid (Vitamin C) 1,000 mg DAILY PO  Last administered on 7/19/17at 09:

21; Admin Dose 1,000 MG;  Start 7/17/17 at 15:30


Docusate Sodium (Colace) 250 mg BID PO  Last administered on 7/19/17at 09:21; 

Admin Dose 250 MG;  Start 7/17/17 at 15:30


Lisinopril (Zestril) 20 mg DAILY PO  Last administered on 7/19/17at 09:22; 

Admin Dose 20 MG;  Start 7/17/17 at 15:30


Atorvastatin Calcium (Lipitor) 10 mg DAILY@21 PO  Last administered on 7/18/ 17at 21:05; Admin Dose 10 MG;  Start 7/17/17 at 21:00


Ondansetron HCl (Zofran Inj) 4 mg Q6H  PRN IV NAUSEA AND/OR VOMITING Last 

administered on 7/17/17at 16:04; Admin Dose 4 MG;  Start 7/17/17 at 15:30


Aspirin (Aspirin) 81 mg DAILY PO  Last administered on 7/19/17at 09:21; Admin 

Dose 81 MG;  Start 7/17/17 at 16:00


Methylprednisolone Sodium Succinate (Solu-Medrol) 60 mg Q8 IV  Last 

administered on 7/19/17at 13:49; Admin Dose 60 MG;  Start 7/17/17 at 16:00


Nitroglycerin (Nitroglycerin (Sl Tab) 0.4 Mg) 1 tab Q5M  PRN SL CHEST PAIN;  

Start 7/17/17 at 15:30


Acetaminophen (Tylenol Supp) 650 mg Q6H  PRN [place] PAIN LEVEL 1-3 OR FEVER;  Start 

7/17/17 at 15:30


Acetaminophen/ Hydrocodone Bitart (Norco (5/325)) 1 tab Q6H  PRN PO PAIN LEVEL 4

-6;  Start 7/17/17 at 15:30


Hydromorphone HCl (Dilaudid) 2 mg Q4H  PRN IV PAIN LEVEL 7-10 Last administered 

on 7/19/17at 13:48; Admin Dose 2 MG;  Start 7/17/17 at 15:30


Enoxaparin Sodium 40 mg 40 mg DAILY SC  Last administered on 7/19/17at 09:30; 

Admin Dose 40 MG;  Start 7/18/17 at 09:00


Levofloxacin/ Dextrose (Levaquin 750 Mg/ D5W 150 ml (Pmx)) 150 ml @  100 mls/hr 

Q24H IVPB  Last administered on 7/18/17at 16:56; Admin Dose 100 MLS/HR;  Start 7 /17/17 at 16:00


Furosemide (Lasix) 40 mg Q8 IV  Last administered on 7/19/17at 13:49; Admin 

Dose 40 MG;  Start 7/18/17 at 22:00











HARLEY ADAMS Jul 19, 2017 15:02

## 2017-07-19 NOTE — PN
Date/Time of Note


Date/Time of Note


DATE: 7/19/17 


TIME: 18:30





Assessment/Plan


VTE Prophylaxis


VTE Prophylaxis Intervention:  SCD's





Lines/Catheters


IV Catheter Type (from [email or web address]  Saline Lock


Urinary Cath still in place:  No





Assessment/Plan


Chief Complaint/Hosp Course


Patient stated that she feels slightly better, complains of occasional 

shortness of breath with exertion, comfortable at rest.


Assessment/Plan


-Chest pain, rule out acute coronary syndrome.  Cardiac enzymes are negative 3 

Dr. Manrique is following in cardiology consultation.  Continue aspirin.


-COPD exacerbation, continue breathing treatments and steroids.


-Possible bronchitis, continue Levaquin.


-Acute on chronic diastolic dysfunction congestive heart failure.  Continue 

Lasix, monitor electrolytes.


-GERD, continue Protonix


-Morbid obesity with BMI of 50





Further recommendations based on clinical course.  Plan of care was discussed 

with Dr. Anaya.


Problems:  





Exam/Review of Systems


Vital Signs


Vitals





 Vital Signs








  Date Time  Temp Pulse Resp B/P Pulse Ox O2 Delivery O2 Flow Rate FiO2


 


7/19/17 16:24  89      


 


7/19/17 16:00 98.3  20 139/75 91   


 


7/19/17 08:47      Nasal Cannula 2.0 














 Intake and Output   


 


 7/18/17 7/18/17 7/19/17





 15:00 23:00 07:00


 


Intake Total  900 ml 800 ml


 


Output Total   1500 ml


 


Balance  900 ml -700 ml











Exam


Constitutional:  alert, oriented


Head:  normocephalic


Neck:  supple


Respiratory:  diminished breath sounds


Cardiovascular:  nl pulses


Gastrointestinal:  non-tender, soft


Musculoskeletal:  nl extremities to inspection


Extremities:  normal pulses





Results


Result Diagram:  


7/18/17 0742                                                                   

             7/19/17 0811





Results 24 hrs





Laboratory Tests








Test


  7/19/17


08:11


 


Sodium Level 144  


 


Potassium Level 5.0  


 


Chloride Level 98  


 


Carbon Dioxide Level 30  


 


Anion Gap 21  H


 


Blood Urea Nitrogen 24  H


 


Creatinine 0.82  


 


Glucose Level 124  


 


Calcium Level 9.0  











Medications


Medications





 Current Medications


Acetaminophen (Tylenol Tab) 650 mg Q4H  PRN PO PAIN AND OR ELEVATED TEMP;  

Start 7/17/17 at 15:00


Potassium Chloride (Klor-Con 20) 20 meq BID PO  Last administered on 7/19/17at 

09:21; Admin Dose 20 MEQ;  Start 7/17/17 at 21:00


Amlodipine Besylate (Norvasc) 10 mg DAILY PO  Last administered on 7/19/17at 09:

22; Admin Dose 10 MG;  Start 7/17/17 at 15:30


Ascorbic Acid (Vitamin C) 1,000 mg DAILY PO  Last administered on 7/19/17at 09:

21; Admin Dose 1,000 MG;  Start 7/17/17 at 15:30


Docusate Sodium (Colace) 250 mg BID PO  Last administered on 7/19/17at 09:21; 

Admin Dose 250 MG;  Start 7/17/17 at 15:30


Lisinopril (Zestril) 20 mg DAILY PO  Last administered on 7/19/17at 09:22; 

Admin Dose 20 MG;  Start 7/17/17 at 15:30


Atorvastatin Calcium (Lipitor) 10 mg DAILY@21 PO  Last administered on 7/18/ 17at 21:05; Admin Dose 10 MG;  Start 7/17/17 at 21:00


Ondansetron HCl (Zofran Inj) 4 mg Q6H  PRN IV NAUSEA AND/OR VOMITING Last 

administered on 7/17/17at 16:04; Admin Dose 4 MG;  Start 7/17/17 at 15:30


Aspirin (Aspirin) 81 mg DAILY PO  Last administered on 7/19/17at 09:21; Admin 

Dose 81 MG;  Start 7/17/17 at 16:00


Methylprednisolone Sodium Succinate (Solu-Medrol) 60 mg Q8 IV  Last 

administered on 7/19/17at 13:49; Admin Dose 60 MG;  Start 7/17/17 at 16:00


Nitroglycerin (Nitroglycerin (Sl Tab) 0.4 Mg) 1 tab Q5M  PRN SL CHEST PAIN;  

Start 7/17/17 at 15:30


Acetaminophen (Tylenol Supp) 650 mg Q6H  PRN [place] PAIN LEVEL 1-3 OR FEVER;  Start 

7/17/17 at 15:30


Acetaminophen/ Hydrocodone Bitart (Norco (5/325)) 1 tab Q6H  PRN PO PAIN LEVEL 4

-6;  Start 7/17/17 at 15:30


Hydromorphone HCl (Dilaudid) 2 mg Q4H  PRN IV PAIN LEVEL 7-10 Last administered 

on 7/19/17at 13:48; Admin Dose 2 MG;  Start 7/17/17 at 15:30


Enoxaparin Sodium 40 mg 40 mg DAILY SC  Last administered on 7/19/17at 09:30; 

Admin Dose 40 MG;  Start 7/18/17 at 09:00


Levofloxacin/ Dextrose (Levaquin 750 Mg/ D5W 150 ml (Pmx)) 150 ml @  100 mls/hr 

Q24H IVPB  Last administered on 7/18/17at 16:56; Admin Dose 100 MLS/HR;  Start 7 /17/17 at 16:00


Furosemide (Lasix) 40 mg Q8 IV  Last administered on 7/19/17at 13:49; Admin 

Dose 40 MG;  Start 7/18/17 at 22:00











DARIO NORRIS Jul 19, 2017 18:32

## 2017-07-20 VITALS — RESPIRATION RATE: 18 BRPM | DIASTOLIC BLOOD PRESSURE: 64 MMHG | SYSTOLIC BLOOD PRESSURE: 145 MMHG

## 2017-07-20 VITALS — HEART RATE: 81 BPM

## 2017-07-20 VITALS — DIASTOLIC BLOOD PRESSURE: 95 MMHG | SYSTOLIC BLOOD PRESSURE: 139 MMHG | RESPIRATION RATE: 19 BRPM

## 2017-07-20 VITALS — RESPIRATION RATE: 19 BRPM | SYSTOLIC BLOOD PRESSURE: 144 MMHG | DIASTOLIC BLOOD PRESSURE: 87 MMHG

## 2017-07-20 VITALS — HEART RATE: 74 BPM

## 2017-07-20 VITALS — HEART RATE: 75 BPM

## 2017-07-20 VITALS — DIASTOLIC BLOOD PRESSURE: 54 MMHG | RESPIRATION RATE: 18 BRPM | SYSTOLIC BLOOD PRESSURE: 144 MMHG

## 2017-07-20 VITALS — DIASTOLIC BLOOD PRESSURE: 92 MMHG | SYSTOLIC BLOOD PRESSURE: 147 MMHG | RESPIRATION RATE: 19 BRPM

## 2017-07-20 VITALS — HEART RATE: 101 BPM

## 2017-07-20 VITALS — DIASTOLIC BLOOD PRESSURE: 95 MMHG | HEART RATE: 88 BPM | RESPIRATION RATE: 18 BRPM | SYSTOLIC BLOOD PRESSURE: 143 MMHG

## 2017-07-20 VITALS — HEART RATE: 71 BPM

## 2017-07-20 LAB
ADD SCAN DIFF: NO
ANION GAP SERPL CALC-SCNC: 20 MMOL/L (ref 8–16)
BASOPHILS # BLD AUTO: 0 10^3/UL (ref 0–0.1)
BASOPHILS NFR BLD: 0.1 % (ref 0–2)
BUN SERPL-MCNC: 29 MG/DL (ref 7–20)
CALCIUM SERPL-MCNC: 9 MG/DL (ref 8.4–10.2)
CHLORIDE SERPL-SCNC: 92 MMOL/L (ref 97–110)
CO2 SERPL-SCNC: 38 MMOL/L (ref 21–31)
CREAT SERPL-MCNC: 0.82 MG/DL (ref 0.61–1.24)
EOSINOPHIL # BLD: 0 10^3/UL (ref 0–0.5)
EOSINOPHIL NFR BLD: 0 % (ref 0–7)
ERYTHROBLAST% (NRBC) (M): 0.3 /100WBC (ref 0–0)
ERYTHROCYTE [DISTWIDTH] IN BLOOD BY AUTOMATED COUNT: 17 % (ref 11.5–14.5)
GLUCOSE SERPL-MCNC: 105 MG/DL (ref 70–220)
HCT VFR BLD CALC: 39.5 % (ref 42–52)
HGB BLD-MCNC: 11.5 G/DL (ref 14–18)
LYMPHOCYTES # BLD AUTO: 1.3 10^3/UL (ref 0.8–2.9)
LYMPHOCYTES NFR BLD AUTO: 13.5 % (ref 15–51)
MCH RBC QN AUTO: 25.1 PG (ref 29–33)
MCHC RBC AUTO-ENTMCNC: 29.1 G/DL (ref 32–37)
MCV RBC AUTO: 86.2 FL (ref 82–101)
MONOCYTES # BLD: 0.8 10^3/UL (ref 0.3–0.9)
MONOCYTES NFR BLD: 8.3 % (ref 0–11)
NEUTROPHILS # BLD: 7.7 10^3/UL (ref 1.6–7.5)
NEUTROPHILS NFR BLD AUTO: 77.8 % (ref 39–77)
NRBC # BLD MANUAL: 0 10^3/UL (ref 0–0)
PLATELET # BLD: 258 10^3/UL (ref 140–415)
PLATELETS CLUMPS: (no result)
PMV BLD AUTO: 11.3 FL (ref 7.4–10.4)
POTASSIUM SERPL-SCNC: 4.5 MMOL/L (ref 3.5–5.1)
RBC # BLD AUTO: 4.58 10^6/UL (ref 4.7–6.1)
SODIUM SERPL-SCNC: 145 MMOL/L (ref 135–144)
WBC # BLD AUTO: 9.8 10^3/UL (ref 4.8–10.8)

## 2017-07-20 RX ADMIN — POTASSIUM CHLORIDE SCH MEQ: 20 TABLET, EXTENDED RELEASE ORAL at 20:56

## 2017-07-20 RX ADMIN — ASPIRIN 81 MG SCH MG: 81 TABLET ORAL at 08:41

## 2017-07-20 RX ADMIN — AMLODIPINE BESYLATE SCH MG: 10 TABLET ORAL at 08:41

## 2017-07-20 RX ADMIN — LISINOPRIL SCH MG: 20 TABLET ORAL at 08:41

## 2017-07-20 RX ADMIN — ATORVASTATIN CALCIUM SCH MG: 10 TABLET, FILM COATED ORAL at 20:55

## 2017-07-20 RX ADMIN — HYDROMORPHONE HYDROCHLORIDE PRN MG: 2 INJECTION, SOLUTION INTRAMUSCULAR; INTRAVENOUS; SUBCUTANEOUS at 22:39

## 2017-07-20 RX ADMIN — PANTOPRAZOLE SODIUM SCH MG: 40 TABLET, DELAYED RELEASE ORAL at 08:41

## 2017-07-20 RX ADMIN — HYDROCODONE BITARTRATE AND ACETAMINOPHEN PRN TAB: 5; 325 TABLET ORAL at 20:55

## 2017-07-20 RX ADMIN — HYDROMORPHONE HYDROCHLORIDE PRN MG: 2 INJECTION, SOLUTION INTRAMUSCULAR; INTRAVENOUS; SUBCUTANEOUS at 18:27

## 2017-07-20 RX ADMIN — LEVOFLOXACIN SCH MG: 750 TABLET, FILM COATED ORAL at 18:27

## 2017-07-20 RX ADMIN — ENOXAPARIN SODIUM SCH MG: 100 INJECTION SUBCUTANEOUS at 08:45

## 2017-07-20 RX ADMIN — LISINOPRIL SCH MG: 20 TABLET ORAL at 20:55

## 2017-07-20 RX ADMIN — POTASSIUM CHLORIDE SCH MEQ: 20 TABLET, EXTENDED RELEASE ORAL at 08:41

## 2017-07-20 RX ADMIN — HYDROMORPHONE HYDROCHLORIDE PRN MG: 2 INJECTION, SOLUTION INTRAMUSCULAR; INTRAVENOUS; SUBCUTANEOUS at 06:39

## 2017-07-20 RX ADMIN — HYDROMORPHONE HYDROCHLORIDE PRN MG: 2 INJECTION, SOLUTION INTRAMUSCULAR; INTRAVENOUS; SUBCUTANEOUS at 14:51

## 2017-07-20 RX ADMIN — OXYCODONE HYDROCHLORIDE AND ACETAMINOPHEN SCH MG: 500 TABLET ORAL at 08:41

## 2017-07-20 RX ADMIN — HYDROMORPHONE HYDROCHLORIDE PRN MG: 2 INJECTION, SOLUTION INTRAMUSCULAR; INTRAVENOUS; SUBCUTANEOUS at 10:54

## 2017-07-20 RX ADMIN — HYDROMORPHONE HYDROCHLORIDE PRN MG: 2 INJECTION, SOLUTION INTRAMUSCULAR; INTRAVENOUS; SUBCUTANEOUS at 02:33

## 2017-07-20 NOTE — PN
Date/Time of Note


Date/Time of Note


DATE: 7/20/17 


TIME: 13:43





Assessment/Plan


VTE Prophylaxis


VTE Prophylaxis Intervention:  other





Lines/Catheters


IV Catheter Type (from [facility]):  Saline Lock


Urinary Cath still in place:  No





Assessment/Plan


Assessment/Plan


-Chest pain, rule out acute coronary syndrome.  Cardiac enzymes are negative 3 

Dr. Manrique is following in cardiology consultation.  Continue aspirin.


-COPD exacerbation, continue breathing treatments and steroids.


-Possible bronchitis, continue Levaquin.


-Acute on chronic diastolic dysfunction congestive heart failure.  Continue 

Lasix, monitor electrolytes.


-GERD, continue Protonix


-Morbid obesity with BMI of 50


   - Dietary recommendation





Further recommendations based on clinical course.  Plan of care was discussed 

with Dr. Anaya.





Subjective


24 Hr Interval Summary


Free Text/Dictation


Sitting up in the bed, complaining of shortness of breath, chest pain, fever, 

dizziness, palpitations, discussed with staff


Constitutional:  improved, requiring O2


Respiratory:  shortness of breath


Cardiovascular:  no complaints


Gastrointestinal:  no complaints


Musculoskeletal:  no complaints





Exam/Review of Systems


Vital Signs


Vitals





 Vital Signs








  Date Time  Temp Pulse Resp B/P Pulse Ox O2 Delivery O2 Flow Rate FiO2


 


7/20/17 12:26  81      


 


7/20/17 11:55 98.1  18 130/71 93   


 


7/20/17 08:10      Nasal Cannula 2.0 














 Intake and Output   


 


 7/19/17 7/19/17 7/20/17





 15:00 23:00 07:00


 


Intake Total  1300 ml 900 ml


 


Output Total  1700 ml 1600 ml


 


Balance  -400 ml -700 ml











Exam


Constitutional:  alert, obese, oriented, well developed


Respiratory:  diminished breath sounds (Bilateral bases)


Cardiovascular:  nl pulses


Gastrointestinal:  non-tender, soft


Extremities:  normal pulses


Neurological:  nl mental status, nl speech





Results


Result Diagram:  


7/20/17 0651                                                                   

             7/20/17 0651





Results 24 hrs





Laboratory Tests








Test


  7/20/17


06:51


 


White Blood Count 9.8  #


 


Red Blood Count 4.58  L


 


Hemoglobin 11.5  L


 


Hematocrit 39.5  L


 


Mean Corpuscular Volume 86.2  


 


Mean Corpuscular Hemoglobin 25.1  L


 


Mean Corpuscular Hemoglobin


Concent 29.1  L


 


 


Red Cell Distribution Width 17.0  H


 


Platelet Count Pending  


 


Mean Platelet Volume 11.3  #H


 


Neutrophils % 77.8  H


 


Lymphocytes % 13.5  L


 


Monocytes % 8.3  


 


Eosinophils % 0.0  


 


Basophils % 0.1  


 


Nucleated Red Blood Cells % 0.3  H


 


Neutrophils # 7.7  H


 


Lymphocytes # 1.3  


 


Monocytes # 0.8  


 


Eosinophils # 0.0  


 


Basophils # 0.0  


 


Nucleated Red Blood Cells # 0.0  


 


Clumped Platelets RARE  


 


Large Platelets OCCASIONAL  


 


Sodium Level 145  H


 


Potassium Level 4.5  


 


Chloride Level 92  L


 


Carbon Dioxide Level 38  H


 


Anion Gap 20  H


 


Blood Urea Nitrogen 29  H


 


Creatinine 0.82  


 


Glucose Level 105  


 


Calcium Level 9.0  











Medications


Medications





 Current Medications


Acetaminophen (Tylenol Tab) 650 mg Q4H  PRN PO PAIN AND OR ELEVATED TEMP;  

Start 7/17/17 at 15:00


Potassium Chloride (Klor-Con 20) 20 meq BID PO  Last administered on 7/20/17at 

08:41; Admin Dose 20 MEQ;  Start 7/17/17 at 21:00


Amlodipine Besylate (Norvasc) 10 mg DAILY PO  Last administered on 7/20/17at 08:

41; Admin Dose 10 MG;  Start 7/17/17 at 15:30


Ascorbic Acid (Vitamin C) 1,000 mg DAILY PO  Last administered on 7/20/17at 08:

41; Admin Dose 1,000 MG;  Start 7/17/17 at 15:30


Docusate Sodium (Colace) 250 mg BID PO  Last administered on 7/20/17at 08:41; 

Admin Dose 250 MG;  Start 7/17/17 at 15:30


Lisinopril (Zestril) 20 mg DAILY PO  Last administered on 7/20/17at 08:41; 

Admin Dose 20 MG;  Start 7/17/17 at 15:30


Atorvastatin Calcium (Lipitor) 10 mg DAILY@21 PO  Last administered on 7/19/ 17at 20:53; Admin Dose 10 MG;  Start 7/17/17 at 21:00


Ondansetron HCl (Zofran Inj) 4 mg Q6H  PRN IV NAUSEA AND/OR VOMITING Last 

administered on 7/17/17at 16:04; Admin Dose 4 MG;  Start 7/17/17 at 15:30


Aspirin (Aspirin) 81 mg DAILY PO  Last administered on 7/20/17at 08:41; Admin 

Dose 81 MG;  Start 7/17/17 at 16:00


Methylprednisolone Sodium Succinate (Solu-Medrol) 60 mg Q8 IV  Last 

administered on 7/20/17at 08:42; Admin Dose 60 MG;  Start 7/17/17 at 16:00


Nitroglycerin (Nitroglycerin (Sl Tab) 0.4 Mg) 1 tab Q5M  PRN SL CHEST PAIN;  

Start 7/17/17 at 15:30


Acetaminophen (Tylenol Supp) 650 mg Q6H  PRN [place] PAIN LEVEL 1-3 OR FEVER;  Start 

7/17/17 at 15:30


Acetaminophen/ Hydrocodone Bitart (Norco (5/325)) 1 tab Q6H  PRN PO PAIN LEVEL 4

-6;  Start 7/17/17 at 15:30


Hydromorphone HCl (Dilaudid) 2 mg Q4H  PRN IV PAIN LEVEL 7-10 Last administered 

on 7/20/17at 10:54; Admin Dose 2 MG;  Start 7/17/17 at 15:30


Enoxaparin Sodium 40 mg 40 mg DAILY SC  Last administered on 7/20/17at 08:45; 

Admin Dose 40 MG;  Start 7/18/17 at 09:00


Levofloxacin/ Dextrose (Levaquin 750 Mg/ D5W 150 ml (Pmx)) 150 ml @  100 mls/hr 

Q24H IVPB  Last administered on 7/18/17at 16:56; Admin Dose 100 MLS/HR;  Start 7 /17/17 at 16:00


Furosemide (Lasix) 40 mg Q8 IV  Last administered on 7/20/17at 08:40; Admin 

Dose 40 MG;  Start 7/18/17 at 22:00











NIKKI ENCINAS Jul 20, 2017 13:46

## 2017-07-20 NOTE — CONS
Date/Time of Note


Date/Time of Note


DATE: 7/20/17 


TIME: 18:17





Assessment/Plan


Assessment/Plan


Chief Complaint/Hosp Course


IMP:


1.CHF-diastolic acute on chronic


2.Chest pain-improved. Negative trop x 3/NL EF by echo this admit


3.HTN


4.COPD


5.Obesity


6.HL


7.Hypernatremia





Recc:


-Tele


-serial ecg's


-Continue lasix diuresis and follow volume status closely


-Continue statin/asa


-Continue norvasc


-Increase ACEI to improve BP control


-Continue steroids/bronchodilators/abx's


Problems:  





Consultation Date/Type/Reason


Admit Date/Time


Jul 16, 2017 at 20:25


Initial Consult Date


7/17/17


Type of Consultation:  Cardiology


Reason for Consultation


CHF/CP


Referring Provider:  DEVAUGHN TALAMANTES MD





Exam/Review of Systems


Vital Signs


Vitals





 Vital Signs








  Date Time  Temp Pulse Resp B/P Pulse Ox O2 Delivery O2 Flow Rate FiO2


 


7/20/17 16:45  101      


 


7/20/17 16:09      Nasal Cannula 2.0 


 


7/20/17 16:04 97.8  18 145/64 93   














 Intake and Output   


 


 7/19/17 7/19/17 7/20/17





 15:00 23:00 07:00


 


Intake Total  1300 ml 900 ml


 


Output Total  1700 ml 1600 ml


 


Balance  -400 ml -700 ml











Exam


Review of Systems:


CONSTITUTIONAL:  No fevers, chills.


PULMONARY: mild sob


CARDIOVASCULAR: No chest pain/palpitations


GASTROINTESTINAL:  No nausea/vomiting.


GENITOURINARY:  No hematuria/dysuria.


MUSCULOSKELETAL:  No myagias/arthalgias.


PSYCHIATRIC:  The patient denies depression.


NEUROLOGIC:   No weakness


Constitutional:  alert


Psych:  no complaints


Head:  normocephalic


ENMT:  mucosa pink and moist


Neck:  jvd (9 cm water), supple


Respiratory:  diminished breath sounds (at bases/B)


Cardiovascular:  regular rate and rhythm


Gastrointestinal:  non-tender, soft


Musculoskeletal:  muscle tone (normal)


Extremities:  pitting pedal edema (bilateral)


Neurological:  other (No focal deficits)





Results


Result Diagram:  


7/20/17 0651                                                                   

             7/20/17 0651





Results 24 hrs





Laboratory Tests








Test


  7/20/17


06:51


 


White Blood Count 9.8  #


 


Red Blood Count 4.58  L


 


Hemoglobin 11.5  L


 


Hematocrit 39.5  L


 


Mean Corpuscular Volume 86.2  


 


Mean Corpuscular Hemoglobin 25.1  L


 


Mean Corpuscular Hemoglobin


Concent 29.1  L


 


 


Red Cell Distribution Width 17.0  H


 


Platelet Count 258  #


 


Mean Platelet Volume 11.3  #H


 


Neutrophils % 77.8  H


 


Lymphocytes % 13.5  L


 


Monocytes % 8.3  


 


Eosinophils % 0.0  


 


Basophils % 0.1  


 


Nucleated Red Blood Cells % 0.3  H


 


Neutrophils # 7.7  H


 


Lymphocytes # 1.3  


 


Monocytes # 0.8  


 


Eosinophils # 0.0  


 


Basophils # 0.0  


 


Nucleated Red Blood Cells # 0.0  


 


Clumped Platelets RARE  


 


Large Platelets OCCASIONAL  


 


Sodium Level 145  H


 


Potassium Level 4.5  


 


Chloride Level 92  L


 


Carbon Dioxide Level 38  H


 


Anion Gap 20  H


 


Blood Urea Nitrogen 29  H


 


Creatinine 0.82  


 


Glucose Level 105  


 


Calcium Level 9.0  











Medications


Medications





 Current Medications


Acetaminophen (Tylenol Tab) 650 mg Q4H  PRN PO PAIN AND OR ELEVATED TEMP;  

Start 7/17/17 at 15:00


Potassium Chloride (Klor-Con 20) 20 meq BID PO  Last administered on 7/20/17at 

08:41; Admin Dose 20 MEQ;  Start 7/17/17 at 21:00


Amlodipine Besylate (Norvasc) 10 mg DAILY PO  Last administered on 7/20/17at 08:

41; Admin Dose 10 MG;  Start 7/17/17 at 15:30


Ascorbic Acid (Vitamin C) 1,000 mg DAILY PO  Last administered on 7/20/17at 08:

41; Admin Dose 1,000 MG;  Start 7/17/17 at 15:30


Docusate Sodium (Colace) 250 mg BID PO  Last administered on 7/20/17at 08:41; 

Admin Dose 250 MG;  Start 7/17/17 at 15:30


Lisinopril (Zestril) 20 mg DAILY PO  Last administered on 7/20/17at 08:41; 

Admin Dose 20 MG;  Start 7/17/17 at 15:30


Atorvastatin Calcium (Lipitor) 10 mg DAILY@21 PO  Last administered on 7/19/ 17at 20:53; Admin Dose 10 MG;  Start 7/17/17 at 21:00


Ondansetron HCl (Zofran Inj) 4 mg Q6H  PRN IV NAUSEA AND/OR VOMITING Last 

administered on 7/17/17at 16:04; Admin Dose 4 MG;  Start 7/17/17 at 15:30


Aspirin (Aspirin) 81 mg DAILY PO  Last administered on 7/20/17at 08:41; Admin 

Dose 81 MG;  Start 7/17/17 at 16:00


Methylprednisolone Sodium Succinate (Solu-Medrol) 60 mg Q8 IV  Last 

administered on 7/20/17at 14:52; Admin Dose 60 MG;  Start 7/17/17 at 16:00


Nitroglycerin (Nitroglycerin (Sl Tab) 0.4 Mg) 1 tab Q5M  PRN SL CHEST PAIN;  

Start 7/17/17 at 15:30


Acetaminophen (Tylenol Supp) 650 mg Q6H  PRN [place] PAIN LEVEL 1-3 OR FEVER;  Start 

7/17/17 at 15:30


Acetaminophen/ Hydrocodone Bitart (Norco (5/325)) 1 tab Q6H  PRN PO PAIN LEVEL 4

-6;  Start 7/17/17 at 15:30


Hydromorphone HCl (Dilaudid) 2 mg Q4H  PRN IV PAIN LEVEL 7-10 Last administered 

on 7/20/17at 14:51; Admin Dose 2 MG;  Start 7/17/17 at 15:30


Enoxaparin Sodium (Lovenox) 40 mg DAILY SC  Last administered on 7/20/17at 08:45

; Admin Dose 40 MG;  Start 7/18/17 at 09:00


Furosemide (Lasix) 40 mg Q8 IV  Last administered on 7/20/17at 14:52; Admin 

Dose 40 MG;  Start 7/18/17 at 22:00


Levofloxacin (Levaquin) 750 mg DAILY@06 PO ;  Start 7/20/17 at 16:00











HARLEY ADAMS Jul 20, 2017 18:19

## 2017-07-21 VITALS — SYSTOLIC BLOOD PRESSURE: 138 MMHG | RESPIRATION RATE: 18 BRPM | DIASTOLIC BLOOD PRESSURE: 86 MMHG

## 2017-07-21 VITALS — DIASTOLIC BLOOD PRESSURE: 76 MMHG | RESPIRATION RATE: 18 BRPM | SYSTOLIC BLOOD PRESSURE: 128 MMHG

## 2017-07-21 VITALS — DIASTOLIC BLOOD PRESSURE: 75 MMHG | SYSTOLIC BLOOD PRESSURE: 142 MMHG | RESPIRATION RATE: 18 BRPM

## 2017-07-21 VITALS — HEART RATE: 81 BPM

## 2017-07-21 VITALS — HEART RATE: 87 BPM

## 2017-07-21 VITALS — SYSTOLIC BLOOD PRESSURE: 142 MMHG | DIASTOLIC BLOOD PRESSURE: 81 MMHG | RESPIRATION RATE: 18 BRPM

## 2017-07-21 VITALS — RESPIRATION RATE: 18 BRPM | DIASTOLIC BLOOD PRESSURE: 108 MMHG | SYSTOLIC BLOOD PRESSURE: 171 MMHG

## 2017-07-21 VITALS — RESPIRATION RATE: 20 BRPM | SYSTOLIC BLOOD PRESSURE: 163 MMHG | DIASTOLIC BLOOD PRESSURE: 92 MMHG

## 2017-07-21 VITALS — HEART RATE: 105 BPM

## 2017-07-21 VITALS — DIASTOLIC BLOOD PRESSURE: 80 MMHG | RESPIRATION RATE: 18 BRPM | SYSTOLIC BLOOD PRESSURE: 132 MMHG

## 2017-07-21 VITALS — HEART RATE: 98 BPM

## 2017-07-21 VITALS — HEART RATE: 78 BPM

## 2017-07-21 LAB
ABNORMAL IP MESSAGE: 1
ADD SCAN DIFF: NO
ANION GAP SERPL CALC-SCNC: 19 MMOL/L (ref 8–16)
BASOPHILS # BLD AUTO: 0 10^3/UL (ref 0–0.1)
BASOPHILS NFR BLD: 0.2 % (ref 0–2)
BUN SERPL-MCNC: 28 MG/DL (ref 7–20)
CALCIUM SERPL-MCNC: 9 MG/DL (ref 8.4–10.2)
CHLORIDE SERPL-SCNC: 95 MMOL/L (ref 97–110)
CO2 SERPL-SCNC: 35 MMOL/L (ref 21–31)
CREAT SERPL-MCNC: 0.86 MG/DL (ref 0.61–1.24)
EOSINOPHIL # BLD: 0 10^3/UL (ref 0–0.5)
EOSINOPHIL NFR BLD: 0.1 % (ref 0–7)
ERYTHROCYTE [DISTWIDTH] IN BLOOD BY AUTOMATED COUNT: 16.9 % (ref 11.5–14.5)
GLUCOSE SERPL-MCNC: 81 MG/DL (ref 70–220)
HCT VFR BLD CALC: 41.3 % (ref 42–52)
HGB BLD-MCNC: 12.4 G/DL (ref 14–18)
LYMPHOCYTES # BLD AUTO: 2.6 10^3/UL (ref 0.8–2.9)
LYMPHOCYTES NFR BLD AUTO: 19.3 % (ref 15–51)
MCH RBC QN AUTO: 25.1 PG (ref 29–33)
MCHC RBC AUTO-ENTMCNC: 30 G/DL (ref 32–37)
MCV RBC AUTO: 83.6 FL (ref 82–101)
MONOCYTES # BLD: 2.5 10^3/UL (ref 0.3–0.9)
MONOCYTES NFR BLD: 19 % (ref 0–11)
NEUTROPHILS # BLD: 8.1 10^3/UL (ref 1.6–7.5)
NEUTROPHILS NFR BLD AUTO: 60.9 % (ref 39–77)
NRBC # BLD MANUAL: 0.1 10^3/UL (ref 0–0)
PLATELET # BLD: 237 10^3/UL (ref 140–415)
PMV BLD AUTO: 10.4 FL (ref 7.4–10.4)
POTASSIUM SERPL-SCNC: 3.7 MMOL/L (ref 3.5–5.1)
RBC # BLD AUTO: 4.94 10^6/UL (ref 4.7–6.1)
SODIUM SERPL-SCNC: 145 MMOL/L (ref 135–144)
WBC # BLD AUTO: 13.2 10^3/UL (ref 4.8–10.8)

## 2017-07-21 RX ADMIN — OXYCODONE HYDROCHLORIDE AND ACETAMINOPHEN SCH MG: 500 TABLET ORAL at 09:17

## 2017-07-21 RX ADMIN — LEVOFLOXACIN SCH MG: 750 TABLET, FILM COATED ORAL at 09:16

## 2017-07-21 RX ADMIN — POTASSIUM CHLORIDE SCH MEQ: 20 TABLET, EXTENDED RELEASE ORAL at 21:12

## 2017-07-21 RX ADMIN — AMLODIPINE BESYLATE SCH MG: 10 TABLET ORAL at 09:17

## 2017-07-21 RX ADMIN — HYDROMORPHONE HYDROCHLORIDE PRN MG: 2 INJECTION, SOLUTION INTRAMUSCULAR; INTRAVENOUS; SUBCUTANEOUS at 11:26

## 2017-07-21 RX ADMIN — LISINOPRIL SCH MG: 20 TABLET ORAL at 21:12

## 2017-07-21 RX ADMIN — HYDROMORPHONE HYDROCHLORIDE PRN MG: 2 INJECTION, SOLUTION INTRAMUSCULAR; INTRAVENOUS; SUBCUTANEOUS at 07:20

## 2017-07-21 RX ADMIN — HYDROMORPHONE HYDROCHLORIDE PRN MG: 2 INJECTION, SOLUTION INTRAMUSCULAR; INTRAVENOUS; SUBCUTANEOUS at 18:57

## 2017-07-21 RX ADMIN — ASPIRIN 81 MG SCH MG: 81 TABLET ORAL at 09:17

## 2017-07-21 RX ADMIN — LISINOPRIL SCH MG: 20 TABLET ORAL at 09:17

## 2017-07-21 RX ADMIN — POTASSIUM CHLORIDE SCH MEQ: 20 TABLET, EXTENDED RELEASE ORAL at 09:17

## 2017-07-21 RX ADMIN — HYDROMORPHONE HYDROCHLORIDE PRN MG: 2 INJECTION, SOLUTION INTRAMUSCULAR; INTRAVENOUS; SUBCUTANEOUS at 23:00

## 2017-07-21 RX ADMIN — HYDROMORPHONE HYDROCHLORIDE PRN MG: 2 INJECTION, SOLUTION INTRAMUSCULAR; INTRAVENOUS; SUBCUTANEOUS at 15:26

## 2017-07-21 RX ADMIN — ATORVASTATIN CALCIUM SCH MG: 10 TABLET, FILM COATED ORAL at 21:12

## 2017-07-21 RX ADMIN — PANTOPRAZOLE SODIUM SCH MG: 40 TABLET, DELAYED RELEASE ORAL at 09:16

## 2017-07-21 RX ADMIN — ENOXAPARIN SODIUM SCH MG: 100 INJECTION SUBCUTANEOUS at 09:19

## 2017-07-21 NOTE — PN
Date/Time of Note


Date/Time of Note


DATE: 7/21/17 


TIME: 15:57





Assessment/Plan


VTE Prophylaxis


VTE Prophylaxis Intervention:  SCD's





Lines/Catheters


IV Catheter Type (from [facility]):  Saline Lock


Urinary Cath still in place:  No





Assessment/Plan


Chief Complaint/Hosp Course


Patient's continues to have fluctuation in blood pressure, complains of 

shortness of breath on exertion, becomes tachycardic when out of bed, 

comfortable at rest, continue telemetry monitoring.


Assessment/Plan


-Chest pain, rule out acute coronary syndrome.  Cardiac enzymes are negative 

3. Dr. Manrique is following in cardiology consultation.  Continue aspirin.


-COPD exacerbation, continue breathing treatments and steroids.


-Possible bronchitis, continue Levaquin.


-Acute on chronic diastolic dysfunction congestive heart failure.  Continue 

Lasix, monitor electrolytes.


-GERD, continue Protonix


-Morbid obesity with BMI of 50





Further recommendations based on clinical course.  Plan of care was discussed 

with Dr. Anaya.


Problems:  





Exam/Review of Systems


Vital Signs


Vitals





 Vital Signs








  Date Time  Temp Pulse Resp B/P Pulse Ox O2 Delivery O2 Flow Rate FiO2


 


7/21/17 12:24 98.1 92 18 142/81 93   


 


7/21/17 09:26      Nasal Cannula 2.0 














 Intake and Output   


 


 7/20/17 7/20/17 7/21/17





 15:00 23:00 07:00


 


Intake Total   800 ml


 


Output Total   1650 ml


 


Balance   -850 ml











Exam


Constitutional:  alert, oriented


Head:  normocephalic


Neck:  supple


Respiratory:  diminished breath sounds


Cardiovascular:  nl pulses


Gastrointestinal:  non-tender, soft


Musculoskeletal:  nl extremities to inspection


Extremities:  normal pulses





Results


Result Diagram:  


7/21/17 1210                                                                   

             7/21/17 1210





Results 24 hrs





Laboratory Tests








Test


  7/21/17


12:10


 


White Blood Count 13.2  #H


 


Red Blood Count 4.94  


 


Hemoglobin 12.4  L


 


Hematocrit 41.3  L


 


Mean Corpuscular Volume 83.6  


 


Mean Corpuscular Hemoglobin 25.1  L


 


Mean Corpuscular Hemoglobin


Concent 30.0  L


 


 


Red Cell Distribution Width 16.9  H


 


Platelet Count 237  


 


Mean Platelet Volume 10.4  


 


Neutrophils % 60.9  


 


Lymphocytes % 19.3  


 


Monocytes % 19.0  H


 


Eosinophils % 0.1  


 


Basophils % 0.2  


 


Neutrophils # 8.1  H


 


Lymphocytes # 2.6  


 


Monocytes # 2.5  H


 


Eosinophils # 0.0  


 


Basophils # 0.0  


 


Nucleated Red Blood Cells # 0.1  H


 


Sodium Level 145  H


 


Potassium Level 3.7  


 


Chloride Level 95  L


 


Carbon Dioxide Level 35  H


 


Anion Gap 19  H


 


Blood Urea Nitrogen 28  H


 


Creatinine 0.86  


 


Glucose Level 81  


 


Calcium Level 9.0  











Medications


Medications





 Current Medications


Acetaminophen (Tylenol Tab) 650 mg Q4H  PRN PO PAIN AND OR ELEVATED TEMP;  

Start 7/17/17 at 15:00


Potassium Chloride (Klor-Con 20) 20 meq BID PO  Last administered on 7/21/17at 

09:17; Admin Dose 20 MEQ;  Start 7/17/17 at 21:00


Amlodipine Besylate (Norvasc) 10 mg DAILY PO  Last administered on 7/21/17at 09:

17; Admin Dose 10 MG;  Start 7/17/17 at 15:30


Ascorbic Acid (Vitamin C) 1,000 mg DAILY PO  Last administered on 7/21/17at 09:

17; Admin Dose 1,000 MG;  Start 7/17/17 at 15:30


Docusate Sodium (Colace) 250 mg BID PO  Last administered on 7/21/17at 09:17; 

Admin Dose 250 MG;  Start 7/17/17 at 15:30


Atorvastatin Calcium (Lipitor) 10 mg DAILY@21 PO  Last administered on 7/20/ 17at 20:55; Admin Dose 10 MG;  Start 7/17/17 at 21:00


Ondansetron HCl (Zofran Inj) 4 mg Q6H  PRN IV NAUSEA AND/OR VOMITING Last 

administered on 7/17/17at 16:04; Admin Dose 4 MG;  Start 7/17/17 at 15:30


Aspirin (Aspirin) 81 mg DAILY PO  Last administered on 7/21/17at 09:17; Admin 

Dose 81 MG;  Start 7/17/17 at 16:00


Methylprednisolone Sodium Succinate (Solu-Medrol) 60 mg Q8 IV  Last 

administered on 7/21/17at 14:04; Admin Dose 60 MG;  Start 7/17/17 at 16:00


Nitroglycerin (Nitroglycerin (Sl Tab) 0.4 Mg) 1 tab Q5M  PRN SL CHEST PAIN;  

Start 7/17/17 at 15:30


Acetaminophen (Tylenol Supp) 650 mg Q6H  PRN [place] PAIN LEVEL 1-3 OR FEVER;  Start 

7/17/17 at 15:30


Acetaminophen/ Hydrocodone Bitart (Norco (5/325)) 1 tab Q6H  PRN PO PAIN LEVEL 4

-6 Last administered on 7/20/17at 20:55; Admin Dose 1 TAB;  Start 7/17/17 at 15:

30


Hydromorphone HCl (Dilaudid) 2 mg Q4H  PRN IV PAIN LEVEL 7-10 Last administered 

on 7/21/17at 15:26; Admin Dose 2 MG;  Start 7/17/17 at 15:30


Enoxaparin Sodium (Lovenox) 40 mg DAILY SC  Last administered on 7/21/17at 09:19

; Admin Dose 40 MG;  Start 7/18/17 at 09:00


Furosemide (Lasix) 40 mg Q8 IV  Last administered on 7/21/17at 14:04; Admin 

Dose 40 MG;  Start 7/18/17 at 22:00


Levofloxacin (Levaquin) 750 mg DAILY@06 PO  Last administered on 7/21/17at 09:16

; Admin Dose 750 MG;  Start 7/20/17 at 16:00


Lisinopril (Zestril) 20 mg BID PO  Last administered on 7/21/17at 09:17; Admin 

Dose 20 MG;  Start 7/20/17 at 21:00











DARIO NORRIS Jul 21, 2017 15:59

## 2017-07-21 NOTE — CONS
Date/Time of Note


Date/Time of Note


DATE: 7/21/17 


TIME: 17:58





Assessment/Plan


Assessment/Plan


Chief Complaint/Hosp Course


IMP:


1.CHF-diastolic acute on chronic


2.Chest pain-improved. Negative trop x 3/NL EF by echo this admit


3.HTN


4.COPD


5.Obesity


6.HL


7.Hypernatremia-mild





Recc:


-Tele


-serial ecg's


-Continue lasix diuresis and follow volume status closely


-Continue statin/asa


-Continue norvasc/ACEI


-Continue steroids/bronchodilators/abx's


Problems:  





Consultation Date/Type/Reason


Admit Date/Time


Jul 16, 2017 at 20:25


Initial Consult Date


7/17/17


Type of Consultation:  Cardiology


Reason for Consultation


chest pain./CHF


Referring Provider:  DEVAUGHN TALAMANTES MD





Exam/Review of Systems


Vital Signs


Vitals





 Vital Signs








  Date Time  Temp Pulse Resp B/P Pulse Ox O2 Delivery O2 Flow Rate FiO2


 


7/21/17 16:11 97.3 96 18 138/86 94   


 


7/21/17 09:26      Nasal Cannula 2.0 














 Intake and Output   


 


 7/20/17 7/20/17 7/21/17





 14:59 22:59 06:59


 


Intake Total   800 ml


 


Output Total   1650 ml


 


Balance   -850 ml











Exam


Review of Systems:


CONSTITUTIONAL:  No fevers, chills.


PULMONARY: mild sob


CARDIOVASCULAR: No chest pain/palpitations


GASTROINTESTINAL:  No nausea/vomiting.


GENITOURINARY:  No hematuria/dysuria.


MUSCULOSKELETAL:  No myagias/arthalgias.


PSYCHIATRIC:  The patient denies depression.


NEUROLOGIC:   No weakness


Constitutional:  alert, oriented


Psych:  no complaints


Head:  normocephalic


ENMT:  mucosa pink and moist


Neck:  jvd (9 cm water), supple


Respiratory:  diminished breath sounds (at bases/B)


Cardiovascular:  regular rate and rhythm


Gastrointestinal:  non-tender, soft


Musculoskeletal:  muscle tone (normal)


Extremities:  edema (none)


Neurological:  other (NO focal deficits)





Results


Result Diagram:  


7/21/17 1210                                                                   

             7/21/17 1210





Results 24 hrs





Laboratory Tests








Test


  7/21/17


12:10


 


White Blood Count 13.2  #H


 


Red Blood Count 4.94  


 


Hemoglobin 12.4  L


 


Hematocrit 41.3  L


 


Mean Corpuscular Volume 83.6  


 


Mean Corpuscular Hemoglobin 25.1  L


 


Mean Corpuscular Hemoglobin


Concent 30.0  L


 


 


Red Cell Distribution Width 16.9  H


 


Platelet Count 237  


 


Mean Platelet Volume 10.4  


 


Neutrophils % 60.9  


 


Lymphocytes % 19.3  


 


Monocytes % 19.0  H


 


Eosinophils % 0.1  


 


Basophils % 0.2  


 


Neutrophils # 8.1  H


 


Lymphocytes # 2.6  


 


Monocytes # 2.5  H


 


Eosinophils # 0.0  


 


Basophils # 0.0  


 


Nucleated Red Blood Cells # 0.1  H


 


Sodium Level 145  H


 


Potassium Level 3.7  


 


Chloride Level 95  L


 


Carbon Dioxide Level 35  H


 


Anion Gap 19  H


 


Blood Urea Nitrogen 28  H


 


Creatinine 0.86  


 


Glucose Level 81  


 


Calcium Level 9.0  











Medications


Medications





 Current Medications


Acetaminophen (Tylenol Tab) 650 mg Q4H  PRN PO PAIN AND OR ELEVATED TEMP;  

Start 7/17/17 at 15:00


Potassium Chloride (Klor-Con 20) 20 meq BID PO  Last administered on 7/21/17at 

09:17; Admin Dose 20 MEQ;  Start 7/17/17 at 21:00


Amlodipine Besylate (Norvasc) 10 mg DAILY PO  Last administered on 7/21/17at 09:

17; Admin Dose 10 MG;  Start 7/17/17 at 15:30


Ascorbic Acid (Vitamin C) 1,000 mg DAILY PO  Last administered on 7/21/17at 09:

17; Admin Dose 1,000 MG;  Start 7/17/17 at 15:30


Docusate Sodium (Colace) 250 mg BID PO  Last administered on 7/21/17at 09:17; 

Admin Dose 250 MG;  Start 7/17/17 at 15:30


Atorvastatin Calcium (Lipitor) 10 mg DAILY@21 PO  Last administered on 7/20/ 17at 20:55; Admin Dose 10 MG;  Start 7/17/17 at 21:00


Ondansetron HCl (Zofran Inj) 4 mg Q6H  PRN IV NAUSEA AND/OR VOMITING Last 

administered on 7/17/17at 16:04; Admin Dose 4 MG;  Start 7/17/17 at 15:30


Aspirin (Aspirin) 81 mg DAILY PO  Last administered on 7/21/17at 09:17; Admin 

Dose 81 MG;  Start 7/17/17 at 16:00


Methylprednisolone Sodium Succinate (Solu-Medrol) 60 mg Q8 IV  Last 

administered on 7/21/17at 14:04; Admin Dose 60 MG;  Start 7/17/17 at 16:00


Nitroglycerin (Nitroglycerin (Sl Tab) 0.4 Mg) 1 tab Q5M  PRN SL CHEST PAIN;  

Start 7/17/17 at 15:30


Acetaminophen (Tylenol Supp) 650 mg Q6H  PRN [place] PAIN LEVEL 1-3 OR FEVER;  Start 

7/17/17 at 15:30


Acetaminophen/ Hydrocodone Bitart (Norco (5/325)) 1 tab Q6H  PRN PO PAIN LEVEL 4

-6 Last administered on 7/20/17at 20:55; Admin Dose 1 TAB;  Start 7/17/17 at 15:

30


Hydromorphone HCl (Dilaudid) 2 mg Q4H  PRN IV PAIN LEVEL 7-10 Last administered 

on 7/21/17at 15:26; Admin Dose 2 MG;  Start 7/17/17 at 15:30


Enoxaparin Sodium (Lovenox) 40 mg DAILY SC  Last administered on 7/21/17at 09:19

; Admin Dose 40 MG;  Start 7/18/17 at 09:00


Furosemide (Lasix) 40 mg Q8 IV  Last administered on 7/21/17at 14:04; Admin 

Dose 40 MG;  Start 7/18/17 at 22:00


Levofloxacin (Levaquin) 750 mg DAILY@06 PO  Last administered on 7/21/17at 09:16

; Admin Dose 750 MG;  Start 7/20/17 at 16:00


Lisinopril (Zestril) 20 mg BID PO  Last administered on 7/21/17at 09:17; Admin 

Dose 20 MG;  Start 7/20/17 at 21:00











HARLEY ADAMS Jul 21, 2017 18:00

## 2017-07-22 VITALS — HEART RATE: 79 BPM

## 2017-07-22 VITALS — HEART RATE: 101 BPM

## 2017-07-22 VITALS — RESPIRATION RATE: 21 BRPM | DIASTOLIC BLOOD PRESSURE: 77 MMHG | SYSTOLIC BLOOD PRESSURE: 136 MMHG

## 2017-07-22 VITALS — DIASTOLIC BLOOD PRESSURE: 82 MMHG | SYSTOLIC BLOOD PRESSURE: 171 MMHG | RESPIRATION RATE: 21 BRPM

## 2017-07-22 VITALS — SYSTOLIC BLOOD PRESSURE: 118 MMHG | DIASTOLIC BLOOD PRESSURE: 57 MMHG | RESPIRATION RATE: 18 BRPM

## 2017-07-22 VITALS — HEART RATE: 85 BPM

## 2017-07-22 VITALS — DIASTOLIC BLOOD PRESSURE: 77 MMHG | RESPIRATION RATE: 20 BRPM | SYSTOLIC BLOOD PRESSURE: 135 MMHG

## 2017-07-22 VITALS — HEART RATE: 91 BPM

## 2017-07-22 VITALS — HEART RATE: 78 BPM

## 2017-07-22 VITALS — HEART RATE: 103 BPM

## 2017-07-22 LAB
ANION GAP SERPL CALC-SCNC: 25 MMOL/L (ref 8–16)
BASOPHILS # BLD AUTO: 0 10^3/UL (ref 0–0.1)
BASOPHILS NFR BLD: 0.1 % (ref 0–2)
BUN SERPL-MCNC: 26 MG/DL (ref 7–20)
CALCIUM SERPL-MCNC: 9 MG/DL (ref 8.4–10.2)
CHLORIDE SERPL-SCNC: 95 MMOL/L (ref 97–110)
CO2 SERPL-SCNC: 29 MMOL/L (ref 21–31)
CREAT SERPL-MCNC: 0.72 MG/DL (ref 0.61–1.24)
EOSINOPHIL # BLD: 0 10^3/UL (ref 0–0.5)
EOSINOPHIL NFR BLD: 0 % (ref 0–7)
ERYTHROCYTE [DISTWIDTH] IN BLOOD BY AUTOMATED COUNT: 17.1 % (ref 11.5–14.5)
GLUCOSE SERPL-MCNC: 105 MG/DL (ref 70–220)
HCT VFR BLD CALC: 43 % (ref 42–52)
HGB BLD-MCNC: 12.7 G/DL (ref 14–18)
LYMPHOCYTES # BLD AUTO: 1.3 10^3/UL (ref 0.8–2.9)
LYMPHOCYTES NFR BLD AUTO: 14.5 % (ref 15–51)
MCH RBC QN AUTO: 25.3 PG (ref 29–33)
MCHC RBC AUTO-ENTMCNC: 29.5 G/DL (ref 32–37)
MCV RBC AUTO: 85.8 FL (ref 82–101)
MONOCYTES # BLD: 0.9 10^3/UL (ref 0.3–0.9)
MONOCYTES NFR BLD: 10.5 % (ref 0–11)
NEUTROPHILS # BLD: 6.7 10^3/UL (ref 1.6–7.5)
NEUTROPHILS NFR BLD AUTO: 74.6 % (ref 39–77)
NRBC # BLD MANUAL: 0.1 10^3/UL (ref 0–0)
NRBC BLD AUTO-RTO: 0.8 /100WBC (ref 0–0)
PLATELET # BLD: 235 10^3/UL (ref 140–415)
PMV BLD AUTO: 12.1 FL (ref 7.4–10.4)
POTASSIUM SERPL-SCNC: 5.2 MMOL/L (ref 3.5–5.1)
RBC # BLD AUTO: 5.01 10^6/UL (ref 4.7–6.1)
SODIUM SERPL-SCNC: 144 MMOL/L (ref 135–144)
WBC # BLD AUTO: 8.9 10^3/UL (ref 4.8–10.8)

## 2017-07-22 RX ADMIN — HYDROMORPHONE HYDROCHLORIDE PRN MG: 2 INJECTION, SOLUTION INTRAMUSCULAR; INTRAVENOUS; SUBCUTANEOUS at 20:55

## 2017-07-22 RX ADMIN — HYDROCODONE BITARTRATE AND ACETAMINOPHEN PRN TAB: 5; 325 TABLET ORAL at 13:24

## 2017-07-22 RX ADMIN — HYDROMORPHONE HYDROCHLORIDE PRN MG: 2 INJECTION, SOLUTION INTRAMUSCULAR; INTRAVENOUS; SUBCUTANEOUS at 17:46

## 2017-07-22 RX ADMIN — HYDROMORPHONE HYDROCHLORIDE PRN MG: 2 INJECTION, SOLUTION INTRAMUSCULAR; INTRAVENOUS; SUBCUTANEOUS at 03:01

## 2017-07-22 RX ADMIN — LISINOPRIL SCH MG: 20 TABLET ORAL at 08:56

## 2017-07-22 RX ADMIN — OXYCODONE HYDROCHLORIDE AND ACETAMINOPHEN SCH MG: 500 TABLET ORAL at 08:56

## 2017-07-22 RX ADMIN — ASPIRIN 81 MG SCH MG: 81 TABLET ORAL at 08:56

## 2017-07-22 RX ADMIN — HYDROMORPHONE HYDROCHLORIDE PRN MG: 2 INJECTION, SOLUTION INTRAMUSCULAR; INTRAVENOUS; SUBCUTANEOUS at 11:10

## 2017-07-22 RX ADMIN — ATORVASTATIN CALCIUM SCH MG: 10 TABLET, FILM COATED ORAL at 20:51

## 2017-07-22 RX ADMIN — PANTOPRAZOLE SODIUM SCH MG: 40 TABLET, DELAYED RELEASE ORAL at 08:56

## 2017-07-22 RX ADMIN — AMLODIPINE BESYLATE SCH MG: 10 TABLET ORAL at 08:56

## 2017-07-22 RX ADMIN — HYDROMORPHONE HYDROCHLORIDE PRN MG: 2 INJECTION, SOLUTION INTRAMUSCULAR; INTRAVENOUS; SUBCUTANEOUS at 07:09

## 2017-07-22 RX ADMIN — HYDROMORPHONE HYDROCHLORIDE PRN MG: 2 INJECTION, SOLUTION INTRAMUSCULAR; INTRAVENOUS; SUBCUTANEOUS at 14:46

## 2017-07-22 RX ADMIN — LISINOPRIL SCH MG: 20 TABLET ORAL at 20:50

## 2017-07-22 RX ADMIN — ENOXAPARIN SODIUM SCH MG: 100 INJECTION SUBCUTANEOUS at 09:02

## 2017-07-22 RX ADMIN — LEVOFLOXACIN SCH MG: 750 TABLET, FILM COATED ORAL at 06:05

## 2017-07-22 RX ADMIN — POTASSIUM CHLORIDE SCH MEQ: 20 TABLET, EXTENDED RELEASE ORAL at 08:56

## 2017-07-22 NOTE — CONS
Date/Time of Note


Date/Time of Note


DATE: 7/22/17 


TIME: 13:46





Assessment/Plan


Assessment/Plan


Additional Assessment/Plan


Atypical chest pain


CHF-diastolic acute on chronic


HTN


COPD


Obesity


Hyperlipidemia





Heart failure clinically compensated





Continue Lasix 


Avoid Volume Overload


Continue Norvasc and Lisinopril


Continue Lipitor


Continue Nebs


Continue GI and DVT Prophylaxis





Consultation Date/Type/Reason


Admit Date/Time


Jul 16, 2017 at 20:25


Constitutional:  improved, requiring O2


Respiratory:  shortness of breath


Cardiovascular:  no complaints


Gastrointestinal:  no complaints


Musculoskeletal:  no complaints


Psychological:  no complaints





Past Medical History


Medical History:  congestive heart failure, GERD, hypertension, other (COPD)





Past Surgical History


Past Surgical Hx:  no surgical history





Social History


Alcohol Use:  none


Smoking Status:  Former smoker


Drug Use:  none





Exam/Review of Systems


Vital Signs


Vitals





 Vital Signs








  Date Time  Temp Pulse Resp B/P Pulse Ox O2 Delivery O2 Flow Rate FiO2


 


7/22/17 12:28  78      


 


7/22/17 11:55 97.6  21 136/77 91   


 


7/22/17 00:14       2.0 


 


7/21/17 20:00      Nasal Cannula  














 Intake and Output   


 


 7/21/17 7/21/17 7/22/17





 15:00 23:00 07:00


 


Intake Total  600 ml 900 ml


 


Output Total   1400 ml


 


Balance  600 ml -500 ml











Exam


Constitutional:  alert


Head:  atraumatic, normocephalic


Neck:  non-tender, supple


Respiratory:  diminished breath sounds


Cardiovascular:  regular rate and rhythm


Gastrointestinal:  nl liver, spleen, soft


Extremities:  other (pedal edema)





Results


Result Diagram:  


7/22/17 0719                                                                   

             7/22/17 0719





Results 24 hrs





Laboratory Tests








Test


  7/22/17


07:19


 


White Blood Count 8.9  #


 


Red Blood Count 5.01  


 


Hemoglobin 12.7  L


 


Hematocrit 43.0  


 


Mean Corpuscular Volume 85.8  


 


Mean Corpuscular Hemoglobin 25.3  L


 


Mean Corpuscular Hemoglobin


Concent 29.5  L


 


 


Red Cell Distribution Width 17.1  H


 


Platelet Count 235  


 


Mean Platelet Volume 12.1  H


 


Neutrophils % 74.6  


 


Lymphocytes % 14.5  L


 


Monocytes % 10.5  


 


Eosinophils % 0.0  


 


Basophils % 0.1  


 


Nucleated Red Blood Cells % 0.8  H


 


Neutrophils # 6.7  


 


Lymphocytes # 1.3  


 


Monocytes # 0.9  


 


Eosinophils # 0.0  


 


Basophils # 0.0  


 


Nucleated Red Blood Cells # 0.1  H


 


Sodium Level 144  


 


Potassium Level 5.2  H


 


Chloride Level 95  L


 


Carbon Dioxide Level 29  


 


Anion Gap 25  H


 


Blood Urea Nitrogen 26  H


 


Creatinine 0.72  


 


Glucose Level 105  


 


Calcium Level 9.0  











Medications


Medications





 Current Medications


Acetaminophen (Tylenol Tab) 650 mg Q4H  PRN PO PAIN AND OR ELEVATED TEMP;  

Start 7/17/17 at 15:00


Potassium Chloride (Klor-Con 20) 20 meq BID PO  Last administered on 7/22/17at 

08:56; Admin Dose 20 MEQ;  Start 7/17/17 at 21:00


Amlodipine Besylate (Norvasc) 10 mg DAILY PO  Last administered on 7/22/17at 08:

56; Admin Dose 10 MG;  Start 7/17/17 at 15:30


Ascorbic Acid (Vitamin C) 1,000 mg DAILY PO  Last administered on 7/22/17at 08:

56; Admin Dose 1,000 MG;  Start 7/17/17 at 15:30


Docusate Sodium (Colace) 250 mg BID PO  Last administered on 7/22/17at 08:56; 

Admin Dose 250 MG;  Start 7/17/17 at 15:30


Atorvastatin Calcium (Lipitor) 10 mg DAILY@21 PO  Last administered on 7/21/ 17at 21:12; Admin Dose 10 MG;  Start 7/17/17 at 21:00


Ondansetron HCl (Zofran Inj) 4 mg Q6H  PRN IV NAUSEA AND/OR VOMITING Last 

administered on 7/17/17at 16:04; Admin Dose 4 MG;  Start 7/17/17 at 15:30


Aspirin (Aspirin) 81 mg DAILY PO  Last administered on 7/22/17at 08:56; Admin 

Dose 81 MG;  Start 7/17/17 at 16:00


Methylprednisolone Sodium Succinate (Solu-Medrol) 60 mg Q8 IV  Last 

administered on 7/22/17at 06:05; Admin Dose 60 MG;  Start 7/17/17 at 16:00


Nitroglycerin (Nitroglycerin (Sl Tab) 0.4 Mg) 1 tab Q5M  PRN SL CHEST PAIN;  

Start 7/17/17 at 15:30


Acetaminophen (Tylenol Supp) 650 mg Q6H  PRN [place] PAIN LEVEL 1-3 OR FEVER;  Start 

7/17/17 at 15:30


Acetaminophen/ Hydrocodone Bitart (Norco (5/325)) 1 tab Q6H  PRN PO PAIN LEVEL 4

-6 Last administered on 7/22/17at 13:24; Admin Dose 1 TAB;  Start 7/17/17 at 15:

30


Hydromorphone HCl (Dilaudid) 2 mg Q4H  PRN IV PAIN LEVEL 7-10 Last administered 

on 7/22/17at 11:10; Admin Dose 2 MG;  Start 7/17/17 at 15:30


Enoxaparin Sodium (Lovenox) 40 mg DAILY SC  Last administered on 7/22/17at 09:02

; Admin Dose 40 MG;  Start 7/18/17 at 09:00


Furosemide (Lasix) 40 mg Q8 IV  Last administered on 7/22/17at 06:06; Admin 

Dose 40 MG;  Start 7/18/17 at 22:00


Levofloxacin (Levaquin) 750 mg DAILY@06 PO  Last administered on 7/22/17at 06:05

; Admin Dose 750 MG;  Start 7/20/17 at 16:00


Lisinopril (Zestril) 20 mg BID PO  Last administered on 7/22/17at 08:56; Admin 

Dose 20 MG;  Start 7/20/17 at 21:00











ORALIA BOWSER M.D. Jul 22, 2017 13:51

## 2017-07-22 NOTE — PN
Date/Time of Note


Date/Time of Note


DATE: 7/22/17 


TIME: 15:44





Assessment/Plan


VTE Prophylaxis


VTE Prophylaxis Intervention:  other





Lines/Catheters


IV Catheter Type (from [facility]):  Saline Lock


Urinary Cath still in place:  No





Assessment/Plan


Assessment/Plan


-Chest pain, rule out acute coronary syndrome.  Cardiac enzymes are negative 

3. Dr. Manrique is following in cardiology consultation.  Continue aspirin.


-COPD exacerbation, continue breathing treatments and steroids.


-Possible bronchitis, continue Levaquin.


-Acute on chronic diastolic dysfunction congestive heart failure.  Continue 

Lasix, monitor electrolytes.


-GERD, continue Protonix


-Morbid obesity with BMI of 50





Further recommendations based on clinical course.  Plan of care was discussed 

with Dr. Anaya.





Subjective


24 Hr Interval Summary


Free Text/Dictation


 blood pressure elevated this am. better now, denies any  chest pain, not 

cleared by cardio but wants to leave AMA- staff notified Dr Anaya, complains 

of shortness of breath on exertion, becomes tachycardic when out of bed, 

changed his mind and patient will stay, comfortable at rest, continue telemetry 

monitoring.


Constitutional:  requiring O2


Eyes:  no complaints


ENT:  no complaints


Respiratory:  shortness of breath


Cardiovascular:  no complaints


Gastrointestinal:  no complaints


Genitourinary:  no complaints


Musculoskeletal:  no complaints





Exam/Review of Systems


Vital Signs


Vitals





 Vital Signs








  Date Time  Temp Pulse Resp B/P Pulse Ox O2 Delivery O2 Flow Rate FiO2


 


7/22/17 12:28  78      


 


7/22/17 11:55 97.6  21 136/77 91   


 


7/22/17 00:14       2.0 


 


7/21/17 20:00      Nasal Cannula  














 Intake and Output   


 


 7/21/17 7/21/17 7/22/17





 15:00 23:00 07:00


 


Intake Total  600 ml 900 ml


 


Output Total   1400 ml


 


Balance  600 ml -500 ml











Exam


Constitutional:  alert, obese, oriented


Respiratory:  diminished breath sounds


Cardiovascular:  nl pulses


Gastrointestinal:  non-tender, soft


Musculoskeletal:  nl extremities to inspection


Neurological:  nl mental status, nl speech





Results


Result Diagram:  


7/22/17 0719 7/22/17 0719





Results 24 hrs





Laboratory Tests








Test


  7/22/17


07:19


 


White Blood Count 8.9  #


 


Red Blood Count 5.01  


 


Hemoglobin 12.7  L


 


Hematocrit 43.0  


 


Mean Corpuscular Volume 85.8  


 


Mean Corpuscular Hemoglobin 25.3  L


 


Mean Corpuscular Hemoglobin


Concent 29.5  L


 


 


Red Cell Distribution Width 17.1  H


 


Platelet Count 235  


 


Mean Platelet Volume 12.1  H


 


Neutrophils % 74.6  


 


Lymphocytes % 14.5  L


 


Monocytes % 10.5  


 


Eosinophils % 0.0  


 


Basophils % 0.1  


 


Nucleated Red Blood Cells % 0.8  H


 


Neutrophils # 6.7  


 


Lymphocytes # 1.3  


 


Monocytes # 0.9  


 


Eosinophils # 0.0  


 


Basophils # 0.0  


 


Nucleated Red Blood Cells # 0.1  H


 


Sodium Level 144  


 


Potassium Level 5.2  H


 


Chloride Level 95  L


 


Carbon Dioxide Level 29  


 


Anion Gap 25  H


 


Blood Urea Nitrogen 26  H


 


Creatinine 0.72  


 


Glucose Level 105  


 


Calcium Level 9.0  











Medications


Medications





 Current Medications


Acetaminophen (Tylenol Tab) 650 mg Q4H  PRN PO PAIN AND OR ELEVATED TEMP;  

Start 7/17/17 at 15:00


Amlodipine Besylate (Norvasc) 10 mg DAILY PO  Last administered on 7/22/17at 08:

56; Admin Dose 10 MG;  Start 7/17/17 at 15:30


Ascorbic Acid (Vitamin C) 1,000 mg DAILY PO  Last administered on 7/22/17at 08:

56; Admin Dose 1,000 MG;  Start 7/17/17 at 15:30


Docusate Sodium (Colace) 250 mg BID PO  Last administered on 7/22/17at 08:56; 

Admin Dose 250 MG;  Start 7/17/17 at 15:30


Atorvastatin Calcium (Lipitor) 10 mg DAILY@21 PO  Last administered on 7/21/ 17at 21:12; Admin Dose 10 MG;  Start 7/17/17 at 21:00


Ondansetron HCl (Zofran Inj) 4 mg Q6H  PRN IV NAUSEA AND/OR VOMITING Last 

administered on 7/17/17at 16:04; Admin Dose 4 MG;  Start 7/17/17 at 15:30


Aspirin (Aspirin) 81 mg DAILY PO  Last administered on 7/22/17at 08:56; Admin 

Dose 81 MG;  Start 7/17/17 at 16:00


Methylprednisolone Sodium Succinate (Solu-Medrol) 60 mg Q8 IV  Last 

administered on 7/22/17at 15:03; Admin Dose 60 MG;  Start 7/17/17 at 16:00


Nitroglycerin (Nitroglycerin (Sl Tab) 0.4 Mg) 1 tab Q5M  PRN SL CHEST PAIN;  

Start 7/17/17 at 15:30


Acetaminophen (Tylenol Supp) 650 mg Q6H  PRN TX PAIN LEVEL 1-3 OR FEVER;  Start 

7/17/17 at 15:30


Acetaminophen/ Hydrocodone Bitart (Norco (5/325)) 1 tab Q6H  PRN PO PAIN LEVEL 4

-6 Last administered on 7/22/17at 13:24; Admin Dose 1 TAB;  Start 7/17/17 at 15:

30


Enoxaparin Sodium (Lovenox) 40 mg DAILY SC  Last administered on 7/22/17at 09:02

; Admin Dose 40 MG;  Start 7/18/17 at 09:00


Furosemide (Lasix) 40 mg Q8 IV  Last administered on 7/22/17at 15:03; Admin 

Dose 40 MG;  Start 7/18/17 at 22:00


Levofloxacin (Levaquin) 750 mg DAILY@06 PO  Last administered on 7/22/17at 06:05

; Admin Dose 750 MG;  Start 7/20/17 at 16:00


Lisinopril (Zestril) 20 mg BID PO  Last administered on 7/22/17at 08:56; Admin 

Dose 20 MG;  Start 7/20/17 at 21:00


Hydromorphone HCl (Dilaudid) 2 mg Q3H  PRN IV PAIN LEVEL 7-10 Last administered 

on 7/22/17at 14:46; Admin Dose 2 MG;  Start 7/22/17 at 15:00











NIKKI ENCINAS Jul 22, 2017 15:49

## 2017-07-23 VITALS — RESPIRATION RATE: 20 BRPM | SYSTOLIC BLOOD PRESSURE: 123 MMHG | DIASTOLIC BLOOD PRESSURE: 78 MMHG

## 2017-07-23 VITALS — RESPIRATION RATE: 21 BRPM | SYSTOLIC BLOOD PRESSURE: 132 MMHG | DIASTOLIC BLOOD PRESSURE: 71 MMHG

## 2017-07-23 VITALS — SYSTOLIC BLOOD PRESSURE: 148 MMHG | DIASTOLIC BLOOD PRESSURE: 78 MMHG | RESPIRATION RATE: 18 BRPM

## 2017-07-23 VITALS — HEART RATE: 80 BPM

## 2017-07-23 VITALS — RESPIRATION RATE: 18 BRPM | SYSTOLIC BLOOD PRESSURE: 136 MMHG | DIASTOLIC BLOOD PRESSURE: 86 MMHG

## 2017-07-23 VITALS — HEART RATE: 75 BPM

## 2017-07-23 VITALS — HEART RATE: 89 BPM

## 2017-07-23 RX ADMIN — OXYCODONE HYDROCHLORIDE AND ACETAMINOPHEN SCH MG: 500 TABLET ORAL at 09:55

## 2017-07-23 RX ADMIN — ENOXAPARIN SODIUM SCH MG: 100 INJECTION SUBCUTANEOUS at 08:31

## 2017-07-23 RX ADMIN — ASPIRIN 81 MG SCH MG: 81 TABLET ORAL at 09:56

## 2017-07-23 RX ADMIN — PANTOPRAZOLE SODIUM SCH MG: 40 TABLET, DELAYED RELEASE ORAL at 07:30

## 2017-07-23 RX ADMIN — OXYCODONE HYDROCHLORIDE AND ACETAMINOPHEN SCH MG: 500 TABLET ORAL at 08:30

## 2017-07-23 RX ADMIN — ENOXAPARIN SODIUM SCH MG: 100 INJECTION SUBCUTANEOUS at 09:57

## 2017-07-23 RX ADMIN — PANTOPRAZOLE SODIUM SCH MG: 40 TABLET, DELAYED RELEASE ORAL at 09:56

## 2017-07-23 RX ADMIN — AMLODIPINE BESYLATE SCH MG: 10 TABLET ORAL at 08:30

## 2017-07-23 RX ADMIN — LISINOPRIL SCH MG: 20 TABLET ORAL at 09:56

## 2017-07-23 RX ADMIN — LEVOFLOXACIN SCH MG: 750 TABLET, FILM COATED ORAL at 06:00

## 2017-07-23 RX ADMIN — AMLODIPINE BESYLATE SCH MG: 10 TABLET ORAL at 09:56

## 2017-07-23 RX ADMIN — LISINOPRIL SCH MG: 20 TABLET ORAL at 08:30

## 2017-07-23 RX ADMIN — ASPIRIN 81 MG SCH MG: 81 TABLET ORAL at 08:30

## 2017-07-23 NOTE — DS
Date/Time of Note


Date/Time of Note


DATE: 7/23/17 


TIME: 11:52





Discharge Summary


Admission/Discharge Info


Admit Date/Time


Jul 16, 2017 at 20:25


Discharge Date/Time





Patient Condition:  Stable


Hx of Present Illness


The patient is 67-year-old -American gentleman known to me from previous 

admission.  Patient has extensive medical history including morbid obesity, 

congestive heart failure, COPD, hypertension, gastroesophageal reflux disease, 

neuropathy, chronic back pain, and anxiety.  Patient presented to the emergency 

room with complaints of sudden onset of chest pressure that radiates to the 

left upper arm and back, he also complains of productive cough, nausea and 

vomiting.  Patient had 2 episodes of nonbloody nonbilious emesis on the way to 

the emergency room.  Patient denies any fever.  He underwent chest x-ray which 

revealed mild cardiomegaly.  Patient was giving aspirin, nitroglycerin, also 

was getting Lasix ,  breathing treatment, and Solu-Medrol with improvement in 

symptoms.  Patient is admitted for further evaluation and management to 

telemetry floor.


Hospital Course


IMP:


1.CHF-diastolic acute on chronic


2.Chest pain-improved. Negative trop x 3/NL EF by echo this admit


3.HTN


4.COPD


5.Obesity


6.HL


7.Hypernatremia-mild





Recc:


-Tele


-serial ecg's


-Continue lasix diuresis and follow volume status closely


-Continue statin/asa


-Continue norvasc/ACEI


-Continue steroids/bronchodilators/abx's


Home Meds


Reported Medications


Ondansetron Hcl* (Ondansetron Hcl*) 4 Mg Tablet, 4 MG PO Q4H Y for NAUSEA AND 

OR VOMITING, TAB


   5/24/17


Gabapentin* (Gabapentin*) 300 Mg Capsule, 900 MG PO TID, #270 CAP


   5/24/17


Hydrocodone Bit-Acetaminophen (Hydrocodone Bit-Acetaminophen)  Mg Tablet, 

1 TAB PO TID Y for PAIN, TAB


   4/16/17


Morphine Sulfate* (Ms Contin*) 15 Mg Tablet.sa, 15 MG PO Q12, TAB


   4/16/17


Simvastatin* (Zocor*) 10 Mg Tablet, 10 MG PO QHS, #30 TAB


   12/17/16


Pantoprazole* (Pantoprazole*) 40 Mg Tablet.dr, 40 MG PO AC BREAKFAST, TAB


   12/17/16


Cholecalciferol* (Vitamin D3*) 1,000 Unit Tablet, 1000 UNIT PO DAILY, TAB


   6/28/15


Lisinopril* (Lisinopril*) 20 Mg Tablet, 20 MG PO DAILY, TAB


   5/20/15


Amlodipine Besylate* (Amlodipine Besylate*) 10 Mg Tablet, 10 MG PO DAILY, TAB


   2/19/15


Furosemide* (Lasix*) 40 Mg Tablet, 40 MG PO BID, TAB


   2/19/15


Multivitamins* (Multivitamin*) 1 Udcap Capsule, 1 TAB PO DAILY, TAB


   1/31/15


Docusate Sodium* (Colace*) 250 Mg Capsule, 250 MG PO BID, CAP


   1/31/15


Ascorbic Acid (Vitamin C) 500 Mg Tab, 1000 MG PO DAILY, TAB


   12/23/14


Potassium Chloride* (K-Dur*) 10 Meq Tab.prt.sr, 10 MEQ PO DAILY, TAB


   10/16/14


Aspirin* (Aspirin* Chew) 81 Mg Tab.chew, 81 MG PO DAILY, TAB.CHEW


   10/16/14


Primary Care Provider


NIKKI Barnes Jul 23, 2017 11:52

## 2017-07-23 NOTE — CONS
Date/Time of Note


Date/Time of Note


DATE: 7/23/17 


TIME: 13:04





Assessment/Plan


Assessment/Plan


Additional Assessment/Plan


Atypical chest pain


CHF-diastolic acute on chronic


HTN


COPD


Obesity


Hyperlipidemia





Heart failure clinically compensated





Continue Lasix 


Avoid Volume Overload


Continue Norvasc and Lisinopril


Continue Lipitor


Continue Nebs


Continue GI and DVT Prophylaxis


For discharge





Consultation Date/Type/Reason


Admit Date/Time


Jul 16, 2017 at 20:25


Initial Consult Date





Type of Consultation:  Cardiology


Referring Provider:  DEVAUGHN TALAMANTES MD





Exam/Review of Systems


Vital Signs


Vitals





 Vital Signs








  Date Time  Temp Pulse Resp B/P Pulse Ox O2 Delivery O2 Flow Rate FiO2


 


7/23/17 11:46 97.6 83 20 123/78 94   


 


7/22/17 21:00      Nasal Cannula 2.0 














 Intake and Output   


 


 7/22/17 7/22/17 7/23/17





 15:00 23:00 07:00


 


Intake Total  400 ml 240 ml


 


Output Total  300 ml 450 ml


 


Balance  100 ml -210 ml











Exam


Constitutional:  alert, oriented


Head:  atraumatic, normocephalic


Respiratory:  diminished breath sounds


Cardiovascular:  regular rate and rhythm


Gastrointestinal:  nl liver, spleen, soft


Musculoskeletal:  swelling





Results


Result Diagram:  


7/22/17 0719                                                                   

             7/22/17 0719














ORALIA BOWSER M.D. Jul 23, 2017 13:05

## 2017-07-23 NOTE — PDOCDIS
Discharge Instructions


CONDITION


Patient Condition:  Stable





HOME CARE INSTRUCTIONS:


Diet Instructions:  Special Diet:  Cardiac





ACTIVITY:








Activity Restrictions:   Slowly Increase Activity





 Rest between Activity





 Avoid heavy lifting





 No Sexual Activity





 Do not operate Machinery





 Do not operate Power Tool





 Avoid Heavy Housework











FOLLOW UP/APPOINTMENTS


Follow-up Plan


FU with Primary Md x 1 week


FU with cardiology as recommended.


Call 911 or got to the nearest hospital if symptoms get worse.


Plan of care kary Anaya/staff/patient











NIKKI ENCINAS Jul 23, 2017 11:38

## 2017-07-28 NOTE — CONS
DATE OF ADMISSION:  07/16/2017

 

DATE OF CONSULTATION:  07/17/2017

 

 

 

REASON FOR CONSULTATION:  Chest pain/acute coronary syndrome, as

well as shortness of breath/congestive heart failure.

 

 

 

REQUESTING PHYSICIAN:  John Anaya MD

 

 

 

HISTORY OF PRESENT ILLNESS:  Mr. Zacarias is a 67-year-old male well

known to me due to multiple prior hospitalizations with a history

of congestive heart failure with preserved left ventricular

ejection fraction by echocardiogram in December 2016,

hypertension, chronic obstructive pulmonary disease, obesity,

hypoventilation syndrome, dyslipidemia, and diabetes mellitus who

presented with complaints of substernal chest pain described as a

sharp stabbing sensation occurring at rest and currently improved

on the floor and associated dyspnea on exertion and lower

extremity edema.  Upon arrival in the Emergency Department,

temperature was 98.1, blood pressure 137/88, pulse 82,

respiratory rate 20, saturating at 92 percent; the patient's

labs, white cell count 4.8, hemoglobin 11.0, platelet count of

314, sodium 148, potassium 4.0, creatinine of 0.7, BUN 8, AST 21,

ALT 35, troponin negative, BNP of 39, INR of 0.97.  UA negative.

The patient underwent a chest x-ray revealing mild cardiomegaly,

spondylosis of thoracic spine, __________ inspiration was

compressed with atelectasis in the bases of the lung.  The

patient's electrocardiogram revealed normal sinus rhythm, rate of

78, normal axis, normal intervals, and complete right bundle

branch block.  The patient subsequently in the Emergency

Department was treated with Lasix and admitted to the floor for

further evaluation and treatment.

 

 

 

PAST MEDICAL HISTORY:  As in HPI.

 

 

 

MEDICATIONS:  Currently in the hospital:

 

 

1.   Lovenox 40 mg subq daily.

2.   Protonix 40 mg daily.

3.   Potassium chloride 20 mEq b.i.d.

4.   Atorvastatin 10 mg daily.

5.   Aspirin 81 mg daily.

6.   __________ 6 mg IV q.8 h.

7.   Levofloxacin.

8.   Norvasc 10 mg daily.

9.   Vitamin C.

10.  Colace 100 mg p.o. b.i.d.

11.  Lisinopril 20 mg daily.

12.  Zofran p.r.n.

13.  Lasix 40 mg IV daily.

14.  Flomax p.r.n.

15.  Tylenol p.r.n.

16.  Norco p.r.n.

17.  Dilaudid p.r.n.

18.  DuoNeb p.r.n.

 

 

ALLERGIES:  DILAUDID.

 

 

 

SOCIAL HISTORY:  No current tobacco, EtOH or illicit drug use.

 

 

 

FAMILY HISTORY:  Negative for sudden cardiac death and CAD.

 

 

 

REVIEW OF SYSTEMS:

 

CONSTITUTIONAL:  No fevers or chills.

 

PULMONARY:  Shortness of breath.

 

CARDIOVASCULAR:  Congestive heart failure.

 

GASTROINTESTINAL:  No vomiting.

 

GENITOURINARY:  No hematuria.

 

MUSCULOSKELETAL:  Degenerative joint disease.

 

PSYCHIATRIC:  The patient has depression.

 

NEUROLOGIC:  No documented CVA..

 

ENDOCRINE:  __________.

 

 

 

PHYSICAL EXAMINATION:

 

VITAL SIGNS:  Temperature of 98.  Blood pressure 137/76.  Pulse

91.  Respiratory rate 18.  Saturating at 100 percent.

 

GENERAL:  The patient is alert, awake, complaining of shortness

of breath.

 

NECK:  JVP approximately 9 cm of water.

 

CHEST:  Firm, with mild __________ at the bases bilaterally.

 

HEART:  Regular rate and rhythm.  S1, increased S2, and 1/6

systolic murmur, nondisplaced.

 

ABDOMEN:  Positive bowel sounds.  Soft, obese.

 

EXTREMITIES:  Edema 1-plus edema bilaterally.  Posterior tibial,

dorsalis pedis, and popliteal pulses bilaterally plus-2.

 

 

 

LABORATORY:  As per HPI.  Since admitting the patient, troponin

is negative.

 

 

 

IMAGING:  Studies as noted in HPI.  No further images for review

at this time.  ECG as above in HPI.  No further

electrocardiograms for review at this time.

 

 

 

IMPRESSION:

 

1.   Chest tightness/acute coronary syndrome with chest pain,

  currently felt to be atypical for cardiac etiology.

2.   Congestive heart failure, diastolic, acute on chronic.

3.   Hypertension.

4.   Dyslipidemia.

5.   Possible chronic obstructive pulmonary disease exacerbation.

6.   Questionable upper respiratory infection.

7.   Gastroesophageal reflux disease.

8.   Obesity.

9.   Hypernatremia.

10.  Anemia.

 

 

RECOMMENDATIONS:  At this time:

 

 

1.   Would maintain the patient on telemetry monitoring and keep

  following rhythm and rates closely.

2.   Would continue the patient's Lasix diuresis and follow

strict intake and outputs closely.

3.   Continue the patient's current calcium channel blocker and

thus will control blood pressure with up-titration as necessary.

4.   We will completely rule out for myocardial infarction to

ensure this patient's chest pain is not due to an acute coronary

syndrome, acute myocardial infarction.

5.   Repeat the patient's 2D echocardiogram in approximately 6

months since most recent assessment to ensure the patient

continues to have normal heart function.

6.   Continue the patient on steroids and bronchodilators with

treatment and followup for chronic obstructive pulmonary disease

exacerbation.

7.   Continue the patient's aspirin prophylaxis for cardiac risk.

8.   We will discontinue __________ and __________ and continue

the patient's statin as necessary.

 

 

 

 

Thanks for letting me take part in the care of this patient.  I

will continue to follow him closely with you and further

recommendations will be made __________ during the patient's

hospital course.

 

 

 

 

 

 

 

Dictated By:  Amanda Garcia

 

 

 

/latia/ma

 

Job#:  [id number]/Document#:  [id number]

 

D:  07/17/2017 18:53

 

T:  07/27/2017 11:05

 

CC:  John Anaya MD;*End*

## 2017-11-07 ENCOUNTER — HOSPITAL ENCOUNTER (INPATIENT)
Dept: HOSPITAL 10 - E/R | Age: 67
LOS: 15 days | Discharge: HOME | DRG: 190 | End: 2017-11-22
Attending: INTERNAL MEDICINE | Admitting: INTERNAL MEDICINE
Payer: MEDICARE

## 2017-11-07 VITALS
WEIGHT: 286.6 LBS | BODY MASS INDEX: 40.12 KG/M2 | HEIGHT: 71 IN | WEIGHT: 286.6 LBS | BODY MASS INDEX: 40.12 KG/M2 | HEIGHT: 71 IN

## 2017-11-07 DIAGNOSIS — K21.9: ICD-10-CM

## 2017-11-07 DIAGNOSIS — J20.9: ICD-10-CM

## 2017-11-07 DIAGNOSIS — Z87.891: ICD-10-CM

## 2017-11-07 DIAGNOSIS — I11.0: ICD-10-CM

## 2017-11-07 DIAGNOSIS — E66.01: ICD-10-CM

## 2017-11-07 DIAGNOSIS — E78.5: ICD-10-CM

## 2017-11-07 DIAGNOSIS — J44.0: ICD-10-CM

## 2017-11-07 DIAGNOSIS — I50.33: ICD-10-CM

## 2017-11-07 DIAGNOSIS — J44.1: Primary | ICD-10-CM

## 2017-11-07 LAB
ABNORMAL IP MESSAGE: 1
ANION GAP SERPL CALC-SCNC: 10 MMOL/L (ref 8–16)
BASOPHILS # BLD AUTO: 0 10^3/UL (ref 0–0.1)
BASOPHILS NFR BLD: 0.5 % (ref 0–2)
BNP SERPL-MCNC: 41 PG/ML (ref 0–125)
BUN SERPL-MCNC: 10 MG/DL (ref 7–20)
CALCIUM SERPL-MCNC: 8.2 MG/DL (ref 8.4–10.2)
CHLORIDE SERPL-SCNC: 109 MMOL/L (ref 97–110)
CO2 SERPL-SCNC: 29 MMOL/L (ref 21–31)
CREAT SERPL-MCNC: 0.73 MG/DL (ref 0.61–1.24)
EOSINOPHIL # BLD: 0.2 10^3/UL (ref 0–0.5)
EOSINOPHIL NFR BLD: 3.8 % (ref 0–7)
ERYTHROCYTE [DISTWIDTH] IN BLOOD BY AUTOMATED COUNT: 17.2 % (ref 11.5–14.5)
GLUCOSE SERPL-MCNC: 87 MG/DL (ref 70–220)
HCT VFR BLD CALC: 36.1 % (ref 42–52)
HGB BLD-MCNC: 10 G/DL (ref 14–18)
LYMPHOCYTES # BLD AUTO: 2 10^3/UL (ref 0.8–2.9)
LYMPHOCYTES NFR BLD AUTO: 31.8 % (ref 15–51)
MCH RBC QN AUTO: 23.8 PG (ref 29–33)
MCHC RBC AUTO-ENTMCNC: 27.7 G/DL (ref 32–37)
MCV RBC AUTO: 86 FL (ref 82–101)
MONOCYTES # BLD: 0.8 10^3/UL (ref 0.3–0.9)
MONOCYTES NFR BLD: 13.2 % (ref 0–11)
NEUTROPHILS # BLD: 3.2 10^3/UL (ref 1.6–7.5)
NEUTROPHILS NFR BLD AUTO: 50.7 % (ref 39–77)
NRBC # BLD MANUAL: 0 10^3/UL (ref 0–0)
NRBC BLD AUTO-RTO: 0 /100WBC (ref 0–0)
PLATELET # BLD: 249 10^3/UL (ref 140–415)
PMV BLD AUTO: 9.1 FL (ref 7.4–10.4)
POSITIVE DIFF: (no result)
POTASSIUM SERPL-SCNC: 4.3 MMOL/L (ref 3.5–5.1)
RBC # BLD AUTO: 4.2 10^6/UL (ref 4.7–6.1)
SODIUM SERPL-SCNC: 144 MMOL/L (ref 135–144)
WBC # BLD AUTO: 6.3 10^3/UL (ref 4.8–10.8)

## 2017-11-07 PROCEDURE — 94640 AIRWAY INHALATION TREATMENT: CPT

## 2017-11-07 PROCEDURE — 96374 THER/PROPH/DIAG INJ IV PUSH: CPT

## 2017-11-07 PROCEDURE — 96375 TX/PRO/DX INJ NEW DRUG ADDON: CPT

## 2017-11-07 PROCEDURE — 97110 THERAPEUTIC EXERCISES: CPT

## 2017-11-07 PROCEDURE — 76937 US GUIDE VASCULAR ACCESS: CPT

## 2017-11-07 PROCEDURE — 97530 THERAPEUTIC ACTIVITIES: CPT

## 2017-11-07 PROCEDURE — 85025 COMPLETE CBC W/AUTO DIFF WBC: CPT

## 2017-11-07 PROCEDURE — 71010: CPT

## 2017-11-07 PROCEDURE — 93005 ELECTROCARDIOGRAM TRACING: CPT

## 2017-11-07 PROCEDURE — C1769 GUIDE WIRE: HCPCS

## 2017-11-07 PROCEDURE — 84484 ASSAY OF TROPONIN QUANT: CPT

## 2017-11-07 PROCEDURE — 94664 DEMO&/EVAL PT USE INHALER: CPT

## 2017-11-07 PROCEDURE — 82550 ASSAY OF CK (CPK): CPT

## 2017-11-07 PROCEDURE — 36569 INSJ PICC 5 YR+ W/O IMAGING: CPT

## 2017-11-07 PROCEDURE — 80048 BASIC METABOLIC PNL TOTAL CA: CPT

## 2017-11-07 PROCEDURE — 97162 PT EVAL MOD COMPLEX 30 MIN: CPT

## 2017-11-07 PROCEDURE — 82553 CREATINE MB FRACTION: CPT

## 2017-11-07 PROCEDURE — 83880 ASSAY OF NATRIURETIC PEPTIDE: CPT

## 2017-11-07 PROCEDURE — 80061 LIPID PANEL: CPT

## 2017-11-07 PROCEDURE — 97116 GAIT TRAINING THERAPY: CPT

## 2017-11-07 NOTE — ERD
ER Documentation


Chief Complaint


Chief Complaint


bib self, cc: chest pain and sob, x 3 days





HPI


Patient is a 67-year-old male with coronary disease, CHF, COPD, and 

hypertension who presents with chest pain.  The patient was having shortness of 

breath as well.  He had cough and was concerned for possible pneumonia.  He 

said chills and felt dizzy.  He has had vomiting twice.  He has had no 

treatment as of yet.  Upon review of old medical records the patient has 

multiple visits to the ER.  Review of the emergency department information 

exchange system shows visits to other emergency departments as well.  His 

primary doctor is Dr. Anaya.





ROS


All systems reviewed and are negative except as per history of present illness.





Medications


Home Meds


Reported Medications


Albuterol Sulfate* (Proair HFA*) 8.5 Gm Hfa.aer.ad, 2 PUFF INH Q4, #1 INHALER


   11/7/17


Metoprolol Tartrate* (Lopressor*) 50 Mg Tab, 50 MG PO DAILY, #60 TAB


   11/7/17


Tamsulosin Hcl* (Tamsulosin Hcl*) 0.4 Mg Cap.er.24h, 0.4 MG PO HS, CAP


   11/7/17


Simvastatin* (Zocor*) 40 Mg Tablet, 40 MG PO QHS, #30 TAB


   11/7/17


Ondansetron Hcl* (Ondansetron Hcl*) 4 Mg Tablet, 4 MG PO Q6H Y for NAUSEA AND 

OR VOMITING, TAB


   5/24/17


Gabapentin* (Gabapentin*) 300 Mg Capsule, 900 MG PO TID, #270 CAP


   5/24/17


Hydrocodone Bit-Acetaminophen (Hydrocodone Bit-Acetaminophen)  Mg Tablet, 

1 TAB PO TID Y for PAIN, TAB


   4/16/17


Morphine Sulfate* (Ms Contin*) 15 Mg Tablet.sa, 15 MG PO Q12, TAB


   4/16/17


Pantoprazole* (Pantoprazole*) 40 Mg Tablet.dr, 40 MG PO AC BREAKFAST, TAB


   12/17/16


Cholecalciferol* (Vitamin D3*) 1,000 Unit Tablet, 1000 UNIT PO DAILY, TAB


   6/28/15


Lisinopril* (Lisinopril*) 20 Mg Tablet, 20 MG PO DAILY, TAB


   5/20/15


Amlodipine Besylate* (Amlodipine Besylate*) 10 Mg Tablet, 10 MG PO DAILY, TAB


   2/19/15


Furosemide* (Lasix*) 40 Mg Tablet, 40 MG PO BID, TAB


   2/19/15


Multivitamins* (Multivitamin*) 1 Udcap Capsule, 1 TAB PO DAILY, TAB


   1/31/15


Docusate Sodium* (Colace*) 250 Mg Capsule, 250 MG PO BID, CAP


   1/31/15


Ascorbic Acid (Vitamin C) 500 Mg Tab, 1000 MG PO DAILY, TAB


   12/23/14


Potassium Chloride* (K-Dur*) 10 Meq Tab.prt.sr, 10 MEQ PO DAILY, TAB


   10/16/14


Aspirin* (Aspirin* Chew) 81 Mg Tab.chew, 81 MG PO DAILY, TAB.CHEW


   10/16/14


Discontinued Reported Medications


Simvastatin* (Zocor*) 10 Mg Tablet, 10 MG PO QHS, #30 TAB


   12/17/16





Allergies


Allergies:  


Coded Allergies:  


     tramadol (Unverified  Allergy, Unknown, VOMITING, 11/7/17)





PMhx/Soc


Medical and Surgical Hx:  pt denies Surgical Hx


History of Surgery:  No


Anesthesia Reaction:  No


Hx Neurological Disorder:  Yes (NEUROPATHY)


Hx Respiratory Disorders:  Yes (COPD)


Hx Cardiac Disorders:  Yes (CHF/ HTN)


Hx Psychiatric Problems:  No


Hx Miscellaneous Medical Probl:  No (OA)


Hx Alcohol Use:  No


Hx Substance Use:  No


Hx Tobacco Use:  Yes (quit 20 yrs ago)


Smoking Status:  Former smoker





FmHx


Family History:  No coronary disease





Physical Exam


Vitals





Vital Signs








  Date Time  Temp Pulse Resp B/P Pulse Ox O2 Delivery O2 Flow Rate FiO2


 


11/7/17 21:30      Nasal Cannula 2 


 


11/7/17 21:25  75 22 154/98 100 Nasal Cannula 2.0 


 


11/7/17 18:55 98.5 89 18 148/82 100   








Physical Exam


Const: No acute distress


Head:   Atraumatic 


Eyes:    Normal Conjunctiva


ENT:    Normal External Ears, Nose and Mouth.


Neck:               Full range of motion..~ No meningismus.


Resp:   Decreased breath sounds bilaterally


Cardio:    Regular rate and rhythm, no murmurs


Abd:    Soft, non tender, non distended. Normal bowel sounds


Skin:    No petechiae or rashes


Back:    No midline or flank tenderness


Ext:    No cyanosis, or edema


Neur:    Awake and alert


Psych:    Normal Mood and Affect


Result Diagram:  


11/7/17 2300 11/7/17 2300





Results 24 hrs





 Laboratory Tests








Test


  11/7/17


23:00


 


White Blood Count 6.310^3/ul 


 


Red Blood Count 4.2010^6/ul 


 


Hemoglobin 10.0g/dl 


 


Hematocrit 36.1% 


 


Mean Corpuscular Volume 86.0fl 


 


Mean Corpuscular Hemoglobin 23.8pg 


 


Mean Corpuscular Hemoglobin


Concent 27.7g/dl 


 


 


Red Cell Distribution Width 17.2% 


 


Platelet Count 68867^3/UL 


 


Mean Platelet Volume 9.1fl 


 


Neutrophils % 50.7% 


 


Lymphocytes % 31.8% 


 


Monocytes % 13.2% 


 


Eosinophils % 3.8% 


 


Basophils % 0.5% 


 


Nucleated Red Blood Cells % 0.0/100WBC 


 


Neutrophils # 3.210^3/ul 


 


Lymphocytes # 2.010^3/ul 


 


Monocytes # 0.810^3/ul 


 


Eosinophils # 0.210^3/ul 


 


Basophils # 0.010^3/ul 


 


Nucleated Red Blood Cells # 0.010^3/ul 


 


Sodium Level 144mmol/L 


 


Potassium Level 4.3mmol/L 


 


Chloride Level 109mmol/L 


 


Carbon Dioxide Level 29mmol/L 


 


Anion Gap 10 


 


Blood Urea Nitrogen 10mg/dl 


 


Creatinine 0.73mg/dl 


 


Glucose Level 87mg/dl 


 


Calcium Level 8.2mg/dl 


 


Troponin I Pending 


 


B-Type Natriuretic Peptide 41PG/ML 








 Current Medications








 Medications


  (Trade)  Dose


 Ordered  Sig/Adán


 Route


 PRN Reason  Start Time


 Stop Time Status Last Admin


Dose Admin


 


 Aspirin


  (Aspirin)  162 mg  ONCE  STAT


 PO


   11/7/17 21:22


 11/7/17 21:23 DC 11/7/17 21:50


 


 


 Nitroglycerin


  (Nitroglycerin


 2% Oint)  1 inch  ONCE  STAT


 TD


   11/7/17 21:22


 11/7/17 21:23 DC 11/7/17 22:50


 


 


 Nitroglycerin


  (Nitroglycerin


  (Sl Tab) 0.4 Mg)  1 tab  Q5M UP TO 3 DOSES PRN


 SL


 CHEST PAIN  11/7/17 21:30


     


 


 


 Furosemide


  (Lasix)  40 mg  ONCE  STAT


 IV


   11/7/17 21:22


 11/7/17 21:23 DC 11/7/17 22:45


 


 


 Ondansetron HCl


  (Zofran Inj)  4 mg  ONCE  STAT


 IV


   11/7/17 23:10


 11/7/17 23:11 DC 11/7/17 23:22


 


 


 Ondansetron HCl


  (Zofran Inj)  4 mg  ER BRIDGE PRN


 IV


 NAUSEA AND/OR VOMITING  11/8/17 00:00


 11/8/17 23:59   


 


 


 Acetaminophen


  (Tylenol Tab)  650 mg  ER BRIDGE PRN


 PO


 MILD PAIN/FEVER  11/8/17 00:00


 11/8/17 23:59   


 











Procedures/MDM


EKG read by me: 


Rate/Rhythm: Regular rate and rhythm at a normal rate


Intervals:    Normal


Impression:     No evidence of ischemia or arrhythmia





PROCEDURE:   XR Chest. 


 


CLINICAL INDICATION:   Chest pain 


 


TECHNIQUE:   Single portable view  of the chest was obtained 


 


COMPARISON:   CR CHEST 3/15/2017 


 


FINDINGS:


The heart and mediastinum are within normal limits.  


There are mild bibasilar atelectatic changes.  


The lungs are otherwise clear.


There is no pleural effusion or pneumothorax.   


 


RPTAT: AA


 


IMPRESSION:


Mild bibasilar atelectatic changes.


_____________________________________________ 


.Cristopher Gonzalez MD, MD           Date    Time 


Electronically viewed and signed by .Cristopher Gonzalez MD, MD on 11/07/2017 22:

03 


 


Patient is a 67-year-old male who presents with chest pain and shortness of 

breath.  Chest x-ray does not show any signs of pneumonia or pneumothorax per 

radiology.  His EKG shows no signs of ST elevations.  I am concerned for acute 

coronary syndrome and congestive heart failure.  The patient was given aspirin, 

nitroglycerin, and Lasix IV.  He will be admitted to the care of Dr. Lee who 

is covering for Dr. Anaya the patient's primary doctor.





Departure


Diagnosis:  


 Primary Impression:  


 Chest pain


 Chest pain type:  unspecified  Qualified Code:  R07.9 - Chest pain, 

unspecified type


 Additional Impression:  


 Congestive heart failure (CHF)


 Congestive heart failure type:  unspecified congestive heart failure type  

Congestive heart failure chronicity:  acute  Qualified Code:  I50.9 - Acute 

congestive heart failure, unspecified congestive heart failure type


Condition:  HORTENCIA Clay MD Nov 7, 2017 23:56

## 2017-11-07 NOTE — RADRPT
PROCEDURE:   XR Chest. 

 

CLINICAL INDICATION:   Chest pain 

 

TECHNIQUE:   Single portable view  of the chest was obtained 

 

COMPARISON:   CR CHEST 3/15/2017 

 

FINDINGS:

The heart and mediastinum are within normal limits.  

There are mild bibasilar atelectatic changes.  

The lungs are otherwise clear.

There is no pleural effusion or pneumothorax.   

 

RPTAT: AA

 

IMPRESSION:

Mild bibasilar atelectatic changes.

_____________________________________________ 

.rCistopher Gonzalez MD, MD           Date    Time 

Electronically viewed and signed by .Cristopher Gonzalez MD, MD on 11/07/2017 22:03 

 

D:  11/07/2017 22:03  T:  11/07/2017 22:03

.S/

## 2017-11-08 VITALS — HEART RATE: 75 BPM

## 2017-11-08 VITALS — HEART RATE: 85 BPM

## 2017-11-08 VITALS — DIASTOLIC BLOOD PRESSURE: 83 MMHG | RESPIRATION RATE: 22 BRPM | SYSTOLIC BLOOD PRESSURE: 154 MMHG

## 2017-11-08 VITALS — SYSTOLIC BLOOD PRESSURE: 149 MMHG | RESPIRATION RATE: 20 BRPM | DIASTOLIC BLOOD PRESSURE: 70 MMHG

## 2017-11-08 VITALS — HEART RATE: 81 BPM

## 2017-11-08 VITALS — HEART RATE: 88 BPM

## 2017-11-08 VITALS — HEART RATE: 84 BPM

## 2017-11-08 VITALS — SYSTOLIC BLOOD PRESSURE: 131 MMHG | RESPIRATION RATE: 22 BRPM | DIASTOLIC BLOOD PRESSURE: 69 MMHG

## 2017-11-08 VITALS — SYSTOLIC BLOOD PRESSURE: 116 MMHG | RESPIRATION RATE: 20 BRPM | DIASTOLIC BLOOD PRESSURE: 63 MMHG

## 2017-11-08 VITALS — HEART RATE: 86 BPM

## 2017-11-08 LAB
CK MB SERPL-MCNC: 0.79 NG/ML (ref 0–2.4)
CK MB SERPL-MCNC: 0.98 NG/ML (ref 0–2.4)
CK SERPL-CCNC: 51 IU/L (ref 23–200)
CK SERPL-CCNC: 65 IU/L (ref 23–200)
TROPONIN I SERPL-MCNC: (no result) NG/ML (ref 0–0.12)
TROPONIN I SERPL-MCNC: < 0.012 NG/ML (ref 0–0.12)
TROPONIN I SERPL-MCNC: < 0.012 NG/ML (ref 0–0.12)

## 2017-11-08 PROCEDURE — B54NZZA ULTRASONOGRAPHY OF LEFT UPPER EXTREMITY VEINS, GUIDANCE: ICD-10-PCS | Performed by: RADIOLOGY

## 2017-11-08 PROCEDURE — 02HV33Z INSERTION OF INFUSION DEVICE INTO SUPERIOR VENA CAVA, PERCUTANEOUS APPROACH: ICD-10-PCS

## 2017-11-08 RX ADMIN — OXYCODONE HYDROCHLORIDE AND ACETAMINOPHEN SCH MG: 500 TABLET ORAL at 09:07

## 2017-11-08 RX ADMIN — GABAPENTIN SCH MG: 300 CAPSULE ORAL at 20:46

## 2017-11-08 RX ADMIN — MORPHINE SULFATE SCH MG: 15 TABLET, EXTENDED RELEASE ORAL at 09:05

## 2017-11-08 RX ADMIN — METOPROLOL TARTRATE SCH MG: 50 TABLET, FILM COATED ORAL at 20:46

## 2017-11-08 RX ADMIN — AMLODIPINE BESYLATE SCH MG: 10 TABLET ORAL at 09:06

## 2017-11-08 RX ADMIN — ATORVASTATIN CALCIUM SCH MG: 20 TABLET, FILM COATED ORAL at 20:46

## 2017-11-08 RX ADMIN — MORPHINE SULFATE PRN MG: 2 INJECTION, SOLUTION INTRAMUSCULAR; INTRAVENOUS at 05:01

## 2017-11-08 RX ADMIN — CEFTRIAXONE SCH MLS/HR: 2 INJECTION, SOLUTION INTRAVENOUS at 21:58

## 2017-11-08 RX ADMIN — GABAPENTIN SCH MG: 300 CAPSULE ORAL at 09:07

## 2017-11-08 RX ADMIN — PANTOPRAZOLE SODIUM SCH MG: 40 TABLET, DELAYED RELEASE ORAL at 09:03

## 2017-11-08 RX ADMIN — MORPHINE SULFATE PRN MG: 2 INJECTION, SOLUTION INTRAMUSCULAR; INTRAVENOUS at 10:17

## 2017-11-08 RX ADMIN — POTASSIUM CHLORIDE SCH MEQ: 10 TABLET, EXTENDED RELEASE ORAL at 09:04

## 2017-11-08 RX ADMIN — MORPHINE SULFATE SCH MG: 15 TABLET, EXTENDED RELEASE ORAL at 21:58

## 2017-11-08 RX ADMIN — LISINOPRIL SCH MG: 20 TABLET ORAL at 09:07

## 2017-11-08 RX ADMIN — DEXAMETHASONE SODIUM PHOSPHATE PRN MG: 10 INJECTION, SOLUTION INTRAMUSCULAR; INTRAVENOUS at 10:16

## 2017-11-08 RX ADMIN — LEVALBUTEROL HYDROCHLORIDE SCH MG: 0.63 SOLUTION RESPIRATORY (INHALATION) at 19:09

## 2017-11-08 RX ADMIN — MORPHINE SULFATE PRN MG: 2 INJECTION, SOLUTION INTRAMUSCULAR; INTRAVENOUS at 20:45

## 2017-11-08 RX ADMIN — GABAPENTIN SCH MG: 300 CAPSULE ORAL at 14:02

## 2017-11-08 RX ADMIN — TAMSULOSIN HYDROCHLORIDE SCH MG: 0.4 CAPSULE ORAL at 20:45

## 2017-11-08 RX ADMIN — DEXAMETHASONE SODIUM PHOSPHATE PRN MG: 10 INJECTION, SOLUTION INTRAMUSCULAR; INTRAVENOUS at 20:44

## 2017-11-08 RX ADMIN — THERA TABS SCH TAB: TAB at 09:12

## 2017-11-08 RX ADMIN — VITAMIN D, TAB 1000IU (100/BT) SCH UNIT: 25 TAB at 09:07

## 2017-11-08 RX ADMIN — ASPIRIN 81 MG SCH MG: 81 TABLET ORAL at 09:04

## 2017-11-08 NOTE — RADRPT
PROCEDURE:   XR Chest. 

 

CLINICAL INDICATION:   Check PICC line position.   

 

TECHNIQUE:   Single frontal view.

 

COMPARISON:   11/07/2017. 

 

FINDINGS:

There is a left arm PICC line with the tip in the lower superior vena cava.  There is mild atelectas
is at the lung bases with right worse than left. The lungs are otherwise clear.

The heart size is normal.  

There is no pleural effusion. 

There is no pneumothorax. 

 

IMPRESSION:

1.  Left arm PICC line tip in satisfactory position.

2.  Mild atelectasis at the lung bases with right worse than left. Unchanged.

3.  Otherwise normal chest radiograph.  

RPTAT: QQ

_____________________________________________ 

.Shun Barber MD, MD           Date    Time 

Electronically viewed and signed by .Shun Barber MD, MD on 11/08/2017 18:26 

 

D:  11/08/2017 18:26  T:  11/08/2017 18:26

.R/

## 2017-11-08 NOTE — HP
Date/Time of Note


Date/Time of Note


DATE: 11/8/17 


TIME: 12:22





Assessment/Plan


VTE Prophylaxis


VTE Prophylaxis Intervention:  SCD's





Lines/Catheters


IV Catheter Type (from [facility]):  Saline Lock


Urinary Cath still in place:  No





Assessment/Plan


Chief Complaint/Hosp Course


Assessment/Plan


-Chest pain, rule out acute coronary syndrome.  Cardiac enzymes are negative 

3. Dr. Manrique is asked to see patient in cardiology consultation.  Continue 

aspirin.


-Possible acute on chronic diastolic dysfunction congestive heart failure.  

Continue Lasix, monitor electrolytes.


-Possible bronchitis, start Rocephin.


-Possible COPD exacerbation, continue breathing treatments and steroids.


-GERD, continue Protonix


-Morbid obesity with BMI of 40





Further recommendations based on clinical course.  Plan of care was discussed 

with Dr. Anaya.


Problems:  





HPI/ROS


Admit Date/Time


Admit Date/Time


Nov 7, 2017 at 23:35





Hx of Present Illness


The patient is 67-year-old -American male with a medical history 

positive for obesity, CHF, hypertension, COPD, GERD, neuropathy, chronic back 

pain and anxiety.  Patient is presented to the emergency room with complains of 

shortness of breath, chest pain, and productive cough.  Patient complains of 

chills and dizziness not sure if he had a temperature.  Patient complained of 

nausea and nonbilious nonbloody emesis.  Patient underwent chest x-ray in the 

emergency room which did not show any  pneumonia.  Patient's complaints of a 

productive cough which got worse over the last couple of days.  Patient also 

complains of increased bilateral lower extremities edema.  Patient was given 

aspirin, nitroglycerin, and Lasix IV.  Patient is admitted for further 

evaluation and management to telemetry floor.





PMH/Family/Social


Past Medical History


Medical History:  congestive heart failure, GERD, hypertension, other (COPD)





Past Surgical History


Past Surgical Hx:  no surgical history





Family History


Significant Family History:  heart disease, cancer, renal disease





Social History


Smoking Status:  Former smoker





Exam/Review of Systems


Vital Signs


Vitals





 Vital Signs








  Date Time  Temp Pulse Resp B/P Pulse Ox O2 Delivery O2 Flow Rate FiO2


 


11/8/17 12:01  81      


 


11/8/17 08:00       2.0 


 


11/8/17 07:59 97.9  22 154/83 93   


 


11/8/17 03:00      Nasal Cannula  














 Intake and Output   


 


 11/7/17 11/7/17 11/8/17





 14:59 22:59 06:59


 


Intake Total   200 ml


 


Balance   200 ml











Exam


Constitutional:  alert, oriented


Head:  atraumatic, normocephalic


Neck:  supple


Respiratory:  diminished breath sounds


Cardiovascular:  nl pulses, regular rate and rhythm


Gastrointestinal:  non-tender, soft


Extremities:  edema, normal pulses


Neurological:  nl mental status


Skin:  nl turgor





Labs


Result Diagram:  


11/7/17 2300                                                                   

             11/7/17 2300








Medications


Medications





 Current Medications


Amlodipine Besylate (Norvasc) 10 mg DAILY PO  Last administered on 11/8/17at 09:

06; Admin Dose 10 MG;  Start 11/8/17 at 09:00


Ascorbic Acid (Vitamin C) 1,000 mg DAILY PO  Last administered on 11/8/17at 09:

07; Admin Dose 1,000 MG;  Start 11/8/17 at 09:00


Aspirin (Aspirin) 81 mg DAILY PO  Last administered on 11/8/17at 09:04; Admin 

Dose 81 MG;  Start 11/8/17 at 09:00


Cholecalciferol (Vitamin D) 1,000 unit DAILY PO  Last administered on 11/8/17at 

09:07; Admin Dose 1,000 UNIT;  Start 11/8/17 at 09:00


Docusate Sodium (Colace) 250 mg BID PO  Last administered on 11/8/17at 09:04; 

Admin Dose 250 MG;  Start 11/8/17 at 09:00


Gabapentin (Neurontin) 900 mg TID PO  Last administered on 11/8/17at 09:07; 

Admin Dose 900 MG;  Start 11/8/17 at 09:00


Lisinopril (Zestril) 20 mg DAILY PO  Last administered on 11/8/17at 09:07; 

Admin Dose 20 MG;  Start 11/8/17 at 09:00


Metoprolol Tartrate (Lopressor) 50 mg DAILY PO  Last administered on 11/8/17at 

09:05; Admin Dose 50 MG;  Start 11/8/17 at 09:00


Morphine Sulfate (Ms Contin (Er)) 15 mg Q12 PO  Last administered on 11/8/17at 

09:05; Admin Dose 15 MG;  Start 11/8/17 at 09:00


Multivitamins Therapeutic (Theragran) 1 tab DAILY PO  Last administered on 11/8/ 17at 09:12; Admin Dose 1 TAB;  Start 11/8/17 at 09:00


Potassium Chloride (Klor-Con 10) 10 meq DAILY PO  Last administered on 11/8/ 17at 09:04; Admin Dose 10 MEQ;  Start 11/8/17 at 09:00


Tamsulosin HCl (Flomax) 0.4 mg HS PO ;  Start 11/8/17 at 21:00


Atorvastatin Calcium (Lipitor) 20 mg DAILY@21 PO ;  Start 11/8/17 at 21:00


Ondansetron HCl (Zofran Inj) 4 mg Q4H  PRN IV NAUSEA AND/OR VOMITING Last 

administered on 11/8/17at 10:16; Admin Dose 4 MG;  Start 11/8/17 at 03:30


Enoxaparin Sodium (Lovenox) 30 mg DAILY SC  Last administered on 11/8/17at 09:10

; Admin Dose 30 MG;  Start 11/8/17 at 09:00


Morphine Sulfate (morphine) 2 mg Q4H  PRN IV PAIN Last administered on 11/8/ 17at 10:17; Admin Dose 2 MG;  Start 11/8/17 at 03:30


Clonidine (Catapres) 0.1 mg Q4H  PRN PO ELEVATED BLOOD PRESSURE;  Start 11/8/17 

at 03:30











DARIO NORRIS Nov 8, 2017 12:32

## 2017-11-08 NOTE — RADRPT
PROCEDURE:   Ultrasound guidance for placement of needle in left upper extremity vein.    

 

CLINICAL INDICATION:   Venous access. 

 

TECHNIQUE:   

Limited sonography of the left upper extremity was performed.  Ultrasound images were recorded and s
tored in the patient's medical record.

 

COMPARISON:   None. 

 

FINDINGS:

The ultrasound images demonstrate a patent left upper extremity vein.  The PICC line was inserted by
 the PICC line nurse.

 

IMPRESSION:

1.  Ultrasound guidance for a needle placement in a left upper extremity vein.

2.  The left upper extremity vein is patent.

 

RPTAT: QQ

_____________________________________________ 

.Shun Barber MD, MD           Date    Time 

Electronically viewed and signed by .Shun Barber MD, MD on 11/08/2017 18:25 

 

D:  11/08/2017 18:25  T:  11/08/2017 18:25

.R/

## 2017-11-09 VITALS — DIASTOLIC BLOOD PRESSURE: 59 MMHG | SYSTOLIC BLOOD PRESSURE: 115 MMHG | RESPIRATION RATE: 18 BRPM

## 2017-11-09 VITALS — HEART RATE: 87 BPM

## 2017-11-09 VITALS — DIASTOLIC BLOOD PRESSURE: 73 MMHG | SYSTOLIC BLOOD PRESSURE: 143 MMHG | RESPIRATION RATE: 18 BRPM

## 2017-11-09 VITALS — RESPIRATION RATE: 20 BRPM | DIASTOLIC BLOOD PRESSURE: 65 MMHG | SYSTOLIC BLOOD PRESSURE: 113 MMHG

## 2017-11-09 VITALS — HEART RATE: 90 BPM

## 2017-11-09 VITALS — RESPIRATION RATE: 18 BRPM | DIASTOLIC BLOOD PRESSURE: 66 MMHG | SYSTOLIC BLOOD PRESSURE: 108 MMHG

## 2017-11-09 VITALS — SYSTOLIC BLOOD PRESSURE: 125 MMHG | RESPIRATION RATE: 18 BRPM | DIASTOLIC BLOOD PRESSURE: 66 MMHG

## 2017-11-09 VITALS — HEART RATE: 78 BPM

## 2017-11-09 VITALS — DIASTOLIC BLOOD PRESSURE: 63 MMHG | SYSTOLIC BLOOD PRESSURE: 131 MMHG | RESPIRATION RATE: 18 BRPM

## 2017-11-09 VITALS — SYSTOLIC BLOOD PRESSURE: 119 MMHG | DIASTOLIC BLOOD PRESSURE: 53 MMHG | RESPIRATION RATE: 19 BRPM

## 2017-11-09 VITALS — HEART RATE: 79 BPM

## 2017-11-09 VITALS — HEART RATE: 93 BPM

## 2017-11-09 LAB
ABNORMAL IP MESSAGE: 1
ANION GAP SERPL CALC-SCNC: 15 MMOL/L (ref 8–16)
BASOPHILS # BLD AUTO: 0 10^3/UL (ref 0–0.1)
BASOPHILS NFR BLD: 0.3 % (ref 0–2)
BUN SERPL-MCNC: 13 MG/DL (ref 7–20)
CALCIUM SERPL-MCNC: 8.7 MG/DL (ref 8.4–10.2)
CHLORIDE SERPL-SCNC: 100 MMOL/L (ref 97–110)
CHOLEST SERPL-MCNC: 169 MG/DL (ref 100–200)
CHOLEST/HDLC SERPL: 1.8 RATIO
CK MB SERPL-MCNC: 1.27 NG/ML (ref 0–2.4)
CK SERPL-CCNC: 54 IU/L (ref 23–200)
CO2 SERPL-SCNC: 35 MMOL/L (ref 21–31)
CREAT SERPL-MCNC: 0.76 MG/DL (ref 0.61–1.24)
EOSINOPHIL # BLD: 0 10^3/UL (ref 0–0.5)
EOSINOPHIL NFR BLD: 0.1 % (ref 0–7)
ERYTHROCYTE [DISTWIDTH] IN BLOOD BY AUTOMATED COUNT: 17 % (ref 11.5–14.5)
GLUCOSE SERPL-MCNC: 126 MG/DL (ref 70–220)
HCT VFR BLD CALC: 37.4 % (ref 42–52)
HDLC SERPL-MCNC: 90 MG/DL (ref 30–78)
HGB BLD-MCNC: 9.9 G/DL (ref 14–18)
LYMPHOCYTES # BLD AUTO: 1.2 10^3/UL (ref 0.8–2.9)
LYMPHOCYTES NFR BLD AUTO: 14.8 % (ref 15–51)
MCH RBC QN AUTO: 23 PG (ref 29–33)
MCHC RBC AUTO-ENTMCNC: 26.5 G/DL (ref 32–37)
MCV RBC AUTO: 87 FL (ref 82–101)
MONOCYTES # BLD: 0.8 10^3/UL (ref 0.3–0.9)
MONOCYTES NFR BLD: 10.5 % (ref 0–11)
NEUTROPHILS # BLD: 5.9 10^3/UL (ref 1.6–7.5)
NEUTROPHILS NFR BLD AUTO: 74 % (ref 39–77)
NRBC # BLD MANUAL: 0 10^3/UL (ref 0–0)
NRBC BLD AUTO-RTO: 0.3 /100WBC (ref 0–0)
PLATELET # BLD: 293 10^3/UL (ref 140–415)
PMV BLD AUTO: 8.9 FL (ref 7.4–10.4)
POSITIVE DIFF: (no result)
POTASSIUM SERPL-SCNC: 4.5 MMOL/L (ref 3.5–5.1)
RBC # BLD AUTO: 4.3 10^6/UL (ref 4.7–6.1)
SODIUM SERPL-SCNC: 145 MMOL/L (ref 135–144)
TRIGL SERPL-MCNC: 58 MG/DL (ref 0–149)
TROPONIN I SERPL-MCNC: < 0.012 NG/ML (ref 0–0.12)
WBC # BLD AUTO: 7.9 10^3/UL (ref 4.8–10.8)

## 2017-11-09 RX ADMIN — DEXAMETHASONE SODIUM PHOSPHATE PRN MG: 10 INJECTION, SOLUTION INTRAMUSCULAR; INTRAVENOUS at 20:12

## 2017-11-09 RX ADMIN — LEVALBUTEROL HYDROCHLORIDE SCH MG: 0.63 SOLUTION RESPIRATORY (INHALATION) at 13:43

## 2017-11-09 RX ADMIN — ASPIRIN 81 MG SCH MG: 81 TABLET ORAL at 08:55

## 2017-11-09 RX ADMIN — OXYCODONE HYDROCHLORIDE AND ACETAMINOPHEN SCH MG: 500 TABLET ORAL at 08:57

## 2017-11-09 RX ADMIN — THERA TABS SCH TAB: TAB at 08:57

## 2017-11-09 RX ADMIN — IBUPROFEN PRN MG: 400 TABLET ORAL at 08:59

## 2017-11-09 RX ADMIN — IBUPROFEN PRN MG: 400 TABLET ORAL at 20:13

## 2017-11-09 RX ADMIN — POTASSIUM CHLORIDE SCH MEQ: 10 TABLET, EXTENDED RELEASE ORAL at 08:55

## 2017-11-09 RX ADMIN — MORPHINE SULFATE SCH MG: 15 TABLET, EXTENDED RELEASE ORAL at 20:24

## 2017-11-09 RX ADMIN — ENOXAPARIN SODIUM SCH MG: 100 INJECTION SUBCUTANEOUS at 09:00

## 2017-11-09 RX ADMIN — GABAPENTIN SCH MG: 300 CAPSULE ORAL at 14:13

## 2017-11-09 RX ADMIN — LISINOPRIL SCH MG: 20 TABLET ORAL at 08:58

## 2017-11-09 RX ADMIN — VITAMIN D, TAB 1000IU (100/BT) SCH UNIT: 25 TAB at 08:57

## 2017-11-09 RX ADMIN — METOPROLOL TARTRATE SCH MG: 50 TABLET, FILM COATED ORAL at 08:56

## 2017-11-09 RX ADMIN — MORPHINE SULFATE SCH MG: 15 TABLET, EXTENDED RELEASE ORAL at 22:41

## 2017-11-09 RX ADMIN — MORPHINE SULFATE PRN MG: 2 INJECTION, SOLUTION INTRAMUSCULAR; INTRAVENOUS at 20:10

## 2017-11-09 RX ADMIN — METOPROLOL TARTRATE SCH MG: 50 TABLET, FILM COATED ORAL at 20:15

## 2017-11-09 RX ADMIN — MORPHINE SULFATE SCH MG: 15 TABLET, EXTENDED RELEASE ORAL at 20:15

## 2017-11-09 RX ADMIN — PANTOPRAZOLE SODIUM SCH MG: 40 TABLET, DELAYED RELEASE ORAL at 06:36

## 2017-11-09 RX ADMIN — LEVALBUTEROL HYDROCHLORIDE SCH MG: 0.63 SOLUTION RESPIRATORY (INHALATION) at 07:52

## 2017-11-09 RX ADMIN — AMLODIPINE BESYLATE SCH MG: 10 TABLET ORAL at 08:59

## 2017-11-09 RX ADMIN — CEFTRIAXONE SCH MLS/HR: 2 INJECTION, SOLUTION INTRAVENOUS at 16:30

## 2017-11-09 RX ADMIN — MORPHINE SULFATE SCH MG: 15 TABLET, EXTENDED RELEASE ORAL at 08:56

## 2017-11-09 RX ADMIN — LEVALBUTEROL HYDROCHLORIDE SCH MG: 0.63 SOLUTION RESPIRATORY (INHALATION) at 20:49

## 2017-11-09 RX ADMIN — DEXAMETHASONE SODIUM PHOSPHATE PRN MG: 10 INJECTION, SOLUTION INTRAMUSCULAR; INTRAVENOUS at 15:21

## 2017-11-09 RX ADMIN — MORPHINE SULFATE PRN MG: 2 INJECTION, SOLUTION INTRAMUSCULAR; INTRAVENOUS at 15:21

## 2017-11-09 RX ADMIN — GABAPENTIN SCH MG: 300 CAPSULE ORAL at 20:16

## 2017-11-09 RX ADMIN — ATORVASTATIN CALCIUM SCH MG: 20 TABLET, FILM COATED ORAL at 20:15

## 2017-11-09 RX ADMIN — GABAPENTIN SCH MG: 300 CAPSULE ORAL at 08:58

## 2017-11-09 RX ADMIN — DEXAMETHASONE SODIUM PHOSPHATE PRN MG: 10 INJECTION, SOLUTION INTRAMUSCULAR; INTRAVENOUS at 10:34

## 2017-11-09 RX ADMIN — MORPHINE SULFATE PRN MG: 2 INJECTION, SOLUTION INTRAMUSCULAR; INTRAVENOUS at 10:35

## 2017-11-09 RX ADMIN — TAMSULOSIN HYDROCHLORIDE SCH MG: 0.4 CAPSULE ORAL at 20:15

## 2017-11-09 RX ADMIN — LEVALBUTEROL HYDROCHLORIDE SCH MG: 0.63 SOLUTION RESPIRATORY (INHALATION) at 01:14

## 2017-11-09 NOTE — RADRPT
Vent Rate: 96 bpm

RR Interval: 0 msec

WV Interval: 164 msec

QRS Duration: 100 msec

QT Interval: 370 msec

QTC Interval: 467 msec

P-R-T Axis: 55 - -40 - 57 degrees

 

 Normal sinus rhythm

 Left axis deviation

 Abnormal ECG

 

Electronically Signed By: Doni Covington

[id number]

## 2017-11-09 NOTE — CONS
DATE OF ADMISSION: 11/07/2017

DATE OF CONSULTATION:  11/08/2017

 

 

 

REASON FOR CONSULTATION:  Chest pain, assess for acute coronary syndrome, as well as shortness of br
eath, assess for congestive heart failure.

 

REQUESTING PHYSICIAN:  John Anaya MD.

 

HISTORY OF PRESENT ILLNESS:  Mr. Zacarias is a 67-year-old male with history of congestive heart failure
, diastolic by most recent echo due to a preserved ejection fraction, recurrent episodes of chest pa
in, hypertension, COPD, obesity, dyslipidemia who presented with complaints of worsening shortness o
f breath, symptoms consistent with orthopnea, and associated substernal chest pain described as a st
abbing sensation occurring at rest.  Initially upon arrival in the emergency department, temperature
 98.5, blood pressure 140/82, pulse 89, respiratory rate 18, saturating 100%.  The patient's labs sh
owed a white count of 6.3, hemoglobin 10.0, platelet count 249, a sodium of 144, potassium 4.3, crea
tinine 0.7, BUN 10, BNP of 41.  Patient underwent a chest x-ray revealing mild bibasilar atelectatic
 change.  The patient's electrocardiogram revealed normal sinus rhythm at 83 with left axis deviatio
n and T-wave flattening isolated to lead aVL.  The patient was subsequently admitted to the floor an
d since admit to floor has been monitored on telemetry, revealing sinus rhythm, no significant arrhy
thmias or pauses and somewhat elevated blood pressures in the high 140s to 150s.

 

PAST MEDICAL HISTORY:  As above in HPI with the patient's last echo being in July 2017 revealing EF 
of 60% at that time with diastolic dysfunction.

 

ALLERGIES:  TRAMADOL.

 

MEDICATIONS CURRENTLY IN HOSPITAL:

1.  Lovenox 40 mg daily.

2.  Flomax 0.4 mg at bedtime.

3.  Lipitor 20 mg at bedtime.

4.  Solu-Medrol 40 mg IV q.12.

5.  Xopenex.

6.  Ceftriaxone IV.

7.  Norvasc 10 mg daily.

8.  Vitamin C.

9.  Aspirin.

10.  Vitamin D.

11.  Colace.

12.  Neurontin.

13.  Lisinopril 20 mg daily.

14.  Metoprolol tartrate 50 mg daily.

15.  Morphine sulfate 50 mg q.12.  

16.  Multivite 1 tab daily.

17.  Potassium chloride 10 mEq daily.

18.  Protonix 40 mg daily.

19.  Lasix ____ mg IV b.i.d.

20.  Zofran p.r.n.

21.  Morphine p.r.n.

22.  Clonidine p.r.n.

23.  Sublingual nitroglycerin.

 

ALLERGIES:  TRAMADOL and ____.

 

SOCIAL HISTORY:  No tobacco, ETOH or illicit drug use.

 

FAMILY HISTORY:  No history of sudden cardiac death or early CAD.

 

REVIEW OF SYSTEMS:  As above in HPI.

CONSTITUTIONAL:  No fevers, chills.

PULMONARY:  Shortness of breath.

CARDIOVASCULAR:  Intermittent chest pain.

GASTROINTESTINAL:  No vomiting.

GENITOURINARY:  No hematuria.

MUSCULOSKELETAL:  Degenerative joint disease.

PSYCHIATRIC:  No documented psych history.

NEUROLOGIC:  No documented history of CVA.

ENDOCRINE:  No documented history of diabetes mellitus.

 

PHYSICAL EXAMINATION:

VITAL SIGNS:  Temperature afebrile, blood pressure most recently 131/____, pulse 88, respiratory rat
e 22, saturating ____%.

GENERAL:  The patient is alert, awake, complaining of shortness of breath, intermittent chest pain.

NECK:  JVP approximately 9 cm water.

CHEST:  Decreased breath sounds at bases bilaterally.

HEART:  Regular rate and rhythm.  Normal S1, S2, I/VI systolic murmur, nondisplaced PMI.

ABDOMEN:  Positive bowel sounds, soft.

EXTREMITIES:  Pitting edema bilaterally, 1+ pulses bilaterally posterior tibial.

 

LABORATORY DATA:  As above in HPI.  No further labs for my review at this time.

 

IMAGING STUDIES:  As above in HPI.  No further imaging studies for my review at this time.

 

ECG:  As above in HPI.  No further electrocardiograms for my review at this time.

 

IMPRESSION:

1.  Chest pain, assess for acute coronary syndrome.

2.  Shortness of breath, assess for congestive heart failure with low BNP at this time.

3.  Diastolic dysfunction by most recent echo 07/2017.

4.  Abnormal electrocardiogram, assess for acute coronary syndrome.

5.  Chronic obstructive pulmonary disease.

6.  Hypertension, labile.

7.  Dyslipidemia.

 

RECOMMENDATIONS:

1.  At this time, would maintain the patient on telemetry monitoring to follow rhythm and rate contr
ol closely.

2.  Complete a rule out for myocardial infarction to ensure the patient's chest pain is not due to a
cute coronary syndrome, acute myocardial infarction thus send troponins q.6 x3.

3.  Continue the patient's current Norvasc, lisinopril and beta blocker to control blood pressure.  
Will change the beta blocker to b.i.d. dosing given half-life of medication to improve overall effic
acy.

4.  Continue the patient's current bronchodilators, steroids and antibiotics for treatment of probab
le COPD exacerbation.

5.  Continue the patient's current statin therapy and adjust it according to a fasting lipid panel.

 

Thank you for allowing me to take part in the care of this patient.  I will continue to follow very 
closely with you.  Further recommendations will be made as the patient progresses through his [facility] clinical course.

 

 

Dictated By: HARLEY MALHOTRA/CORRIE

DD:    11/08/2017 19:20:55

DT:    11/09/2017 00:04:35

Conf#: [id number]

DID#:  [id number]

CC: JOHN ANAYA MD;*EndCC*

## 2017-11-09 NOTE — PN
Date/Time of Note


Date/Time of Note


DATE: 11/9/17 


TIME: 19:58





Assessment/Plan


VTE Prophylaxis


VTE Prophylaxis Intervention:  other





Lines/Catheters


IV Catheter Type (from Nrs):  PICC Line


Central line still needed:  Yes


Urinary Cath still in place:  No





Assessment/Plan


Assessment/Plan


-Chest pain, rule out acute coronary syndrome.  Cardiac enzymes are negative 

3. 


   - per Dr. Manrique  in cardiology consultation.  Continue aspirin.


-Possible acute on chronic diastolic dysfunction congestive heart failure.  

Continue Lasix, monitor electrolytes.


-Possible bronchitis, cont  Rocephin.


-Possible COPD exacerbation, continue breathing treatments and steroids.


-GERD, continue Protonix


-Morbid obesity with BMI of 40


   - dietary consult





Further recommendations based on clinical course.  Plan of care was discussed 

with Dr. Anaya.





Subjective


24 Hr Interval Summary


Free Text/Dictation


sitting up in bed, no s/s/ of pain noted, troponin neg, afebrile, dw staff


Constitutional:  requiring O2


Respiratory:  no complaints


Cardiovascular:  other (generelized pain)


Gastrointestinal:  no complaints


Genitourinary:  no complaints


Musculoskeletal:  no complaints





Exam/Review of Systems


Vital Signs


Vitals





 Vital Signs








  Date Time  Temp Pulse Resp B/P Pulse Ox O2 Delivery O2 Flow Rate FiO2


 


11/9/17 16:00  87      


 


11/9/17 15:33 98.0  18 108/66 90   


 


11/9/17 07:53      Nasal Cannula 4.0 














 Intake and Output   


 


 11/8/17 11/8/17 11/9/17





 14:59 22:59 06:59


 


Intake Total  900 ml 450 ml


 


Output Total  1300 ml 


 


Balance  -400 ml 450 ml











Exam


Constitutional:  alert, obese, oriented


Respiratory:  diminished breath sounds, normal air movement


Cardiovascular:  nl pulses, other (S1S2)


Gastrointestinal:  non-tender, soft


Extremities:  edema


Neurological:  nl mental status, nl speech





Results


Result Diagram:  


11/9/17 1142                                                                   

             11/9/17 1142





Results 24 hrs





Laboratory Tests








Test


  11/9/17


11:42


 


White Blood Count 7.9  #


 


Red Blood Count 4.30  L


 


Hemoglobin 9.9  L


 


Hematocrit 37.4  L


 


Mean Corpuscular Volume 87.0  


 


Mean Corpuscular Hemoglobin 23.0  L


 


Mean Corpuscular Hemoglobin


Concent 26.5  L


 


 


Red Cell Distribution Width 17.0  H


 


Platelet Count 293  


 


Mean Platelet Volume 8.9  


 


Neutrophils % 74.0  


 


Lymphocytes % 14.8  L


 


Monocytes % 10.5  


 


Eosinophils % 0.1  


 


Basophils % 0.3  


 


Nucleated Red Blood Cells % 0.3  H


 


Neutrophils # 5.9  


 


Lymphocytes # 1.2  


 


Monocytes # 0.8  


 


Eosinophils # 0.0  


 


Basophils # 0.0  


 


Nucleated Red Blood Cells # 0.0  


 


Sodium Level 145  H


 


Potassium Level 4.5  


 


Chloride Level 100  


 


Carbon Dioxide Level 35  H


 


Anion Gap 15  


 


Blood Urea Nitrogen 13  


 


Creatinine 0.76  


 


Glucose Level 126  


 


Calcium Level 8.7  


 


Creatine Kinase 54  


 


Creatine Kinase Index 2.4  


 


Creatinine Kinase MB (Mass) 1.27  


 


Troponin I < 0.012  


 


Triglycerides Level 58  


 


Cholesterol Level 169  


 


LDL Cholesterol, Calculated 67  


 


HDL Cholesterol 90  H


 


Cholesterol/HDL Ratio 1.8  











Medications


Medications





 Current Medications


Amlodipine Besylate (Norvasc) 10 mg DAILY PO  Last administered on 11/9/17at 08:

59; Admin Dose 10 MG;  Start 11/8/17 at 09:00


Ascorbic Acid (Vitamin C) 1,000 mg DAILY PO  Last administered on 11/9/17at 08:

57; Admin Dose 1,000 MG;  Start 11/8/17 at 09:00


Aspirin (Aspirin) 81 mg DAILY PO  Last administered on 11/9/17at 08:55; Admin 

Dose 81 MG;  Start 11/8/17 at 09:00


Cholecalciferol (Vitamin D) 1,000 unit DAILY PO  Last administered on 11/9/17at 

08:57; Admin Dose 1,000 UNIT;  Start 11/8/17 at 09:00


Docusate Sodium (Colace) 250 mg BID PO  Last administered on 11/9/17at 08:55; 

Admin Dose 250 MG;  Start 11/8/17 at 09:00


Gabapentin (Neurontin) 900 mg TID PO  Last administered on 11/9/17at 14:13; 

Admin Dose 900 MG;  Start 11/8/17 at 09:00


Lisinopril (Zestril) 20 mg DAILY PO  Last administered on 11/9/17at 08:58; 

Admin Dose 20 MG;  Start 11/8/17 at 09:00


Morphine Sulfate (Ms Contin (Er)) 15 mg Q12 PO  Last administered on 11/9/17at 

08:56; Admin Dose 15 MG;  Start 11/8/17 at 09:00


Multivitamins Therapeutic (Theragran) 1 tab DAILY PO  Last administered on 11/9/ 17at 08:57; Admin Dose 1 TAB;  Start 11/8/17 at 09:00


Potassium Chloride (Klor-Con 10) 10 meq DAILY PO  Last administered on 11/9/ 17at 08:55; Admin Dose 10 MEQ;  Start 11/8/17 at 09:00


Tamsulosin HCl (Flomax) 0.4 mg HS PO  Last administered on 11/8/17at 20:45; 

Admin Dose 0.4 MG;  Start 11/8/17 at 21:00


Atorvastatin Calcium (Lipitor) 20 mg DAILY@21 PO  Last administered on 11/8/ 17at 20:46; Admin Dose 20 MG;  Start 11/8/17 at 21:00


Ondansetron HCl (Zofran Inj) 4 mg Q4H  PRN IV NAUSEA AND/OR VOMITING Last 

administered on 11/9/17at 15:21; Admin Dose 4 MG;  Start 11/8/17 at 03:30


Morphine Sulfate (morphine) 2 mg Q4H  PRN IV PAIN Last administered on 11/9/ 17at 15:21; Admin Dose 2 MG;  Start 11/8/17 at 03:30


Clonidine (Catapres) 0.1 mg Q4H  PRN PO ELEVATED BLOOD PRESSURE;  Start 11/8/17 

at 03:30


Ibuprofen 400 mg 400 mg Q6H  PRN PO PAIN OR TEMP ABOVE 38C Last administered on 

11/9/17at 08:59; Admin Dose 400 MG;  Start 11/8/17 at 14:00


Ceftriaxone Sodium (Rocephin) 50 ml @  100 mls/hr Q24H IVPB  Last administered 

on 11/9/17at 16:30; Admin Dose 100 MLS/HR;  Start 11/8/17 at 16:00


Methylprednisolone Sodium Succinate (Solu-Medrol) 40 mg Q12 IV  Last 

administered on 11/9/17at 08:55; Admin Dose 40 MG;  Start 11/8/17 at 21:00


Enoxaparin Sodium (Lovenox) 40 mg DAILY SC  Last administered on 11/9/17at 09:00

; Admin Dose 40 MG;  Start 11/9/17 at 09:00


IV Flush (NS 10 ml) 10 ml PRN  PRN IV IV PROTOCOL;  Start 11/8/17 at 19:30


Metoprolol Tartrate (Lopressor) 50 mg BID PO  Last administered on 11/9/17at 08:

56; Admin Dose 50 MG;  Start 11/8/17 at 21:00











NIKKI ENCINAS Nov 9, 2017 20:08

## 2017-11-09 NOTE — CONS
Date/Time of Note


Date/Time of Note


DATE: 11/9/17 


TIME: 20:05





Assessment/Plan


Assessment/Plan


Chief Complaint/Hosp Course


IMPRESSION:


1.  Chest pain, assess for acute coronary syndrome.-stabbing by description/

negatuve trop x 3


2.  Shortness of breath, assess for congestive heart failure with low BNP at 

this time.


3.  Diastolic dysfunction by most recent echo 07/2017.


4.  Abnormal electrocardiogram, assess for acute coronary syndrome.


5.  Chronic obstructive pulmonary disease.


6.  Hypertension, labile.


7.  Dyslipidemia.





Recc:


-Tele


-serial ecg's


-Continue lasix diuresis


-Continue BB/norvasc


-Continue asa/statin


-Contineu steroids/bronchodilators


-start oral nitrates


Problems:  





Consultation Date/Type/Reason


Admit Date/Time


Nov 9, 2017 at 11:53


Initial Consult Date


11/9/2017


Type of Consultation:  cardiology


Reason for Consultation


chest pain


Referring Provider:  DEVAUGHN TALAMANTES MD





Exam/Review of Systems


Vital Signs


Vitals





 Vital Signs








  Date Time  Temp Pulse Resp B/P Pulse Ox O2 Delivery O2 Flow Rate FiO2


 


11/9/17 19:58 97.9 93 19 119/53 91   


 


11/9/17 07:53      Nasal Cannula 4.0 














 Intake and Output   


 


 11/8/17 11/8/17 11/9/17





 15:00 23:00 07:00


 


Intake Total  900 ml 450 ml


 


Output Total  1300 ml 


 


Balance  -400 ml 450 ml











Exam


Review of Systems:


CONSTITUTIONAL:  No fevers, chills.


PULMONARY:  No sob


CARDIOVASCULAR: No chest pain/palpitations


GASTROINTESTINAL:  No nausea/vomiting.


GENITOURINARY:  No hematuria/dysuria.


MUSCULOSKELETAL:  No myagias/arthalgias.


PSYCHIATRIC:  The patient denies depression.


NEUROLOGIC:   No weakness


Constitutional:  alert


Psych:  no complaints


Head:  normocephalic


ENMT:  mucosa pink and moist


Neck:  jvd (9 cm water), supple


Respiratory:  diminished breath sounds


Cardiovascular:  regular rate and rhythm


Gastrointestinal:  non-tender, soft


Musculoskeletal:  muscle tone (normal)


Extremities:  edema (none)


Neurological:  other (No focal deficits)





Results


Result Diagram:  


11/9/17 1142                                                                   

             11/9/17 1142





Results 24 hrs





Laboratory Tests








Test


  11/9/17


11:42


 


White Blood Count 7.9  #


 


Red Blood Count 4.30  L


 


Hemoglobin 9.9  L


 


Hematocrit 37.4  L


 


Mean Corpuscular Volume 87.0  


 


Mean Corpuscular Hemoglobin 23.0  L


 


Mean Corpuscular Hemoglobin


Concent 26.5  L


 


 


Red Cell Distribution Width 17.0  H


 


Platelet Count 293  


 


Mean Platelet Volume 8.9  


 


Neutrophils % 74.0  


 


Lymphocytes % 14.8  L


 


Monocytes % 10.5  


 


Eosinophils % 0.1  


 


Basophils % 0.3  


 


Nucleated Red Blood Cells % 0.3  H


 


Neutrophils # 5.9  


 


Lymphocytes # 1.2  


 


Monocytes # 0.8  


 


Eosinophils # 0.0  


 


Basophils # 0.0  


 


Nucleated Red Blood Cells # 0.0  


 


Sodium Level 145  H


 


Potassium Level 4.5  


 


Chloride Level 100  


 


Carbon Dioxide Level 35  H


 


Anion Gap 15  


 


Blood Urea Nitrogen 13  


 


Creatinine 0.76  


 


Glucose Level 126  


 


Calcium Level 8.7  


 


Creatine Kinase 54  


 


Creatine Kinase Index 2.4  


 


Creatinine Kinase MB (Mass) 1.27  


 


Troponin I < 0.012  


 


Triglycerides Level 58  


 


Cholesterol Level 169  


 


LDL Cholesterol, Calculated 67  


 


HDL Cholesterol 90  H


 


Cholesterol/HDL Ratio 1.8  











Medications


Medications





 Current Medications


Amlodipine Besylate (Norvasc) 10 mg DAILY PO  Last administered on 11/9/17at 08:

59; Admin Dose 10 MG;  Start 11/8/17 at 09:00


Ascorbic Acid (Vitamin C) 1,000 mg DAILY PO  Last administered on 11/9/17at 08:

57; Admin Dose 1,000 MG;  Start 11/8/17 at 09:00


Aspirin (Aspirin) 81 mg DAILY PO  Last administered on 11/9/17at 08:55; Admin 

Dose 81 MG;  Start 11/8/17 at 09:00


Cholecalciferol (Vitamin D) 1,000 unit DAILY PO  Last administered on 11/9/17at 

08:57; Admin Dose 1,000 UNIT;  Start 11/8/17 at 09:00


Docusate Sodium (Colace) 250 mg BID PO  Last administered on 11/9/17at 08:55; 

Admin Dose 250 MG;  Start 11/8/17 at 09:00


Gabapentin (Neurontin) 900 mg TID PO  Last administered on 11/9/17at 14:13; 

Admin Dose 900 MG;  Start 11/8/17 at 09:00


Lisinopril (Zestril) 20 mg DAILY PO  Last administered on 11/9/17at 08:58; 

Admin Dose 20 MG;  Start 11/8/17 at 09:00


Morphine Sulfate (Ms Contin (Er)) 15 mg Q12 PO  Last administered on 11/9/17at 

08:56; Admin Dose 15 MG;  Start 11/8/17 at 09:00


Multivitamins Therapeutic (Theragran) 1 tab DAILY PO  Last administered on 11/9/ 17at 08:57; Admin Dose 1 TAB;  Start 11/8/17 at 09:00


Potassium Chloride (Klor-Con 10) 10 meq DAILY PO  Last administered on 11/9/ 17at 08:55; Admin Dose 10 MEQ;  Start 11/8/17 at 09:00


Tamsulosin HCl (Flomax) 0.4 mg HS PO  Last administered on 11/8/17at 20:45; 

Admin Dose 0.4 MG;  Start 11/8/17 at 21:00


Atorvastatin Calcium (Lipitor) 20 mg DAILY@21 PO  Last administered on 11/8/ 17at 20:46; Admin Dose 20 MG;  Start 11/8/17 at 21:00


Ondansetron HCl (Zofran Inj) 4 mg Q4H  PRN IV NAUSEA AND/OR VOMITING Last 

administered on 11/9/17at 15:21; Admin Dose 4 MG;  Start 11/8/17 at 03:30


Morphine Sulfate (morphine) 2 mg Q4H  PRN IV PAIN Last administered on 11/9/ 17at 15:21; Admin Dose 2 MG;  Start 11/8/17 at 03:30


Clonidine (Catapres) 0.1 mg Q4H  PRN PO ELEVATED BLOOD PRESSURE;  Start 11/8/17 

at 03:30


Ibuprofen 400 mg 400 mg Q6H  PRN PO PAIN OR TEMP ABOVE 38C Last administered on 

11/9/17at 08:59; Admin Dose 400 MG;  Start 11/8/17 at 14:00


Ceftriaxone Sodium (Rocephin) 50 ml @  100 mls/hr Q24H IVPB  Last administered 

on 11/9/17at 16:30; Admin Dose 100 MLS/HR;  Start 11/8/17 at 16:00


Methylprednisolone Sodium Succinate (Solu-Medrol) 40 mg Q12 IV  Last 

administered on 11/9/17at 08:55; Admin Dose 40 MG;  Start 11/8/17 at 21:00


Enoxaparin Sodium (Lovenox) 40 mg DAILY SC  Last administered on 11/9/17at 09:00

; Admin Dose 40 MG;  Start 11/9/17 at 09:00


IV Flush (NS 10 ml) 10 ml PRN  PRN IV IV PROTOCOL;  Start 11/8/17 at 19:30


Metoprolol Tartrate (Lopressor) 50 mg BID PO  Last administered on 11/9/17at 08:

56; Admin Dose 50 MG;  Start 11/8/17 at 21:00











HARLEY ADAMS Nov 9, 2017 20:12

## 2017-11-10 VITALS — SYSTOLIC BLOOD PRESSURE: 131 MMHG | DIASTOLIC BLOOD PRESSURE: 66 MMHG | RESPIRATION RATE: 18 BRPM

## 2017-11-10 VITALS — DIASTOLIC BLOOD PRESSURE: 73 MMHG | SYSTOLIC BLOOD PRESSURE: 135 MMHG | RESPIRATION RATE: 19 BRPM

## 2017-11-10 VITALS — HEART RATE: 89 BPM

## 2017-11-10 VITALS — RESPIRATION RATE: 18 BRPM | SYSTOLIC BLOOD PRESSURE: 144 MMHG | DIASTOLIC BLOOD PRESSURE: 70 MMHG

## 2017-11-10 VITALS — HEART RATE: 90 BPM

## 2017-11-10 VITALS — HEART RATE: 84 BPM

## 2017-11-10 VITALS — RESPIRATION RATE: 19 BRPM | SYSTOLIC BLOOD PRESSURE: 133 MMHG | DIASTOLIC BLOOD PRESSURE: 57 MMHG

## 2017-11-10 VITALS — HEART RATE: 92 BPM

## 2017-11-10 VITALS — SYSTOLIC BLOOD PRESSURE: 131 MMHG | RESPIRATION RATE: 19 BRPM | DIASTOLIC BLOOD PRESSURE: 70 MMHG

## 2017-11-10 VITALS — HEART RATE: 95 BPM

## 2017-11-10 PROCEDURE — 02HV33Z INSERTION OF INFUSION DEVICE INTO SUPERIOR VENA CAVA, PERCUTANEOUS APPROACH: ICD-10-PCS

## 2017-11-10 PROCEDURE — 3E0F73Z INTRODUCTION OF ANTI-INFLAMMATORY INTO RESPIRATORY TRACT, VIA NATURAL OR ARTIFICIAL OPENING: ICD-10-PCS

## 2017-11-10 RX ADMIN — AMLODIPINE BESYLATE SCH MG: 10 TABLET ORAL at 10:34

## 2017-11-10 RX ADMIN — GABAPENTIN SCH MG: 300 CAPSULE ORAL at 20:51

## 2017-11-10 RX ADMIN — TAMSULOSIN HYDROCHLORIDE SCH MG: 0.4 CAPSULE ORAL at 20:51

## 2017-11-10 RX ADMIN — ENOXAPARIN SODIUM SCH MG: 100 INJECTION SUBCUTANEOUS at 10:19

## 2017-11-10 RX ADMIN — GABAPENTIN SCH MG: 300 CAPSULE ORAL at 13:01

## 2017-11-10 RX ADMIN — THERA TABS SCH TAB: TAB at 10:31

## 2017-11-10 RX ADMIN — ASPIRIN 81 MG SCH MG: 81 TABLET ORAL at 10:34

## 2017-11-10 RX ADMIN — VITAMIN D, TAB 1000IU (100/BT) SCH UNIT: 25 TAB at 10:34

## 2017-11-10 RX ADMIN — MORPHINE SULFATE SCH MG: 15 TABLET, EXTENDED RELEASE ORAL at 10:35

## 2017-11-10 RX ADMIN — IBUPROFEN PRN MG: 400 TABLET ORAL at 22:12

## 2017-11-10 RX ADMIN — DEXAMETHASONE SODIUM PHOSPHATE PRN MG: 10 INJECTION, SOLUTION INTRAMUSCULAR; INTRAVENOUS at 22:12

## 2017-11-10 RX ADMIN — ISOSORBIDE MONONITRATE SCH MG: 30 TABLET, EXTENDED RELEASE ORAL at 10:36

## 2017-11-10 RX ADMIN — MORPHINE SULFATE SCH MG: 15 TABLET, EXTENDED RELEASE ORAL at 20:48

## 2017-11-10 RX ADMIN — LEVALBUTEROL HYDROCHLORIDE SCH MG: 0.63 SOLUTION RESPIRATORY (INHALATION) at 13:00

## 2017-11-10 RX ADMIN — POTASSIUM CHLORIDE SCH MEQ: 10 TABLET, EXTENDED RELEASE ORAL at 10:34

## 2017-11-10 RX ADMIN — LEVALBUTEROL HYDROCHLORIDE SCH MG: 0.63 SOLUTION RESPIRATORY (INHALATION) at 01:30

## 2017-11-10 RX ADMIN — METOPROLOL TARTRATE SCH MG: 50 TABLET, FILM COATED ORAL at 10:33

## 2017-11-10 RX ADMIN — CEFTRIAXONE SCH MLS/HR: 2 INJECTION, SOLUTION INTRAVENOUS at 16:03

## 2017-11-10 RX ADMIN — ATORVASTATIN CALCIUM SCH MG: 20 TABLET, FILM COATED ORAL at 20:51

## 2017-11-10 RX ADMIN — LEVALBUTEROL HYDROCHLORIDE SCH MG: 0.63 SOLUTION RESPIRATORY (INHALATION) at 08:00

## 2017-11-10 RX ADMIN — LISINOPRIL SCH MG: 20 TABLET ORAL at 10:33

## 2017-11-10 RX ADMIN — MORPHINE SULFATE PRN MG: 2 INJECTION, SOLUTION INTRAMUSCULAR; INTRAVENOUS at 05:00

## 2017-11-10 RX ADMIN — IBUPROFEN PRN MG: 400 TABLET ORAL at 16:02

## 2017-11-10 RX ADMIN — PANTOPRAZOLE SODIUM SCH MG: 40 TABLET, DELAYED RELEASE ORAL at 06:18

## 2017-11-10 RX ADMIN — MORPHINE SULFATE PRN MG: 2 INJECTION, SOLUTION INTRAMUSCULAR; INTRAVENOUS at 14:15

## 2017-11-10 RX ADMIN — LEVALBUTEROL HYDROCHLORIDE SCH MG: 0.63 SOLUTION RESPIRATORY (INHALATION) at 19:28

## 2017-11-10 RX ADMIN — OXYCODONE HYDROCHLORIDE AND ACETAMINOPHEN SCH MG: 500 TABLET ORAL at 10:34

## 2017-11-10 RX ADMIN — METOPROLOL TARTRATE SCH MG: 50 TABLET, FILM COATED ORAL at 20:52

## 2017-11-10 RX ADMIN — GABAPENTIN SCH MG: 300 CAPSULE ORAL at 10:32

## 2017-11-10 NOTE — RADRPT
PROCEDURE:   XR Chest. 

 

CLINICAL INDICATION:  PICC line placement

 

TECHNIQUE:  Single portable view of the chest was obtained. 

 

COMPARISON:   11/10/2017 and additional prior studies 

 

FINDINGS:

 

Cardiac/vascular structures:  Stable cardiomediastinal silhouette. 

 

Pulmonary: Similar bibasilar air space opacities. Small bilateral pleural effusions. No evidence of 
pneumothorax.

 

Osseous structures:  Normal

 

Soft tissues: Normal

 

Left PICC tip over the cavoatrial junction.

 

IMPRESSION:

Left PICC tip over the cavoatrial junction.

Similar bibasilar air space opacities and small bilateral pleural effusions.

 

 

RPTAT:AAJJ

_____________________________________________ 

Physician Gabrielle           Date    Time 

Electronically viewed and signed by Physician Gabrielle on 11/10/2017 16:21 

 

D:  11/10/2017 16:21  T:  11/10/2017 16:21

/

## 2017-11-10 NOTE — CONS
Date/Time of Note


Date/Time of Note


DATE: 11/10/17 


TIME: 14:53





Assessment/Plan


Assessment/Plan


Additional Assessment/Plan


1.  Chest pain, assess for acute coronary syndrome.-stabbing by description/

negatuve trop x 3 - no new sx now, will monitor clinically. 


2.  Shortness of breath, assess for congestive heart failure with low BNP at 

this time - better now. 


3.  Diastolic dysfunction by most recent echo 07/2017- chronic. 


4.  Abnormal electrocardiogram, assess for acute coronary syndrome.


5.  Chronic obstructive pulmonary disease- con't resp Rx. 


6.  Hypertension, labile- will adjust Rx as needed. 


7.  Dyslipidemia.





Consultation Date/Type/Reason


Admit Date/Time


Nov 9, 2017 at 11:53


Initial Consult Date





Type of Consultation:  cardiology


Referring Provider:  DEVAUGHN TALAMANTES MD





24 HR Interval Summary


Free Text/Dictation


No acute events - no ectopy on tele now. 





ROS: No fever, no chills, no nausea, no vomiting, no diarrhea/constipation


        No recent weight changes


        No chest pain, no PND, no orthopnea


        No dizziness, blurred vision


        No thirst, no heat or cold intolerance





Exam/Review of Systems


Vital Signs


Vitals





 Vital Signs








  Date Time  Temp Pulse Resp B/P Pulse Ox O2 Delivery O2 Flow Rate FiO2


 


11/10/17 12:00  89      


 


11/10/17 11:56 98.4  19 131/70 96   


 


11/10/17 10:18       3.0 


 


11/10/17 08:00      Nasal Cannula  32














 Intake and Output   


 


 11/9/17 11/9/17 11/10/17





 15:00 23:00 07:00


 


Intake Total  850 ml 400 ml


 


Output Total  500 ml 


 


Balance  350 ml 400 ml











Exam


General: WN/WD/NAD, AOx 2-3


HEENT: Unicetric/atraumatic/EOMI (follows commands)


NECK: JVD elevated, no thyromegaly


Lymph: no lymphadenopathy


HEART: regular with no S3, II/VI systolic murmur at apex


LUNGS: Coarse sounds


ABD: soft, NT, ND, +BS


: Intact


Neuro: non focal


SKIN: chronic changes


EXT: trace edema





Results


Result Diagram:  


11/9/17 1142                                                                   

             11/9/17 1142








Medications


Medications





 Current Medications


Amlodipine Besylate (Norvasc) 10 mg DAILY PO  Last administered on 11/10/17at 10

:34; Admin Dose 10 MG;  Start 11/8/17 at 09:00


Ascorbic Acid (Vitamin C) 1,000 mg DAILY PO  Last administered on 11/10/17at 10:

34; Admin Dose 1,000 MG;  Start 11/8/17 at 09:00


Aspirin (Aspirin) 81 mg DAILY PO  Last administered on 11/10/17at 10:34; Admin 

Dose 81 MG;  Start 11/8/17 at 09:00


Cholecalciferol (Vitamin D) 1,000 unit DAILY PO  Last administered on 11/10/

17at 10:34; Admin Dose 1,000 UNIT;  Start 11/8/17 at 09:00


Docusate Sodium (Colace) 250 mg BID PO  Last administered on 11/10/17at 10:32; 

Admin Dose 250 MG;  Start 11/8/17 at 09:00


Gabapentin (Neurontin) 900 mg TID PO  Last administered on 11/10/17at 13:01; 

Admin Dose 900 MG;  Start 11/8/17 at 09:00


Lisinopril (Zestril) 20 mg DAILY PO  Last administered on 11/10/17at 10:33; 

Admin Dose 20 MG;  Start 11/8/17 at 09:00


Morphine Sulfate (Ms Contin (Er)) 15 mg Q12 PO  Last administered on 11/10/17at 

10:35; Admin Dose 15 MG;  Start 11/8/17 at 09:00


Multivitamins Therapeutic (Theragran) 1 tab DAILY PO  Last administered on 11/10

/17at 10:31; Admin Dose 1 TAB;  Start 11/8/17 at 09:00


Potassium Chloride (Klor-Con 10) 10 meq DAILY PO  Last administered on 11/10/

17at 10:34; Admin Dose 10 MEQ;  Start 11/8/17 at 09:00


Tamsulosin HCl (Flomax) 0.4 mg HS PO  Last administered on 11/9/17at 20:15; 

Admin Dose 0.4 MG;  Start 11/8/17 at 21:00


Atorvastatin Calcium (Lipitor) 20 mg DAILY@21 PO  Last administered on 11/9/ 17at 20:15; Admin Dose 20 MG;  Start 11/8/17 at 21:00


Ondansetron HCl (Zofran Inj) 4 mg Q4H  PRN IV NAUSEA AND/OR VOMITING Last 

administered on 11/9/17at 20:12; Admin Dose 4 MG;  Start 11/8/17 at 03:30


Morphine Sulfate (morphine) 2 mg Q4H  PRN IV PAIN Last administered on 11/10/

17at 14:15; Admin Dose 2 MG;  Start 11/8/17 at 03:30


Clonidine (Catapres) 0.1 mg Q4H  PRN PO ELEVATED BLOOD PRESSURE;  Start 11/8/17 

at 03:30


Ibuprofen 400 mg 400 mg Q6H  PRN PO PAIN OR TEMP ABOVE 38C Last administered on 

11/9/17at 20:13; Admin Dose 400 MG;  Start 11/8/17 at 14:00


Ceftriaxone Sodium (Rocephin) 50 ml @  100 mls/hr Q24H IVPB  Last administered 

on 11/9/17at 16:30; Admin Dose 100 MLS/HR;  Start 11/8/17 at 16:00


Methylprednisolone Sodium Succinate (Solu-Medrol) 40 mg Q12 IV  Last 

administered on 11/10/17at 10:36; Admin Dose 40 MG;  Start 11/8/17 at 21:00


Enoxaparin Sodium (Lovenox) 40 mg DAILY SC  Last administered on 11/10/17at 10:

19; Admin Dose 40 MG;  Start 11/9/17 at 09:00


IV Flush (NS 10 ml) 10 ml PRN  PRN IV IV PROTOCOL;  Start 11/8/17 at 19:30


Metoprolol Tartrate (Lopressor) 50 mg BID PO  Last administered on 11/10/17at 10

:33; Admin Dose 50 MG;  Start 11/8/17 at 21:00


Isosorbide Mononitrate (Imdur) 30 mg DAILY PO ;  Start 11/10/17 at 09:00











ASH LOW MD Nov 10, 2017 14:55

## 2017-11-10 NOTE — RADRPT
PROCEDURE:   XR Chest. 

 

CLINICAL INDICATION:   PICC line placement

 

TECHNIQUE:   A single AP view of the chest was obtained.  

 

COMPARISON:   Chest x-ray dated 11/08/2017  

 

FINDINGS:

 

 

 

Lung volumes are low with compressive changes and crowding of the central pulmonary vascular marking
s with bibasilar atelectasis . No focal airspace opacity, pleural effusion or pneumothorax is seen. 
 The cardiomediastinal silhouette is within normal limits for size.  The osseous structures are unre
markable.  

 

IMPRESSION:

 

1.  Low lung volumes with compressive changes and bibasilar atelectasis. 

2.  The left upper extremity PICC line tip is within the left axillary soft tissues in the region of
 the left axillary vein.  

 

 

RPTAT: HH

_____________________________________________ 

.Elisabeth Walters MD, MD           Date    Time 

Electronically viewed and signed by .Elisabeth Walters MD, MD on 11/10/2017 14:05 

 

D:  11/10/2017 14:05  T:  11/10/2017 14:05

.G/

## 2017-11-10 NOTE — PN
Date/Time of Note


Date/Time of Note


DATE: 11/10/17 


TIME: 16:48





Assessment/Plan


VTE Prophylaxis


VTE Prophylaxis Intervention:  LMWH





Lines/Catheters


IV Catheter Type (from [facility]):  PICC Line


Central line still needed:  Yes


Urinary Cath still in place:  No





Assessment/Plan


Chief Complaint/Hosp Course


Assessment/Plan


-Chest pain, rule out acute coronary syndrome.  Cardiac enzymes are negative 

3. Dr. Manrique is asked to see patient in cardiology consultation.  Continue 

aspirin.


-Possible acute on chronic diastolic dysfunction congestive heart failure.  

Continue Lasix, monitor electrolytes.


-Possible bronchitis, start Rocephin.


-Possible COPD exacerbation, continue breathing treatments and steroids.


-GERD, continue Protonix


-Morbid obesity with BMI of 40





Further recommendations based on clinical course.  Plan of care was discussed 

with Dr. Anaya.


Problems:  





Exam/Review of Systems


Vital Signs


Vitals





 Vital Signs








  Date Time  Temp Pulse Resp B/P Pulse Ox O2 Delivery O2 Flow Rate FiO2


 


11/10/17 16:43 97.9 89 18 131/66 98   


 


11/10/17 14:56       3.0 


 


11/10/17 13:00      Nasal Cannula  21














 Intake and Output   


 


 11/9/17 11/9/17 11/10/17





 15:00 23:00 07:00


 


Intake Total  850 ml 400 ml


 


Output Total  500 ml 


 


Balance  350 ml 400 ml











Exam


Constitutional:  alert, oriented


Head:  atraumatic, normocephalic


Neck:  supple


Respiratory:  diminished breath sounds


Cardiovascular:  nl pulses, regular rate and rhythm


Gastrointestinal:  non-tender, soft


Extremities:  edema, normal pulses


Neurological:  nl mental status


Skin:  nl turgor





Results


Result Diagram:  


11/9/17 1142                                                                   

             11/9/17 1142








Medications


Medications





 Current Medications


Amlodipine Besylate (Norvasc) 10 mg DAILY PO  Last administered on 11/10/17at 10

:34; Admin Dose 10 MG;  Start 11/8/17 at 09:00


Ascorbic Acid (Vitamin C) 1,000 mg DAILY PO  Last administered on 11/10/17at 10:

34; Admin Dose 1,000 MG;  Start 11/8/17 at 09:00


Aspirin (Aspirin) 81 mg DAILY PO  Last administered on 11/10/17at 10:34; Admin 

Dose 81 MG;  Start 11/8/17 at 09:00


Cholecalciferol (Vitamin D) 1,000 unit DAILY PO  Last administered on 11/10/

17at 10:34; Admin Dose 1,000 UNIT;  Start 11/8/17 at 09:00


Docusate Sodium (Colace) 250 mg BID PO  Last administered on 11/10/17at 10:32; 

Admin Dose 250 MG;  Start 11/8/17 at 09:00


Gabapentin (Neurontin) 900 mg TID PO  Last administered on 11/10/17at 13:01; 

Admin Dose 900 MG;  Start 11/8/17 at 09:00


Lisinopril (Zestril) 20 mg DAILY PO  Last administered on 11/10/17at 10:33; 

Admin Dose 20 MG;  Start 11/8/17 at 09:00


Morphine Sulfate (Ms Contin (Er)) 15 mg Q12 PO  Last administered on 11/10/17at 

10:35; Admin Dose 15 MG;  Start 11/8/17 at 09:00


Multivitamins Therapeutic (Theragran) 1 tab DAILY PO  Last administered on 11/10

/17at 10:31; Admin Dose 1 TAB;  Start 11/8/17 at 09:00


Potassium Chloride (Klor-Con 10) 10 meq DAILY PO  Last administered on 11/10/

17at 10:34; Admin Dose 10 MEQ;  Start 11/8/17 at 09:00


Tamsulosin HCl (Flomax) 0.4 mg HS PO  Last administered on 11/9/17at 20:15; 

Admin Dose 0.4 MG;  Start 11/8/17 at 21:00


Atorvastatin Calcium (Lipitor) 20 mg DAILY@21 PO  Last administered on 11/9/ 17at 20:15; Admin Dose 20 MG;  Start 11/8/17 at 21:00


Ondansetron HCl (Zofran Inj) 4 mg Q4H  PRN IV NAUSEA AND/OR VOMITING Last 

administered on 11/9/17at 20:12; Admin Dose 4 MG;  Start 11/8/17 at 03:30


Morphine Sulfate (morphine) 2 mg Q4H  PRN IV PAIN Last administered on 11/10/

17at 14:15; Admin Dose 2 MG;  Start 11/8/17 at 03:30


Clonidine (Catapres) 0.1 mg Q4H  PRN PO ELEVATED BLOOD PRESSURE;  Start 11/8/17 

at 03:30


Ibuprofen 400 mg 400 mg Q6H  PRN PO PAIN OR TEMP ABOVE 38C Last administered on 

11/10/17at 16:02; Admin Dose 400 MG;  Start 11/8/17 at 14:00


Ceftriaxone Sodium (Rocephin) 50 ml @  100 mls/hr Q24H IVPB  Last administered 

on 11/10/17at 16:03; Admin Dose 100 MLS/HR;  Start 11/8/17 at 16:00


Methylprednisolone Sodium Succinate (Solu-Medrol) 40 mg Q12 IV  Last 

administered on 11/10/17at 10:36; Admin Dose 40 MG;  Start 11/8/17 at 21:00


Enoxaparin Sodium (Lovenox) 40 mg DAILY SC  Last administered on 11/10/17at 10:

19; Admin Dose 40 MG;  Start 11/9/17 at 09:00


IV Flush (NS 10 ml) 10 ml PRN  PRN IV IV PROTOCOL;  Start 11/8/17 at 19:30


Metoprolol Tartrate (Lopressor) 50 mg BID PO  Last administered on 11/10/17at 10

:33; Admin Dose 50 MG;  Start 11/8/17 at 21:00


Isosorbide Mononitrate (Imdur) 30 mg DAILY PO ;  Start 11/10/17 at 09:00


IV Flush (NS 10 ml) 10 ml PRN  PRN IV IV PROTOCOL;  Start 11/10/17 at 15:30











DARIO NORRIS Nov 10, 2017 16:49

## 2017-11-11 VITALS — RESPIRATION RATE: 21 BRPM | SYSTOLIC BLOOD PRESSURE: 139 MMHG | DIASTOLIC BLOOD PRESSURE: 69 MMHG

## 2017-11-11 VITALS — RESPIRATION RATE: 20 BRPM | SYSTOLIC BLOOD PRESSURE: 157 MMHG | DIASTOLIC BLOOD PRESSURE: 76 MMHG

## 2017-11-11 VITALS — SYSTOLIC BLOOD PRESSURE: 127 MMHG | RESPIRATION RATE: 18 BRPM | DIASTOLIC BLOOD PRESSURE: 75 MMHG

## 2017-11-11 VITALS — SYSTOLIC BLOOD PRESSURE: 113 MMHG | DIASTOLIC BLOOD PRESSURE: 57 MMHG | RESPIRATION RATE: 22 BRPM | HEART RATE: 85 BPM

## 2017-11-11 VITALS — RESPIRATION RATE: 18 BRPM | DIASTOLIC BLOOD PRESSURE: 68 MMHG | SYSTOLIC BLOOD PRESSURE: 116 MMHG

## 2017-11-11 VITALS — HEART RATE: 79 BPM | DIASTOLIC BLOOD PRESSURE: 66 MMHG | SYSTOLIC BLOOD PRESSURE: 118 MMHG | RESPIRATION RATE: 22 BRPM

## 2017-11-11 VITALS — HEART RATE: 84 BPM

## 2017-11-11 VITALS — HEART RATE: 81 BPM

## 2017-11-11 VITALS — HEART RATE: 75 BPM

## 2017-11-11 VITALS — HEART RATE: 72 BPM

## 2017-11-11 LAB
ABNORMAL IP MESSAGE: 1
ANION GAP SERPL CALC-SCNC: 13 MMOL/L (ref 8–16)
BASOPHILS # BLD AUTO: 0 10^3/UL (ref 0–0.1)
BASOPHILS NFR BLD: 0.1 % (ref 0–2)
BUN SERPL-MCNC: 18 MG/DL (ref 7–20)
CALCIUM SERPL-MCNC: 8.5 MG/DL (ref 8.4–10.2)
CHLORIDE SERPL-SCNC: 95 MMOL/L (ref 97–110)
CO2 SERPL-SCNC: 40 MMOL/L (ref 21–31)
CREAT SERPL-MCNC: 0.76 MG/DL (ref 0.61–1.24)
EOSINOPHIL # BLD: 0 10^3/UL (ref 0–0.5)
EOSINOPHIL NFR BLD: 0 % (ref 0–7)
ERYTHROCYTE [DISTWIDTH] IN BLOOD BY AUTOMATED COUNT: 16.7 % (ref 11.5–14.5)
GLUCOSE SERPL-MCNC: 94 MG/DL (ref 70–220)
HCT VFR BLD CALC: 36.5 % (ref 42–52)
HGB BLD-MCNC: 10 G/DL (ref 14–18)
LYMPHOCYTES # BLD AUTO: 1 10^3/UL (ref 0.8–2.9)
LYMPHOCYTES NFR BLD AUTO: 10.2 % (ref 15–51)
MCH RBC QN AUTO: 23.9 PG (ref 29–33)
MCHC RBC AUTO-ENTMCNC: 27.4 G/DL (ref 32–37)
MCV RBC AUTO: 87.1 FL (ref 82–101)
MONOCYTES # BLD: 0.8 10^3/UL (ref 0.3–0.9)
MONOCYTES NFR BLD: 8.1 % (ref 0–11)
NEUTROPHILS # BLD: 8.1 10^3/UL (ref 1.6–7.5)
NEUTROPHILS NFR BLD AUTO: 80.6 % (ref 39–77)
NRBC # BLD MANUAL: 0.1 10^3/UL (ref 0–0)
NRBC BLD AUTO-RTO: 0.9 /100WBC (ref 0–0)
PLATELET # BLD: 303 10^3/UL (ref 140–415)
PMV BLD AUTO: 9.5 FL (ref 7.4–10.4)
POSITIVE DIFF: (no result)
POTASSIUM SERPL-SCNC: 4.7 MMOL/L (ref 3.5–5.1)
RBC # BLD AUTO: 4.19 10^6/UL (ref 4.7–6.1)
SODIUM SERPL-SCNC: 143 MMOL/L (ref 135–144)
WBC # BLD AUTO: 10 10^3/UL (ref 4.8–10.8)

## 2017-11-11 RX ADMIN — LEVALBUTEROL HYDROCHLORIDE SCH MG: 0.63 SOLUTION RESPIRATORY (INHALATION) at 13:49

## 2017-11-11 RX ADMIN — THERA TABS SCH TAB: TAB at 09:34

## 2017-11-11 RX ADMIN — MORPHINE SULFATE SCH MG: 15 TABLET, EXTENDED RELEASE ORAL at 21:18

## 2017-11-11 RX ADMIN — AMLODIPINE BESYLATE SCH MG: 10 TABLET ORAL at 09:36

## 2017-11-11 RX ADMIN — ATORVASTATIN CALCIUM SCH MG: 20 TABLET, FILM COATED ORAL at 21:16

## 2017-11-11 RX ADMIN — GABAPENTIN SCH MG: 300 CAPSULE ORAL at 09:34

## 2017-11-11 RX ADMIN — ISOSORBIDE MONONITRATE SCH MG: 30 TABLET, EXTENDED RELEASE ORAL at 09:35

## 2017-11-11 RX ADMIN — OXYCODONE HYDROCHLORIDE AND ACETAMINOPHEN SCH MG: 500 TABLET ORAL at 09:35

## 2017-11-11 RX ADMIN — LISINOPRIL SCH MG: 20 TABLET ORAL at 09:36

## 2017-11-11 RX ADMIN — ASPIRIN 81 MG SCH MG: 81 TABLET ORAL at 09:35

## 2017-11-11 RX ADMIN — LEVALBUTEROL HYDROCHLORIDE SCH MG: 0.63 SOLUTION RESPIRATORY (INHALATION) at 08:41

## 2017-11-11 RX ADMIN — VITAMIN D, TAB 1000IU (100/BT) SCH UNIT: 25 TAB at 09:36

## 2017-11-11 RX ADMIN — ENOXAPARIN SODIUM SCH MG: 100 INJECTION SUBCUTANEOUS at 09:38

## 2017-11-11 RX ADMIN — GABAPENTIN SCH MG: 300 CAPSULE ORAL at 21:16

## 2017-11-11 RX ADMIN — CEFTRIAXONE SCH MLS/HR: 2 INJECTION, SOLUTION INTRAVENOUS at 16:16

## 2017-11-11 RX ADMIN — MORPHINE SULFATE SCH MG: 15 TABLET, EXTENDED RELEASE ORAL at 09:34

## 2017-11-11 RX ADMIN — ISOSORBIDE MONONITRATE SCH MG: 30 TABLET, EXTENDED RELEASE ORAL at 09:00

## 2017-11-11 RX ADMIN — TAMSULOSIN HYDROCHLORIDE SCH MG: 0.4 CAPSULE ORAL at 21:17

## 2017-11-11 RX ADMIN — POTASSIUM CHLORIDE SCH MEQ: 10 TABLET, EXTENDED RELEASE ORAL at 09:35

## 2017-11-11 RX ADMIN — METOPROLOL TARTRATE SCH MG: 50 TABLET, FILM COATED ORAL at 21:17

## 2017-11-11 RX ADMIN — METOPROLOL TARTRATE SCH MG: 50 TABLET, FILM COATED ORAL at 09:35

## 2017-11-11 RX ADMIN — PANTOPRAZOLE SODIUM SCH MG: 40 TABLET, DELAYED RELEASE ORAL at 06:24

## 2017-11-11 RX ADMIN — LEVALBUTEROL HYDROCHLORIDE SCH MG: 0.63 SOLUTION RESPIRATORY (INHALATION) at 20:14

## 2017-11-11 RX ADMIN — GABAPENTIN SCH MG: 300 CAPSULE ORAL at 14:14

## 2017-11-11 RX ADMIN — LEVALBUTEROL HYDROCHLORIDE SCH MG: 0.63 SOLUTION RESPIRATORY (INHALATION) at 01:27

## 2017-11-11 NOTE — PN
Date/Time of Note


Date/Time of Note


DATE: 11/11/17 


TIME: 09:02





Assessment/Plan


VTE Prophylaxis


VTE Prophylaxis Intervention:  other





Lines/Catheters


IV Catheter Type (from Nrs):  PICC Line


Central line still needed:  Yes


Urinary Cath still in place:  No





Assessment/Plan


Chief Complaint/Hosp Course


-Chest pain, rule out acute coronary syndrome.  Cardiac enzymes are negative 

3. Dr. Manrique is asked to see patient in cardiology consultation.  Continue 

aspirin.


-Possible acute on chronic diastolic dysfunction congestive heart failure.  

Continue Lasix, monitor electrolytes.


-Possible bronchitis, start Rocephin.


-Possible COPD exacerbation, continue breathing treatments and steroids.


-GERD, continue Protonix


-Morbid obesity with BMI of 40


Problems:  





Subjective


24 Hr Interval Summary


Free Text/Dictation


Patient is still short of breath





Exam/Review of Systems


Vital Signs


Vitals





 Vital Signs








  Date Time  Temp Pulse Resp B/P Pulse Ox O2 Delivery O2 Flow Rate FiO2


 


11/11/17 08:41  93 20   Nasal Cannula  


 


11/11/17 08:41       3.0 


 


11/11/17 07:38 98.0   116/68 92   


 


11/10/17 13:00        21














 Intake and Output   


 


 11/10/17 11/10/17 11/11/17





 15:00 23:00 07:00


 


Intake Total  850 ml 240 ml


 


Output Total  600 ml 2100 ml


 


Balance  250 ml -1860 ml











Exam


Constitutional:  well developed


Head:  atraumatic, normocephalic


Neck:  supple


Respiratory:  diminished breath sounds, labored breathing


Cardiovascular:  regular rate and rhythm


Gastrointestinal:  non-tender, soft


Extremities:  normal pulses





Results


Result Diagram:  


11/11/17 0700                                                                  

              11/9/17 1142





Results 24 hrs





Laboratory Tests








Test


  11/11/17


07:00


 


White Blood Count 10.0  #


 


Red Blood Count 4.19  L


 


Hemoglobin 10.0  L


 


Hematocrit 36.5  L


 


Mean Corpuscular Volume 87.1  


 


Mean Corpuscular Hemoglobin 23.9  L


 


Mean Corpuscular Hemoglobin


Concent 27.4  L


 


 


Red Cell Distribution Width 16.7  H


 


Platelet Count 303  


 


Mean Platelet Volume 9.5  


 


Neutrophils % 80.6  H


 


Lymphocytes % 10.2  L


 


Monocytes % 8.1  


 


Eosinophils % 0.0  


 


Basophils % 0.1  


 


Nucleated Red Blood Cells % 0.9  H


 


Neutrophils # 8.1  H


 


Lymphocytes # 1.0  


 


Monocytes # 0.8  


 


Eosinophils # 0.0  


 


Basophils # 0.0  


 


Nucleated Red Blood Cells # 0.1  H











Medications


Medications





 Current Medications


Amlodipine Besylate (Norvasc) 10 mg DAILY PO  Last administered on 11/10/17at 10

:34; Admin Dose 10 MG;  Start 11/8/17 at 09:00


Ascorbic Acid (Vitamin C) 1,000 mg DAILY PO  Last administered on 11/10/17at 10:

34; Admin Dose 1,000 MG;  Start 11/8/17 at 09:00


Aspirin (Aspirin) 81 mg DAILY PO  Last administered on 11/10/17at 10:34; Admin 

Dose 81 MG;  Start 11/8/17 at 09:00


Cholecalciferol (Vitamin D) 1,000 unit DAILY PO  Last administered on 11/10/

17at 10:34; Admin Dose 1,000 UNIT;  Start 11/8/17 at 09:00


Docusate Sodium (Colace) 250 mg BID PO  Last administered on 11/10/17at 20:56; 

Admin Dose 250 MG;  Start 11/8/17 at 09:00


Gabapentin (Neurontin) 900 mg TID PO  Last administered on 11/10/17at 20:51; 

Admin Dose 900 MG;  Start 11/8/17 at 09:00


Lisinopril (Zestril) 20 mg DAILY PO  Last administered on 11/10/17at 10:33; 

Admin Dose 20 MG;  Start 11/8/17 at 09:00


Morphine Sulfate (Ms Contin (Er)) 15 mg Q12 PO  Last administered on 11/10/17at 

20:48; Admin Dose 15 MG;  Start 11/8/17 at 09:00


Multivitamins Therapeutic (Theragran) 1 tab DAILY PO  Last administered on 11/10

/17at 10:31; Admin Dose 1 TAB;  Start 11/8/17 at 09:00


Potassium Chloride (Klor-Con 10) 10 meq DAILY PO  Last administered on 11/10/

17at 10:34; Admin Dose 10 MEQ;  Start 11/8/17 at 09:00


Tamsulosin HCl (Flomax) 0.4 mg HS PO  Last administered on 11/10/17at 20:51; 

Admin Dose 0.4 MG;  Start 11/8/17 at 21:00


Atorvastatin Calcium (Lipitor) 20 mg DAILY@21 PO  Last administered on 11/10/

17at 20:51; Admin Dose 20 MG;  Start 11/8/17 at 21:00


Ondansetron HCl (Zofran Inj) 4 mg Q4H  PRN IV NAUSEA AND/OR VOMITING Last 

administered on 11/10/17at 22:12; Admin Dose 4 MG;  Start 11/8/17 at 03:30


Clonidine (Catapres) 0.1 mg Q4H  PRN PO ELEVATED BLOOD PRESSURE;  Start 11/8/17 

at 03:30


Ibuprofen 400 mg 400 mg Q6H  PRN PO PAIN OR TEMP ABOVE 38C Last administered on 

11/10/17at 22:12; Admin Dose 400 MG;  Start 11/8/17 at 14:00


Ceftriaxone Sodium (Rocephin) 50 ml @  100 mls/hr Q24H IVPB  Last administered 

on 11/10/17at 16:03; Admin Dose 100 MLS/HR;  Start 11/8/17 at 16:00


Methylprednisolone Sodium Succinate (Solu-Medrol) 40 mg Q12 IV  Last 

administered on 11/10/17at 20:51; Admin Dose 40 MG;  Start 11/8/17 at 21:00


Enoxaparin Sodium (Lovenox) 40 mg DAILY SC  Last administered on 11/10/17at 10:

19; Admin Dose 40 MG;  Start 11/9/17 at 09:00


IV Flush (NS 10 ml) 10 ml PRN  PRN IV IV PROTOCOL;  Start 11/8/17 at 19:30


Metoprolol Tartrate (Lopressor) 50 mg BID PO  Last administered on 11/10/17at 20

:52; Admin Dose 50 MG;  Start 11/8/17 at 21:00


Isosorbide Mononitrate (Imdur) 30 mg DAILY PO ;  Start 11/10/17 at 09:00


IV Flush (NS 10 ml) 10 ml PRN  PRN IV IV PROTOCOL;  Start 11/10/17 at 15:30


Morphine Sulfate (morphine) 3 mg Q4H  PRN IV PAIN Last administered on 11/11/ 17at 06:24; Admin Dose 3 MG;  Start 11/10/17 at 17:00











SHYANN MACK Nov 11, 2017 09:03

## 2017-11-11 NOTE — CONS
Date/Time of Note


Date/Time of Note


DATE: 11/11/17 


TIME: 14:00





Assessment/Plan


Assessment/Plan


Additional Assessment/Plan


1.  Chest pain, assess for acute coronary syndrome.-stabbing by description/

negatuve trop x 3 - no new sx now, will monitor clinically.  BETTER now. 


2.  Shortness of breath, assess for congestive heart failure with low BNP at 

this time - better now. IMPROVED. 


3.  Diastolic dysfunction by most recent echo 07/2017- chronic. 


4.  Abnormal electrocardiogram, assess for acute coronary syndrome.


5.  Chronic obstructive pulmonary disease- con't resp Rx. On therpy. 


6.  Hypertension, labile- will adjust Rx as needed. Will adjust Rx as needed. 


7.  Dyslipidemia.





Consultation Date/Type/Reason


Admit Date/Time


Nov 9, 2017 at 11:53


Type of Consultation:  cardiology


Referring Provider:  DEVAUGHN TALAMANTES MD





24 HR Interval Summary


Free Text/Dictation


NO acute events - BP stable - no CP now. 





ROS: No fever, no chills, no nausea, no vomiting, no diarrhea/constipation


        No recent weight changes


        No chest pain, no PND, no orthopnea


        No dizziness, blurred vision


        No thirst, no heat or cold intolerance





Exam/Review of Systems


Vital Signs


Vitals





 Vital Signs








  Date Time  Temp Pulse Resp B/P Pulse Ox O2 Delivery O2 Flow Rate FiO2


 


11/11/17 12:19  84      


 


11/11/17 11:31 98.0  18 127/75 93   


 


11/11/17 08:41      Nasal Cannula  


 


11/11/17 08:41       3.0 


 


11/10/17 13:00        21














 Intake and Output   


 


 11/10/17 11/10/17 11/11/17





 15:00 23:00 07:00


 


Intake Total  850 ml 240 ml


 


Output Total  600 ml 2100 ml


 


Balance  250 ml -1860 ml











Exam


General: WN/WD/NAD, AOx 2-3


HEENT: Unicetric/atraumatic/EOMI (follows commands)


NECK: JVD elevated, no thyromegaly


Lymph: no lymphadenopathy


HEART: regular with no S3, II/VI systolic murmur at apex


LUNGS: Coarse sounds


ABD: soft, NT, ND, +BS


: Intact


Neuro: non focal


SKIN: chronic changes


EXT: trace edema





Results


Result Diagram:  


11/11/17 0700                                                                  

              11/11/17 0700





Results 24 hrs





Laboratory Tests








Test


  11/11/17


07:00


 


White Blood Count 10.0  #


 


Red Blood Count 4.19  L


 


Hemoglobin 10.0  L


 


Hematocrit 36.5  L


 


Mean Corpuscular Volume 87.1  


 


Mean Corpuscular Hemoglobin 23.9  L


 


Mean Corpuscular Hemoglobin


Concent 27.4  L


 


 


Red Cell Distribution Width 16.7  H


 


Platelet Count 303  


 


Mean Platelet Volume 9.5  


 


Neutrophils % 80.6  H


 


Lymphocytes % 10.2  L


 


Monocytes % 8.1  


 


Eosinophils % 0.0  


 


Basophils % 0.1  


 


Nucleated Red Blood Cells % 0.9  H


 


Neutrophils # 8.1  H


 


Lymphocytes # 1.0  


 


Monocytes # 0.8  


 


Eosinophils # 0.0  


 


Basophils # 0.0  


 


Nucleated Red Blood Cells # 0.1  H


 


Sodium Level 143  


 


Potassium Level 4.7  


 


Chloride Level 95  L


 


Carbon Dioxide Level 40  H


 


Anion Gap 13  


 


Blood Urea Nitrogen 18  


 


Creatinine 0.76  


 


Glucose Level 94  


 


Calcium Level 8.5  











Medications


Medications





 Current Medications


Amlodipine Besylate (Norvasc) 10 mg DAILY PO  Last administered on 11/11/17at 09

:36; Admin Dose 10 MG;  Start 11/8/17 at 09:00


Ascorbic Acid (Vitamin C) 1,000 mg DAILY PO  Last administered on 11/11/17at 09:

35; Admin Dose 1,000 MG;  Start 11/8/17 at 09:00


Aspirin (Aspirin) 81 mg DAILY PO  Last administered on 11/11/17at 09:35; Admin 

Dose 81 MG;  Start 11/8/17 at 09:00


Cholecalciferol (Vitamin D) 1,000 unit DAILY PO  Last administered on 11/11/ 17at 09:36; Admin Dose 1,000 UNIT;  Start 11/8/17 at 09:00


Docusate Sodium (Colace) 250 mg BID PO  Last administered on 11/11/17at 09:35; 

Admin Dose 250 MG;  Start 11/8/17 at 09:00


Gabapentin (Neurontin) 900 mg TID PO  Last administered on 11/11/17at 09:34; 

Admin Dose 900 MG;  Start 11/8/17 at 09:00


Lisinopril (Zestril) 20 mg DAILY PO  Last administered on 11/11/17at 09:36; 

Admin Dose 20 MG;  Start 11/8/17 at 09:00


Morphine Sulfate (Ms Contin (Er)) 15 mg Q12 PO  Last administered on 11/11/17at 

09:34; Admin Dose 15 MG;  Start 11/8/17 at 09:00


Multivitamins Therapeutic (Theragran) 1 tab DAILY PO  Last administered on 11/11 /17at 09:34; Admin Dose 1 TAB;  Start 11/8/17 at 09:00


Potassium Chloride (Klor-Con 10) 10 meq DAILY PO  Last administered on 11/11/ 17at 09:35; Admin Dose 10 MEQ;  Start 11/8/17 at 09:00


Tamsulosin HCl (Flomax) 0.4 mg HS PO  Last administered on 11/10/17at 20:51; 

Admin Dose 0.4 MG;  Start 11/8/17 at 21:00


Atorvastatin Calcium (Lipitor) 20 mg DAILY@21 PO  Last administered on 11/10/

17at 20:51; Admin Dose 20 MG;  Start 11/8/17 at 21:00


Ondansetron HCl (Zofran Inj) 4 mg Q4H  PRN IV NAUSEA AND/OR VOMITING Last 

administered on 11/10/17at 22:12; Admin Dose 4 MG;  Start 11/8/17 at 03:30


Clonidine (Catapres) 0.1 mg Q4H  PRN PO ELEVATED BLOOD PRESSURE;  Start 11/8/17 

at 03:30


Ibuprofen 400 mg 400 mg Q6H  PRN PO PAIN OR TEMP ABOVE 38C Last administered on 

11/10/17at 22:12; Admin Dose 400 MG;  Start 11/8/17 at 14:00


Ceftriaxone Sodium (Rocephin) 50 ml @  100 mls/hr Q24H IVPB  Last administered 

on 11/10/17at 16:03; Admin Dose 100 MLS/HR;  Start 11/8/17 at 16:00


Methylprednisolone Sodium Succinate (Solu-Medrol) 40 mg Q12 IV  Last 

administered on 11/11/17at 09:36; Admin Dose 40 MG;  Start 11/8/17 at 21:00


Enoxaparin Sodium (Lovenox) 40 mg DAILY SC  Last administered on 11/11/17at 09:

38; Admin Dose 40 MG;  Start 11/9/17 at 09:00


IV Flush (NS 10 ml) 10 ml PRN  PRN IV IV PROTOCOL;  Start 11/8/17 at 19:30


Metoprolol Tartrate (Lopressor) 50 mg BID PO  Last administered on 11/11/17at 09

:35; Admin Dose 50 MG;  Start 11/8/17 at 21:00


Isosorbide Mononitrate (Imdur) 30 mg DAILY PO ;  Start 11/10/17 at 09:00


IV Flush (NS 10 ml) 10 ml PRN  PRN IV IV PROTOCOL;  Start 11/10/17 at 15:30


Morphine Sulfate (morphine) 3 mg Q4H  PRN IV PAIN Last administered on 11/11/ 17at 10:51; Admin Dose 3 MG;  Start 11/10/17 at 17:00











ASH LOW MD Nov 11, 2017 14:02

## 2017-11-12 VITALS — DIASTOLIC BLOOD PRESSURE: 89 MMHG | RESPIRATION RATE: 22 BRPM | SYSTOLIC BLOOD PRESSURE: 130 MMHG

## 2017-11-12 VITALS — SYSTOLIC BLOOD PRESSURE: 141 MMHG | DIASTOLIC BLOOD PRESSURE: 84 MMHG | RESPIRATION RATE: 18 BRPM

## 2017-11-12 VITALS — HEART RATE: 76 BPM

## 2017-11-12 VITALS — HEART RATE: 74 BPM

## 2017-11-12 VITALS — DIASTOLIC BLOOD PRESSURE: 59 MMHG | RESPIRATION RATE: 18 BRPM | SYSTOLIC BLOOD PRESSURE: 115 MMHG

## 2017-11-12 VITALS — HEART RATE: 72 BPM

## 2017-11-12 VITALS — DIASTOLIC BLOOD PRESSURE: 70 MMHG | SYSTOLIC BLOOD PRESSURE: 129 MMHG | RESPIRATION RATE: 18 BRPM

## 2017-11-12 VITALS — RESPIRATION RATE: 22 BRPM | DIASTOLIC BLOOD PRESSURE: 73 MMHG | SYSTOLIC BLOOD PRESSURE: 150 MMHG

## 2017-11-12 VITALS — HEART RATE: 69 BPM

## 2017-11-12 VITALS — SYSTOLIC BLOOD PRESSURE: 128 MMHG | DIASTOLIC BLOOD PRESSURE: 76 MMHG | RESPIRATION RATE: 22 BRPM

## 2017-11-12 VITALS — HEART RATE: 67 BPM

## 2017-11-12 RX ADMIN — METOPROLOL TARTRATE SCH MG: 50 TABLET, FILM COATED ORAL at 08:55

## 2017-11-12 RX ADMIN — POTASSIUM CHLORIDE SCH MEQ: 10 TABLET, EXTENDED RELEASE ORAL at 08:55

## 2017-11-12 RX ADMIN — THERA TABS SCH TAB: TAB at 08:56

## 2017-11-12 RX ADMIN — AMLODIPINE BESYLATE SCH MG: 10 TABLET ORAL at 08:56

## 2017-11-12 RX ADMIN — VITAMIN D, TAB 1000IU (100/BT) SCH UNIT: 25 TAB at 08:57

## 2017-11-12 RX ADMIN — ISOSORBIDE MONONITRATE SCH MG: 30 TABLET, EXTENDED RELEASE ORAL at 08:56

## 2017-11-12 RX ADMIN — LEVALBUTEROL HYDROCHLORIDE SCH MG: 0.63 SOLUTION RESPIRATORY (INHALATION) at 07:14

## 2017-11-12 RX ADMIN — OXYCODONE HYDROCHLORIDE AND ACETAMINOPHEN SCH MG: 500 TABLET ORAL at 08:56

## 2017-11-12 RX ADMIN — MORPHINE SULFATE SCH MG: 15 TABLET, EXTENDED RELEASE ORAL at 20:28

## 2017-11-12 RX ADMIN — CEFTRIAXONE SCH MLS/HR: 2 INJECTION, SOLUTION INTRAVENOUS at 16:15

## 2017-11-12 RX ADMIN — METOPROLOL TARTRATE SCH MG: 50 TABLET, FILM COATED ORAL at 20:29

## 2017-11-12 RX ADMIN — GABAPENTIN SCH MG: 300 CAPSULE ORAL at 20:28

## 2017-11-12 RX ADMIN — ENOXAPARIN SODIUM SCH MG: 100 INJECTION SUBCUTANEOUS at 08:55

## 2017-11-12 RX ADMIN — LEVALBUTEROL HYDROCHLORIDE SCH MG: 0.63 SOLUTION RESPIRATORY (INHALATION) at 19:29

## 2017-11-12 RX ADMIN — TAMSULOSIN HYDROCHLORIDE SCH MG: 0.4 CAPSULE ORAL at 20:29

## 2017-11-12 RX ADMIN — GABAPENTIN SCH MG: 300 CAPSULE ORAL at 12:57

## 2017-11-12 RX ADMIN — ASPIRIN 81 MG SCH MG: 81 TABLET ORAL at 08:54

## 2017-11-12 RX ADMIN — ATORVASTATIN CALCIUM SCH MG: 20 TABLET, FILM COATED ORAL at 20:28

## 2017-11-12 RX ADMIN — LEVALBUTEROL HYDROCHLORIDE SCH MG: 0.63 SOLUTION RESPIRATORY (INHALATION) at 14:00

## 2017-11-12 RX ADMIN — LISINOPRIL SCH MG: 20 TABLET ORAL at 08:57

## 2017-11-12 RX ADMIN — LEVALBUTEROL HYDROCHLORIDE SCH MG: 0.63 SOLUTION RESPIRATORY (INHALATION) at 01:01

## 2017-11-12 RX ADMIN — MORPHINE SULFATE SCH MG: 15 TABLET, EXTENDED RELEASE ORAL at 08:56

## 2017-11-12 RX ADMIN — PANTOPRAZOLE SODIUM SCH MG: 40 TABLET, DELAYED RELEASE ORAL at 08:54

## 2017-11-12 RX ADMIN — GABAPENTIN SCH MG: 300 CAPSULE ORAL at 08:54

## 2017-11-12 NOTE — CONS
Date/Time of Note


Date/Time of Note


DATE: 11/12/17 


TIME: 15:26





Assessment/Plan


Assessment/Plan


Additional Assessment/Plan


1.  Chest pain, assess for acute coronary syndrome.-stabbing by description/

negatuve trop x 3 - no new sx now, will monitor clinically.  BETTER now. 


2.  Shortness of breath, assess for congestive heart failure with low BNP at 

this time - Still SOB, COPD likely. 


3.  Diastolic dysfunction by most recent echo 07/2017- chronic. 


4.  Abnormal electrocardiogram, assess for acute coronary syndrome.


5.  Chronic obstructive pulmonary disease- con't resp Rx. On therpy. 


6.  Hypertension, labile- will adjust Rx as needed. Will adjust Rx as needed. 


7.  Dyslipidemia.





Consultation Date/Type/Reason


Admit Date/Time


Nov 9, 2017 at 11:53


Type of Consultation:  cardiology


Referring Provider:  DEVAUGHN TALAMANTES MD





24 HR Interval Summary


Free Text/Dictation


NO acute events - SOB COPD related. 





ROS: No fever, no chills, no nausea, no vomiting, no diarrhea/constipation


        No recent weight changes


        No chest pain, no PND, no orthopnea + SOB 


        No dizziness, blurred vision


        No thirst, no heat or cold intolerance





Exam/Review of Systems


Vital Signs


Vitals





 Vital Signs








  Date Time  Temp Pulse Resp B/P Pulse Ox O2 Delivery O2 Flow Rate FiO2


 


11/12/17 12:00  67      


 


11/12/17 11:39 98.0  18 129/70 93   


 


11/12/17 08:00      Nasal Cannula 2.0 


 


11/10/17 13:00        21














 Intake and Output   


 


 11/11/17 11/11/17 11/12/17





 15:00 23:00 07:00


 


Intake Total  1650 ml 400 ml


 


Output Total  1700 ml 1400 ml


 


Balance  -50 ml -1000 ml











Exam


General: WN/WD/NAD, AOx 2-3


HEENT: Unicetric/atraumatic/EOMI (follow commands)


NECK: JVD elevated, no thyromegaly


Lymph: no lymphadenopathy


HEART: regular with no S3, II/VI systolic murmur at apex


LUNGS: Coarse sounds


ABD: soft, NT, ND, +BS


: Intact


Neuro: non focal


SKIN: chronic changes


EXT: trace edema





Results


Result Diagram:  


11/11/17 0700                                                                  

              11/11/17 0700








Medications


Medications





 Current Medications


Amlodipine Besylate (Norvasc) 10 mg DAILY PO  Last administered on 11/12/17at 08

:56; Admin Dose 10 MG;  Start 11/8/17 at 09:00


Ascorbic Acid (Vitamin C) 1,000 mg DAILY PO  Last administered on 11/12/17at 08:

56; Admin Dose 1,000 MG;  Start 11/8/17 at 09:00


Aspirin (Aspirin) 81 mg DAILY PO  Last administered on 11/12/17at 08:54; Admin 

Dose 81 MG;  Start 11/8/17 at 09:00


Cholecalciferol (Vitamin D) 1,000 unit DAILY PO  Last administered on 11/12/ 17at 08:57; Admin Dose 1,000 UNIT;  Start 11/8/17 at 09:00


Docusate Sodium (Colace) 250 mg BID PO  Last administered on 11/12/17at 08:56; 

Admin Dose 250 MG;  Start 11/8/17 at 09:00


Gabapentin (Neurontin) 900 mg TID PO  Last administered on 11/12/17at 12:57; 

Admin Dose 900 MG;  Start 11/8/17 at 09:00


Lisinopril (Zestril) 20 mg DAILY PO  Last administered on 11/12/17at 08:57; 

Admin Dose 20 MG;  Start 11/8/17 at 09:00


Morphine Sulfate (Ms Contin (Er)) 15 mg Q12 PO  Last administered on 11/12/17at 

08:56; Admin Dose 15 MG;  Start 11/8/17 at 09:00


Multivitamins Therapeutic (Theragran) 1 tab DAILY PO  Last administered on 11/12 /17at 08:56; Admin Dose 1 TAB;  Start 11/8/17 at 09:00


Potassium Chloride (Klor-Con 10) 10 meq DAILY PO  Last administered on 11/12/ 17at 08:55; Admin Dose 10 MEQ;  Start 11/8/17 at 09:00


Tamsulosin HCl (Flomax) 0.4 mg HS PO  Last administered on 11/11/17at 21:17; 

Admin Dose 0.4 MG;  Start 11/8/17 at 21:00


Atorvastatin Calcium (Lipitor) 20 mg DAILY@21 PO  Last administered on 11/11/ 17at 21:16; Admin Dose 20 MG;  Start 11/8/17 at 21:00


Ondansetron HCl (Zofran Inj) 4 mg Q4H  PRN IV NAUSEA AND/OR VOMITING Last 

administered on 11/10/17at 22:12; Admin Dose 4 MG;  Start 11/8/17 at 03:30


Clonidine (Catapres) 0.1 mg Q4H  PRN PO ELEVATED BLOOD PRESSURE;  Start 11/8/17 

at 03:30


Ibuprofen 400 mg 400 mg Q6H  PRN PO PAIN OR TEMP ABOVE 38C Last administered on 

11/10/17at 22:12; Admin Dose 400 MG;  Start 11/8/17 at 14:00


Ceftriaxone Sodium (Rocephin) 50 ml @  100 mls/hr Q24H IVPB  Last administered 

on 11/11/17at 16:16; Admin Dose 100 MLS/HR;  Start 11/8/17 at 16:00


Methylprednisolone Sodium Succinate (Solu-Medrol) 40 mg Q12 IV  Last 

administered on 11/12/17at 08:55; Admin Dose 40 MG;  Start 11/8/17 at 21:00


Enoxaparin Sodium (Lovenox) 40 mg DAILY SC  Last administered on 11/12/17at 08:

55; Admin Dose 40 MG;  Start 11/9/17 at 09:00


IV Flush (NS 10 ml) 10 ml PRN  PRN IV IV PROTOCOL;  Start 11/8/17 at 19:30


Metoprolol Tartrate (Lopressor) 50 mg BID PO  Last administered on 11/12/17at 08

:55; Admin Dose 50 MG;  Start 11/8/17 at 21:00


Isosorbide Mononitrate (Imdur) 30 mg DAILY PO  Last administered on 11/12/17at 

08:56; Admin Dose 30 MG;  Start 11/10/17 at 09:00


IV Flush (NS 10 ml) 10 ml PRN  PRN IV IV PROTOCOL;  Start 11/10/17 at 15:30


Morphine Sulfate (morphine) 3 mg Q4H  PRN IV PAIN Last administered on 11/12/ 17at 12:59; Admin Dose 3 MG;  Start 11/10/17 at 17:00











ASH LOW MD Nov 12, 2017 15:27

## 2017-11-12 NOTE — PN
Date/Time of Note


Date/Time of Note


DATE: 11/12/17 


TIME: 10:04





Assessment/Plan


VTE Prophylaxis


VTE Prophylaxis Intervention:  other





Lines/Catheters


IV Catheter Type (from Nrs):  PICC Line


Central line still needed:  Yes


Urinary Cath still in place:  No





Assessment/Plan


Chief Complaint/Hosp Course


-Chest pain, rule out acute coronary syndrome.  Cardiac enzymes are negative 

3. Dr. Manrique is asked to see patient in cardiology consultation.  Continue 

aspirin.


-Possible acute on chronic diastolic dysfunction congestive heart failure.  

Continue Lasix, monitor electrolytes.


-Possible bronchitis, start Rocephin.


-Possible COPD exacerbation, continue breathing treatments and steroids.


-GERD, continue Protonix


-Morbid obesity with BMI of 40


Problems:  





Subjective


24 Hr Interval Summary


Free Text/Dictation


Patient is still having shortness of breath





Exam/Review of Systems


Vital Signs


Vitals





 Vital Signs








  Date Time  Temp Pulse Resp B/P Pulse Ox O2 Delivery O2 Flow Rate FiO2


 


11/12/17 08:00  76      


 


11/12/17 07:46 98.0  18 141/84 94   


 


11/12/17 07:15       3.0 


 


11/12/17 07:15      Nasal Cannula  


 


11/10/17 13:00        21














 Intake and Output   


 


 11/11/17 11/11/17 11/12/17





 14:59 22:59 06:59


 


Intake Total  1200 ml 850 ml


 


Output Total  900 ml 2200 ml


 


Balance  300 ml -1350 ml











Exam


Constitutional:  well developed


Head:  atraumatic, normocephalic


Neck:  supple


Respiratory:  diminished breath sounds


Cardiovascular:  regular rate and rhythm


Gastrointestinal:  non-tender, soft


Extremities:  normal pulses





Results


Result Diagram:  


11/11/17 0700                                                                  

              11/11/17 0700








Medications


Medications





 Current Medications


Amlodipine Besylate (Norvasc) 10 mg DAILY PO  Last administered on 11/12/17at 08

:56; Admin Dose 10 MG;  Start 11/8/17 at 09:00


Ascorbic Acid (Vitamin C) 1,000 mg DAILY PO  Last administered on 11/12/17at 08:

56; Admin Dose 1,000 MG;  Start 11/8/17 at 09:00


Aspirin (Aspirin) 81 mg DAILY PO  Last administered on 11/12/17at 08:54; Admin 

Dose 81 MG;  Start 11/8/17 at 09:00


Cholecalciferol (Vitamin D) 1,000 unit DAILY PO  Last administered on 11/12/ 17at 08:57; Admin Dose 1,000 UNIT;  Start 11/8/17 at 09:00


Docusate Sodium (Colace) 250 mg BID PO  Last administered on 11/12/17at 08:56; 

Admin Dose 250 MG;  Start 11/8/17 at 09:00


Gabapentin (Neurontin) 900 mg TID PO  Last administered on 11/12/17at 08:54; 

Admin Dose 900 MG;  Start 11/8/17 at 09:00


Lisinopril (Zestril) 20 mg DAILY PO  Last administered on 11/12/17at 08:57; 

Admin Dose 20 MG;  Start 11/8/17 at 09:00


Morphine Sulfate (Ms Contin (Er)) 15 mg Q12 PO  Last administered on 11/12/17at 

08:56; Admin Dose 15 MG;  Start 11/8/17 at 09:00


Multivitamins Therapeutic (Theragran) 1 tab DAILY PO  Last administered on 11/12 /17at 08:56; Admin Dose 1 TAB;  Start 11/8/17 at 09:00


Potassium Chloride (Klor-Con 10) 10 meq DAILY PO  Last administered on 11/12/ 17at 08:55; Admin Dose 10 MEQ;  Start 11/8/17 at 09:00


Tamsulosin HCl (Flomax) 0.4 mg HS PO  Last administered on 11/11/17at 21:17; 

Admin Dose 0.4 MG;  Start 11/8/17 at 21:00


Atorvastatin Calcium (Lipitor) 20 mg DAILY@21 PO  Last administered on 11/11/ 17at 21:16; Admin Dose 20 MG;  Start 11/8/17 at 21:00


Ondansetron HCl (Zofran Inj) 4 mg Q4H  PRN IV NAUSEA AND/OR VOMITING Last 

administered on 11/10/17at 22:12; Admin Dose 4 MG;  Start 11/8/17 at 03:30


Clonidine (Catapres) 0.1 mg Q4H  PRN PO ELEVATED BLOOD PRESSURE;  Start 11/8/17 

at 03:30


Ibuprofen 400 mg 400 mg Q6H  PRN PO PAIN OR TEMP ABOVE 38C Last administered on 

11/10/17at 22:12; Admin Dose 400 MG;  Start 11/8/17 at 14:00


Ceftriaxone Sodium (Rocephin) 50 ml @  100 mls/hr Q24H IVPB  Last administered 

on 11/11/17at 16:16; Admin Dose 100 MLS/HR;  Start 11/8/17 at 16:00


Methylprednisolone Sodium Succinate (Solu-Medrol) 40 mg Q12 IV  Last 

administered on 11/12/17at 08:55; Admin Dose 40 MG;  Start 11/8/17 at 21:00


Enoxaparin Sodium (Lovenox) 40 mg DAILY SC  Last administered on 11/12/17at 08:

55; Admin Dose 40 MG;  Start 11/9/17 at 09:00


IV Flush (NS 10 ml) 10 ml PRN  PRN IV IV PROTOCOL;  Start 11/8/17 at 19:30


Metoprolol Tartrate (Lopressor) 50 mg BID PO  Last administered on 11/12/17at 08

:55; Admin Dose 50 MG;  Start 11/8/17 at 21:00


Isosorbide Mononitrate (Imdur) 30 mg DAILY PO  Last administered on 11/12/17at 

08:56; Admin Dose 30 MG;  Start 11/10/17 at 09:00


IV Flush (NS 10 ml) 10 ml PRN  PRN IV IV PROTOCOL;  Start 11/10/17 at 15:30


Morphine Sulfate (morphine) 3 mg Q4H  PRN IV PAIN Last administered on 11/12/ 17at 09:04; Admin Dose 3 MG;  Start 11/10/17 at 17:00











SHYANN MACK Nov 12, 2017 10:05

## 2017-11-13 VITALS — SYSTOLIC BLOOD PRESSURE: 132 MMHG | DIASTOLIC BLOOD PRESSURE: 76 MMHG | RESPIRATION RATE: 20 BRPM

## 2017-11-13 VITALS — RESPIRATION RATE: 22 BRPM | DIASTOLIC BLOOD PRESSURE: 67 MMHG | SYSTOLIC BLOOD PRESSURE: 124 MMHG

## 2017-11-13 VITALS — RESPIRATION RATE: 24 BRPM | SYSTOLIC BLOOD PRESSURE: 117 MMHG | DIASTOLIC BLOOD PRESSURE: 76 MMHG

## 2017-11-13 VITALS — HEART RATE: 80 BPM

## 2017-11-13 VITALS — RESPIRATION RATE: 22 BRPM | DIASTOLIC BLOOD PRESSURE: 67 MMHG | SYSTOLIC BLOOD PRESSURE: 125 MMHG

## 2017-11-13 VITALS — HEART RATE: 67 BPM

## 2017-11-13 VITALS — HEART RATE: 64 BPM

## 2017-11-13 VITALS — DIASTOLIC BLOOD PRESSURE: 84 MMHG | SYSTOLIC BLOOD PRESSURE: 164 MMHG | RESPIRATION RATE: 24 BRPM

## 2017-11-13 VITALS — RESPIRATION RATE: 20 BRPM | SYSTOLIC BLOOD PRESSURE: 130 MMHG | DIASTOLIC BLOOD PRESSURE: 77 MMHG

## 2017-11-13 VITALS — RESPIRATION RATE: 20 BRPM | SYSTOLIC BLOOD PRESSURE: 115 MMHG | DIASTOLIC BLOOD PRESSURE: 70 MMHG

## 2017-11-13 VITALS — HEART RATE: 75 BPM

## 2017-11-13 VITALS — HEART RATE: 91 BPM

## 2017-11-13 LAB
ABNORMAL IP MESSAGE: 1
ANION GAP SERPL CALC-SCNC: 10 MMOL/L (ref 8–16)
BASOPHILS # BLD AUTO: 0 10^3/UL (ref 0–0.1)
BASOPHILS NFR BLD: 0.1 % (ref 0–2)
BUN SERPL-MCNC: 24 MG/DL (ref 7–20)
CALCIUM SERPL-MCNC: 8.8 MG/DL (ref 8.4–10.2)
CHLORIDE SERPL-SCNC: 93 MMOL/L (ref 97–110)
CO2 SERPL-SCNC: 43 MMOL/L (ref 21–31)
CREAT SERPL-MCNC: 0.8 MG/DL (ref 0.61–1.24)
EOSINOPHIL # BLD: 0 10^3/UL (ref 0–0.5)
EOSINOPHIL NFR BLD: 0.1 % (ref 0–7)
ERYTHROCYTE [DISTWIDTH] IN BLOOD BY AUTOMATED COUNT: 16.9 % (ref 11.5–14.5)
GLUCOSE SERPL-MCNC: 125 MG/DL (ref 70–220)
HCT VFR BLD CALC: 38 % (ref 42–52)
HGB BLD-MCNC: 10.5 G/DL (ref 14–18)
LYMPHOCYTES # BLD AUTO: 1.1 10^3/UL (ref 0.8–2.9)
LYMPHOCYTES NFR BLD AUTO: 10.3 % (ref 15–51)
MCH RBC QN AUTO: 23.4 PG (ref 29–33)
MCHC RBC AUTO-ENTMCNC: 27.6 G/DL (ref 32–37)
MCV RBC AUTO: 84.6 FL (ref 82–101)
MONOCYTES # BLD: 0.7 10^3/UL (ref 0.3–0.9)
MONOCYTES NFR BLD: 6.4 % (ref 0–11)
NEUTROPHILS # BLD: 9 10^3/UL (ref 1.6–7.5)
NEUTROPHILS NFR BLD AUTO: 82.4 % (ref 39–77)
NRBC # BLD MANUAL: 0.1 10^3/UL (ref 0–0)
NRBC BLD AUTO-RTO: 0.9 /100WBC (ref 0–0)
PLATELET # BLD: 294 10^3/UL (ref 140–415)
PMV BLD AUTO: 8.7 FL (ref 7.4–10.4)
POSITIVE DIFF: (no result)
POTASSIUM SERPL-SCNC: 4.5 MMOL/L (ref 3.5–5.1)
RBC # BLD AUTO: 4.49 10^6/UL (ref 4.7–6.1)
SODIUM SERPL-SCNC: 141 MMOL/L (ref 135–144)
WBC # BLD AUTO: 10.9 10^3/UL (ref 4.8–10.8)

## 2017-11-13 RX ADMIN — TAMSULOSIN HYDROCHLORIDE SCH MG: 0.4 CAPSULE ORAL at 20:24

## 2017-11-13 RX ADMIN — GABAPENTIN SCH MG: 300 CAPSULE ORAL at 12:20

## 2017-11-13 RX ADMIN — AMLODIPINE BESYLATE SCH MG: 10 TABLET ORAL at 09:14

## 2017-11-13 RX ADMIN — OXYCODONE HYDROCHLORIDE AND ACETAMINOPHEN SCH MG: 500 TABLET ORAL at 09:16

## 2017-11-13 RX ADMIN — MORPHINE SULFATE SCH MG: 15 TABLET, EXTENDED RELEASE ORAL at 10:09

## 2017-11-13 RX ADMIN — LEVALBUTEROL HYDROCHLORIDE SCH MG: 0.63 SOLUTION RESPIRATORY (INHALATION) at 14:00

## 2017-11-13 RX ADMIN — MORPHINE SULFATE SCH MG: 15 TABLET, EXTENDED RELEASE ORAL at 09:24

## 2017-11-13 RX ADMIN — LEVALBUTEROL HYDROCHLORIDE SCH MG: 0.63 SOLUTION RESPIRATORY (INHALATION) at 07:48

## 2017-11-13 RX ADMIN — GABAPENTIN SCH MG: 300 CAPSULE ORAL at 20:25

## 2017-11-13 RX ADMIN — ISOSORBIDE MONONITRATE SCH MG: 30 TABLET, EXTENDED RELEASE ORAL at 09:17

## 2017-11-13 RX ADMIN — ENOXAPARIN SODIUM SCH MG: 100 INJECTION SUBCUTANEOUS at 09:36

## 2017-11-13 RX ADMIN — LISINOPRIL SCH MG: 20 TABLET ORAL at 09:17

## 2017-11-13 RX ADMIN — ATORVASTATIN CALCIUM SCH MG: 20 TABLET, FILM COATED ORAL at 20:25

## 2017-11-13 RX ADMIN — POTASSIUM CHLORIDE SCH MEQ: 10 TABLET, EXTENDED RELEASE ORAL at 09:15

## 2017-11-13 RX ADMIN — VITAMIN D, TAB 1000IU (100/BT) SCH UNIT: 25 TAB at 09:18

## 2017-11-13 RX ADMIN — GABAPENTIN SCH MG: 300 CAPSULE ORAL at 09:16

## 2017-11-13 RX ADMIN — MORPHINE SULFATE SCH MG: 15 TABLET, EXTENDED RELEASE ORAL at 21:27

## 2017-11-13 RX ADMIN — CEFTRIAXONE SCH MLS/HR: 2 INJECTION, SOLUTION INTRAVENOUS at 17:40

## 2017-11-13 RX ADMIN — LEVALBUTEROL HYDROCHLORIDE SCH MG: 0.63 SOLUTION RESPIRATORY (INHALATION) at 01:21

## 2017-11-13 RX ADMIN — LEVALBUTEROL HYDROCHLORIDE SCH MG: 0.63 SOLUTION RESPIRATORY (INHALATION) at 19:25

## 2017-11-13 RX ADMIN — METOPROLOL TARTRATE SCH MG: 50 TABLET, FILM COATED ORAL at 20:28

## 2017-11-13 RX ADMIN — THERA TABS SCH TAB: TAB at 09:15

## 2017-11-13 RX ADMIN — PANTOPRAZOLE SODIUM SCH MG: 40 TABLET, DELAYED RELEASE ORAL at 09:17

## 2017-11-13 RX ADMIN — ASPIRIN 81 MG SCH MG: 81 TABLET ORAL at 09:15

## 2017-11-13 RX ADMIN — METOPROLOL TARTRATE SCH MG: 50 TABLET, FILM COATED ORAL at 09:15

## 2017-11-13 NOTE — PN
Date/Time of Note


Date/Time of Note


DATE: 11/13/17 


TIME: 12:47





Assessment/Plan


VTE Prophylaxis


VTE Prophylaxis Intervention:  SCD's





Lines/Catheters


IV Catheter Type (from [facility]):  PICC Line


Central line still needed:  Yes


Urinary Cath still in place:  No





Assessment/Plan


Chief Complaint/Hosp Course


Assessment/Plan


-Chest pain, rule out acute coronary syndrome.  Cardiac enzymes are negative 

3. Dr. Manrique is following in cardiology consultation.  Continue aspirin.


-Possible acute on chronic diastolic dysfunction congestive heart failure.  

Continue Lasix, monitor electrolytes.


-Possible bronchitis, continue Rocephin.


-Possible COPD exacerbation, continue breathing treatments and steroids.


-GERD, continue Protonix


-Morbid obesity with BMI of 40





Further recommendations based on clinical course.  Plan of care was discussed 

with Dr. Anaya.


Problems:  





Exam/Review of Systems


Vital Signs


Vitals





 Vital Signs








  Date Time  Temp Pulse Resp B/P Pulse Ox O2 Delivery O2 Flow Rate FiO2


 


11/13/17 12:09  64      


 


11/13/17 11:41 98.5  24 117/76 96   


 


11/13/17 01:21      Nasal Cannula 3.0 


 


11/10/17 13:00        21














 Intake and Output   


 


 11/12/17 11/12/17 11/13/17





 15:00 23:00 07:00


 


Intake Total  1800 ml 260 ml


 


Output Total  1000 ml 450 ml


 


Balance  800 ml -190 ml











Exam


Constitutional:  alert, oriented


Head:  atraumatic, normocephalic


Neck:  supple


Respiratory:  diminished breath sounds


Cardiovascular:  nl pulses, regular rate and rhythm


Gastrointestinal:  non-tender, soft


Extremities:  edema, normal pulses


Neurological:  nl mental status


Skin:  nl turgor





Results


Result Diagram:  


11/11/17 0700                                                                  

              11/11/17 0700








Medications


Medications





 Current Medications


Amlodipine Besylate (Norvasc) 10 mg DAILY PO  Last administered on 11/13/17at 09

:14; Admin Dose 10 MG;  Start 11/8/17 at 09:00


Ascorbic Acid (Vitamin C) 1,000 mg DAILY PO  Last administered on 11/13/17at 09:

16; Admin Dose 1,000 MG;  Start 11/8/17 at 09:00


Aspirin (Aspirin) 81 mg DAILY PO  Last administered on 11/13/17at 09:15; Admin 

Dose 81 MG;  Start 11/8/17 at 09:00


Cholecalciferol (Vitamin D) 1,000 unit DAILY PO  Last administered on 11/13/ 17at 09:18; Admin Dose 1,000 UNIT;  Start 11/8/17 at 09:00


Docusate Sodium (Colace) 250 mg BID PO  Last administered on 11/13/17at 09:16; 

Admin Dose 250 MG;  Start 11/8/17 at 09:00


Gabapentin (Neurontin) 900 mg TID PO  Last administered on 11/13/17at 12:20; 

Admin Dose 900 MG;  Start 11/8/17 at 09:00


Lisinopril (Zestril) 20 mg DAILY PO  Last administered on 11/13/17at 09:17; 

Admin Dose 20 MG;  Start 11/8/17 at 09:00


Morphine Sulfate (Ms Contin (Er)) 15 mg Q12 PO  Last administered on 11/13/17at 

10:09; Admin Dose 15 MG;  Start 11/8/17 at 09:00


Multivitamins Therapeutic (Theragran) 1 tab DAILY PO  Last administered on 11/13 /17at 09:15; Admin Dose 1 TAB;  Start 11/8/17 at 09:00


Potassium Chloride (Klor-Con 10) 10 meq DAILY PO  Last administered on 11/13/ 17at 09:15; Admin Dose 10 MEQ;  Start 11/8/17 at 09:00


Tamsulosin HCl (Flomax) 0.4 mg HS PO  Last administered on 11/12/17at 20:29; 

Admin Dose 0.4 MG;  Start 11/8/17 at 21:00


Atorvastatin Calcium (Lipitor) 20 mg DAILY@21 PO  Last administered on 11/12/ 17at 20:28; Admin Dose 20 MG;  Start 11/8/17 at 21:00


Ondansetron HCl (Zofran Inj) 4 mg Q4H  PRN IV NAUSEA AND/OR VOMITING Last 

administered on 11/10/17at 22:12; Admin Dose 4 MG;  Start 11/8/17 at 03:30


Clonidine (Catapres) 0.1 mg Q4H  PRN PO ELEVATED BLOOD PRESSURE;  Start 11/8/17 

at 03:30


Ibuprofen 400 mg 400 mg Q6H  PRN PO PAIN OR TEMP ABOVE 38C Last administered on 

11/10/17at 22:12; Admin Dose 400 MG;  Start 11/8/17 at 14:00


Ceftriaxone Sodium (Rocephin) 50 ml @  100 mls/hr Q24H IVPB  Last administered 

on 11/12/17at 16:15; Admin Dose 100 MLS/HR;  Start 11/8/17 at 16:00


Methylprednisolone Sodium Succinate (Solu-Medrol) 40 mg Q12 IV  Last 

administered on 11/13/17at 09:13; Admin Dose 40 MG;  Start 11/8/17 at 21:00


Enoxaparin Sodium (Lovenox) 40 mg DAILY SC  Last administered on 11/13/17at 09:

36; Admin Dose 40 MG;  Start 11/9/17 at 09:00


IV Flush (NS 10 ml) 10 ml PRN  PRN IV IV PROTOCOL;  Start 11/8/17 at 19:30


Metoprolol Tartrate (Lopressor) 50 mg BID PO  Last administered on 11/13/17at 09

:15; Admin Dose 50 MG;  Start 11/8/17 at 21:00


Isosorbide Mononitrate (Imdur) 30 mg DAILY PO  Last administered on 11/13/17at 

09:17; Admin Dose 30 MG;  Start 11/10/17 at 09:00


IV Flush (NS 10 ml) 10 ml PRN  PRN IV IV PROTOCOL;  Start 11/10/17 at 15:30


Morphine Sulfate (morphine) 3 mg Q4H  PRN IV PAIN Last administered on 11/13/ 17at 12:19; Admin Dose 3 MG;  Start 11/10/17 at 17:00











DARIO NORRIS Nov 13, 2017 12:48

## 2017-11-13 NOTE — CONS
Date/Time of Note


Date/Time of Note


DATE: 11/13/17 


TIME: 14:01





Assessment/Plan


Assessment/Plan


Chief Complaint/Hosp Course


IMPRESSION:


1.  Chest pain, assess for acute coronary syndrome.-stabbing by description/

negative trop x 3


2.  Shortness of breath, assess for congestive heart failure with low BNP at 

this time.


3.  Diastolic dysfunction by most recent echo 07/2017.


4.  Abnormal electrocardiogram, assess for acute coronary syndrome.


5.  Chronic obstructive pulmonary disease.


6.  Hypertension, somewhat labile.


7.  Dyslipidemia.





Recc:


-Tele


-serial ecg's


-Continue lasix diuresis


-Continue BB/norvasc/oral nitrates


-Continue asa/statin


-Continue steroids/bronchodilators


-start oral nitrates


Problems:  





Consultation Date/Type/Reason


Admit Date/Time


Nov 9, 2017 at 11:53


Initial Consult Date


11/9/2017


Type of Consultation:  cardiology


Reason for Consultation


chest pain


Referring Provider:  DEVAUGHN TALAMANTES MD





Exam/Review of Systems


Vital Signs


Vitals





 Vital Signs








  Date Time  Temp Pulse Resp B/P Pulse Ox O2 Delivery O2 Flow Rate FiO2


 


11/13/17 12:09  64      


 


11/13/17 11:41 98.5  24 117/76 96   


 


11/13/17 01:21      Nasal Cannula 3.0 


 


11/10/17 13:00        21














 Intake and Output   


 


 11/12/17 11/12/17 11/13/17





 15:00 23:00 07:00


 


Intake Total  1800 ml 260 ml


 


Output Total  1000 ml 450 ml


 


Balance  800 ml -190 ml











Exam


Review of Systems:


CONSTITUTIONAL:  No fevers, chills.


PULMONARY:  No sob


CARDIOVASCULAR: No chest pain/palpitations


GASTROINTESTINAL:  No nausea/vomiting.


GENITOURINARY:  No hematuria/dysuria.


MUSCULOSKELETAL:  No myagias/arthalgias.


PSYCHIATRIC:  The patient denies depression.


NEUROLOGIC:   No weakness


Constitutional:  alert, oriented


Psych:  no complaints


Head:  normocephalic


ENMT:  mucosa pink and moist


Neck:  jvd (9 cm water), supple


Respiratory:  diminished breath sounds (at bases/B)


Cardiovascular:  regular rate and rhythm


Gastrointestinal:  non-tender, soft


Musculoskeletal:  muscle tone (normal)


Extremities:  edema (trace/B)


Neurological:  other (No focval deficits)





Results


Result Diagram:  


11/13/17 1345                                                                  

              11/11/17 0700





Results 24 hrs





Laboratory Tests








Test


  11/13/17


13:45


 


White Blood Count 10.9  H


 


Red Blood Count 4.49  L


 


Hemoglobin 10.5  L


 


Hematocrit 38.0  L


 


Mean Corpuscular Volume 84.6  


 


Mean Corpuscular Hemoglobin 23.4  L


 


Mean Corpuscular Hemoglobin


Concent 27.6  L


 


 


Red Cell Distribution Width 16.9  H


 


Platelet Count 294  


 


Mean Platelet Volume 8.7  


 


Neutrophils % 82.4  H


 


Lymphocytes % 10.3  L


 


Monocytes % 6.4  


 


Eosinophils % 0.1  


 


Basophils % 0.1  


 


Nucleated Red Blood Cells % 0.9  H


 


Neutrophils # 9.0  H


 


Lymphocytes # 1.1  


 


Monocytes # 0.7  


 


Eosinophils # 0.0  


 


Basophils # 0.0  


 


Nucleated Red Blood Cells # 0.1  H











Medications


Medications





 Current Medications


Amlodipine Besylate (Norvasc) 10 mg DAILY PO  Last administered on 11/13/17at 09

:14; Admin Dose 10 MG;  Start 11/8/17 at 09:00


Ascorbic Acid (Vitamin C) 1,000 mg DAILY PO  Last administered on 11/13/17at 09:

16; Admin Dose 1,000 MG;  Start 11/8/17 at 09:00


Aspirin (Aspirin) 81 mg DAILY PO  Last administered on 11/13/17at 09:15; Admin 

Dose 81 MG;  Start 11/8/17 at 09:00


Cholecalciferol (Vitamin D) 1,000 unit DAILY PO  Last administered on 11/13/ 17at 09:18; Admin Dose 1,000 UNIT;  Start 11/8/17 at 09:00


Docusate Sodium (Colace) 250 mg BID PO  Last administered on 11/13/17at 09:16; 

Admin Dose 250 MG;  Start 11/8/17 at 09:00


Gabapentin (Neurontin) 900 mg TID PO  Last administered on 11/13/17at 12:20; 

Admin Dose 900 MG;  Start 11/8/17 at 09:00


Lisinopril (Zestril) 20 mg DAILY PO  Last administered on 11/13/17at 09:17; 

Admin Dose 20 MG;  Start 11/8/17 at 09:00


Morphine Sulfate (Ms Contin (Er)) 15 mg Q12 PO  Last administered on 11/13/17at 

10:09; Admin Dose 15 MG;  Start 11/8/17 at 09:00


Multivitamins Therapeutic (Theragran) 1 tab DAILY PO  Last administered on 11/13 /17at 09:15; Admin Dose 1 TAB;  Start 11/8/17 at 09:00


Potassium Chloride (Klor-Con 10) 10 meq DAILY PO  Last administered on 11/13/ 17at 09:15; Admin Dose 10 MEQ;  Start 11/8/17 at 09:00


Tamsulosin HCl (Flomax) 0.4 mg HS PO  Last administered on 11/12/17at 20:29; 

Admin Dose 0.4 MG;  Start 11/8/17 at 21:00


Atorvastatin Calcium (Lipitor) 20 mg DAILY@21 PO  Last administered on 11/12/ 17at 20:28; Admin Dose 20 MG;  Start 11/8/17 at 21:00


Ondansetron HCl (Zofran Inj) 4 mg Q4H  PRN IV NAUSEA AND/OR VOMITING Last 

administered on 11/10/17at 22:12; Admin Dose 4 MG;  Start 11/8/17 at 03:30


Clonidine (Catapres) 0.1 mg Q4H  PRN PO ELEVATED BLOOD PRESSURE;  Start 11/8/17 

at 03:30


Ibuprofen 400 mg 400 mg Q6H  PRN PO PAIN OR TEMP ABOVE 38C Last administered on 

11/10/17at 22:12; Admin Dose 400 MG;  Start 11/8/17 at 14:00


Ceftriaxone Sodium (Rocephin) 50 ml @  100 mls/hr Q24H IVPB  Last administered 

on 11/12/17at 16:15; Admin Dose 100 MLS/HR;  Start 11/8/17 at 16:00


Methylprednisolone Sodium Succinate (Solu-Medrol) 40 mg Q12 IV  Last 

administered on 11/13/17at 09:13; Admin Dose 40 MG;  Start 11/8/17 at 21:00


Enoxaparin Sodium (Lovenox) 40 mg DAILY SC  Last administered on 11/13/17at 09:

36; Admin Dose 40 MG;  Start 11/9/17 at 09:00


IV Flush (NS 10 ml) 10 ml PRN  PRN IV IV PROTOCOL;  Start 11/8/17 at 19:30


Metoprolol Tartrate (Lopressor) 50 mg BID PO  Last administered on 11/13/17at 09

:15; Admin Dose 50 MG;  Start 11/8/17 at 21:00


Isosorbide Mononitrate (Imdur) 30 mg DAILY PO  Last administered on 11/13/17at 

09:17; Admin Dose 30 MG;  Start 11/10/17 at 09:00


IV Flush (NS 10 ml) 10 ml PRN  PRN IV IV PROTOCOL;  Start 11/10/17 at 15:30


Morphine Sulfate (morphine) 3 mg Q4H  PRN IV PAIN Last administered on 11/13/ 17at 12:19; Admin Dose 3 MG;  Start 11/10/17 at 17:00











HARLEY ADAMS Nov 13, 2017 14:04

## 2017-11-14 VITALS — RESPIRATION RATE: 20 BRPM | SYSTOLIC BLOOD PRESSURE: 128 MMHG | DIASTOLIC BLOOD PRESSURE: 63 MMHG

## 2017-11-14 VITALS — HEART RATE: 80 BPM

## 2017-11-14 VITALS — DIASTOLIC BLOOD PRESSURE: 83 MMHG | SYSTOLIC BLOOD PRESSURE: 149 MMHG | RESPIRATION RATE: 20 BRPM

## 2017-11-14 VITALS — HEART RATE: 73 BPM

## 2017-11-14 VITALS — SYSTOLIC BLOOD PRESSURE: 100 MMHG | DIASTOLIC BLOOD PRESSURE: 59 MMHG | RESPIRATION RATE: 20 BRPM

## 2017-11-14 VITALS — HEART RATE: 82 BPM

## 2017-11-14 VITALS — SYSTOLIC BLOOD PRESSURE: 125 MMHG | DIASTOLIC BLOOD PRESSURE: 59 MMHG | RESPIRATION RATE: 24 BRPM

## 2017-11-14 VITALS — SYSTOLIC BLOOD PRESSURE: 135 MMHG | RESPIRATION RATE: 19 BRPM | DIASTOLIC BLOOD PRESSURE: 77 MMHG

## 2017-11-14 VITALS — RESPIRATION RATE: 17 BRPM

## 2017-11-14 VITALS — HEART RATE: 75 BPM

## 2017-11-14 RX ADMIN — MORPHINE SULFATE SCH MG: 15 TABLET, EXTENDED RELEASE ORAL at 21:00

## 2017-11-14 RX ADMIN — TAMSULOSIN HYDROCHLORIDE SCH MG: 0.4 CAPSULE ORAL at 21:51

## 2017-11-14 RX ADMIN — METOPROLOL TARTRATE SCH MG: 50 TABLET, FILM COATED ORAL at 21:29

## 2017-11-14 RX ADMIN — LEVALBUTEROL HYDROCHLORIDE SCH MG: 0.63 SOLUTION RESPIRATORY (INHALATION) at 02:45

## 2017-11-14 RX ADMIN — MORPHINE SULFATE SCH MG: 15 TABLET, EXTENDED RELEASE ORAL at 09:00

## 2017-11-14 RX ADMIN — OXYCODONE HYDROCHLORIDE AND ACETAMINOPHEN SCH MG: 500 TABLET ORAL at 09:27

## 2017-11-14 RX ADMIN — LEVALBUTEROL HYDROCHLORIDE SCH MG: 0.63 SOLUTION RESPIRATORY (INHALATION) at 08:41

## 2017-11-14 RX ADMIN — THERA TABS SCH TAB: TAB at 09:26

## 2017-11-14 RX ADMIN — POTASSIUM CHLORIDE SCH MEQ: 10 TABLET, EXTENDED RELEASE ORAL at 09:26

## 2017-11-14 RX ADMIN — ATORVASTATIN CALCIUM SCH MG: 20 TABLET, FILM COATED ORAL at 21:29

## 2017-11-14 RX ADMIN — LEVALBUTEROL HYDROCHLORIDE SCH MG: 0.63 SOLUTION RESPIRATORY (INHALATION) at 19:51

## 2017-11-14 RX ADMIN — CEFTRIAXONE SCH MLS/HR: 2 INJECTION, SOLUTION INTRAVENOUS at 16:05

## 2017-11-14 RX ADMIN — GABAPENTIN SCH MG: 300 CAPSULE ORAL at 12:49

## 2017-11-14 RX ADMIN — VITAMIN D, TAB 1000IU (100/BT) SCH UNIT: 25 TAB at 09:28

## 2017-11-14 RX ADMIN — ISOSORBIDE MONONITRATE SCH MG: 30 TABLET, EXTENDED RELEASE ORAL at 09:27

## 2017-11-14 RX ADMIN — AMLODIPINE BESYLATE SCH MG: 10 TABLET ORAL at 09:28

## 2017-11-14 RX ADMIN — METOPROLOL TARTRATE SCH MG: 50 TABLET, FILM COATED ORAL at 09:27

## 2017-11-14 RX ADMIN — ASPIRIN 81 MG SCH MG: 81 TABLET ORAL at 09:25

## 2017-11-14 RX ADMIN — LEVALBUTEROL HYDROCHLORIDE SCH MG: 0.63 SOLUTION RESPIRATORY (INHALATION) at 14:54

## 2017-11-14 RX ADMIN — GABAPENTIN SCH MG: 300 CAPSULE ORAL at 09:26

## 2017-11-14 RX ADMIN — PANTOPRAZOLE SODIUM SCH MG: 40 TABLET, DELAYED RELEASE ORAL at 06:28

## 2017-11-14 RX ADMIN — ENOXAPARIN SODIUM SCH MG: 100 INJECTION SUBCUTANEOUS at 09:29

## 2017-11-14 RX ADMIN — LISINOPRIL SCH MG: 20 TABLET ORAL at 09:28

## 2017-11-14 RX ADMIN — GABAPENTIN SCH MG: 300 CAPSULE ORAL at 21:28

## 2017-11-14 NOTE — PN
Date/Time of Note


Date/Time of Note


DATE: 11/14/17 


TIME: 17:54





Assessment/Plan


VTE Prophylaxis


VTE Prophylaxis Intervention:  SCD's





Lines/Catheters


IV Catheter Type (from [facility] [[email or web address]  PICC Line


Central line still needed:  Yes


Urinary Cath still in place:  No





Assessment/Plan


Chief Complaint/Hosp Course


Patient's continues to have bilateral lower extremity edema, complains of 

shortness of breath on exertion however comfortable at rest


Assessment/Plan


-Chest pain, rule out acute coronary syndrome.  Cardiac enzymes are negative 

3. Dr. Manrique is following in cardiology consultation.  Continue aspirin.


-Possible acute on chronic diastolic dysfunction congestive heart failure.  

Continue Lasix, monitor electrolytes.


-Possible bronchitis, continue Rocephin.


-Possible COPD exacerbation, continue breathing treatments and steroids.


-GERD, continue Protonix


-Morbid obesity with BMI of 40





Further recommendations based on clinical course.  Plan of care was discussed 

with Dr. Anaya.


Problems:  





Exam/Review of Systems


Vital Signs


Vitals





 Vital Signs








  Date Time  Temp Pulse Resp B/P Pulse Ox O2 Delivery O2 Flow Rate FiO2


 


11/14/17 16:01 98.5 76 24 125/59 94   


 


11/14/17 14:57      Nasal Cannula 3.0 32














 Intake and Output   


 


 11/13/17 11/13/17 11/14/17





 14:59 22:59 06:59


 


Intake Total 60 ml 1250 ml 700 ml


 


Output Total 450 ml 2400 ml 950 ml


 


Balance -390 ml -1150 ml -250 ml











Exam


Constitutional:  alert, oriented


Head:  atraumatic, normocephalic


Neck:  supple


Respiratory:  diminished breath sounds


Cardiovascular:  nl pulses, regular rate and rhythm


Gastrointestinal:  non-tender, soft


Extremities:  edema, normal pulses


Neurological:  nl mental status


Skin:  nl turgor





Results


Result Diagram:  


11/13/17 1345                                                                  

              11/13/17 1345








Medications


Medications





 Current Medications


Amlodipine Besylate (Norvasc) 10 mg DAILY PO  Last administered on 11/14/17at 09

:28; Admin Dose 10 MG;  Start 11/8/17 at 09:00


Ascorbic Acid (Vitamin C) 1,000 mg DAILY PO  Last administered on 11/14/17at 09:

27; Admin Dose 1,000 MG;  Start 11/8/17 at 09:00


Aspirin (Aspirin) 81 mg DAILY PO  Last administered on 11/14/17at 09:25; Admin 

Dose 81 MG;  Start 11/8/17 at 09:00


Cholecalciferol (Vitamin D) 1,000 unit DAILY PO  Last administered on 11/14/ 17at 09:28; Admin Dose 1,000 UNIT;  Start 11/8/17 at 09:00


Docusate Sodium (Colace) 250 mg BID PO  Last administered on 11/14/17at 09:27; 

Admin Dose 250 MG;  Start 11/8/17 at 09:00


Gabapentin (Neurontin) 900 mg TID PO  Last administered on 11/14/17at 12:49; 

Admin Dose 900 MG;  Start 11/8/17 at 09:00


Lisinopril (Zestril) 20 mg DAILY PO  Last administered on 11/14/17at 09:28; 

Admin Dose 20 MG;  Start 11/8/17 at 09:00


Morphine Sulfate (Ms Contin (Er)) 15 mg Q12 PO  Last administered on 11/13/17at 

10:09; Admin Dose 15 MG;  Start 11/8/17 at 09:00


Multivitamins Therapeutic (Theragran) 1 tab DAILY PO  Last administered on 11/14 /17at 09:26; Admin Dose 1 TAB;  Start 11/8/17 at 09:00


Potassium Chloride (Klor-Con 10) 10 meq DAILY PO  Last administered on 11/14/ 17at 09:26; Admin Dose 10 MEQ;  Start 11/8/17 at 09:00


Tamsulosin HCl (Flomax) 0.4 mg HS PO  Last administered on 11/13/17at 20:24; 

Admin Dose 0.4 MG;  Start 11/8/17 at 21:00


Atorvastatin Calcium (Lipitor) 20 mg DAILY@21 PO  Last administered on 11/13/ 17at 20:25; Admin Dose 20 MG;  Start 11/8/17 at 21:00


Ondansetron HCl (Zofran Inj) 4 mg Q4H  PRN IV NAUSEA AND/OR VOMITING Last 

administered on 11/10/17at 22:12; Admin Dose 4 MG;  Start 11/8/17 at 03:30


Clonidine (Catapres) 0.1 mg Q4H  PRN PO ELEVATED BLOOD PRESSURE;  Start 11/8/17 

at 03:30


Ibuprofen 400 mg 400 mg Q6H  PRN PO PAIN OR TEMP ABOVE 38C Last administered on 

11/10/17at 22:12; Admin Dose 400 MG;  Start 11/8/17 at 14:00


Ceftriaxone Sodium (Rocephin) 50 ml @  100 mls/hr Q24H IVPB  Last administered 

on 11/14/17at 16:05; Admin Dose 100 MLS/HR;  Start 11/8/17 at 16:00


Methylprednisolone Sodium Succinate (Solu-Medrol) 40 mg Q12 IV  Last 

administered on 11/14/17at 09:28; Admin Dose 40 MG;  Start 11/8/17 at 21:00


Enoxaparin Sodium (Lovenox) 40 mg DAILY SC  Last administered on 11/14/17at 09:

29; Admin Dose 40 MG;  Start 11/9/17 at 09:00


IV Flush (NS 10 ml) 10 ml PRN  PRN IV IV PROTOCOL;  Start 11/8/17 at 19:30


Metoprolol Tartrate (Lopressor) 50 mg BID PO  Last administered on 11/14/17at 09

:27; Admin Dose 50 MG;  Start 11/8/17 at 21:00


Isosorbide Mononitrate (Imdur) 30 mg DAILY PO  Last administered on 11/14/17at 

09:27; Admin Dose 30 MG;  Start 11/10/17 at 09:00


IV Flush (NS 10 ml) 10 ml PRN  PRN IV IV PROTOCOL;  Start 11/10/17 at 15:30


Morphine Sulfate (morphine) 3 mg Q4H  PRN IV PAIN Last administered on 11/14/ 17at 14:44; Admin Dose 3 MG;  Start 11/10/17 at 17:00











DARIO NORRIS Nov 14, 2017 17:55

## 2017-11-14 NOTE — CONS
Date/Time of Note


Date/Time of Note


DATE: 11/14/17 


TIME: 11:44





Assessment/Plan


Assessment/Plan


Additional Assessment/Plan


1.  Chest pain, assess for acute coronary syndrome.-stabbing by description/

negatuve trop x 3 - no new sx now, will monitor clinically.  BETTER now. 


2.  Shortness of breath, assess for congestive heart failure with low BNP at 

this time - Still SOB, COPD likely. 


3.  Diastolic dysfunction by most recent echo 07/2017- chronic. 


4.  Abnormal electrocardiogram, assess for acute coronary syndrome.


5.  Chronic obstructive pulmonary disease- con't resp Rx. On therpy.  BETTER 

with RX. 


6.  Hypertension, labile- will adjust Rx as needed. Will adjust Rx as needed. 

NITRATES ADDED - will follow for now. 


7.  Dyslipidemia.





Consultation Date/Type/Reason


Admit Date/Time


Nov 9, 2017 at 11:53


Type of Consultation:  cardiology


Referring Provider:  DEVAUGHN TALAMANTES MD





24 HR Interval Summary


Free Text/Dictation


NO acute  events - con't BP optimization. 





ROS: No fever, no chills, no nausea, no vomiting, no diarrhea/constipation


        No recent weight changes


        No chest pain, no PND, no orthopnea + SOB 


        No dizziness, blurred vision


        No thirst, no heat or cold intolerance





Exam/Review of Systems


Vital Signs


Vitals





 Vital Signs








  Date Time  Temp Pulse Resp B/P Pulse Ox O2 Delivery O2 Flow Rate FiO2


 


11/14/17 08:43  78 17  93 Nasal Cannula 3.0 32


 


11/14/17 07:54 98.5   149/83    














 Intake and Output   


 


 11/13/17 11/13/17 11/14/17





 15:00 23:00 07:00


 


Intake Total  1250 ml 700 ml


 


Output Total  2400 ml 950 ml


 


Balance  -1150 ml -250 ml











Exam


General: WN/WD/NAD, AOx 3


HEENT: Unicetric/atraumatic/EOMI (follows commands)


NECK: JVD elevated, no thyromegaly


Lymph: no lymphadenopathy


HEART: regular with no S3, II/VI systolic murmur at apex


LUNGS: Coarse sounds


ABD: soft, NT, ND, +BS


: Intact


Neuro: non focal


SKIN: chronic changes


EXT: trace edema





Results


Result Diagram:  


11/13/17 1345                                                                  

              11/13/17 1345





Results 24 hrs





Laboratory Tests








Test


  11/13/17


13:45


 


White Blood Count 10.9  H


 


Red Blood Count 4.49  L


 


Hemoglobin 10.5  L


 


Hematocrit 38.0  L


 


Mean Corpuscular Volume 84.6  


 


Mean Corpuscular Hemoglobin 23.4  L


 


Mean Corpuscular Hemoglobin


Concent 27.6  L


 


 


Red Cell Distribution Width 16.9  H


 


Platelet Count 294  


 


Mean Platelet Volume 8.7  


 


Neutrophils % 82.4  H


 


Lymphocytes % 10.3  L


 


Monocytes % 6.4  


 


Eosinophils % 0.1  


 


Basophils % 0.1  


 


Nucleated Red Blood Cells % 0.9  H


 


Neutrophils # 9.0  H


 


Lymphocytes # 1.1  


 


Monocytes # 0.7  


 


Eosinophils # 0.0  


 


Basophils # 0.0  


 


Nucleated Red Blood Cells # 0.1  H


 


Sodium Level 141  


 


Potassium Level 4.5  


 


Chloride Level 93  L


 


Carbon Dioxide Level 43  *H


 


Anion Gap 10  


 


Blood Urea Nitrogen 24  H


 


Creatinine 0.80  


 


Glucose Level 125  


 


Calcium Level 8.8  











Medications


Medications





 Current Medications


Amlodipine Besylate (Norvasc) 10 mg DAILY PO  Last administered on 11/14/17at 09

:28; Admin Dose 10 MG;  Start 11/8/17 at 09:00


Ascorbic Acid (Vitamin C) 1,000 mg DAILY PO  Last administered on 11/14/17at 09:

27; Admin Dose 1,000 MG;  Start 11/8/17 at 09:00


Aspirin (Aspirin) 81 mg DAILY PO  Last administered on 11/14/17at 09:25; Admin 

Dose 81 MG;  Start 11/8/17 at 09:00


Cholecalciferol (Vitamin D) 1,000 unit DAILY PO  Last administered on 11/14/ 17at 09:28; Admin Dose 1,000 UNIT;  Start 11/8/17 at 09:00


Docusate Sodium (Colace) 250 mg BID PO  Last administered on 11/14/17at 09:27; 

Admin Dose 250 MG;  Start 11/8/17 at 09:00


Gabapentin (Neurontin) 900 mg TID PO  Last administered on 11/14/17at 09:26; 

Admin Dose 900 MG;  Start 11/8/17 at 09:00


Lisinopril (Zestril) 20 mg DAILY PO  Last administered on 11/14/17at 09:28; 

Admin Dose 20 MG;  Start 11/8/17 at 09:00


Morphine Sulfate (Ms Contin (Er)) 15 mg Q12 PO  Last administered on 11/13/17at 

10:09; Admin Dose 15 MG;  Start 11/8/17 at 09:00


Multivitamins Therapeutic (Theragran) 1 tab DAILY PO  Last administered on 11/14 /17at 09:26; Admin Dose 1 TAB;  Start 11/8/17 at 09:00


Potassium Chloride (Klor-Con 10) 10 meq DAILY PO  Last administered on 11/14/ 17at 09:26; Admin Dose 10 MEQ;  Start 11/8/17 at 09:00


Tamsulosin HCl (Flomax) 0.4 mg HS PO  Last administered on 11/13/17at 20:24; 

Admin Dose 0.4 MG;  Start 11/8/17 at 21:00


Atorvastatin Calcium (Lipitor) 20 mg DAILY@21 PO  Last administered on 11/13/ 17at 20:25; Admin Dose 20 MG;  Start 11/8/17 at 21:00


Ondansetron HCl (Zofran Inj) 4 mg Q4H  PRN IV NAUSEA AND/OR VOMITING Last 

administered on 11/10/17at 22:12; Admin Dose 4 MG;  Start 11/8/17 at 03:30


Clonidine (Catapres) 0.1 mg Q4H  PRN PO ELEVATED BLOOD PRESSURE;  Start 11/8/17 

at 03:30


Ibuprofen 400 mg 400 mg Q6H  PRN PO PAIN OR TEMP ABOVE 38C Last administered on 

11/10/17at 22:12; Admin Dose 400 MG;  Start 11/8/17 at 14:00


Ceftriaxone Sodium (Rocephin) 50 ml @  100 mls/hr Q24H IVPB  Last administered 

on 11/13/17at 17:40; Admin Dose 100 MLS/HR;  Start 11/8/17 at 16:00


Methylprednisolone Sodium Succinate (Solu-Medrol) 40 mg Q12 IV  Last 

administered on 11/14/17at 09:28; Admin Dose 40 MG;  Start 11/8/17 at 21:00


Enoxaparin Sodium (Lovenox) 40 mg DAILY SC  Last administered on 11/14/17at 09:

29; Admin Dose 40 MG;  Start 11/9/17 at 09:00


IV Flush (NS 10 ml) 10 ml PRN  PRN IV IV PROTOCOL;  Start 11/8/17 at 19:30


Metoprolol Tartrate (Lopressor) 50 mg BID PO  Last administered on 11/14/17at 09

:27; Admin Dose 50 MG;  Start 11/8/17 at 21:00


Isosorbide Mononitrate (Imdur) 30 mg DAILY PO  Last administered on 11/14/17at 

09:27; Admin Dose 30 MG;  Start 11/10/17 at 09:00


IV Flush (NS 10 ml) 10 ml PRN  PRN IV IV PROTOCOL;  Start 11/10/17 at 15:30


Morphine Sulfate (morphine) 3 mg Q4H  PRN IV PAIN Last administered on 11/14/ 17at 10:32; Admin Dose 3 MG;  Start 11/10/17 at 17:00











ASH LOW MD Nov 14, 2017 11:46

## 2017-11-15 VITALS — SYSTOLIC BLOOD PRESSURE: 125 MMHG | RESPIRATION RATE: 20 BRPM | DIASTOLIC BLOOD PRESSURE: 70 MMHG

## 2017-11-15 VITALS — HEART RATE: 81 BPM

## 2017-11-15 VITALS — RESPIRATION RATE: 24 BRPM | DIASTOLIC BLOOD PRESSURE: 67 MMHG | SYSTOLIC BLOOD PRESSURE: 141 MMHG

## 2017-11-15 VITALS — DIASTOLIC BLOOD PRESSURE: 75 MMHG | SYSTOLIC BLOOD PRESSURE: 132 MMHG | RESPIRATION RATE: 20 BRPM

## 2017-11-15 VITALS — RESPIRATION RATE: 20 BRPM | DIASTOLIC BLOOD PRESSURE: 54 MMHG | SYSTOLIC BLOOD PRESSURE: 106 MMHG

## 2017-11-15 VITALS — SYSTOLIC BLOOD PRESSURE: 151 MMHG | DIASTOLIC BLOOD PRESSURE: 68 MMHG | RESPIRATION RATE: 20 BRPM

## 2017-11-15 VITALS — HEART RATE: 99 BPM

## 2017-11-15 VITALS — HEART RATE: 86 BPM

## 2017-11-15 VITALS — HEART RATE: 71 BPM

## 2017-11-15 VITALS — DIASTOLIC BLOOD PRESSURE: 65 MMHG | SYSTOLIC BLOOD PRESSURE: 135 MMHG | RESPIRATION RATE: 20 BRPM

## 2017-11-15 VITALS — HEART RATE: 89 BPM

## 2017-11-15 VITALS — HEART RATE: 87 BPM

## 2017-11-15 LAB
ABNORMAL IP MESSAGE: 1
ANION GAP SERPL CALC-SCNC: 12 MMOL/L (ref 8–16)
BASOPHILS # BLD AUTO: 0 10^3/UL (ref 0–0.1)
BASOPHILS NFR BLD: 0.1 % (ref 0–2)
BUN SERPL-MCNC: 20 MG/DL (ref 7–20)
CALCIUM SERPL-MCNC: 8.9 MG/DL (ref 8.4–10.2)
CHLORIDE SERPL-SCNC: 92 MMOL/L (ref 97–110)
CO2 SERPL-SCNC: 42 MMOL/L (ref 21–31)
CREAT SERPL-MCNC: 0.77 MG/DL (ref 0.61–1.24)
EOSINOPHIL # BLD: 0 10^3/UL (ref 0–0.5)
EOSINOPHIL NFR BLD: 0.1 % (ref 0–7)
ERYTHROCYTE [DISTWIDTH] IN BLOOD BY AUTOMATED COUNT: 17.1 % (ref 11.5–14.5)
GLUCOSE SERPL-MCNC: 151 MG/DL (ref 70–220)
HCT VFR BLD CALC: 38.4 % (ref 42–52)
HGB BLD-MCNC: 10.6 G/DL (ref 14–18)
LYMPHOCYTES # BLD AUTO: 1.7 10^3/UL (ref 0.8–2.9)
LYMPHOCYTES NFR BLD AUTO: 14.4 % (ref 15–51)
MCH RBC QN AUTO: 23.4 PG (ref 29–33)
MCHC RBC AUTO-ENTMCNC: 27.6 G/DL (ref 32–37)
MCV RBC AUTO: 84.8 FL (ref 82–101)
MONOCYTES # BLD: 1.2 10^3/UL (ref 0.3–0.9)
MONOCYTES NFR BLD: 9.9 % (ref 0–11)
NEUTROPHILS # BLD: 9 10^3/UL (ref 1.6–7.5)
NEUTROPHILS NFR BLD AUTO: 74.3 % (ref 39–77)
NRBC # BLD MANUAL: 0.1 10^3/UL (ref 0–0)
NRBC BLD AUTO-RTO: 0.6 /100WBC (ref 0–0)
PLATELET # BLD: 306 10^3/UL (ref 140–415)
PMV BLD AUTO: 9.1 FL (ref 7.4–10.4)
POSITIVE DIFF: (no result)
POTASSIUM SERPL-SCNC: 4 MMOL/L (ref 3.5–5.1)
RBC # BLD AUTO: 4.53 10^6/UL (ref 4.7–6.1)
SODIUM SERPL-SCNC: 142 MMOL/L (ref 135–144)
WBC # BLD AUTO: 12 10^3/UL (ref 4.8–10.8)

## 2017-11-15 RX ADMIN — LEVALBUTEROL HYDROCHLORIDE SCH MG: 0.63 SOLUTION RESPIRATORY (INHALATION) at 07:55

## 2017-11-15 RX ADMIN — LISINOPRIL SCH MG: 20 TABLET ORAL at 09:43

## 2017-11-15 RX ADMIN — VITAMIN D, TAB 1000IU (100/BT) SCH UNIT: 25 TAB at 09:41

## 2017-11-15 RX ADMIN — LEVALBUTEROL HYDROCHLORIDE SCH MG: 0.63 SOLUTION RESPIRATORY (INHALATION) at 01:19

## 2017-11-15 RX ADMIN — METOPROLOL TARTRATE SCH MG: 50 TABLET, FILM COATED ORAL at 21:12

## 2017-11-15 RX ADMIN — ASPIRIN 81 MG SCH MG: 81 TABLET ORAL at 09:41

## 2017-11-15 RX ADMIN — TAMSULOSIN HYDROCHLORIDE SCH MG: 0.4 CAPSULE ORAL at 21:11

## 2017-11-15 RX ADMIN — ENOXAPARIN SODIUM SCH MG: 100 INJECTION SUBCUTANEOUS at 09:44

## 2017-11-15 RX ADMIN — OXYCODONE HYDROCHLORIDE AND ACETAMINOPHEN SCH MG: 500 TABLET ORAL at 09:41

## 2017-11-15 RX ADMIN — ISOSORBIDE MONONITRATE SCH MG: 30 TABLET, EXTENDED RELEASE ORAL at 09:43

## 2017-11-15 RX ADMIN — LEVALBUTEROL HYDROCHLORIDE SCH MG: 0.63 SOLUTION RESPIRATORY (INHALATION) at 20:00

## 2017-11-15 RX ADMIN — GABAPENTIN SCH MG: 300 CAPSULE ORAL at 21:11

## 2017-11-15 RX ADMIN — LEVALBUTEROL HYDROCHLORIDE SCH MG: 0.63 SOLUTION RESPIRATORY (INHALATION) at 14:00

## 2017-11-15 RX ADMIN — THERA TABS SCH TAB: TAB at 09:43

## 2017-11-15 RX ADMIN — METOPROLOL TARTRATE SCH MG: 50 TABLET, FILM COATED ORAL at 09:42

## 2017-11-15 RX ADMIN — IBUPROFEN PRN MG: 400 TABLET ORAL at 02:13

## 2017-11-15 RX ADMIN — MORPHINE SULFATE SCH MG: 15 TABLET, EXTENDED RELEASE ORAL at 09:00

## 2017-11-15 RX ADMIN — PANTOPRAZOLE SODIUM SCH MG: 40 TABLET, DELAYED RELEASE ORAL at 06:18

## 2017-11-15 RX ADMIN — GABAPENTIN SCH MG: 300 CAPSULE ORAL at 13:36

## 2017-11-15 RX ADMIN — CEFTRIAXONE SCH MLS/HR: 2 INJECTION, SOLUTION INTRAVENOUS at 16:33

## 2017-11-15 RX ADMIN — GABAPENTIN SCH MG: 300 CAPSULE ORAL at 09:42

## 2017-11-15 RX ADMIN — POTASSIUM CHLORIDE SCH MEQ: 10 TABLET, EXTENDED RELEASE ORAL at 09:43

## 2017-11-15 RX ADMIN — ATORVASTATIN CALCIUM SCH MG: 20 TABLET, FILM COATED ORAL at 21:11

## 2017-11-15 RX ADMIN — AMLODIPINE BESYLATE SCH MG: 10 TABLET ORAL at 09:43

## 2017-11-15 NOTE — PN
Date/Time of Note


Date/Time of Note


DATE: 11/15/17 


TIME: 17:09





Assessment/Plan


VTE Prophylaxis


VTE Prophylaxis Intervention:  SCD's





Lines/Catheters


IV Catheter Type (from [facility]):  PICC Line


Central line still needed:  Yes


Urinary Cath still in place:  No





Assessment/Plan


Chief Complaint/Hosp Course


Patient denies any chest pain denies shortness of breath at rest, still has 

bilateral lower extremities edema, and short of breath on exertion.


Assessment/Plan


-Chest pain, rule out acute coronary syndrome.  Cardiac enzymes are negative 

3. Dr. Manrique is following in cardiology consultation.  Continue aspirin.


-Possible acute on chronic diastolic dysfunction congestive heart failure.  

Continue Lasix, monitor electrolytes.


-Possible bronchitis, continue Rocephin.


-Possible COPD exacerbation, continue breathing treatments and steroids.


-GERD, continue Protonix


-Morbid obesity with BMI of 40





Further recommendations based on clinical course.  Plan of care was discussed 

with Dr. Anaya.


Problems:  





Exam/Review of Systems


Vital Signs


Vitals





 Vital Signs








  Date Time  Temp Pulse Resp B/P Pulse Ox O2 Delivery O2 Flow Rate FiO2


 


11/15/17 16:04  86      


 


11/15/17 15:52 98.3  20 151/68 98   


 


11/15/17 14:01      Nasal Cannula 3.0 


 


11/15/17 01:19        30














 Intake and Output   


 


 11/14/17 11/14/17 11/15/17





 14:59 22:59 06:59


 


Intake Total  1850 ml 1000 ml


 


Output Total  2800 ml 1200 ml


 


Balance  -950 ml -200 ml











Exam


Constitutional:  alert, oriented


Respiratory:  diminished breath sounds


Cardiovascular:  nl pulses, regular rate and rhythm


Gastrointestinal:  non-tender, soft


Extremities:  edema, normal pulses


Neurological:  nl mental status


Skin:  nl turgor





Results


Result Diagram:  


11/15/17 0715                                                                  

              11/15/17 0715





Results 24 hrs





Laboratory Tests








Test


  11/15/17


07:15


 


White Blood Count 12.0  H


 


Red Blood Count 4.53  L


 


Hemoglobin 10.6  L


 


Hematocrit 38.4  L


 


Mean Corpuscular Volume 84.8  


 


Mean Corpuscular Hemoglobin 23.4  L


 


Mean Corpuscular Hemoglobin


Concent 27.6  L


 


 


Red Cell Distribution Width 17.1  H


 


Platelet Count 306  


 


Mean Platelet Volume 9.1  


 


Neutrophils % 74.3  


 


Lymphocytes % 14.4  L


 


Monocytes % 9.9  


 


Eosinophils % 0.1  


 


Basophils % 0.1  


 


Nucleated Red Blood Cells % 0.6  H


 


Neutrophils # 9.0  H


 


Lymphocytes # 1.7  


 


Monocytes # 1.2  H


 


Eosinophils # 0.0  


 


Basophils # 0.0  


 


Nucleated Red Blood Cells # 0.1  H


 


Sodium Level 142  


 


Potassium Level 4.0  


 


Chloride Level 92  L


 


Carbon Dioxide Level 42  *H


 


Anion Gap 12  


 


Blood Urea Nitrogen 20  


 


Creatinine 0.77  


 


Glucose Level 151  


 


Calcium Level 8.9  











Medications


Medications





 Current Medications


Amlodipine Besylate (Norvasc) 10 mg DAILY PO  Last administered on 11/15/17at 09

:43; Admin Dose 10 MG;  Start 11/8/17 at 09:00


Ascorbic Acid (Vitamin C) 1,000 mg DAILY PO  Last administered on 11/15/17at 09:

41; Admin Dose 1,000 MG;  Start 11/8/17 at 09:00


Aspirin (Aspirin) 81 mg DAILY PO  Last administered on 11/15/17at 09:41; Admin 

Dose 81 MG;  Start 11/8/17 at 09:00


Cholecalciferol (Vitamin D) 1,000 unit DAILY PO  Last administered on 11/15/

17at 09:41; Admin Dose 1,000 UNIT;  Start 11/8/17 at 09:00


Docusate Sodium (Colace) 250 mg BID PO  Last administered on 11/15/17at 09:41; 

Admin Dose 250 MG;  Start 11/8/17 at 09:00


Gabapentin (Neurontin) 900 mg TID PO  Last administered on 11/15/17at 13:36; 

Admin Dose 900 MG;  Start 11/8/17 at 09:00


Lisinopril (Zestril) 20 mg DAILY PO  Last administered on 11/15/17at 09:43; 

Admin Dose 20 MG;  Start 11/8/17 at 09:00


Morphine Sulfate (Ms Contin (Er)) 15 mg Q12 PO  Last administered on 11/13/17at 

10:09; Admin Dose 15 MG;  Start 11/8/17 at 09:00


Multivitamins Therapeutic (Theragran) 1 tab DAILY PO  Last administered on 11/15

/17at 09:43; Admin Dose 1 TAB;  Start 11/8/17 at 09:00


Potassium Chloride (Klor-Con 10) 10 meq DAILY PO  Last administered on 11/15/

17at 09:43; Admin Dose 10 MEQ;  Start 11/8/17 at 09:00


Tamsulosin HCl (Flomax) 0.4 mg HS PO  Last administered on 11/14/17at 21:51; 

Admin Dose 0.4 MG;  Start 11/8/17 at 21:00


Atorvastatin Calcium (Lipitor) 20 mg DAILY@21 PO  Last administered on 11/14/ 17at 21:29; Admin Dose 20 MG;  Start 11/8/17 at 21:00


Ondansetron HCl (Zofran Inj) 4 mg Q4H  PRN IV NAUSEA AND/OR VOMITING Last 

administered on 11/10/17at 22:12; Admin Dose 4 MG;  Start 11/8/17 at 03:30


Clonidine (Catapres) 0.1 mg Q4H  PRN PO ELEVATED BLOOD PRESSURE;  Start 11/8/17 

at 03:30


Ibuprofen 400 mg 400 mg Q6H  PRN PO PAIN OR TEMP ABOVE 38C Last administered on 

11/15/17at 02:13; Admin Dose 400 MG;  Start 11/8/17 at 14:00


Ceftriaxone Sodium (Rocephin) 50 ml @  100 mls/hr Q24H IVPB  Last administered 

on 11/15/17at 16:33; Admin Dose 100 MLS/HR;  Start 11/8/17 at 16:00


Methylprednisolone Sodium Succinate (Solu-Medrol) 40 mg Q12 IV  Last 

administered on 11/15/17at 09:44; Admin Dose 40 MG;  Start 11/8/17 at 21:00


Enoxaparin Sodium (Lovenox) 40 mg DAILY SC  Last administered on 11/15/17at 09:

44; Admin Dose 40 MG;  Start 11/9/17 at 09:00


IV Flush (NS 10 ml) 10 ml PRN  PRN IV IV PROTOCOL;  Start 11/8/17 at 19:30


Metoprolol Tartrate (Lopressor) 50 mg BID PO  Last administered on 11/15/17at 09

:42; Admin Dose 50 MG;  Start 11/8/17 at 21:00


Isosorbide Mononitrate (Imdur) 30 mg DAILY PO  Last administered on 11/15/17at 

09:43; Admin Dose 30 MG;  Start 11/10/17 at 09:00


IV Flush (NS 10 ml) 10 ml PRN  PRN IV IV PROTOCOL;  Start 11/10/17 at 15:30


Morphine Sulfate (morphine) 3 mg Q4H  PRN IV PAIN Last administered on 11/15/

17at 06:28; Admin Dose 3 MG;  Start 11/10/17 at 17:00











DARIO NORRIS Nov 15, 2017 17:10

## 2017-11-15 NOTE — CONS
Date/Time of Note


Date/Time of Note


DATE: 11/15/17 


TIME: 15:24





Assessment/Plan


Assessment/Plan


Chief Complaint/Hosp Course


IMPRESSION:


1.  Chest pain, assess for acute coronary syndrome.-stabbing by description/

negative trop x 3


2.  Shortness of breath, assess for congestive heart failure with low BNP at 

this time.


3.  Diastolic dysfunction by most recent echo 07/2017.


4.  Abnormal electrocardiogram, assess for acute coronary syndrome.


5.  Chronic obstructive pulmonary disease.


6.  Hypertension, somewhat labile.


7.  Dyslipidemia.





Recc:


-Tele


-serial ecg's


-Continue lasix diuresis


-Continue BB/norvasc/oral nitrates


-Continue asa/statin


-Continue steroids/bronchodilators


-Follow volume status closely


Problems:  





Consultation Date/Type/Reason


Admit Date/Time


Nov 9, 2017 at 11:53


Initial Consult Date


11/9/2017


Type of Consultation:  cardiology


Reason for Consultation


CHF


Referring Provider:  DEVAUGHN TALAMANTES MD





Exam/Review of Systems


Vital Signs


Vitals





 Vital Signs








  Date Time  Temp Pulse Resp B/P Pulse Ox O2 Delivery O2 Flow Rate FiO2


 


11/15/17 14:01  85 22  93 Nasal Cannula 3.0 


 


11/15/17 12:00 97.5   125/70    


 


11/15/17 01:19        30














 Intake and Output   


 


 11/14/17 11/14/17 11/15/17





 14:59 22:59 06:59


 


Intake Total  1850 ml 1000 ml


 


Output Total  2800 ml 1200 ml


 


Balance  -950 ml -200 ml











Exam


Review of Systems:


CONSTITUTIONAL:  No fevers, chills.


PULMONARY:  No sob


CARDIOVASCULAR: No chest pain/palpitations


GASTROINTESTINAL:  No nausea/vomiting.


GENITOURINARY:  No hematuria/dysuria.


MUSCULOSKELETAL:  No myagias/arthalgias.


PSYCHIATRIC:  The patient denies depression.


NEUROLOGIC:   No weakness


Constitutional:  alert, oriented


Psych:  no complaints


Head:  normocephalic


ENMT:  mucosa pink and moist


Neck:  jvd (9 cm water), supple


Respiratory:  diminished breath sounds (at bases/B)


Cardiovascular:  regular rate and rhythm


Gastrointestinal:  non-tender, soft


Musculoskeletal:  muscle tone (normal)


Extremities:  pitting pedal edema (1+ bilateral)


Neurological:  other (No focal deficits)





Results


Result Diagram:  


11/15/17 0715                                                                  

              11/15/17 0715





Results 24 hrs





Laboratory Tests








Test


  11/15/17


07:15


 


White Blood Count 12.0  H


 


Red Blood Count 4.53  L


 


Hemoglobin 10.6  L


 


Hematocrit 38.4  L


 


Mean Corpuscular Volume 84.8  


 


Mean Corpuscular Hemoglobin 23.4  L


 


Mean Corpuscular Hemoglobin


Concent 27.6  L


 


 


Red Cell Distribution Width 17.1  H


 


Platelet Count 306  


 


Mean Platelet Volume 9.1  


 


Neutrophils % 74.3  


 


Lymphocytes % 14.4  L


 


Monocytes % 9.9  


 


Eosinophils % 0.1  


 


Basophils % 0.1  


 


Nucleated Red Blood Cells % 0.6  H


 


Neutrophils # 9.0  H


 


Lymphocytes # 1.7  


 


Monocytes # 1.2  H


 


Eosinophils # 0.0  


 


Basophils # 0.0  


 


Nucleated Red Blood Cells # 0.1  H


 


Sodium Level 142  


 


Potassium Level 4.0  


 


Chloride Level 92  L


 


Carbon Dioxide Level 42  *H


 


Anion Gap 12  


 


Blood Urea Nitrogen 20  


 


Creatinine 0.77  


 


Glucose Level 151  


 


Calcium Level 8.9  











Medications


Medications





 Current Medications


Amlodipine Besylate (Norvasc) 10 mg DAILY PO  Last administered on 11/15/17at 09

:43; Admin Dose 10 MG;  Start 11/8/17 at 09:00


Ascorbic Acid (Vitamin C) 1,000 mg DAILY PO  Last administered on 11/15/17at 09:

41; Admin Dose 1,000 MG;  Start 11/8/17 at 09:00


Aspirin (Aspirin) 81 mg DAILY PO  Last administered on 11/15/17at 09:41; Admin 

Dose 81 MG;  Start 11/8/17 at 09:00


Cholecalciferol (Vitamin D) 1,000 unit DAILY PO  Last administered on 11/15/

17at 09:41; Admin Dose 1,000 UNIT;  Start 11/8/17 at 09:00


Docusate Sodium (Colace) 250 mg BID PO  Last administered on 11/15/17at 09:41; 

Admin Dose 250 MG;  Start 11/8/17 at 09:00


Gabapentin (Neurontin) 900 mg TID PO  Last administered on 11/15/17at 13:36; 

Admin Dose 900 MG;  Start 11/8/17 at 09:00


Lisinopril (Zestril) 20 mg DAILY PO  Last administered on 11/15/17at 09:43; 

Admin Dose 20 MG;  Start 11/8/17 at 09:00


Morphine Sulfate (Ms Contin (Er)) 15 mg Q12 PO  Last administered on 11/13/17at 

10:09; Admin Dose 15 MG;  Start 11/8/17 at 09:00


Multivitamins Therapeutic (Theragran) 1 tab DAILY PO  Last administered on 11/15

/17at 09:43; Admin Dose 1 TAB;  Start 11/8/17 at 09:00


Potassium Chloride (Klor-Con 10) 10 meq DAILY PO  Last administered on 11/15/

17at 09:43; Admin Dose 10 MEQ;  Start 11/8/17 at 09:00


Tamsulosin HCl (Flomax) 0.4 mg HS PO  Last administered on 11/14/17at 21:51; 

Admin Dose 0.4 MG;  Start 11/8/17 at 21:00


Atorvastatin Calcium (Lipitor) 20 mg DAILY@21 PO  Last administered on 11/14/ 17at 21:29; Admin Dose 20 MG;  Start 11/8/17 at 21:00


Ondansetron HCl (Zofran Inj) 4 mg Q4H  PRN IV NAUSEA AND/OR VOMITING Last 

administered on 11/10/17at 22:12; Admin Dose 4 MG;  Start 11/8/17 at 03:30


Clonidine (Catapres) 0.1 mg Q4H  PRN PO ELEVATED BLOOD PRESSURE;  Start 11/8/17 

at 03:30


Ibuprofen 400 mg 400 mg Q6H  PRN PO PAIN OR TEMP ABOVE 38C Last administered on 

11/15/17at 02:13; Admin Dose 400 MG;  Start 11/8/17 at 14:00


Ceftriaxone Sodium (Rocephin) 50 ml @  100 mls/hr Q24H IVPB  Last administered 

on 11/14/17at 16:05; Admin Dose 100 MLS/HR;  Start 11/8/17 at 16:00


Methylprednisolone Sodium Succinate (Solu-Medrol) 40 mg Q12 IV  Last 

administered on 11/15/17at 09:44; Admin Dose 40 MG;  Start 11/8/17 at 21:00


Enoxaparin Sodium (Lovenox) 40 mg DAILY SC  Last administered on 11/15/17at 09:

44; Admin Dose 40 MG;  Start 11/9/17 at 09:00


IV Flush (NS 10 ml) 10 ml PRN  PRN IV IV PROTOCOL;  Start 11/8/17 at 19:30


Metoprolol Tartrate (Lopressor) 50 mg BID PO  Last administered on 11/15/17at 09

:42; Admin Dose 50 MG;  Start 11/8/17 at 21:00


Isosorbide Mononitrate (Imdur) 30 mg DAILY PO  Last administered on 11/15/17at 

09:43; Admin Dose 30 MG;  Start 11/10/17 at 09:00


IV Flush (NS 10 ml) 10 ml PRN  PRN IV IV PROTOCOL;  Start 11/10/17 at 15:30


Morphine Sulfate (morphine) 3 mg Q4H  PRN IV PAIN Last administered on 11/15/

17at 06:28; Admin Dose 3 MG;  Start 11/10/17 at 17:00











HARLEY ADAMS Nov 15, 2017 15:28

## 2017-11-16 VITALS — RESPIRATION RATE: 19 BRPM | DIASTOLIC BLOOD PRESSURE: 79 MMHG | SYSTOLIC BLOOD PRESSURE: 146 MMHG

## 2017-11-16 VITALS — SYSTOLIC BLOOD PRESSURE: 141 MMHG | DIASTOLIC BLOOD PRESSURE: 71 MMHG | RESPIRATION RATE: 19 BRPM

## 2017-11-16 VITALS — SYSTOLIC BLOOD PRESSURE: 155 MMHG | DIASTOLIC BLOOD PRESSURE: 78 MMHG | RESPIRATION RATE: 22 BRPM

## 2017-11-16 VITALS — SYSTOLIC BLOOD PRESSURE: 135 MMHG | RESPIRATION RATE: 20 BRPM | DIASTOLIC BLOOD PRESSURE: 69 MMHG

## 2017-11-16 LAB
ABNORMAL IP MESSAGE: 1
ANION GAP SERPL CALC-SCNC: 10 MMOL/L (ref 8–16)
BASOPHILS # BLD AUTO: 0 10^3/UL (ref 0–0.1)
BASOPHILS NFR BLD: 0.2 % (ref 0–2)
BUN SERPL-MCNC: 20 MG/DL (ref 7–20)
CALCIUM SERPL-MCNC: 8.5 MG/DL (ref 8.4–10.2)
CHLORIDE SERPL-SCNC: 95 MMOL/L (ref 97–110)
CO2 SERPL-SCNC: 40 MMOL/L (ref 21–31)
CREAT SERPL-MCNC: 0.74 MG/DL (ref 0.61–1.24)
EOSINOPHIL # BLD: 0 10^3/UL (ref 0–0.5)
EOSINOPHIL NFR BLD: 0.1 % (ref 0–7)
ERYTHROCYTE [DISTWIDTH] IN BLOOD BY AUTOMATED COUNT: 17.2 % (ref 11.5–14.5)
GLUCOSE SERPL-MCNC: 97 MG/DL (ref 70–220)
HCT VFR BLD CALC: 35 % (ref 42–52)
HGB BLD-MCNC: 9.8 G/DL (ref 14–18)
LYMPHOCYTES # BLD AUTO: 1.7 10^3/UL (ref 0.8–2.9)
LYMPHOCYTES NFR BLD AUTO: 14.4 % (ref 15–51)
MCH RBC QN AUTO: 23.8 PG (ref 29–33)
MCHC RBC AUTO-ENTMCNC: 28 G/DL (ref 32–37)
MCV RBC AUTO: 85.2 FL (ref 82–101)
MONOCYTES # BLD: 1.1 10^3/UL (ref 0.3–0.9)
MONOCYTES NFR BLD: 9.2 % (ref 0–11)
NEUTROPHILS # BLD: 8.9 10^3/UL (ref 1.6–7.5)
NEUTROPHILS NFR BLD AUTO: 75 % (ref 39–77)
NRBC # BLD MANUAL: 0 10^3/UL (ref 0–0)
NRBC BLD AUTO-RTO: 0.3 /100WBC (ref 0–0)
PLATELET # BLD: 283 10^3/UL (ref 140–415)
PMV BLD AUTO: 9.2 FL (ref 7.4–10.4)
POSITIVE DIFF: (no result)
POTASSIUM SERPL-SCNC: 4.2 MMOL/L (ref 3.5–5.1)
RBC # BLD AUTO: 4.11 10^6/UL (ref 4.7–6.1)
SODIUM SERPL-SCNC: 141 MMOL/L (ref 135–144)
WBC # BLD AUTO: 11.8 10^3/UL (ref 4.8–10.8)

## 2017-11-16 RX ADMIN — ENOXAPARIN SODIUM SCH MG: 100 INJECTION SUBCUTANEOUS at 08:34

## 2017-11-16 RX ADMIN — IBUPROFEN PRN MG: 400 TABLET ORAL at 05:48

## 2017-11-16 RX ADMIN — OXYCODONE HYDROCHLORIDE AND ACETAMINOPHEN SCH MG: 500 TABLET ORAL at 08:22

## 2017-11-16 RX ADMIN — POTASSIUM CHLORIDE SCH MEQ: 10 TABLET, EXTENDED RELEASE ORAL at 08:23

## 2017-11-16 RX ADMIN — IBUPROFEN PRN MG: 400 TABLET ORAL at 10:35

## 2017-11-16 RX ADMIN — THERA TABS SCH TAB: TAB at 08:23

## 2017-11-16 RX ADMIN — GABAPENTIN SCH MG: 300 CAPSULE ORAL at 08:22

## 2017-11-16 RX ADMIN — CEFTRIAXONE SCH MLS/HR: 2 INJECTION, SOLUTION INTRAVENOUS at 15:54

## 2017-11-16 RX ADMIN — LEVALBUTEROL HYDROCHLORIDE SCH MG: 0.63 SOLUTION RESPIRATORY (INHALATION) at 20:35

## 2017-11-16 RX ADMIN — LEVALBUTEROL HYDROCHLORIDE SCH MG: 0.63 SOLUTION RESPIRATORY (INHALATION) at 15:02

## 2017-11-16 RX ADMIN — VITAMIN D, TAB 1000IU (100/BT) SCH UNIT: 25 TAB at 08:23

## 2017-11-16 RX ADMIN — ATORVASTATIN CALCIUM SCH MG: 20 TABLET, FILM COATED ORAL at 20:50

## 2017-11-16 RX ADMIN — LISINOPRIL SCH MG: 20 TABLET ORAL at 08:22

## 2017-11-16 RX ADMIN — LEVALBUTEROL HYDROCHLORIDE SCH MG: 0.63 SOLUTION RESPIRATORY (INHALATION) at 10:27

## 2017-11-16 RX ADMIN — GABAPENTIN SCH MG: 300 CAPSULE ORAL at 20:50

## 2017-11-16 RX ADMIN — AMLODIPINE BESYLATE SCH MG: 10 TABLET ORAL at 08:22

## 2017-11-16 RX ADMIN — TAMSULOSIN HYDROCHLORIDE SCH MG: 0.4 CAPSULE ORAL at 20:50

## 2017-11-16 RX ADMIN — ISOSORBIDE MONONITRATE SCH MG: 30 TABLET, EXTENDED RELEASE ORAL at 08:23

## 2017-11-16 RX ADMIN — METOPROLOL TARTRATE SCH MG: 50 TABLET, FILM COATED ORAL at 08:21

## 2017-11-16 RX ADMIN — LEVALBUTEROL HYDROCHLORIDE SCH MG: 0.63 SOLUTION RESPIRATORY (INHALATION) at 02:00

## 2017-11-16 RX ADMIN — GABAPENTIN SCH MG: 300 CAPSULE ORAL at 13:07

## 2017-11-16 RX ADMIN — ASPIRIN 81 MG SCH MG: 81 TABLET ORAL at 08:23

## 2017-11-16 RX ADMIN — METOPROLOL TARTRATE SCH MG: 50 TABLET, FILM COATED ORAL at 20:49

## 2017-11-16 RX ADMIN — PANTOPRAZOLE SODIUM SCH MG: 40 TABLET, DELAYED RELEASE ORAL at 05:49

## 2017-11-16 NOTE — CONS
Date/Time of Note


Date/Time of Note


DATE: 11/16/17 


TIME: 19:55





Assessment/Plan


Assessment/Plan


Chief Complaint/Hosp Course


IMPRESSION:


1.  Chest pain, assess for acute coronary syndrome.-stabbing by description/

negative trop x 3


2.  Shortness of breath, assess for congestive heart failure with low BNP at 

this time-slowly improving


3.  Diastolic dysfunction by most recent echo 07/2017.


4.  Abnormal electrocardiogram, assess for acute coronary syndrome.


5.  Chronic obstructive pulmonary disease.


6.  Hypertension, somewhat labile.


7.  Dyslipidemia.





Recc:


-Tele


-serial ecg's


-Continue lasix diuresis


-Continue BB/norvasc/oral nitrates


-Continue asa/statin


-Continue steroids/bronchodilators


-Follow volume status closely


Problems:  





Consultation Date/Type/Reason


Admit Date/Time


Nov 9, 2017 at 11:53


Initial Consult Date


11/9/2017


Type of Consultation:  cardiology


Reason for Consultation


CHF


Referring Provider:  DEVAUGHN TALAMANTES MD





Exam/Review of Systems


Vital Signs


Vitals





 Vital Signs








  Date Time  Temp Pulse Resp B/P Pulse Ox O2 Delivery O2 Flow Rate FiO2


 


11/16/17 19:43 98.0 90 22 155/78 92   


 


11/16/17 15:05      Nasal Cannula 3.0 


 


11/16/17 10:32        21














 Intake and Output   


 


 11/15/17 11/15/17 11/16/17





 15:00 23:00 07:00


 


Intake Total  1050 ml 1480 ml


 


Output Total  1200 ml 1050 ml


 


Balance  -150 ml 430 ml











Exam


Review of Systems:


CONSTITUTIONAL:  No fevers, chills.


PULMONARY:  No sob


CARDIOVASCULAR: No chest pain/palpitations


GASTROINTESTINAL:  No nausea/vomiting.


GENITOURINARY:  No hematuria/dysuria.


MUSCULOSKELETAL:  No myagias/arthalgias.


PSYCHIATRIC:  The patient denies depression.


NEUROLOGIC:   No weakness


Constitutional:  alert


Psych:  no complaints


Head:  normocephalic


ENMT:  mucosa pink and moist


Neck:  jvd (9 cm water), supple


Respiratory:  diminished breath sounds


Cardiovascular:  regular rate and rhythm


Gastrointestinal:  non-tender, soft


Musculoskeletal:  muscle tone (normal)


Extremities:  edema (none)


Neurological:  other (No focal deficits)





Results


Result Diagram:  


11/16/17 0430                                                                  

              11/16/17 0430





Results 24 hrs





Laboratory Tests








Test


  11/16/17


04:30


 


White Blood Count 11.8  H


 


Red Blood Count 4.11  L


 


Hemoglobin 9.8  L


 


Hematocrit 35.0  L


 


Mean Corpuscular Volume 85.2  


 


Mean Corpuscular Hemoglobin 23.8  L


 


Mean Corpuscular Hemoglobin


Concent 28.0  L


 


 


Red Cell Distribution Width 17.2  H


 


Platelet Count 283  


 


Mean Platelet Volume 9.2  


 


Neutrophils % 75.0  


 


Lymphocytes % 14.4  L


 


Monocytes % 9.2  


 


Eosinophils % 0.1  


 


Basophils % 0.2  


 


Nucleated Red Blood Cells % 0.3  H


 


Neutrophils # 8.9  H


 


Lymphocytes # 1.7  


 


Monocytes # 1.1  H


 


Eosinophils # 0.0  


 


Basophils # 0.0  


 


Nucleated Red Blood Cells # 0.0  


 


Sodium Level 141  


 


Potassium Level 4.2  


 


Chloride Level 95  L


 


Carbon Dioxide Level 40  H


 


Anion Gap 10  


 


Blood Urea Nitrogen 20  


 


Creatinine 0.74  


 


Glucose Level 97  #


 


Calcium Level 8.5  











Medications


Medications





 Current Medications


Amlodipine Besylate (Norvasc) 10 mg DAILY PO  Last administered on 11/16/17at 08

:22; Admin Dose 10 MG;  Start 11/8/17 at 09:00


Ascorbic Acid (Vitamin C) 1,000 mg DAILY PO  Last administered on 11/16/17at 08:

22; Admin Dose 1,000 MG;  Start 11/8/17 at 09:00


Aspirin (Aspirin) 81 mg DAILY PO  Last administered on 11/16/17at 08:23; Admin 

Dose 81 MG;  Start 11/8/17 at 09:00


Cholecalciferol (Vitamin D) 1,000 unit DAILY PO  Last administered on 11/16/ 17at 08:23; Admin Dose 1,000 UNIT;  Start 11/8/17 at 09:00


Docusate Sodium (Colace) 250 mg BID PO  Last administered on 11/16/17at 08:23; 

Admin Dose 250 MG;  Start 11/8/17 at 09:00


Gabapentin (Neurontin) 900 mg TID PO  Last administered on 11/16/17at 13:07; 

Admin Dose 900 MG;  Start 11/8/17 at 09:00


Lisinopril (Zestril) 20 mg DAILY PO  Last administered on 11/16/17at 08:22; 

Admin Dose 20 MG;  Start 11/8/17 at 09:00


Multivitamins Therapeutic (Theragran) 1 tab DAILY PO  Last administered on 11/16 /17at 08:23; Admin Dose 1 TAB;  Start 11/8/17 at 09:00


Potassium Chloride (Klor-Con 10) 10 meq DAILY PO  Last administered on 11/16/ 17at 08:23; Admin Dose 10 MEQ;  Start 11/8/17 at 09:00


Tamsulosin HCl (Flomax) 0.4 mg HS PO  Last administered on 11/15/17at 21:11; 

Admin Dose 0.4 MG;  Start 11/8/17 at 21:00


Atorvastatin Calcium (Lipitor) 20 mg DAILY@21 PO  Last administered on 11/15/

17at 21:11; Admin Dose 20 MG;  Start 11/8/17 at 21:00


Ondansetron HCl (Zofran Inj) 4 mg Q4H  PRN IV NAUSEA AND/OR VOMITING Last 

administered on 11/10/17at 22:12; Admin Dose 4 MG;  Start 11/8/17 at 03:30


Clonidine 0.1 mg 0.1 mg Q4H  PRN PO ELEVATED BLOOD PRESSURE;  Start 11/8/17 at 

03:30


Ceftriaxone Sodium (Rocephin) 50 ml @  100 mls/hr Q24H IVPB  Last administered 

on 11/16/17at 15:54; Admin Dose 100 MLS/HR;  Start 11/8/17 at 16:00


Enoxaparin Sodium (Lovenox) 40 mg DAILY SC  Last administered on 11/16/17at 08:

34; Admin Dose 40 MG;  Start 11/9/17 at 09:00


IV Flush (NS 10 ml) 10 ml PRN  PRN IV IV PROTOCOL;  Start 11/8/17 at 19:30


Metoprolol Tartrate (Lopressor) 50 mg BID PO  Last administered on 11/16/17at 08

:21; Admin Dose 50 MG;  Start 11/8/17 at 21:00


Isosorbide Mononitrate (Imdur) 30 mg DAILY PO  Last administered on 11/16/17at 

08:23; Admin Dose 30 MG;  Start 11/10/17 at 09:00


IV Flush (NS 10 ml) 10 ml PRN  PRN IV IV PROTOCOL;  Start 11/10/17 at 15:30


Acetaminophen/ Hydrocodone Bitart (Norco (10/325)) 1 tab Q4H  PRN PO PAIN Last 

administered on 11/15/17at 20:02; Admin Dose 1 TAB;  Start 11/15/17 at 17:30


Methylprednisolone Sodium Succinate (Solu-Medrol) 20 mg Q12 IV  Last 

administered on 11/16/17at 08:25; Admin Dose 20 MG;  Start 11/15/17 at 21:00


Ibuprofen (Motrin) 400 mg Q4H  PRN PO PAIN OR TEMP ABOVE 38C Last administered 

on 11/16/17at 10:35; Admin Dose 400 MG;  Start 11/15/17 at 20:00











HARLEY ADAMS Nov 16, 2017 19:56

## 2017-11-16 NOTE — PN
Date/Time of Note


Date/Time of Note


DATE: 11/16/17 


TIME: 13:21





Assessment/Plan


VTE Prophylaxis


VTE Prophylaxis Intervention:  SCD's





Lines/Catheters


IV Catheter Type (from Nrs):  PICC Line


Central line still needed:  Yes


Urinary Cath still in place:  No





Assessment/Plan


Chief Complaint/Hosp Course


Patient complains of  shortness of right breath on exertion and bilateral lower 

extremities edema, comfortable at rest.


Assessment/Plan


-Chest pain, rule out acute coronary syndrome.  Cardiac enzymes are negative 

3. Dr. Manrique is following in cardiology consultation.  Continue aspirin.


-Possible acute on chronic diastolic dysfunction congestive heart failure.  

Continue Lasix, monitor electrolytes.


-Possible bronchitis, continue Rocephin.


-Possible COPD exacerbation, continue breathing treatments and steroids.


-GERD, continue Protonix


-Morbid obesity with BMI of 40





Further recommendations based on clinical course.  Plan of care was discussed 

with Dr. Anaya.


Problems:  





Exam/Review of Systems


Vital Signs


Vitals





 Vital Signs








  Date Time  Temp Pulse Resp B/P Pulse Ox O2 Delivery O2 Flow Rate FiO2


 


11/16/17 10:40       3.0 


 


11/16/17 10:32  89 20  91   21


 


11/16/17 07:40 97.0   146/79    


 


11/15/17 23:00      Nasal Cannula  














 Intake and Output   


 


 11/15/17 11/15/17 11/16/17





 15:00 23:00 07:00


 


Intake Total  1050 ml 1480 ml


 


Output Total  1200 ml 1050 ml


 


Balance  -150 ml 430 ml











Exam


Constitutional:  alert, oriented


Respiratory:  diminished breath sounds


Cardiovascular:  nl pulses, regular rate and rhythm


Gastrointestinal:  non-tender, soft


Extremities:  edema, normal pulses


Neurological:  nl mental status


Skin:  nl turgor





Results


Result Diagram:  


11/16/17 0430                                                                  

              11/16/17 0430





Results 24 hrs





Laboratory Tests








Test


  11/16/17


04:30


 


White Blood Count 11.8  H


 


Red Blood Count 4.11  L


 


Hemoglobin 9.8  L


 


Hematocrit 35.0  L


 


Mean Corpuscular Volume 85.2  


 


Mean Corpuscular Hemoglobin 23.8  L


 


Mean Corpuscular Hemoglobin


Concent 28.0  L


 


 


Red Cell Distribution Width 17.2  H


 


Platelet Count 283  


 


Mean Platelet Volume 9.2  


 


Neutrophils % 75.0  


 


Lymphocytes % 14.4  L


 


Monocytes % 9.2  


 


Eosinophils % 0.1  


 


Basophils % 0.2  


 


Nucleated Red Blood Cells % 0.3  H


 


Neutrophils # 8.9  H


 


Lymphocytes # 1.7  


 


Monocytes # 1.1  H


 


Eosinophils # 0.0  


 


Basophils # 0.0  


 


Nucleated Red Blood Cells # 0.0  


 


Sodium Level 141  


 


Potassium Level 4.2  


 


Chloride Level 95  L


 


Carbon Dioxide Level 40  H


 


Anion Gap 10  


 


Blood Urea Nitrogen 20  


 


Creatinine 0.74  


 


Glucose Level 97  #


 


Calcium Level 8.5  











Medications


Medications





 Current Medications


Amlodipine Besylate (Norvasc) 10 mg DAILY PO  Last administered on 11/16/17at 08

:22; Admin Dose 10 MG;  Start 11/8/17 at 09:00


Ascorbic Acid (Vitamin C) 1,000 mg DAILY PO  Last administered on 11/16/17at 08:

22; Admin Dose 1,000 MG;  Start 11/8/17 at 09:00


Aspirin (Aspirin) 81 mg DAILY PO  Last administered on 11/16/17at 08:23; Admin 

Dose 81 MG;  Start 11/8/17 at 09:00


Cholecalciferol (Vitamin D) 1,000 unit DAILY PO  Last administered on 11/16/ 17at 08:23; Admin Dose 1,000 UNIT;  Start 11/8/17 at 09:00


Docusate Sodium (Colace) 250 mg BID PO  Last administered on 11/16/17at 08:23; 

Admin Dose 250 MG;  Start 11/8/17 at 09:00


Gabapentin (Neurontin) 900 mg TID PO  Last administered on 11/16/17at 13:07; 

Admin Dose 900 MG;  Start 11/8/17 at 09:00


Lisinopril (Zestril) 20 mg DAILY PO  Last administered on 11/16/17at 08:22; 

Admin Dose 20 MG;  Start 11/8/17 at 09:00


Multivitamins Therapeutic (Theragran) 1 tab DAILY PO  Last administered on 11/16 /17at 08:23; Admin Dose 1 TAB;  Start 11/8/17 at 09:00


Potassium Chloride (Klor-Con 10) 10 meq DAILY PO  Last administered on 11/16/ 17at 08:23; Admin Dose 10 MEQ;  Start 11/8/17 at 09:00


Tamsulosin HCl (Flomax) 0.4 mg HS PO  Last administered on 11/15/17at 21:11; 

Admin Dose 0.4 MG;  Start 11/8/17 at 21:00


Atorvastatin Calcium (Lipitor) 20 mg DAILY@21 PO  Last administered on 11/15/

17at 21:11; Admin Dose 20 MG;  Start 11/8/17 at 21:00


Ondansetron HCl (Zofran Inj) 4 mg Q4H  PRN IV NAUSEA AND/OR VOMITING Last 

administered on 11/10/17at 22:12; Admin Dose 4 MG;  Start 11/8/17 at 03:30


Clonidine 0.1 mg 0.1 mg Q4H  PRN PO ELEVATED BLOOD PRESSURE;  Start 11/8/17 at 

03:30


Ceftriaxone Sodium (Rocephin) 50 ml @  100 mls/hr Q24H IVPB  Last administered 

on 11/15/17at 16:33; Admin Dose 100 MLS/HR;  Start 11/8/17 at 16:00


Enoxaparin Sodium (Lovenox) 40 mg DAILY SC  Last administered on 11/16/17at 08:

34; Admin Dose 40 MG;  Start 11/9/17 at 09:00


IV Flush (NS 10 ml) 10 ml PRN  PRN IV IV PROTOCOL;  Start 11/8/17 at 19:30


Metoprolol Tartrate (Lopressor) 50 mg BID PO  Last administered on 11/16/17at 08

:21; Admin Dose 50 MG;  Start 11/8/17 at 21:00


Isosorbide Mononitrate (Imdur) 30 mg DAILY PO  Last administered on 11/16/17at 

08:23; Admin Dose 30 MG;  Start 11/10/17 at 09:00


IV Flush (NS 10 ml) 10 ml PRN  PRN IV IV PROTOCOL;  Start 11/10/17 at 15:30


Acetaminophen/ Hydrocodone Bitart (Norco (10/325)) 1 tab Q4H  PRN PO PAIN Last 

administered on 11/15/17at 20:02; Admin Dose 1 TAB;  Start 11/15/17 at 17:30


Methylprednisolone Sodium Succinate (Solu-Medrol) 20 mg Q12 IV  Last 

administered on 11/16/17at 08:25; Admin Dose 20 MG;  Start 11/15/17 at 21:00


Ibuprofen (Motrin) 400 mg Q4H  PRN PO PAIN OR TEMP ABOVE 38C Last administered 

on 11/16/17at 10:35; Admin Dose 400 MG;  Start 11/15/17 at 20:00











DARIO NORRIS Nov 16, 2017 13:23

## 2017-11-17 VITALS — DIASTOLIC BLOOD PRESSURE: 64 MMHG | SYSTOLIC BLOOD PRESSURE: 134 MMHG | HEART RATE: 90 BPM | RESPIRATION RATE: 18 BRPM

## 2017-11-17 VITALS — SYSTOLIC BLOOD PRESSURE: 128 MMHG | DIASTOLIC BLOOD PRESSURE: 74 MMHG | RESPIRATION RATE: 20 BRPM

## 2017-11-17 VITALS — RESPIRATION RATE: 22 BRPM | DIASTOLIC BLOOD PRESSURE: 76 MMHG | SYSTOLIC BLOOD PRESSURE: 137 MMHG

## 2017-11-17 VITALS — DIASTOLIC BLOOD PRESSURE: 67 MMHG | SYSTOLIC BLOOD PRESSURE: 124 MMHG | RESPIRATION RATE: 18 BRPM

## 2017-11-17 LAB
ABNORMAL IP MESSAGE: 1
ANION GAP SERPL CALC-SCNC: 9 MMOL/L (ref 8–16)
BASOPHILS # BLD AUTO: 0 10^3/UL (ref 0–0.1)
BASOPHILS NFR BLD: 0.2 % (ref 0–2)
BUN SERPL-MCNC: 17 MG/DL (ref 7–20)
CALCIUM SERPL-MCNC: 8.9 MG/DL (ref 8.4–10.2)
CHLORIDE SERPL-SCNC: 97 MMOL/L (ref 97–110)
CREAT SERPL-MCNC: 0.68 MG/DL (ref 0.61–1.24)
EOSINOPHIL # BLD: 0 10^3/UL (ref 0–0.5)
EOSINOPHIL NFR BLD: 0.2 % (ref 0–7)
ERYTHROCYTE [DISTWIDTH] IN BLOOD BY AUTOMATED COUNT: 17.7 % (ref 11.5–14.5)
GLUCOSE SERPL-MCNC: 97 MG/DL (ref 70–220)
HCT VFR BLD CALC: 32.7 % (ref 42–52)
HGB BLD-MCNC: 9.1 G/DL (ref 14–18)
LYMPHOCYTES # BLD AUTO: 1.7 10^3/UL (ref 0.8–2.9)
LYMPHOCYTES NFR BLD AUTO: 13.7 % (ref 15–51)
MCH RBC QN AUTO: 23.3 PG (ref 29–33)
MCHC RBC AUTO-ENTMCNC: 27.8 G/DL (ref 32–37)
MCV RBC AUTO: 83.8 FL (ref 82–101)
MONOCYTES # BLD: 1.2 10^3/UL (ref 0.3–0.9)
MONOCYTES NFR BLD: 9.6 % (ref 0–11)
NEUTROPHILS # BLD: 9.1 10^3/UL (ref 1.6–7.5)
NEUTROPHILS NFR BLD AUTO: 74.7 % (ref 39–77)
NRBC # BLD MANUAL: 0 10^3/UL (ref 0–0)
NRBC BLD AUTO-RTO: 0 /100WBC (ref 0–0)
PLATELET # BLD: 259 10^3/UL (ref 140–415)
PMV BLD AUTO: 9.2 FL (ref 7.4–10.4)
POSITIVE DIFF: (no result)
POTASSIUM SERPL-SCNC: 4.2 MMOL/L (ref 3.5–5.1)
RBC # BLD AUTO: 3.9 10^6/UL (ref 4.7–6.1)
SODIUM SERPL-SCNC: 142 MMOL/L (ref 135–144)
WBC # BLD AUTO: 12.1 10^3/UL (ref 4.8–10.8)

## 2017-11-17 RX ADMIN — IBUPROFEN PRN MG: 400 TABLET ORAL at 20:11

## 2017-11-17 RX ADMIN — DEXAMETHASONE SODIUM PHOSPHATE PRN MG: 10 INJECTION, SOLUTION INTRAMUSCULAR; INTRAVENOUS at 20:11

## 2017-11-17 RX ADMIN — HYDROMORPHONE HYDROCHLORIDE PRN MG: 1 INJECTION, SOLUTION INTRAMUSCULAR; INTRAVENOUS; SUBCUTANEOUS at 20:11

## 2017-11-17 RX ADMIN — THERA TABS SCH TAB: TAB at 09:33

## 2017-11-17 RX ADMIN — ASPIRIN 81 MG SCH MG: 81 TABLET ORAL at 09:34

## 2017-11-17 RX ADMIN — TAMSULOSIN HYDROCHLORIDE SCH MG: 0.4 CAPSULE ORAL at 20:10

## 2017-11-17 RX ADMIN — LEVALBUTEROL HYDROCHLORIDE SCH MG: 0.63 SOLUTION RESPIRATORY (INHALATION) at 19:57

## 2017-11-17 RX ADMIN — METOPROLOL TARTRATE SCH MG: 50 TABLET, FILM COATED ORAL at 09:35

## 2017-11-17 RX ADMIN — METOPROLOL TARTRATE SCH MG: 50 TABLET, FILM COATED ORAL at 20:10

## 2017-11-17 RX ADMIN — CEFTRIAXONE SCH MLS/HR: 2 INJECTION, SOLUTION INTRAVENOUS at 16:03

## 2017-11-17 RX ADMIN — ATORVASTATIN CALCIUM SCH MG: 20 TABLET, FILM COATED ORAL at 20:10

## 2017-11-17 RX ADMIN — LEVALBUTEROL HYDROCHLORIDE SCH MG: 0.63 SOLUTION RESPIRATORY (INHALATION) at 14:00

## 2017-11-17 RX ADMIN — ENOXAPARIN SODIUM SCH MG: 100 INJECTION SUBCUTANEOUS at 09:42

## 2017-11-17 RX ADMIN — DEXAMETHASONE SODIUM PHOSPHATE PRN MG: 10 INJECTION, SOLUTION INTRAMUSCULAR; INTRAVENOUS at 13:49

## 2017-11-17 RX ADMIN — ISOSORBIDE MONONITRATE SCH MG: 30 TABLET, EXTENDED RELEASE ORAL at 09:34

## 2017-11-17 RX ADMIN — VITAMIN D, TAB 1000IU (100/BT) SCH UNIT: 25 TAB at 09:35

## 2017-11-17 RX ADMIN — GABAPENTIN SCH MG: 300 CAPSULE ORAL at 09:33

## 2017-11-17 RX ADMIN — LISINOPRIL SCH MG: 20 TABLET ORAL at 09:34

## 2017-11-17 RX ADMIN — POTASSIUM CHLORIDE SCH MEQ: 10 TABLET, EXTENDED RELEASE ORAL at 09:45

## 2017-11-17 RX ADMIN — HYDROMORPHONE HYDROCHLORIDE PRN MG: 1 INJECTION, SOLUTION INTRAMUSCULAR; INTRAVENOUS; SUBCUTANEOUS at 13:50

## 2017-11-17 RX ADMIN — OXYCODONE HYDROCHLORIDE AND ACETAMINOPHEN SCH MG: 500 TABLET ORAL at 09:33

## 2017-11-17 RX ADMIN — LEVALBUTEROL HYDROCHLORIDE SCH MG: 0.63 SOLUTION RESPIRATORY (INHALATION) at 01:07

## 2017-11-17 RX ADMIN — GABAPENTIN SCH MG: 300 CAPSULE ORAL at 20:10

## 2017-11-17 RX ADMIN — IBUPROFEN PRN MG: 400 TABLET ORAL at 13:49

## 2017-11-17 RX ADMIN — LEVALBUTEROL HYDROCHLORIDE SCH MG: 0.63 SOLUTION RESPIRATORY (INHALATION) at 08:00

## 2017-11-17 RX ADMIN — PANTOPRAZOLE SODIUM SCH MG: 40 TABLET, DELAYED RELEASE ORAL at 06:35

## 2017-11-17 RX ADMIN — AMLODIPINE BESYLATE SCH MG: 10 TABLET ORAL at 09:34

## 2017-11-17 RX ADMIN — GABAPENTIN SCH MG: 300 CAPSULE ORAL at 13:21

## 2017-11-17 NOTE — PN
Date/Time of Note


Date/Time of Note


DATE: 11/17/17 


TIME: 16:13





Assessment/Plan


VTE Prophylaxis


VTE Prophylaxis Intervention:  SCD's





Lines/Catheters


IV Catheter Type (from [facility]):  PICC Line


Central line still needed:  Yes


Urinary Cath still in place:  No





Assessment/Plan


Chief Complaint/Hosp Course


Patient stated some improvement in bilateral lower extremity edema and 

respiratory status however still complains of shortness of breath on exertion.


Assessment/Plan


-Chest pain, rule out acute coronary syndrome.  Cardiac enzymes are negative 

3. Dr. Manrique is following in cardiology consultation.  Continue aspirin.


-Possible acute on chronic diastolic dysfunction congestive heart failure.  

Continue Lasix, monitor electrolytes.


-Possible bronchitis, continue Rocephin.


-Possible COPD exacerbation, continue breathing treatments and steroids.


-GERD, continue Protonix


-Morbid obesity with BMI of 40





Further recommendations based on clinical course.  Plan of care was discussed 

with Dr. Anaya.


Problems:  





Exam/Review of Systems


Vital Signs


Vitals





 Vital Signs








  Date Time  Temp Pulse Resp B/P Pulse Ox O2 Delivery O2 Flow Rate FiO2


 


11/17/17 14:00 98.4 87 18 124/67 92   


 


11/17/17 09:55      Nasal Cannula 2.0 


 


11/16/17 10:32        21














 Intake and Output   


 


 11/16/17 11/16/17 11/17/17





 15:00 23:00 07:00


 


Intake Total  890 ml 1080 ml


 


Output Total  1500 ml 1050 ml


 


Balance  -610 ml 30 ml











Exam


Constitutional:  alert, oriented


Respiratory:  diminished breath sounds


Cardiovascular:  nl pulses, regular rate and rhythm


Gastrointestinal:  non-tender, soft


Extremities:  edema, normal pulses


Neurological:  nl mental status


Skin:  nl turgor





Results


Result Diagram:  


11/17/17 0427                                                                  

              11/17/17 0427





Results 24 hrs





Laboratory Tests








Test


  11/17/17


04:27


 


White Blood Count 12.1  H


 


Red Blood Count 3.90  L


 


Hemoglobin 9.1  L


 


Hematocrit 32.7  L


 


Mean Corpuscular Volume 83.8  


 


Mean Corpuscular Hemoglobin 23.3  L


 


Mean Corpuscular Hemoglobin


Concent 27.8  L


 


 


Red Cell Distribution Width 17.7  H


 


Platelet Count 259  


 


Mean Platelet Volume 9.2  


 


Neutrophils % 74.7  


 


Lymphocytes % 13.7  L


 


Monocytes % 9.6  


 


Eosinophils % 0.2  


 


Basophils % 0.2  


 


Nucleated Red Blood Cells % 0.0  


 


Neutrophils # 9.1  H


 


Lymphocytes # 1.7  


 


Monocytes # 1.2  H


 


Eosinophils # 0.0  


 


Basophils # 0.0  


 


Nucleated Red Blood Cells # 0.0  


 


Sodium Level 142  


 


Potassium Level 4.2  


 


Chloride Level 97  


 


Carbon Dioxide Level   


 


Anion Gap 9  


 


Blood Urea Nitrogen 17  


 


Creatinine 0.68  


 


Glucose Level 97  


 


Calcium Level 8.9  











Medications


Medications





 Current Medications


Amlodipine Besylate (Norvasc) 10 mg DAILY PO  Last administered on 11/17/17at 09

:34; Admin Dose 10 MG;  Start 11/8/17 at 09:00


Ascorbic Acid (Vitamin C) 1,000 mg DAILY PO  Last administered on 11/17/17at 09:

33; Admin Dose 1,000 MG;  Start 11/8/17 at 09:00


Aspirin (Aspirin) 81 mg DAILY PO  Last administered on 11/17/17at 09:34; Admin 

Dose 81 MG;  Start 11/8/17 at 09:00


Cholecalciferol (Vitamin D) 1,000 unit DAILY PO  Last administered on 11/17/ 17at 09:35; Admin Dose 1,000 UNIT;  Start 11/8/17 at 09:00


Docusate Sodium (Colace) 250 mg BID PO  Last administered on 11/17/17at 09:34; 

Admin Dose 250 MG;  Start 11/8/17 at 09:00


Gabapentin (Neurontin) 900 mg TID PO  Last administered on 11/17/17at 13:21; 

Admin Dose 900 MG;  Start 11/8/17 at 09:00


Lisinopril (Zestril) 20 mg DAILY PO  Last administered on 11/17/17at 09:34; 

Admin Dose 20 MG;  Start 11/8/17 at 09:00


Multivitamins Therapeutic (Theragran) 1 tab DAILY PO  Last administered on 11/17 /17at 09:33; Admin Dose 1 TAB;  Start 11/8/17 at 09:00


Potassium Chloride (Klor-Con 10) 10 meq DAILY PO  Last administered on 11/17/ 17at 09:45; Admin Dose 10 MEQ;  Start 11/8/17 at 09:00


Tamsulosin HCl (Flomax) 0.4 mg HS PO  Last administered on 11/16/17at 20:50; 

Admin Dose 0.4 MG;  Start 11/8/17 at 21:00


Atorvastatin Calcium (Lipitor) 20 mg DAILY@21 PO  Last administered on 11/16/ 17at 20:50; Admin Dose 20 MG;  Start 11/8/17 at 21:00


Ondansetron HCl (Zofran Inj) 4 mg Q4H  PRN IV NAUSEA AND/OR VOMITING Last 

administered on 11/17/17at 13:49; Admin Dose 4 MG;  Start 11/8/17 at 03:30


Clonidine 0.1 mg 0.1 mg Q4H  PRN PO ELEVATED BLOOD PRESSURE;  Start 11/8/17 at 

03:30


Ceftriaxone Sodium (Rocephin) 50 ml @  100 mls/hr Q24H IVPB  Last administered 

on 11/17/17at 16:03; Admin Dose 100 MLS/HR;  Start 11/8/17 at 16:00


Enoxaparin Sodium (Lovenox) 40 mg DAILY SC  Last administered on 11/17/17at 09:

42; Admin Dose 40 MG;  Start 11/9/17 at 09:00


IV Flush (NS 10 ml) 10 ml PRN  PRN IV IV PROTOCOL;  Start 11/8/17 at 19:30


Metoprolol Tartrate (Lopressor) 50 mg BID PO  Last administered on 11/17/17at 09

:35; Admin Dose 50 MG;  Start 11/8/17 at 21:00


Isosorbide Mononitrate (Imdur) 30 mg DAILY PO  Last administered on 11/17/17at 

09:34; Admin Dose 30 MG;  Start 11/10/17 at 09:00


IV Flush (NS 10 ml) 10 ml PRN  PRN IV IV PROTOCOL;  Start 11/10/17 at 15:30


Acetaminophen/ Hydrocodone Bitart (Norco (10/325)) 1 tab Q4H  PRN PO PAIN Last 

administered on 11/17/17at 16:09; Admin Dose 1 TAB;  Start 11/15/17 at 17:30


Methylprednisolone Sodium Succinate (Solu-Medrol) 20 mg Q12 IV  Last 

administered on 11/17/17at 09:35; Admin Dose 20 MG;  Start 11/15/17 at 21:00


Ibuprofen (Motrin) 400 mg Q4H  PRN PO PAIN OR TEMP ABOVE 38C Last administered 

on 11/17/17at 13:49; Admin Dose 400 MG;  Start 11/15/17 at 20:00


Hydromorphone HCl (Dilaudid) 1 mg Q6H  PRN IV PAIN Last administered on 11/17/

17at 13:50; Admin Dose 1 MG;  Start 11/17/17 at 12:35











DARIO NORRIS Nov 17, 2017 16:15

## 2017-11-17 NOTE — CONS
Date/Time of Note


Date/Time of Note


DATE: 11/17/17 


TIME: 14:02





Assessment/Plan


Assessment/Plan


Chief Complaint/Hosp Course


IMPRESSION:


1.  Chest pain, assess for acute coronary syndrome.-stabbing by description/

negative trop x 3


2.  Shortness of breath, assess for congestive heart failure with low BNP at 

this time-slowly improving


3.  Diastolic dysfunction by most recent echo 07/2017.


4.  Abnormal electrocardiogram, assess for acute coronary syndrome.


5.  Chronic obstructive pulmonary disease.


6.  Hypertension, somewhat labile.


7.  Dyslipidemia.





Recc:


-Continue lasix diuresis


-Continue BB/norvasc/oral nitrates


-Continue asa/statin


-Continue steroids/bronchodilators


-Follow volume status closely


Problems:  





Consultation Date/Type/Reason


Admit Date/Time


Nov 9, 2017 at 11:53


Initial Consult Date


11/9/2017


Type of Consultation:  cardiology


Reason for Consultation


CHF


Referring Provider:  DEVAUGHN TALAMANTES MD





Exam/Review of Systems


Vital Signs


Vitals





 Vital Signs








  Date Time  Temp Pulse Resp B/P Pulse Ox O2 Delivery O2 Flow Rate FiO2


 


11/17/17 09:55      Nasal Cannula 2.0 


 


11/17/17 07:42 98.7 77 20 128/74 93   


 


11/16/17 10:32        21














 Intake and Output   


 


 11/16/17 11/16/17 11/17/17





 15:00 23:00 07:00


 


Intake Total  890 ml 1080 ml


 


Output Total  1500 ml 1050 ml


 


Balance  -610 ml 30 ml











Exam


Review of Systems:


CONSTITUTIONAL:  No fevers, chills.


PULMONARY:  No sob


CARDIOVASCULAR: No chest pain/palpitations


GASTROINTESTINAL:  No nausea/vomiting.


GENITOURINARY:  No hematuria/dysuria.


MUSCULOSKELETAL:  No myagias/arthalgias.


PSYCHIATRIC:  The patient denies depression.


NEUROLOGIC:   No weakness


Constitutional:  alert, oriented


Psych:  no complaints


Head:  normocephalic


ENMT:  mucosa pink and moist


Neck:  jvd (9 cm water), supple


Respiratory:  diminished breath sounds (at bases/B)


Cardiovascular:  regular rate and rhythm


Gastrointestinal:  non-tender, soft


Musculoskeletal:  muscle tone (normal)


Extremities:  edema (none)


Neurological:  other (No focal deficits)





Results


Result Diagram:  


11/17/17 0427                                                                  

              11/17/17 0427





Results 24 hrs





Laboratory Tests








Test


  11/17/17


04:27


 


White Blood Count 12.1  H


 


Red Blood Count 3.90  L


 


Hemoglobin 9.1  L


 


Hematocrit 32.7  L


 


Mean Corpuscular Volume 83.8  


 


Mean Corpuscular Hemoglobin 23.3  L


 


Mean Corpuscular Hemoglobin


Concent 27.8  L


 


 


Red Cell Distribution Width 17.7  H


 


Platelet Count 259  


 


Mean Platelet Volume 9.2  


 


Neutrophils % 74.7  


 


Lymphocytes % 13.7  L


 


Monocytes % 9.6  


 


Eosinophils % 0.2  


 


Basophils % 0.2  


 


Nucleated Red Blood Cells % 0.0  


 


Neutrophils # 9.1  H


 


Lymphocytes # 1.7  


 


Monocytes # 1.2  H


 


Eosinophils # 0.0  


 


Basophils # 0.0  


 


Nucleated Red Blood Cells # 0.0  


 


Sodium Level 142  


 


Potassium Level 4.2  


 


Chloride Level 97  


 


Carbon Dioxide Level   


 


Anion Gap 9  


 


Blood Urea Nitrogen 17  


 


Creatinine 0.68  


 


Glucose Level 97  


 


Calcium Level 8.9  











Medications


Medications





 Current Medications


Amlodipine Besylate (Norvasc) 10 mg DAILY PO  Last administered on 11/17/17at 09

:34; Admin Dose 10 MG;  Start 11/8/17 at 09:00


Ascorbic Acid (Vitamin C) 1,000 mg DAILY PO  Last administered on 11/17/17at 09:

33; Admin Dose 1,000 MG;  Start 11/8/17 at 09:00


Aspirin (Aspirin) 81 mg DAILY PO  Last administered on 11/17/17at 09:34; Admin 

Dose 81 MG;  Start 11/8/17 at 09:00


Cholecalciferol (Vitamin D) 1,000 unit DAILY PO  Last administered on 11/17/ 17at 09:35; Admin Dose 1,000 UNIT;  Start 11/8/17 at 09:00


Docusate Sodium (Colace) 250 mg BID PO  Last administered on 11/17/17at 09:34; 

Admin Dose 250 MG;  Start 11/8/17 at 09:00


Gabapentin (Neurontin) 900 mg TID PO  Last administered on 11/17/17at 13:21; 

Admin Dose 900 MG;  Start 11/8/17 at 09:00


Lisinopril (Zestril) 20 mg DAILY PO  Last administered on 11/17/17at 09:34; 

Admin Dose 20 MG;  Start 11/8/17 at 09:00


Multivitamins Therapeutic (Theragran) 1 tab DAILY PO  Last administered on 11/17 /17at 09:33; Admin Dose 1 TAB;  Start 11/8/17 at 09:00


Potassium Chloride (Klor-Con 10) 10 meq DAILY PO  Last administered on 11/17/ 17at 09:45; Admin Dose 10 MEQ;  Start 11/8/17 at 09:00


Tamsulosin HCl (Flomax) 0.4 mg HS PO  Last administered on 11/16/17at 20:50; 

Admin Dose 0.4 MG;  Start 11/8/17 at 21:00


Atorvastatin Calcium (Lipitor) 20 mg DAILY@21 PO  Last administered on 11/16/ 17at 20:50; Admin Dose 20 MG;  Start 11/8/17 at 21:00


Ondansetron HCl (Zofran Inj) 4 mg Q4H  PRN IV NAUSEA AND/OR VOMITING Last 

administered on 11/17/17at 13:49; Admin Dose 4 MG;  Start 11/8/17 at 03:30


Clonidine 0.1 mg 0.1 mg Q4H  PRN PO ELEVATED BLOOD PRESSURE;  Start 11/8/17 at 

03:30


Ceftriaxone Sodium (Rocephin) 50 ml @  100 mls/hr Q24H IVPB  Last administered 

on 11/16/17at 15:54; Admin Dose 100 MLS/HR;  Start 11/8/17 at 16:00


Enoxaparin Sodium (Lovenox) 40 mg DAILY SC  Last administered on 11/17/17at 09:

42; Admin Dose 40 MG;  Start 11/9/17 at 09:00


IV Flush (NS 10 ml) 10 ml PRN  PRN IV IV PROTOCOL;  Start 11/8/17 at 19:30


Metoprolol Tartrate (Lopressor) 50 mg BID PO  Last administered on 11/17/17at 09

:35; Admin Dose 50 MG;  Start 11/8/17 at 21:00


Isosorbide Mononitrate (Imdur) 30 mg DAILY PO  Last administered on 11/17/17at 

09:34; Admin Dose 30 MG;  Start 11/10/17 at 09:00


IV Flush (NS 10 ml) 10 ml PRN  PRN IV IV PROTOCOL;  Start 11/10/17 at 15:30


Acetaminophen/ Hydrocodone Bitart (Norco (10/325)) 1 tab Q4H  PRN PO PAIN Last 

administered on 11/15/17at 20:02; Admin Dose 1 TAB;  Start 11/15/17 at 17:30


Methylprednisolone Sodium Succinate (Solu-Medrol) 20 mg Q12 IV  Last 

administered on 11/17/17at 09:35; Admin Dose 20 MG;  Start 11/15/17 at 21:00


Ibuprofen (Motrin) 400 mg Q4H  PRN PO PAIN OR TEMP ABOVE 38C Last administered 

on 11/17/17at 13:49; Admin Dose 400 MG;  Start 11/15/17 at 20:00


Hydromorphone HCl (Dilaudid) 1 mg Q6H  PRN IV PAIN Last administered on 11/17/ 17at 13:50; Admin Dose 1 MG;  Start 11/17/17 at 12:35











HARLEY ADAMS 17, 2017 14:04

## 2017-11-18 VITALS — HEART RATE: 98 BPM | RESPIRATION RATE: 18 BRPM | DIASTOLIC BLOOD PRESSURE: 60 MMHG | SYSTOLIC BLOOD PRESSURE: 120 MMHG

## 2017-11-18 VITALS — DIASTOLIC BLOOD PRESSURE: 63 MMHG | RESPIRATION RATE: 20 BRPM | SYSTOLIC BLOOD PRESSURE: 132 MMHG

## 2017-11-18 VITALS — RESPIRATION RATE: 19 BRPM | DIASTOLIC BLOOD PRESSURE: 72 MMHG | SYSTOLIC BLOOD PRESSURE: 125 MMHG

## 2017-11-18 VITALS — SYSTOLIC BLOOD PRESSURE: 122 MMHG | RESPIRATION RATE: 18 BRPM | DIASTOLIC BLOOD PRESSURE: 67 MMHG

## 2017-11-18 LAB
ABNORMAL IP MESSAGE: 1
ANION GAP SERPL CALC-SCNC: 14 MMOL/L (ref 8–16)
BASOPHILS # BLD AUTO: 0 10^3/UL (ref 0–0.1)
BASOPHILS NFR BLD: 0.2 % (ref 0–2)
BUN SERPL-MCNC: 20 MG/DL (ref 7–20)
CALCIUM SERPL-MCNC: 8.5 MG/DL (ref 8.4–10.2)
CHLORIDE SERPL-SCNC: 98 MMOL/L (ref 97–110)
CO2 SERPL-SCNC: 35 MMOL/L (ref 21–31)
CREAT SERPL-MCNC: 0.78 MG/DL (ref 0.61–1.24)
EOSINOPHIL # BLD: 0 10^3/UL (ref 0–0.5)
EOSINOPHIL NFR BLD: 0.2 % (ref 0–7)
ERYTHROCYTE [DISTWIDTH] IN BLOOD BY AUTOMATED COUNT: 17.7 % (ref 11.5–14.5)
GLUCOSE SERPL-MCNC: 157 MG/DL (ref 70–220)
HCT VFR BLD CALC: 35.4 % (ref 42–52)
HGB BLD-MCNC: 9.7 G/DL (ref 14–18)
LYMPHOCYTES # BLD AUTO: 1.6 10^3/UL (ref 0.8–2.9)
LYMPHOCYTES NFR BLD AUTO: 11 % (ref 15–51)
MCH RBC QN AUTO: 23.8 PG (ref 29–33)
MCHC RBC AUTO-ENTMCNC: 27.4 G/DL (ref 32–37)
MCV RBC AUTO: 87 FL (ref 82–101)
MONOCYTES # BLD: 1.3 10^3/UL (ref 0.3–0.9)
MONOCYTES NFR BLD: 9 % (ref 0–11)
NEUTROPHILS # BLD: 11 10^3/UL (ref 1.6–7.5)
NEUTROPHILS NFR BLD AUTO: 76.3 % (ref 39–77)
NRBC # BLD MANUAL: 0 10^3/UL (ref 0–0)
NRBC BLD AUTO-RTO: 0.3 /100WBC (ref 0–0)
PLATELET # BLD: 277 10^3/UL (ref 140–415)
PMV BLD AUTO: 9.2 FL (ref 7.4–10.4)
POSITIVE DIFF: (no result)
POTASSIUM SERPL-SCNC: 4.9 MMOL/L (ref 3.5–5.1)
RBC # BLD AUTO: 4.07 10^6/UL (ref 4.7–6.1)
SODIUM SERPL-SCNC: 142 MMOL/L (ref 135–144)
WBC # BLD AUTO: 14.4 10^3/UL (ref 4.8–10.8)

## 2017-11-18 RX ADMIN — DEXAMETHASONE SODIUM PHOSPHATE PRN MG: 10 INJECTION, SOLUTION INTRAMUSCULAR; INTRAVENOUS at 20:27

## 2017-11-18 RX ADMIN — ISOSORBIDE MONONITRATE SCH MG: 30 TABLET, EXTENDED RELEASE ORAL at 08:29

## 2017-11-18 RX ADMIN — HYDROMORPHONE HYDROCHLORIDE PRN MG: 1 INJECTION, SOLUTION INTRAMUSCULAR; INTRAVENOUS; SUBCUTANEOUS at 02:08

## 2017-11-18 RX ADMIN — DEXAMETHASONE SODIUM PHOSPHATE PRN MG: 10 INJECTION, SOLUTION INTRAMUSCULAR; INTRAVENOUS at 08:28

## 2017-11-18 RX ADMIN — AMLODIPINE BESYLATE SCH MG: 10 TABLET ORAL at 08:29

## 2017-11-18 RX ADMIN — HYDROMORPHONE HYDROCHLORIDE PRN MG: 1 INJECTION, SOLUTION INTRAMUSCULAR; INTRAVENOUS; SUBCUTANEOUS at 20:24

## 2017-11-18 RX ADMIN — OXYCODONE HYDROCHLORIDE AND ACETAMINOPHEN SCH MG: 500 TABLET ORAL at 08:27

## 2017-11-18 RX ADMIN — POTASSIUM CHLORIDE SCH MEQ: 10 TABLET, EXTENDED RELEASE ORAL at 08:27

## 2017-11-18 RX ADMIN — ATORVASTATIN CALCIUM SCH MG: 20 TABLET, FILM COATED ORAL at 20:30

## 2017-11-18 RX ADMIN — LEVALBUTEROL HYDROCHLORIDE SCH MG: 0.63 SOLUTION RESPIRATORY (INHALATION) at 01:33

## 2017-11-18 RX ADMIN — HYDROMORPHONE HYDROCHLORIDE PRN MG: 1 INJECTION, SOLUTION INTRAMUSCULAR; INTRAVENOUS; SUBCUTANEOUS at 14:10

## 2017-11-18 RX ADMIN — IBUPROFEN PRN MG: 400 TABLET ORAL at 08:28

## 2017-11-18 RX ADMIN — IBUPROFEN PRN MG: 400 TABLET ORAL at 02:07

## 2017-11-18 RX ADMIN — IBUPROFEN PRN MG: 400 TABLET ORAL at 14:10

## 2017-11-18 RX ADMIN — THERA TABS SCH TAB: TAB at 08:28

## 2017-11-18 RX ADMIN — METOPROLOL TARTRATE SCH MG: 50 TABLET, FILM COATED ORAL at 20:30

## 2017-11-18 RX ADMIN — VITAMIN D, TAB 1000IU (100/BT) SCH UNIT: 25 TAB at 08:27

## 2017-11-18 RX ADMIN — DEXAMETHASONE SODIUM PHOSPHATE PRN MG: 10 INJECTION, SOLUTION INTRAMUSCULAR; INTRAVENOUS at 02:07

## 2017-11-18 RX ADMIN — GABAPENTIN SCH MG: 300 CAPSULE ORAL at 12:15

## 2017-11-18 RX ADMIN — IBUPROFEN PRN MG: 400 TABLET ORAL at 20:30

## 2017-11-18 RX ADMIN — HYDROMORPHONE HYDROCHLORIDE PRN MG: 1 INJECTION, SOLUTION INTRAMUSCULAR; INTRAVENOUS; SUBCUTANEOUS at 08:29

## 2017-11-18 RX ADMIN — GABAPENTIN SCH MG: 300 CAPSULE ORAL at 20:30

## 2017-11-18 RX ADMIN — ENOXAPARIN SODIUM SCH MG: 100 INJECTION SUBCUTANEOUS at 08:46

## 2017-11-18 RX ADMIN — CEFTRIAXONE SCH MLS/HR: 2 INJECTION, SOLUTION INTRAVENOUS at 15:15

## 2017-11-18 RX ADMIN — DEXAMETHASONE SODIUM PHOSPHATE PRN MG: 10 INJECTION, SOLUTION INTRAMUSCULAR; INTRAVENOUS at 14:10

## 2017-11-18 RX ADMIN — METOPROLOL TARTRATE SCH MG: 50 TABLET, FILM COATED ORAL at 08:28

## 2017-11-18 RX ADMIN — CEFTRIAXONE SCH MLS/HR: 2 INJECTION, SOLUTION INTRAVENOUS at 20:22

## 2017-11-18 RX ADMIN — TAMSULOSIN HYDROCHLORIDE SCH MG: 0.4 CAPSULE ORAL at 20:29

## 2017-11-18 RX ADMIN — GABAPENTIN SCH MG: 300 CAPSULE ORAL at 08:27

## 2017-11-18 RX ADMIN — LISINOPRIL SCH MG: 20 TABLET ORAL at 08:28

## 2017-11-18 RX ADMIN — LEVALBUTEROL HYDROCHLORIDE SCH MG: 0.63 SOLUTION RESPIRATORY (INHALATION) at 19:20

## 2017-11-18 RX ADMIN — LEVALBUTEROL HYDROCHLORIDE SCH MG: 0.63 SOLUTION RESPIRATORY (INHALATION) at 07:59

## 2017-11-18 RX ADMIN — ASPIRIN 81 MG SCH MG: 81 TABLET ORAL at 08:27

## 2017-11-18 RX ADMIN — LEVALBUTEROL HYDROCHLORIDE SCH MG: 0.63 SOLUTION RESPIRATORY (INHALATION) at 13:06

## 2017-11-18 RX ADMIN — PANTOPRAZOLE SODIUM SCH MG: 40 TABLET, DELAYED RELEASE ORAL at 06:33

## 2017-11-18 NOTE — CONS
Date/Time of Note


Date/Time of Note


DATE: 11/18/17 


TIME: 16:52





Assessment/Plan


Assessment/Plan


Chief Complaint/Hosp Course


IMPRESSION:


1.  Chest pain, assess for acute coronary syndrome.-stabbing by description/

negative trop x 3


2.  Shortness of breath, assess for congestive heart failure with low BNP at 

this time-slowly improving


3.  Diastolic dysfunction by most recent echo 07/2017.


4.  Abnormal electrocardiogram, assess for acute coronary syndrome.


5.  Chronic obstructive pulmonary disease.


6.  Hypertension, somewhat labile.


7.  Dyslipidemia.





Recc:


-Continue lasix diuresis


-Continue BB/norvasc/oral nitrates


-Continue asa/statin


-Continue steroids/bronchodilators


-Follow volume status closely


Problems:  





Consultation Date/Type/Reason


Admit Date/Time


Nov 9, 2017 at 11:53


Initial Consult Date


11/9/2017


Type of Consultation:  cardiology


Reason for Consultation


CHF


Referring Provider:  DEVAUGHN TALAMANTES MD





Exam/Review of Systems


Vital Signs


Vitals





 Vital Signs








  Date Time  Temp Pulse Resp B/P Pulse Ox O2 Delivery O2 Flow Rate FiO2


 


11/18/17 14:00 97.7 90 18 122/67 92   


 


11/18/17 13:48       3.0 


 


11/18/17 13:08      Nasal Cannula  32














 Intake and Output   


 


 11/17/17 11/17/17 11/18/17





 15:00 23:00 07:00


 


Intake Total  2150 ml 1700 ml


 


Output Total  2300 ml 1500 ml


 


Balance  -150 ml 200 ml











Exam


Review of Systems:


CONSTITUTIONAL:  No fevers, chills.


PULMONARY:  No sob


CARDIOVASCULAR: No chest pain/palpitations


GASTROINTESTINAL:  No nausea/vomiting.


GENITOURINARY:  No hematuria/dysuria.


MUSCULOSKELETAL:  No myagias/arthalgias.


PSYCHIATRIC:  The patient denies depression.


NEUROLOGIC:   No weakness


Constitutional:  alert, oriented


Psych:  no complaints


Head:  normocephalic


ENMT:  mucosa pink and moist


Neck:  jvd (9 cm WATER), supple


Respiratory:  diminished breath sounds (AT BASES/b)


Cardiovascular:  regular rate and rhythm


Gastrointestinal:  non-tender, soft


Musculoskeletal:  muscle tone (NORMAL)


Extremities:  edema (NONE)


Neurological:  other (NO FOCAL DEFICITS)





Results


Result Diagram:  


11/18/17 0436                                                                  

              11/18/17 0436





Results 24 hrs





Laboratory Tests








Test


  11/18/17


04:36


 


White Blood Count 14.4  H


 


Red Blood Count 4.07  L


 


Hemoglobin 9.7  L


 


Hematocrit 35.4  L


 


Mean Corpuscular Volume 87.0  


 


Mean Corpuscular Hemoglobin 23.8  L


 


Mean Corpuscular Hemoglobin


Concent 27.4  L


 


 


Red Cell Distribution Width 17.7  H


 


Platelet Count 277  


 


Mean Platelet Volume 9.2  


 


Neutrophils % 76.3  


 


Lymphocytes % 11.0  L


 


Monocytes % 9.0  


 


Eosinophils % 0.2  


 


Basophils % 0.2  


 


Nucleated Red Blood Cells % 0.3  H


 


Neutrophils # 11.0  H


 


Lymphocytes # 1.6  


 


Monocytes # 1.3  H


 


Eosinophils # 0.0  


 


Basophils # 0.0  


 


Nucleated Red Blood Cells # 0.0  


 


Sodium Level 142  


 


Potassium Level 4.9  


 


Chloride Level 98  


 


Carbon Dioxide Level 35  H


 


Anion Gap 14  


 


Blood Urea Nitrogen 20  


 


Creatinine 0.78  


 


Glucose Level 157  


 


Calcium Level 8.5  











Medications


Medications





 Current Medications


Amlodipine Besylate (Norvasc) 10 mg DAILY PO  Last administered on 11/18/17at 08

:29; Admin Dose 10 MG;  Start 11/8/17 at 09:00


Ascorbic Acid (Vitamin C) 1,000 mg DAILY PO  Last administered on 11/18/17at 08:

27; Admin Dose 1,000 MG;  Start 11/8/17 at 09:00


Aspirin (Aspirin) 81 mg DAILY PO  Last administered on 11/18/17at 08:27; Admin 

Dose 81 MG;  Start 11/8/17 at 09:00


Cholecalciferol (Vitamin D) 1,000 unit DAILY PO  Last administered on 11/18/ 17at 08:27; Admin Dose 1,000 UNIT;  Start 11/8/17 at 09:00


Docusate Sodium (Colace) 250 mg BID PO  Last administered on 11/18/17at 08:27; 

Admin Dose 250 MG;  Start 11/8/17 at 09:00


Gabapentin (Neurontin) 900 mg TID PO  Last administered on 11/18/17at 12:15; 

Admin Dose 900 MG;  Start 11/8/17 at 09:00


Lisinopril (Zestril) 20 mg DAILY PO  Last administered on 11/18/17at 08:28; 

Admin Dose 20 MG;  Start 11/8/17 at 09:00


Multivitamins Therapeutic (Theragran) 1 tab DAILY PO  Last administered on 11/18 /17at 08:28; Admin Dose 1 TAB;  Start 11/8/17 at 09:00


Potassium Chloride (Klor-Con 10) 10 meq DAILY PO  Last administered on 11/18/ 17at 08:27; Admin Dose 10 MEQ;  Start 11/8/17 at 09:00


Tamsulosin HCl (Flomax) 0.4 mg HS PO  Last administered on 11/17/17at 20:10; 

Admin Dose 0.4 MG;  Start 11/8/17 at 21:00


Atorvastatin Calcium (Lipitor) 20 mg DAILY@21 PO  Last administered on 11/17/ 17at 20:10; Admin Dose 20 MG;  Start 11/8/17 at 21:00


Ondansetron HCl (Zofran Inj) 4 mg Q4H  PRN IV NAUSEA AND/OR VOMITING Last 

administered on 11/18/17at 14:10; Admin Dose 4 MG;  Start 11/8/17 at 03:30


Clonidine 0.1 mg 0.1 mg Q4H  PRN PO ELEVATED BLOOD PRESSURE;  Start 11/8/17 at 

03:30


Ceftriaxone Sodium (Rocephin) 50 ml @  100 mls/hr Q24H IVPB  Last administered 

on 11/17/17at 16:03; Admin Dose 100 MLS/HR;  Start 11/8/17 at 16:00


Enoxaparin Sodium (Lovenox) 40 mg DAILY SC  Last administered on 11/18/17at 08:

46; Admin Dose 40 MG;  Start 11/9/17 at 09:00


IV Flush (NS 10 ml) 10 ml PRN  PRN IV IV PROTOCOL;  Start 11/8/17 at 19:30


Metoprolol Tartrate (Lopressor) 50 mg BID PO  Last administered on 11/18/17at 08

:28; Admin Dose 50 MG;  Start 11/8/17 at 21:00


Isosorbide Mononitrate (Imdur) 30 mg DAILY PO  Last administered on 11/18/17at 

08:29; Admin Dose 30 MG;  Start 11/10/17 at 09:00


IV Flush (NS 10 ml) 10 ml PRN  PRN IV IV PROTOCOL;  Start 11/10/17 at 15:30


Acetaminophen/ Hydrocodone Bitart (Norco (10/325)) 1 tab Q4H  PRN PO PAIN Last 

administered on 11/17/17at 22:25; Admin Dose 1 TAB;  Start 11/15/17 at 17:30


Methylprednisolone Sodium Succinate (Solu-Medrol) 20 mg Q12 IV  Last 

administered on 11/18/17at 08:27; Admin Dose 20 MG;  Start 11/15/17 at 21:00


Ibuprofen (Motrin) 400 mg Q4H  PRN PO PAIN OR TEMP ABOVE 38C Last administered 

on 11/18/17at 14:10; Admin Dose 400 MG;  Start 11/15/17 at 20:00


Hydromorphone HCl (Dilaudid) 1 mg Q6H  PRN IV PAIN Last administered on 11/18/ 17at 14:10; Admin Dose 1 MG;  Start 11/17/17 at 12:35











HARLEY ADAMS 18, 2017 16:53

## 2017-11-18 NOTE — PN
Date/Time of Note


Date/Time of Note


DATE: 11/18/17 


TIME: 15:47





Assessment/Plan


Lines/Catheters


IV Catheter Type (from [facility]):  PICC Line


Urinary Cath still in place:  No





Assessment/Plan


Assessment/Plan


-Chest pain, rule out acute coronary syndrome.  Cardiac enzymes are negative 

3. Dr. Manrique is following in cardiology consultation.  Continue aspirin.


-Possible acute on chronic diastolic dysfunction congestive heart failure.  

Continue Lasix, monitor electrolytes.


-Possible bronchitis, continue Rocephin.


-Possible COPD exacerbation, continue breathing treatments and steroids.


-GERD, continue Protonix


-Morbid obesity with BMI of 40





Further recommendations based on clinical course.  Plan of care was discussed 

with Dr. Anaya.





Exam/Review of Systems


Vital Signs


Vitals





 Vital Signs








  Date Time  Temp Pulse Resp B/P Pulse Ox O2 Delivery O2 Flow Rate FiO2


 


11/18/17 14:00 97.7 90 18 122/67 92   


 


11/18/17 13:48       3.0 


 


11/18/17 13:08      Nasal Cannula  32














 Intake and Output   


 


 11/17/17 11/17/17 11/18/17





 15:00 23:00 07:00


 


Intake Total  2150 ml 1700 ml


 


Output Total  2300 ml 1500 ml


 


Balance  -150 ml 200 ml











Results


Result Diagram:  


11/18/17 0436                                                                  

              11/18/17 0436





Results 24 hrs





Laboratory Tests








Test


  11/18/17


04:36


 


White Blood Count 14.4  H


 


Red Blood Count 4.07  L


 


Hemoglobin 9.7  L


 


Hematocrit 35.4  L


 


Mean Corpuscular Volume 87.0  


 


Mean Corpuscular Hemoglobin 23.8  L


 


Mean Corpuscular Hemoglobin


Concent 27.4  L


 


 


Red Cell Distribution Width 17.7  H


 


Platelet Count 277  


 


Mean Platelet Volume 9.2  


 


Neutrophils % 76.3  


 


Lymphocytes % 11.0  L


 


Monocytes % 9.0  


 


Eosinophils % 0.2  


 


Basophils % 0.2  


 


Nucleated Red Blood Cells % 0.3  H


 


Neutrophils # 11.0  H


 


Lymphocytes # 1.6  


 


Monocytes # 1.3  H


 


Eosinophils # 0.0  


 


Basophils # 0.0  


 


Nucleated Red Blood Cells # 0.0  


 


Sodium Level 142  


 


Potassium Level 4.9  


 


Chloride Level 98  


 


Carbon Dioxide Level 35  H


 


Anion Gap 14  


 


Blood Urea Nitrogen 20  


 


Creatinine 0.78  


 


Glucose Level 157  


 


Calcium Level 8.5  











Medications


Medications





 Current Medications


Amlodipine Besylate (Norvasc) 10 mg DAILY PO  Last administered on 11/18/17at 08

:29; Admin Dose 10 MG;  Start 11/8/17 at 09:00


Ascorbic Acid (Vitamin C) 1,000 mg DAILY PO  Last administered on 11/18/17at 08:

27; Admin Dose 1,000 MG;  Start 11/8/17 at 09:00


Aspirin (Aspirin) 81 mg DAILY PO  Last administered on 11/18/17at 08:27; Admin 

Dose 81 MG;  Start 11/8/17 at 09:00


Cholecalciferol (Vitamin D) 1,000 unit DAILY PO  Last administered on 11/18/ 17at 08:27; Admin Dose 1,000 UNIT;  Start 11/8/17 at 09:00


Docusate Sodium (Colace) 250 mg BID PO  Last administered on 11/18/17at 08:27; 

Admin Dose 250 MG;  Start 11/8/17 at 09:00


Gabapentin (Neurontin) 900 mg TID PO  Last administered on 11/18/17at 12:15; 

Admin Dose 900 MG;  Start 11/8/17 at 09:00


Lisinopril (Zestril) 20 mg DAILY PO  Last administered on 11/18/17at 08:28; 

Admin Dose 20 MG;  Start 11/8/17 at 09:00


Multivitamins Therapeutic (Theragran) 1 tab DAILY PO  Last administered on 11/18 /17at 08:28; Admin Dose 1 TAB;  Start 11/8/17 at 09:00


Potassium Chloride (Klor-Con 10) 10 meq DAILY PO  Last administered on 11/18/ 17at 08:27; Admin Dose 10 MEQ;  Start 11/8/17 at 09:00


Tamsulosin HCl (Flomax) 0.4 mg HS PO  Last administered on 11/17/17at 20:10; 

Admin Dose 0.4 MG;  Start 11/8/17 at 21:00


Atorvastatin Calcium (Lipitor) 20 mg DAILY@21 PO  Last administered on 11/17/ 17at 20:10; Admin Dose 20 MG;  Start 11/8/17 at 21:00


Ondansetron HCl (Zofran Inj) 4 mg Q4H  PRN IV NAUSEA AND/OR VOMITING Last 

administered on 11/18/17at 14:10; Admin Dose 4 MG;  Start 11/8/17 at 03:30


Clonidine 0.1 mg 0.1 mg Q4H  PRN PO ELEVATED BLOOD PRESSURE;  Start 11/8/17 at 

03:30


Ceftriaxone Sodium (Rocephin) 50 ml @  100 mls/hr Q24H IVPB  Last administered 

on 11/17/17at 16:03; Admin Dose 100 MLS/HR;  Start 11/8/17 at 16:00


Enoxaparin Sodium (Lovenox) 40 mg DAILY SC  Last administered on 11/18/17at 08:

46; Admin Dose 40 MG;  Start 11/9/17 at 09:00


IV Flush (NS 10 ml) 10 ml PRN  PRN IV IV PROTOCOL;  Start 11/8/17 at 19:30


Metoprolol Tartrate (Lopressor) 50 mg BID PO  Last administered on 11/18/17at 08

:28; Admin Dose 50 MG;  Start 11/8/17 at 21:00


Isosorbide Mononitrate (Imdur) 30 mg DAILY PO  Last administered on 11/18/17at 

08:29; Admin Dose 30 MG;  Start 11/10/17 at 09:00


IV Flush (NS 10 ml) 10 ml PRN  PRN IV IV PROTOCOL;  Start 11/10/17 at 15:30


Acetaminophen/ Hydrocodone Bitart (Norco (10/325)) 1 tab Q4H  PRN PO PAIN Last 

administered on 11/17/17at 22:25; Admin Dose 1 TAB;  Start 11/15/17 at 17:30


Methylprednisolone Sodium Succinate (Solu-Medrol) 20 mg Q12 IV  Last 

administered on 11/18/17at 08:27; Admin Dose 20 MG;  Start 11/15/17 at 21:00


Ibuprofen (Motrin) 400 mg Q4H  PRN PO PAIN OR TEMP ABOVE 38C Last administered 

on 11/18/17at 14:10; Admin Dose 400 MG;  Start 11/15/17 at 20:00


Hydromorphone HCl (Dilaudid) 1 mg Q6H  PRN IV PAIN Last administered on 11/18/ 17at 14:10; Admin Dose 1 MG;  Start 11/17/17 at 12:35











NIKKI ENCINAS Nov 18, 2017 15:47

## 2017-11-19 VITALS — SYSTOLIC BLOOD PRESSURE: 141 MMHG | DIASTOLIC BLOOD PRESSURE: 67 MMHG | RESPIRATION RATE: 20 BRPM

## 2017-11-19 VITALS — SYSTOLIC BLOOD PRESSURE: 132 MMHG | DIASTOLIC BLOOD PRESSURE: 61 MMHG | RESPIRATION RATE: 20 BRPM

## 2017-11-19 VITALS — DIASTOLIC BLOOD PRESSURE: 80 MMHG | SYSTOLIC BLOOD PRESSURE: 140 MMHG | RESPIRATION RATE: 20 BRPM

## 2017-11-19 LAB
ABNORMAL IP MESSAGE: 1
ANION GAP SERPL CALC-SCNC: 9 MMOL/L (ref 8–16)
BASOPHILS # BLD AUTO: 0.1 10^3/UL (ref 0–0.1)
BASOPHILS NFR BLD: 0.4 % (ref 0–2)
BUN SERPL-MCNC: 18 MG/DL (ref 7–20)
CALCIUM SERPL-MCNC: 9 MG/DL (ref 8.4–10.2)
CHLORIDE SERPL-SCNC: 98 MMOL/L (ref 97–110)
CO2 SERPL-SCNC: 39 MMOL/L (ref 21–31)
CREAT SERPL-MCNC: 0.69 MG/DL (ref 0.61–1.24)
EOSINOPHIL # BLD: 0 10^3/UL (ref 0–0.5)
EOSINOPHIL NFR BLD: 0.3 % (ref 0–7)
ERYTHROCYTE [DISTWIDTH] IN BLOOD BY AUTOMATED COUNT: 17.7 % (ref 11.5–14.5)
GLUCOSE SERPL-MCNC: 104 MG/DL (ref 70–220)
HCT VFR BLD CALC: 35.2 % (ref 42–52)
HGB BLD-MCNC: 9.6 G/DL (ref 14–18)
LYMPHOCYTES # BLD AUTO: 1.6 10^3/UL (ref 0.8–2.9)
LYMPHOCYTES NFR BLD AUTO: 10.4 % (ref 15–51)
MCH RBC QN AUTO: 23.8 PG (ref 29–33)
MCHC RBC AUTO-ENTMCNC: 27.3 G/DL (ref 32–37)
MCV RBC AUTO: 87.1 FL (ref 82–101)
MONOCYTES # BLD: 1.6 10^3/UL (ref 0.3–0.9)
MONOCYTES NFR BLD: 10 % (ref 0–11)
NEUTROPHILS # BLD: 11.6 10^3/UL (ref 1.6–7.5)
NEUTROPHILS NFR BLD AUTO: 73.8 % (ref 39–77)
NRBC # BLD MANUAL: 0.1 10^3/UL (ref 0–0)
NRBC BLD AUTO-RTO: 0.4 /100WBC (ref 0–0)
PLATELET # BLD: 257 10^3/UL (ref 140–415)
PMV BLD AUTO: 8.9 FL (ref 7.4–10.4)
POSITIVE DIFF: (no result)
POTASSIUM SERPL-SCNC: 5.1 MMOL/L (ref 3.5–5.1)
RBC # BLD AUTO: 4.04 10^6/UL (ref 4.7–6.1)
SODIUM SERPL-SCNC: 141 MMOL/L (ref 135–144)
WBC # BLD AUTO: 15.7 10^3/UL (ref 4.8–10.8)

## 2017-11-19 RX ADMIN — LEVALBUTEROL HYDROCHLORIDE SCH MG: 0.63 SOLUTION RESPIRATORY (INHALATION) at 19:28

## 2017-11-19 RX ADMIN — HYDROMORPHONE HYDROCHLORIDE PRN MG: 1 INJECTION, SOLUTION INTRAMUSCULAR; INTRAVENOUS; SUBCUTANEOUS at 08:18

## 2017-11-19 RX ADMIN — TAMSULOSIN HYDROCHLORIDE SCH MG: 0.4 CAPSULE ORAL at 20:12

## 2017-11-19 RX ADMIN — DEXAMETHASONE SODIUM PHOSPHATE PRN MG: 10 INJECTION, SOLUTION INTRAMUSCULAR; INTRAVENOUS at 02:13

## 2017-11-19 RX ADMIN — CEFTRIAXONE SCH MLS/HR: 2 INJECTION, SOLUTION INTRAVENOUS at 16:50

## 2017-11-19 RX ADMIN — PANTOPRAZOLE SODIUM SCH MG: 40 TABLET, DELAYED RELEASE ORAL at 08:16

## 2017-11-19 RX ADMIN — ENOXAPARIN SODIUM SCH MG: 100 INJECTION SUBCUTANEOUS at 08:47

## 2017-11-19 RX ADMIN — IBUPROFEN PRN MG: 400 TABLET ORAL at 02:13

## 2017-11-19 RX ADMIN — IBUPROFEN PRN MG: 400 TABLET ORAL at 14:13

## 2017-11-19 RX ADMIN — GABAPENTIN SCH MG: 300 CAPSULE ORAL at 08:16

## 2017-11-19 RX ADMIN — LEVALBUTEROL HYDROCHLORIDE SCH MG: 0.63 SOLUTION RESPIRATORY (INHALATION) at 07:55

## 2017-11-19 RX ADMIN — GABAPENTIN SCH MG: 300 CAPSULE ORAL at 12:27

## 2017-11-19 RX ADMIN — AMLODIPINE BESYLATE SCH MG: 10 TABLET ORAL at 08:18

## 2017-11-19 RX ADMIN — LEVALBUTEROL HYDROCHLORIDE SCH MG: 0.63 SOLUTION RESPIRATORY (INHALATION) at 14:42

## 2017-11-19 RX ADMIN — VITAMIN D, TAB 1000IU (100/BT) SCH UNIT: 25 TAB at 08:16

## 2017-11-19 RX ADMIN — HYDROMORPHONE HYDROCHLORIDE PRN MG: 1 INJECTION, SOLUTION INTRAMUSCULAR; INTRAVENOUS; SUBCUTANEOUS at 14:13

## 2017-11-19 RX ADMIN — METOPROLOL TARTRATE SCH MG: 50 TABLET, FILM COATED ORAL at 08:18

## 2017-11-19 RX ADMIN — THERA TABS SCH TAB: TAB at 08:16

## 2017-11-19 RX ADMIN — IBUPROFEN PRN MG: 400 TABLET ORAL at 08:19

## 2017-11-19 RX ADMIN — DEXAMETHASONE SODIUM PHOSPHATE PRN MG: 10 INJECTION, SOLUTION INTRAMUSCULAR; INTRAVENOUS at 14:13

## 2017-11-19 RX ADMIN — LISINOPRIL SCH MG: 20 TABLET ORAL at 08:18

## 2017-11-19 RX ADMIN — DEXAMETHASONE SODIUM PHOSPHATE PRN MG: 10 INJECTION, SOLUTION INTRAMUSCULAR; INTRAVENOUS at 20:12

## 2017-11-19 RX ADMIN — POTASSIUM CHLORIDE SCH MEQ: 10 TABLET, EXTENDED RELEASE ORAL at 08:17

## 2017-11-19 RX ADMIN — GABAPENTIN SCH MG: 300 CAPSULE ORAL at 20:12

## 2017-11-19 RX ADMIN — METOPROLOL TARTRATE SCH MG: 50 TABLET, FILM COATED ORAL at 20:12

## 2017-11-19 RX ADMIN — ASPIRIN 81 MG SCH MG: 81 TABLET ORAL at 08:16

## 2017-11-19 RX ADMIN — ISOSORBIDE MONONITRATE SCH MG: 30 TABLET, EXTENDED RELEASE ORAL at 08:17

## 2017-11-19 RX ADMIN — HYDROMORPHONE HYDROCHLORIDE PRN MG: 1 INJECTION, SOLUTION INTRAMUSCULAR; INTRAVENOUS; SUBCUTANEOUS at 02:13

## 2017-11-19 RX ADMIN — ATORVASTATIN CALCIUM SCH MG: 20 TABLET, FILM COATED ORAL at 20:12

## 2017-11-19 RX ADMIN — LEVALBUTEROL HYDROCHLORIDE SCH MG: 0.63 SOLUTION RESPIRATORY (INHALATION) at 01:33

## 2017-11-19 RX ADMIN — OXYCODONE HYDROCHLORIDE AND ACETAMINOPHEN SCH MG: 500 TABLET ORAL at 08:16

## 2017-11-19 RX ADMIN — IBUPROFEN PRN MG: 400 TABLET ORAL at 20:13

## 2017-11-19 RX ADMIN — HYDROMORPHONE HYDROCHLORIDE PRN MG: 1 INJECTION, SOLUTION INTRAMUSCULAR; INTRAVENOUS; SUBCUTANEOUS at 20:13

## 2017-11-19 RX ADMIN — DEXAMETHASONE SODIUM PHOSPHATE PRN MG: 10 INJECTION, SOLUTION INTRAMUSCULAR; INTRAVENOUS at 08:19

## 2017-11-19 NOTE — PN
Date/Time of Note


Date/Time of Note


DATE: 11/19/17 


TIME: 15:20





Assessment/Plan


Lines/Catheters


IV C[facility]ter Type (from [facility]):  PICC Line


Urinary Cath still in place:  No





Assessment/Plan


Assessment/Plan


-Chest pain, rule out acute coronary syndrome.  Cardiac enzymes are negative 

3. Dr. Manrique is following in cardiology consultation.  Continue aspirin.


-Possible acute on chronic diastolic dysfunction congestive heart failure.  

Continue Lasix, monitor electrolytes.


-Possible bronchitis, continue Rocephin.


-Possible COPD exacerbation, continue breathing treatments and steroids.


-GERD, continue Protonix


-Morbid obesity with BMI of 40





Further recommendations based on clinical course.  Plan of care was discussed 

with Dr. Anaya.





Subjective


24 Hr Interval Summary


Respiratory:  no complaints, shortness of breath


Cardiovascular:  no complaints


Gastrointestinal:  no complaints


Genitourinary:  no complaints





Exam/Review of Systems


Vital Signs


Vitals





 Vital Signs








  Date Time  Temp Pulse Resp B/P Pulse Ox O2 Delivery O2 Flow Rate FiO2


 


11/19/17 14:57 98.2 91 20 132/61 96   


 


11/19/17 14:42        21


 


11/19/17 09:00      Nasal Cannula 2.0 














 Intake and Output   


 


 11/18/17 11/18/17 11/19/17





 15:00 23:00 07:00


 


Intake Total  1010 ml 2000 ml


 


Output Total  500 ml 1600 ml


 


Balance  510 ml 400 ml











Exam


Constitutional:  alert, obese, oriented


Respiratory:  diminished breath sounds


Cardiovascular:  nl pulses, other (s1s2)


Gastrointestinal:  soft


Extremities:  edema


Neurological:  nl mental status





Results


Result Diagram:  


11/19/17 0427                                                                  

              11/19/17 0427





Results 24 hrs





Laboratory Tests








Test


  11/19/17


04:27


 


White Blood Count 15.7  H


 


Red Blood Count 4.04  L


 


Hemoglobin 9.6  L


 


Hematocrit 35.2  L


 


Mean Corpuscular Volume 87.1  


 


Mean Corpuscular Hemoglobin 23.8  L


 


Mean Corpuscular Hemoglobin


Concent 27.3  L


 


 


Red Cell Distribution Width 17.7  H


 


Platelet Count 257  


 


Mean Platelet Volume 8.9  


 


Neutrophils % 73.8  


 


Lymphocytes % 10.4  L


 


Monocytes % 10.0  


 


Eosinophils % 0.3  


 


Basophils % 0.4  


 


Nucleated Red Blood Cells % 0.4  H


 


Neutrophils # 11.6  H


 


Lymphocytes # 1.6  


 


Monocytes # 1.6  H


 


Eosinophils # 0.0  


 


Basophils # 0.1  


 


Nucleated Red Blood Cells # 0.1  H


 


Sodium Level 141  


 


Potassium Level 5.1  


 


Chloride Level 98  


 


Carbon Dioxide Level 39  H


 


Anion Gap 9  #


 


Blood Urea Nitrogen 18  


 


Creatinine 0.69  


 


Glucose Level 104  #


 


Calcium Level 9.0  











Medications


Medications





 Current Medications


Amlodipine Besylate (Norvasc) 10 mg DAILY PO  Last administered on 11/19/17at 08

:18; Admin Dose 10 MG;  Start 11/8/17 at 09:00


Ascorbic Acid (Vitamin C) 1,000 mg DAILY PO  Last administered on 11/19/17at 08:

16; Admin Dose 1,000 MG;  Start 11/8/17 at 09:00


Aspirin (Aspirin) 81 mg DAILY PO  Last administered on 11/19/17at 08:16; Admin 

Dose 81 MG;  Start 11/8/17 at 09:00


Cholecalciferol (Vitamin D) 1,000 unit DAILY PO  Last administered on 11/19/ 17at 08:16; Admin Dose 1,000 UNIT;  Start 11/8/17 at 09:00


Docusate Sodium (Colace) 250 mg BID PO  Last administered on 11/19/17at 08:16; 

Admin Dose 250 MG;  Start 11/8/17 at 09:00


Gabapentin (Neurontin) 900 mg TID PO  Last administered on 11/19/17at 12:27; 

Admin Dose 900 MG;  Start 11/8/17 at 09:00


Lisinopril (Zestril) 20 mg DAILY PO  Last administered on 11/19/17at 08:18; 

Admin Dose 20 MG;  Start 11/8/17 at 09:00


Multivitamins Therapeutic (Theragran) 1 tab DAILY PO  Last administered on 11/19 /17at 08:16; Admin Dose 1 TAB;  Start 11/8/17 at 09:00


Potassium Chloride (Klor-Con 10) 10 meq DAILY PO  Last administered on 11/19/ 17at 08:17; Admin Dose 10 MEQ;  Start 11/8/17 at 09:00


Tamsulosin HCl (Flomax) 0.4 mg HS PO  Last administered on 11/18/17at 20:29; 

Admin Dose 0.4 MG;  Start 11/8/17 at 21:00


Atorvastatin Calcium (Lipitor) 20 mg DAILY@21 PO  Last administered on 11/18/ 17at 20:30; Admin Dose 20 MG;  Start 11/8/17 at 21:00


Ondansetron HCl (Zofran Inj) 4 mg Q4H  PRN IV NAUSEA AND/OR VOMITING Last 

administered on 11/19/17at 14:13; Admin Dose 4 MG;  Start 11/8/17 at 03:30


Clonidine 0.1 mg 0.1 mg Q4H  PRN PO ELEVATED BLOOD PRESSURE;  Start 11/8/17 at 

03:30


Ceftriaxone Sodium (Rocephin) 50 ml @  100 mls/hr Q24H IVPB  Last administered 

on 11/18/17at 20:22; Admin Dose 100 MLS/HR;  Start 11/8/17 at 16:00


Enoxaparin Sodium (Lovenox) 40 mg DAILY SC  Last administered on 11/19/17at 08:

47; Admin Dose 40 MG;  Start 11/9/17 at 09:00


IV Flush (NS 10 ml) 10 ml PRN  PRN IV IV PROTOCOL;  Start 11/8/17 at 19:30


Metoprolol Tartrate (Lopressor) 50 mg BID PO  Last administered on 11/19/17at 08

:18; Admin Dose 50 MG;  Start 11/8/17 at 21:00


Isosorbide Mononitrate (Imdur) 30 mg DAILY PO  Last administered on 11/19/17at 

08:17; Admin Dose 30 MG;  Start 11/10/17 at 09:00


IV Flush (NS 10 ml) 10 ml PRN  PRN IV IV PROTOCOL;  Start 11/10/17 at 15:30


Acetaminophen/ Hydrocodone Bitart (Norco (10/325)) 1 tab Q4H  PRN PO PAIN Last 

administered on 11/19/17at 12:30; Admin Dose 1 TAB;  Start 11/15/17 at 17:30


Methylprednisolone Sodium Succinate (Solu-Medrol) 20 mg Q12 IV  Last 

administered on 11/19/17at 08:20; Admin Dose 20 MG;  Start 11/15/17 at 21:00


Ibuprofen (Motrin) 400 mg Q4H  PRN PO PAIN OR TEMP ABOVE 38C Last administered 

on 11/19/17at 14:13; Admin Dose 400 MG;  Start 11/15/17 at 20:00


Hydromorphone HCl (Dilaudid) 1 mg Q6H  PRN IV PAIN Last administered on 11/19/ 17at 14:13; Admin Dose 1 MG;  Start 11/17/17 at 12:35











NIKKI ENCINAS 19, 2017 15:21

## 2017-11-19 NOTE — CONS
Date/Time of Note


Date/Time of Note


DATE: 11/19/17 


TIME: 14:44





Assessment/Plan


Assessment/Plan


Chief Complaint/Hosp Course


IMPRESSION:


1.  Chest pain, assess for acute coronary syndrome.-stabbing by description/

negative trop x 3


2.  Shortness of breath, assess for congestive heart failure with low BNP at 

this time-slowly improving


3.  Diastolic dysfunction by most recent echo 07/2017.


4.  Abnormal electrocardiogram, assess for acute coronary syndrome.


5.  Chronic obstructive pulmonary disease.


6.  Hypertension, somewhat labile but reasonable currently


7.  Dyslipidemia.





Recc:


-Continue lasix diuresis


-Continue BB/norvasc/oral nitrates


-Continue asa/statin


-Continue steroids/bronchodilators


-Follow volume status closely


Problems:  





Consultation Date/Type/Reason


Admit Date/Time


Nov 9, 2017 at 11:53


Initial Consult Date


11/9/2017


Type of Consultation:  cardiology


Reason for Consultation


chest pain


Referring Provider:  DEVAUGHN TALAMANTES MD





Exam/Review of Systems


Vital Signs


Vitals





 Vital Signs








  Date Time  Temp Pulse Resp B/P Pulse Ox O2 Delivery O2 Flow Rate FiO2


 


11/19/17 09:00      Nasal Cannula 2.0 


 


11/19/17 08:17 98.1 83 20 140/80 97   


 


11/19/17 07:56        21














 Intake and Output   


 


 11/18/17 11/18/17 11/19/17





 15:00 23:00 07:00


 


Intake Total  1010 ml 2000 ml


 


Output Total  500 ml 1600 ml


 


Balance  510 ml 400 ml











Exam


Review of Systems:


CONSTITUTIONAL:  No fevers, chills.


PULMONARY:  No sob


CARDIOVASCULAR: No chest pain/palpitations


GASTROINTESTINAL:  No nausea/vomiting.


GENITOURINARY:  No hematuria/dysuria.


MUSCULOSKELETAL:  No myagias/arthalgias.


PSYCHIATRIC:  The patient denies depression.


NEUROLOGIC:   No weakness


Constitutional:  alert


Psych:  no complaints


Head:  normocephalic


ENMT:  mucosa pink and moist


Neck:  jvd, supple


Respiratory:  diminished breath sounds (at bases/B)


Cardiovascular:  regular rate and rhythm


Gastrointestinal:  non-tender, soft


Musculoskeletal:  muscle tone (normal)


Extremities:  edema (none)


Neurological:  other (No focal deficvits)





Results


Result Diagram:  


11/19/17 0427                                                                  

              11/19/17 0427





Results 24 hrs





Laboratory Tests








Test


  11/19/17


04:27


 


White Blood Count 15.7  H


 


Red Blood Count 4.04  L


 


Hemoglobin 9.6  L


 


Hematocrit 35.2  L


 


Mean Corpuscular Volume 87.1  


 


Mean Corpuscular Hemoglobin 23.8  L


 


Mean Corpuscular Hemoglobin


Concent 27.3  L


 


 


Red Cell Distribution Width 17.7  H


 


Platelet Count 257  


 


Mean Platelet Volume 8.9  


 


Neutrophils % 73.8  


 


Lymphocytes % 10.4  L


 


Monocytes % 10.0  


 


Eosinophils % 0.3  


 


Basophils % 0.4  


 


Nucleated Red Blood Cells % 0.4  H


 


Neutrophils # 11.6  H


 


Lymphocytes # 1.6  


 


Monocytes # 1.6  H


 


Eosinophils # 0.0  


 


Basophils # 0.1  


 


Nucleated Red Blood Cells # 0.1  H


 


Sodium Level 141  


 


Potassium Level 5.1  


 


Chloride Level 98  


 


Carbon Dioxide Level 39  H


 


Anion Gap 9  #


 


Blood Urea Nitrogen 18  


 


Creatinine 0.69  


 


Glucose Level 104  #


 


Calcium Level 9.0  











Medications


Medications





 Current Medications


Amlodipine Besylate (Norvasc) 10 mg DAILY PO  Last administered on 11/19/17at 08

:18; Admin Dose 10 MG;  Start 11/8/17 at 09:00


Ascorbic Acid (Vitamin C) 1,000 mg DAILY PO  Last administered on 11/19/17at 08:

16; Admin Dose 1,000 MG;  Start 11/8/17 at 09:00


Aspirin (Aspirin) 81 mg DAILY PO  Last administered on 11/19/17at 08:16; Admin 

Dose 81 MG;  Start 11/8/17 at 09:00


Cholecalciferol (Vitamin D) 1,000 unit DAILY PO  Last administered on 11/19/ 17at 08:16; Admin Dose 1,000 UNIT;  Start 11/8/17 at 09:00


Docusate Sodium (Colace) 250 mg BID PO  Last administered on 11/19/17at 08:16; 

Admin Dose 250 MG;  Start 11/8/17 at 09:00


Gabapentin (Neurontin) 900 mg TID PO  Last administered on 11/19/17at 12:27; 

Admin Dose 900 MG;  Start 11/8/17 at 09:00


Lisinopril (Zestril) 20 mg DAILY PO  Last administered on 11/19/17at 08:18; 

Admin Dose 20 MG;  Start 11/8/17 at 09:00


Multivitamins Therapeutic (Theragran) 1 tab DAILY PO  Last administered on 11/19 /17at 08:16; Admin Dose 1 TAB;  Start 11/8/17 at 09:00


Potassium Chloride (Klor-Con 10) 10 meq DAILY PO  Last administered on 11/19/ 17at 08:17; Admin Dose 10 MEQ;  Start 11/8/17 at 09:00


Tamsulosin HCl (Flomax) 0.4 mg HS PO  Last administered on 11/18/17at 20:29; 

Admin Dose 0.4 MG;  Start 11/8/17 at 21:00


Atorvastatin Calcium (Lipitor) 20 mg DAILY@21 PO  Last administered on 11/18/ 17at 20:30; Admin Dose 20 MG;  Start 11/8/17 at 21:00


Ondansetron HCl (Zofran Inj) 4 mg Q4H  PRN IV NAUSEA AND/OR VOMITING Last 

administered on 11/19/17at 14:13; Admin Dose 4 MG;  Start 11/8/17 at 03:30


Clonidine 0.1 mg 0.1 mg Q4H  PRN PO ELEVATED BLOOD PRESSURE;  Start 11/8/17 at 

03:30


Ceftriaxone Sodium (Rocephin) 50 ml @  100 mls/hr Q24H IVPB  Last administered 

on 11/18/17at 20:22; Admin Dose 100 MLS/HR;  Start 11/8/17 at 16:00


Enoxaparin Sodium (Lovenox) 40 mg DAILY SC  Last administered on 11/19/17at 08:

47; Admin Dose 40 MG;  Start 11/9/17 at 09:00


IV Flush (NS 10 ml) 10 ml PRN  PRN IV IV PROTOCOL;  Start 11/8/17 at 19:30


Metoprolol Tartrate (Lopressor) 50 mg BID PO  Last administered on 11/19/17at 08

:18; Admin Dose 50 MG;  Start 11/8/17 at 21:00


Isosorbide Mononitrate (Imdur) 30 mg DAILY PO  Last administered on 11/19/17at 

08:17; Admin Dose 30 MG;  Start 11/10/17 at 09:00


IV Flush (NS 10 ml) 10 ml PRN  PRN IV IV PROTOCOL;  Start 11/10/17 at 15:30


Acetaminophen/ Hydrocodone Bitart (Norco (10/325)) 1 tab Q4H  PRN PO PAIN Last 

administered on 11/19/17at 12:30; Admin Dose 1 TAB;  Start 11/15/17 at 17:30


Methylprednisolone Sodium Succinate (Solu-Medrol) 20 mg Q12 IV  Last 

administered on 11/19/17at 08:20; Admin Dose 20 MG;  Start 11/15/17 at 21:00


Ibuprofen (Motrin) 400 mg Q4H  PRN PO PAIN OR TEMP ABOVE 38C Last administered 

on 11/19/17at 14:13; Admin Dose 400 MG;  Start 11/15/17 at 20:00


Hydromorphone HCl (Dilaudid) 1 mg Q6H  PRN IV PAIN Last administered on 11/19/ 17at 14:13; Admin Dose 1 MG;  Start 11/17/17 at 12:35











HARLEY ADAMS Nov 19, 2017 14:47

## 2017-11-20 VITALS — SYSTOLIC BLOOD PRESSURE: 139 MMHG | DIASTOLIC BLOOD PRESSURE: 65 MMHG | RESPIRATION RATE: 20 BRPM

## 2017-11-20 VITALS — RESPIRATION RATE: 22 BRPM | DIASTOLIC BLOOD PRESSURE: 82 MMHG | HEART RATE: 81 BPM | SYSTOLIC BLOOD PRESSURE: 169 MMHG

## 2017-11-20 VITALS — RESPIRATION RATE: 16 BRPM | SYSTOLIC BLOOD PRESSURE: 118 MMHG | DIASTOLIC BLOOD PRESSURE: 58 MMHG | HEART RATE: 96 BPM

## 2017-11-20 VITALS — HEART RATE: 82 BPM | SYSTOLIC BLOOD PRESSURE: 142 MMHG | DIASTOLIC BLOOD PRESSURE: 72 MMHG

## 2017-11-20 VITALS — SYSTOLIC BLOOD PRESSURE: 116 MMHG | HEART RATE: 80 BPM | DIASTOLIC BLOOD PRESSURE: 67 MMHG

## 2017-11-20 LAB
ABNORMAL IP MESSAGE: 1
ANION GAP SERPL CALC-SCNC: 12 MMOL/L (ref 8–16)
BASOPHILS # BLD AUTO: 0.1 10^3/UL (ref 0–0.1)
BASOPHILS NFR BLD: 0.4 % (ref 0–2)
BUN SERPL-MCNC: 18 MG/DL (ref 7–20)
CALCIUM SERPL-MCNC: 8.7 MG/DL (ref 8.4–10.2)
CHLORIDE SERPL-SCNC: 93 MMOL/L (ref 97–110)
CO2 SERPL-SCNC: 40 MMOL/L (ref 21–31)
CREAT SERPL-MCNC: 0.73 MG/DL (ref 0.61–1.24)
EOSINOPHIL # BLD: 0.1 10^3/UL (ref 0–0.5)
EOSINOPHIL NFR BLD: 0.3 % (ref 0–7)
ERYTHROCYTE [DISTWIDTH] IN BLOOD BY AUTOMATED COUNT: 17.8 % (ref 11.5–14.5)
GLUCOSE SERPL-MCNC: 171 MG/DL (ref 70–220)
HCT VFR BLD CALC: 35.1 % (ref 42–52)
HGB BLD-MCNC: 9.6 G/DL (ref 14–18)
LYMPHOCYTES # BLD AUTO: 1.9 10^3/UL (ref 0.8–2.9)
LYMPHOCYTES NFR BLD AUTO: 12.2 % (ref 15–51)
MCH RBC QN AUTO: 23.7 PG (ref 29–33)
MCHC RBC AUTO-ENTMCNC: 27.4 G/DL (ref 32–37)
MCV RBC AUTO: 86.7 FL (ref 82–101)
MONOCYTES # BLD: 1.2 10^3/UL (ref 0.3–0.9)
MONOCYTES NFR BLD: 7.4 % (ref 0–11)
NEUTROPHILS # BLD: 11.6 10^3/UL (ref 1.6–7.5)
NEUTROPHILS NFR BLD AUTO: 75.1 % (ref 39–77)
NRBC # BLD MANUAL: 0.1 10^3/UL (ref 0–0)
NRBC BLD AUTO-RTO: 0.6 /100WBC (ref 0–0)
PLATELET # BLD: 249 10^3/UL (ref 140–415)
PMV BLD AUTO: 9 FL (ref 7.4–10.4)
POSITIVE DIFF: (no result)
POTASSIUM SERPL-SCNC: 4.4 MMOL/L (ref 3.5–5.1)
RBC # BLD AUTO: 4.05 10^6/UL (ref 4.7–6.1)
SODIUM SERPL-SCNC: 141 MMOL/L (ref 135–144)
WBC # BLD AUTO: 15.5 10^3/UL (ref 4.8–10.8)

## 2017-11-20 RX ADMIN — HYDROMORPHONE HYDROCHLORIDE PRN MG: 1 INJECTION, SOLUTION INTRAMUSCULAR; INTRAVENOUS; SUBCUTANEOUS at 14:30

## 2017-11-20 RX ADMIN — LEVALBUTEROL HYDROCHLORIDE SCH MG: 0.63 SOLUTION RESPIRATORY (INHALATION) at 20:13

## 2017-11-20 RX ADMIN — GABAPENTIN SCH MG: 300 CAPSULE ORAL at 20:18

## 2017-11-20 RX ADMIN — LEVALBUTEROL HYDROCHLORIDE SCH MG: 0.63 SOLUTION RESPIRATORY (INHALATION) at 14:14

## 2017-11-20 RX ADMIN — HYDROMORPHONE HYDROCHLORIDE PRN MG: 1 INJECTION, SOLUTION INTRAMUSCULAR; INTRAVENOUS; SUBCUTANEOUS at 08:34

## 2017-11-20 RX ADMIN — IBUPROFEN PRN MG: 400 TABLET ORAL at 02:09

## 2017-11-20 RX ADMIN — ENOXAPARIN SODIUM SCH MG: 100 INJECTION SUBCUTANEOUS at 08:45

## 2017-11-20 RX ADMIN — AMLODIPINE BESYLATE SCH MG: 10 TABLET ORAL at 08:32

## 2017-11-20 RX ADMIN — NYSTATIN SCH APPLIC: 100000 POWDER TOPICAL at 20:20

## 2017-11-20 RX ADMIN — VITAMIN D, TAB 1000IU (100/BT) SCH UNIT: 25 TAB at 08:33

## 2017-11-20 RX ADMIN — DEXAMETHASONE SODIUM PHOSPHATE PRN MG: 10 INJECTION, SOLUTION INTRAMUSCULAR; INTRAVENOUS at 14:29

## 2017-11-20 RX ADMIN — OXYCODONE HYDROCHLORIDE AND ACETAMINOPHEN SCH MG: 500 TABLET ORAL at 08:33

## 2017-11-20 RX ADMIN — LEVALBUTEROL HYDROCHLORIDE SCH MG: 0.63 SOLUTION RESPIRATORY (INHALATION) at 01:45

## 2017-11-20 RX ADMIN — IBUPROFEN PRN MG: 400 TABLET ORAL at 08:34

## 2017-11-20 RX ADMIN — IBUPROFEN PRN MG: 400 TABLET ORAL at 20:19

## 2017-11-20 RX ADMIN — HYDROMORPHONE HYDROCHLORIDE PRN MG: 1 INJECTION, SOLUTION INTRAMUSCULAR; INTRAVENOUS; SUBCUTANEOUS at 02:09

## 2017-11-20 RX ADMIN — METOPROLOL TARTRATE SCH MG: 50 TABLET, FILM COATED ORAL at 08:31

## 2017-11-20 RX ADMIN — ATORVASTATIN CALCIUM SCH MG: 20 TABLET, FILM COATED ORAL at 20:18

## 2017-11-20 RX ADMIN — ASPIRIN 81 MG SCH MG: 81 TABLET ORAL at 08:29

## 2017-11-20 RX ADMIN — HYDROMORPHONE HYDROCHLORIDE PRN MG: 1 INJECTION, SOLUTION INTRAMUSCULAR; INTRAVENOUS; SUBCUTANEOUS at 20:19

## 2017-11-20 RX ADMIN — DEXAMETHASONE SODIUM PHOSPHATE PRN MG: 10 INJECTION, SOLUTION INTRAMUSCULAR; INTRAVENOUS at 20:19

## 2017-11-20 RX ADMIN — DEXAMETHASONE SODIUM PHOSPHATE PRN MG: 10 INJECTION, SOLUTION INTRAMUSCULAR; INTRAVENOUS at 02:09

## 2017-11-20 RX ADMIN — DEXAMETHASONE SODIUM PHOSPHATE PRN MG: 10 INJECTION, SOLUTION INTRAMUSCULAR; INTRAVENOUS at 08:34

## 2017-11-20 RX ADMIN — PANTOPRAZOLE SODIUM SCH MG: 40 TABLET, DELAYED RELEASE ORAL at 04:26

## 2017-11-20 RX ADMIN — THERA TABS SCH TAB: TAB at 08:33

## 2017-11-20 RX ADMIN — TAMSULOSIN HYDROCHLORIDE SCH MG: 0.4 CAPSULE ORAL at 20:18

## 2017-11-20 RX ADMIN — ISOSORBIDE MONONITRATE SCH MG: 30 TABLET, EXTENDED RELEASE ORAL at 08:30

## 2017-11-20 RX ADMIN — GABAPENTIN SCH MG: 300 CAPSULE ORAL at 12:31

## 2017-11-20 RX ADMIN — POTASSIUM CHLORIDE SCH MEQ: 10 TABLET, EXTENDED RELEASE ORAL at 08:30

## 2017-11-20 RX ADMIN — LISINOPRIL SCH MG: 20 TABLET ORAL at 08:33

## 2017-11-20 RX ADMIN — IBUPROFEN PRN MG: 400 TABLET ORAL at 14:29

## 2017-11-20 RX ADMIN — LEVALBUTEROL HYDROCHLORIDE SCH MG: 0.63 SOLUTION RESPIRATORY (INHALATION) at 07:39

## 2017-11-20 RX ADMIN — GABAPENTIN SCH MG: 300 CAPSULE ORAL at 08:31

## 2017-11-20 RX ADMIN — METOPROLOL TARTRATE SCH MG: 50 TABLET, FILM COATED ORAL at 20:19

## 2017-11-20 NOTE — PN
Date/Time of Note


Date/Time of Note


DATE: 11/20/17 


TIME: 16:34





Assessment/Plan


Lines/Catheters


IV Catheter Type (from Nrs):  PICC Line


Central line still needed:  Yes


Urinary Cath still in place:  No





Subjective


24 Hr Interval Summary


Constitutional:  requiring O2


Respiratory:  shortness of breath


Cardiovascular:  no complaints


Gastrointestinal:  no complaints


Genitourinary:  no complaints


Musculoskeletal:  no complaints





Exam/Review of Systems


Vital Signs


Vitals





 Vital Signs








  Date Time  Temp Pulse Resp B/P Pulse Ox O2 Delivery O2 Flow Rate FiO2


 


11/20/17 16:19 97.9 96 16 118/58 88 Nasal Cannula 2.0 


 


11/20/17 01:46        21














 Intake and Output   


 


 11/19/17 11/19/17 11/20/17





 14:59 22:59 06:59


 


Intake Total  1950 ml 2000 ml


 


Output Total  2100 ml 2100 ml


 


Balance  -150 ml -100 ml











Exam


Constitutional:  alert, obese


Respiratory:  diminished breath sounds, normal air movement


Cardiovascular:  nl pulses


Gastrointestinal:  non-tender, soft


Musculoskeletal:  nl extremities to inspection


Extremities:  edema


Neurological:  nl mental status, nl speech





Results


Result Diagram:  


11/20/17 0430                                                                  

              11/20/17 0430





Results 24 hrs





Laboratory Tests








Test


  11/20/17


04:30


 


White Blood Count 15.5  H


 


Red Blood Count 4.05  L


 


Hemoglobin 9.6  L


 


Hematocrit 35.1  L


 


Mean Corpuscular Volume 86.7  


 


Mean Corpuscular Hemoglobin 23.7  L


 


Mean Corpuscular Hemoglobin


Concent 27.4  L


 


 


Red Cell Distribution Width 17.8  H


 


Platelet Count 249  


 


Mean Platelet Volume 9.0  


 


Neutrophils % 75.1  


 


Lymphocytes % 12.2  L


 


Monocytes % 7.4  


 


Eosinophils % 0.3  


 


Basophils % 0.4  


 


Nucleated Red Blood Cells % 0.6  H


 


Neutrophils # 11.6  H


 


Lymphocytes # 1.9  


 


Monocytes # 1.2  H


 


Eosinophils # 0.1  


 


Basophils # 0.1  


 


Nucleated Red Blood Cells # 0.1  H


 


Sodium Level 141  


 


Potassium Level 4.4  


 


Chloride Level 93  L


 


Carbon Dioxide Level 40  H


 


Anion Gap 12  


 


Blood Urea Nitrogen 18  


 


Creatinine 0.73  


 


Glucose Level 171  


 


Calcium Level 8.7  











Medications


Medications





 Current Medications


Amlodipine Besylate (Norvasc) 10 mg DAILY PO  Last administered on 11/20/17at 08

:32; Admin Dose 10 MG;  Start 11/8/17 at 09:00


Ascorbic Acid (Vitamin C) 1,000 mg DAILY PO  Last administered on 11/20/17at 08:

33; Admin Dose 1,000 MG;  Start 11/8/17 at 09:00


Aspirin (Aspirin) 81 mg DAILY PO  Last administered on 11/20/17at 08:29; Admin 

Dose 81 MG;  Start 11/8/17 at 09:00


Cholecalciferol (Vitamin D) 1,000 unit DAILY PO  Last administered on 11/20/ 17at 08:33; Admin Dose 1,000 UNIT;  Start 11/8/17 at 09:00


Docusate Sodium (Colace) 250 mg BID PO  Last administered on 11/20/17at 08:29; 

Admin Dose 250 MG;  Start 11/8/17 at 09:00


Gabapentin (Neurontin) 900 mg TID PO  Last administered on 11/20/17at 12:31; 

Admin Dose 900 MG;  Start 11/8/17 at 09:00


Lisinopril (Zestril) 20 mg DAILY PO  Last administered on 11/20/17at 08:33; 

Admin Dose 20 MG;  Start 11/8/17 at 09:00


Multivitamins Therapeutic (Theragran) 1 tab DAILY PO  Last administered on 11/20 /17at 08:33; Admin Dose 1 TAB;  Start 11/8/17 at 09:00


Potassium Chloride (Klor-Con 10) 10 meq DAILY PO  Last administered on 11/20/ 17at 08:30; Admin Dose 10 MEQ;  Start 11/8/17 at 09:00


Tamsulosin HCl (Flomax) 0.4 mg HS PO  Last administered on 11/19/17at 20:12; 

Admin Dose 0.4 MG;  Start 11/8/17 at 21:00


Atorvastatin Calcium (Lipitor) 20 mg DAILY@21 PO  Last administered on 11/19/ 17at 20:12; Admin Dose 20 MG;  Start 11/8/17 at 21:00


Ondansetron HCl (Zofran Inj) 4 mg Q4H  PRN IV NAUSEA AND/OR VOMITING Last 

administered on 11/20/17at 14:29; Admin Dose 4 MG;  Start 11/8/17 at 03:30


Clonidine (Catapres) 0.1 mg Q4H  PRN PO ELEVATED BLOOD PRESSURE;  Start 11/8/17 

at 03:30


Enoxaparin Sodium (Lovenox) 40 mg DAILY SC  Last administered on 11/20/17at 08:

45; Admin Dose 40 MG;  Start 11/9/17 at 09:00


IV Flush (NS 10 ml) 10 ml PRN  PRN IV IV PROTOCOL;  Start 11/8/17 at 19:30


Metoprolol Tartrate (Lopressor) 50 mg BID PO  Last administered on 11/20/17at 08

:31; Admin Dose 50 MG;  Start 11/8/17 at 21:00


Isosorbide Mononitrate (Imdur) 30 mg DAILY PO  Last administered on 11/20/17at 

08:30; Admin Dose 30 MG;  Start 11/10/17 at 09:00


IV Flush (NS 10 ml) 10 ml PRN  PRN IV IV PROTOCOL;  Start 11/10/17 at 15:30


Acetaminophen/ Hydrocodone Bitart (Norco (10/325)) 1 tab Q4H  PRN PO PAIN Last 

administered on 11/20/17at 10:33; Admin Dose 1 TAB;  Start 11/15/17 at 17:30


Methylprednisolone Sodium Succinate (Solu-Medrol) 20 mg Q12 IV  Last 

administered on 11/20/17at 08:29; Admin Dose 20 MG;  Start 11/15/17 at 21:00


Ibuprofen (Motrin) 400 mg Q4H  PRN PO PAIN OR TEMP ABOVE 38C Last administered 

on 11/20/17at 14:29; Admin Dose 400 MG;  Start 11/15/17 at 20:00


Hydromorphone HCl (Dilaudid) 1 mg Q6H  PRN IV PAIN Last administered on 11/20/ 17at 14:30; Admin Dose 1 MG;  Start 11/17/17 at 12:35


Nystatin (Nystatin Powder) 1 applic BID TOP ;  Start 11/20/17 at 21:00











NIKKI ENCINAS Nov 20, 2017 16:36

## 2017-11-20 NOTE — CONS
Date/Time of Note


Date/Time of Note


DATE: 11/20/17 


TIME: 12:35





Assessment/Plan


Assessment/Plan


Chief Complaint/Hosp Course


IMPRESSION:


1.  Chest pain, assess for acute coronary syndrome.-stabbing by description/

negative trop x 3


2.  Shortness of breath, assess for congestive heart failure with low BNP at 

this time-slowly improving


3.  Diastolic dysfunction by most recent echo 07/2017.


4.  Abnormal electrocardiogram, assess for acute coronary syndrome.


5.  Chronic obstructive pulmonary disease.


6.  Hypertension, somewhat labile but reasonable currently


7.  Dyslipidemia.





Recc:


-Continue lasix diuresis


-Continue BB/norvasc/oral nitrates/ACEI


-Continue asa/statin


-Continue steroids/bronchodilators


-Follow volume status closely


Problems:  





Consultation Date/Type/Reason


Admit Date/Time


Nov 9, 2017 at 11:53


Initial Consult Date


11/9/2017


Type of Consultation:  cardiology


Reason for Consultation


CHF


Referring Provider:  DEVAUGHN TALAMANTES MD





Exam/Review of Systems


Vital Signs


Vitals





 Vital Signs








  Date Time  Temp Pulse Resp B/P Pulse Ox O2 Delivery O2 Flow Rate FiO2


 


11/20/17 10:08  80  116/67 92 Nasal Cannula 2.0 


 


11/20/17 07:41 98.0  22     


 


11/20/17 01:46        21














 Intake and Output   


 


 11/19/17 11/19/17 11/20/17





 15:00 23:00 07:00


 


Intake Total  1950 ml 2000 ml


 


Output Total  2100 ml 2100 ml


 


Balance  -150 ml -100 ml











Exam


Review of Systems:


CONSTITUTIONAL:  No fevers, chills.


PULMONARY:  No sob


CARDIOVASCULAR: No chest pain/palpitations


GASTROINTESTINAL:  No nausea/vomiting.


GENITOURINARY:  No hematuria/dysuria.


MUSCULOSKELETAL:  No myagias/arthalgias.


PSYCHIATRIC:  The patient denies depression.


NEUROLOGIC:   No weakness


Constitutional:  alert, oriented


Psych:  no complaints


Head:  normocephalic


ENMT:  mucosa pink and moist


Neck:  jvd (9 cm water), supple


Respiratory:  clear to auscultation


Cardiovascular:  regular rate and rhythm


Gastrointestinal:  non-tender, soft


Musculoskeletal:  muscle tone (normal)


Extremities:  edema (none)


Neurological:  other (No focal deficits)





Results


Result Diagram:  


11/20/17 0430                                                                  

              11/20/17 0430





Results 24 hrs





Laboratory Tests








Test


  11/20/17


04:30


 


White Blood Count 15.5  H


 


Red Blood Count 4.05  L


 


Hemoglobin 9.6  L


 


Hematocrit 35.1  L


 


Mean Corpuscular Volume 86.7  


 


Mean Corpuscular Hemoglobin 23.7  L


 


Mean Corpuscular Hemoglobin


Concent 27.4  L


 


 


Red Cell Distribution Width 17.8  H


 


Platelet Count 249  


 


Mean Platelet Volume 9.0  


 


Neutrophils % 75.1  


 


Lymphocytes % 12.2  L


 


Monocytes % 7.4  


 


Eosinophils % 0.3  


 


Basophils % 0.4  


 


Nucleated Red Blood Cells % 0.6  H


 


Neutrophils # 11.6  H


 


Lymphocytes # 1.9  


 


Monocytes # 1.2  H


 


Eosinophils # 0.1  


 


Basophils # 0.1  


 


Nucleated Red Blood Cells # 0.1  H


 


Sodium Level 141  


 


Potassium Level 4.4  


 


Chloride Level 93  L


 


Carbon Dioxide Level 40  H


 


Anion Gap 12  


 


Blood Urea Nitrogen 18  


 


Creatinine 0.73  


 


Glucose Level 171  


 


Calcium Level 8.7  











Medications


Medications





 Current Medications


Amlodipine Besylate (Norvasc) 10 mg DAILY PO  Last administered on 11/20/17at 08

:32; Admin Dose 10 MG;  Start 11/8/17 at 09:00


Ascorbic Acid (Vitamin C) 1,000 mg DAILY PO  Last administered on 11/20/17at 08:

33; Admin Dose 1,000 MG;  Start 11/8/17 at 09:00


Aspirin (Aspirin) 81 mg DAILY PO  Last administered on 11/20/17at 08:29; Admin 

Dose 81 MG;  Start 11/8/17 at 09:00


Cholecalciferol (Vitamin D) 1,000 unit DAILY PO  Last administered on 11/20/ 17at 08:33; Admin Dose 1,000 UNIT;  Start 11/8/17 at 09:00


Docusate Sodium (Colace) 250 mg BID PO  Last administered on 11/20/17at 08:29; 

Admin Dose 250 MG;  Start 11/8/17 at 09:00


Gabapentin (Neurontin) 900 mg TID PO  Last administered on 11/20/17at 12:31; 

Admin Dose 900 MG;  Start 11/8/17 at 09:00


Lisinopril (Zestril) 20 mg DAILY PO  Last administered on 11/20/17at 08:33; 

Admin Dose 20 MG;  Start 11/8/17 at 09:00


Multivitamins Therapeutic (Theragran) 1 tab DAILY PO  Last administered on 11/20 /17at 08:33; Admin Dose 1 TAB;  Start 11/8/17 at 09:00


Potassium Chloride (Klor-Con 10) 10 meq DAILY PO  Last administered on 11/20/ 17at 08:30; Admin Dose 10 MEQ;  Start 11/8/17 at 09:00


Tamsulosin HCl (Flomax) 0.4 mg HS PO  Last administered on 11/19/17at 20:12; 

Admin Dose 0.4 MG;  Start 11/8/17 at 21:00


Atorvastatin Calcium (Lipitor) 20 mg DAILY@21 PO  Last administered on 11/19/ 17at 20:12; Admin Dose 20 MG;  Start 11/8/17 at 21:00


Ondansetron HCl (Zofran Inj) 4 mg Q4H  PRN IV NAUSEA AND/OR VOMITING Last 

administered on 11/20/17at 08:34; Admin Dose 4 MG;  Start 11/8/17 at 03:30


Clonidine 0.1 mg 0.1 mg Q4H  PRN PO ELEVATED BLOOD PRESSURE;  Start 11/8/17 at 

03:30


Ceftriaxone Sodium (Rocephin) 50 ml @  100 mls/hr Q24H IVPB  Last administered 

on 11/19/17at 16:50; Admin Dose 100 MLS/HR;  Start 11/8/17 at 16:00


Enoxaparin Sodium (Lovenox) 40 mg DAILY SC  Last administered on 11/20/17at 08:

45; Admin Dose 40 MG;  Start 11/9/17 at 09:00


IV Flush (NS 10 ml) 10 ml PRN  PRN IV IV PROTOCOL;  Start 11/8/17 at 19:30


Metoprolol Tartrate (Lopressor) 50 mg BID PO  Last administered on 11/20/17at 08

:31; Admin Dose 50 MG;  Start 11/8/17 at 21:00


Isosorbide Mononitrate (Imdur) 30 mg DAILY PO  Last administered on 11/20/17at 

08:30; Admin Dose 30 MG;  Start 11/10/17 at 09:00


IV Flush (NS 10 ml) 10 ml PRN  PRN IV IV PROTOCOL;  Start 11/10/17 at 15:30


Acetaminophen/ Hydrocodone Bitart (Norco (10/325)) 1 tab Q4H  PRN PO PAIN Last 

administered on 11/20/17at 10:33; Admin Dose 1 TAB;  Start 11/15/17 at 17:30


Methylprednisolone Sodium Succinate (Solu-Medrol) 20 mg Q12 IV  Last 

administered on 11/20/17at 08:29; Admin Dose 20 MG;  Start 11/15/17 at 21:00


Ibuprofen (Motrin) 400 mg Q4H  PRN PO PAIN OR TEMP ABOVE 38C Last administered 

on 11/20/17at 08:34; Admin Dose 400 MG;  Start 11/15/17 at 20:00


Hydromorphone HCl (Dilaudid) 1 mg Q6H  PRN IV PAIN Last administered on 11/20/ 17at 08:34; Admin Dose 1 MG;  Start 11/17/17 at 12:35











HARLEY ADAMS Nov 20, 2017 12:36

## 2017-11-21 VITALS — DIASTOLIC BLOOD PRESSURE: 72 MMHG | RESPIRATION RATE: 20 BRPM | SYSTOLIC BLOOD PRESSURE: 125 MMHG

## 2017-11-21 VITALS — DIASTOLIC BLOOD PRESSURE: 57 MMHG | RESPIRATION RATE: 20 BRPM | SYSTOLIC BLOOD PRESSURE: 108 MMHG

## 2017-11-21 VITALS — RESPIRATION RATE: 20 BRPM | SYSTOLIC BLOOD PRESSURE: 167 MMHG | DIASTOLIC BLOOD PRESSURE: 86 MMHG

## 2017-11-21 VITALS — RESPIRATION RATE: 20 BRPM | DIASTOLIC BLOOD PRESSURE: 58 MMHG | SYSTOLIC BLOOD PRESSURE: 119 MMHG

## 2017-11-21 LAB
ABNORMAL IP MESSAGE: 1
ANION GAP SERPL CALC-SCNC: 9 MMOL/L (ref 8–16)
BASOPHILS # BLD AUTO: 0.1 10^3/UL (ref 0–0.1)
BASOPHILS NFR BLD: 0.3 % (ref 0–2)
BUN SERPL-MCNC: 20 MG/DL (ref 7–20)
CALCIUM SERPL-MCNC: 8.7 MG/DL (ref 8.4–10.2)
CHLORIDE SERPL-SCNC: 93 MMOL/L (ref 97–110)
CREAT SERPL-MCNC: 0.75 MG/DL (ref 0.61–1.24)
EOSINOPHIL # BLD: 0 10^3/UL (ref 0–0.5)
EOSINOPHIL NFR BLD: 0.3 % (ref 0–7)
ERYTHROCYTE [DISTWIDTH] IN BLOOD BY AUTOMATED COUNT: 18.4 % (ref 11.5–14.5)
GLUCOSE SERPL-MCNC: 115 MG/DL (ref 70–220)
HCT VFR BLD CALC: 33.3 % (ref 42–52)
HGB BLD-MCNC: 9.3 G/DL (ref 14–18)
LYMPHOCYTES # BLD AUTO: 2 10^3/UL (ref 0.8–2.9)
LYMPHOCYTES NFR BLD AUTO: 12.8 % (ref 15–51)
MCH RBC QN AUTO: 24 PG (ref 29–33)
MCHC RBC AUTO-ENTMCNC: 27.9 G/DL (ref 32–37)
MCV RBC AUTO: 85.8 FL (ref 82–101)
MONOCYTES # BLD: 1.3 10^3/UL (ref 0.3–0.9)
MONOCYTES NFR BLD: 8.2 % (ref 0–11)
NEUTROPHILS # BLD: 11.3 10^3/UL (ref 1.6–7.5)
NEUTROPHILS NFR BLD AUTO: 73.8 % (ref 39–77)
NRBC # BLD MANUAL: 0.2 10^3/UL (ref 0–0)
NRBC BLD AUTO-RTO: 1.4 /100WBC (ref 0–0)
PLATELET # BLD: 239 10^3/UL (ref 140–415)
PMV BLD AUTO: 9 FL (ref 7.4–10.4)
POSITIVE DIFF: (no result)
POTASSIUM SERPL-SCNC: 4.6 MMOL/L (ref 3.5–5.1)
RBC # BLD AUTO: 3.88 10^6/UL (ref 4.7–6.1)
SODIUM SERPL-SCNC: 141 MMOL/L (ref 135–144)
WBC # BLD AUTO: 15.3 10^3/UL (ref 4.8–10.8)

## 2017-11-21 RX ADMIN — LEVALBUTEROL HYDROCHLORIDE SCH MG: 0.63 SOLUTION RESPIRATORY (INHALATION) at 02:51

## 2017-11-21 RX ADMIN — GABAPENTIN SCH MG: 300 CAPSULE ORAL at 20:19

## 2017-11-21 RX ADMIN — DEXAMETHASONE SODIUM PHOSPHATE PRN MG: 10 INJECTION, SOLUTION INTRAMUSCULAR; INTRAVENOUS at 18:22

## 2017-11-21 RX ADMIN — HYDROMORPHONE HYDROCHLORIDE PRN MG: 1 INJECTION, SOLUTION INTRAMUSCULAR; INTRAVENOUS; SUBCUTANEOUS at 14:34

## 2017-11-21 RX ADMIN — IBUPROFEN PRN MG: 400 TABLET ORAL at 02:35

## 2017-11-21 RX ADMIN — IBUPROFEN PRN MG: 400 TABLET ORAL at 22:23

## 2017-11-21 RX ADMIN — ISOSORBIDE MONONITRATE SCH MG: 30 TABLET, EXTENDED RELEASE ORAL at 08:38

## 2017-11-21 RX ADMIN — GABAPENTIN SCH MG: 300 CAPSULE ORAL at 08:39

## 2017-11-21 RX ADMIN — DEXAMETHASONE SODIUM PHOSPHATE PRN MG: 10 INJECTION, SOLUTION INTRAMUSCULAR; INTRAVENOUS at 02:34

## 2017-11-21 RX ADMIN — IBUPROFEN PRN MG: 400 TABLET ORAL at 08:41

## 2017-11-21 RX ADMIN — LEVALBUTEROL HYDROCHLORIDE SCH MG: 0.63 SOLUTION RESPIRATORY (INHALATION) at 19:44

## 2017-11-21 RX ADMIN — DEXAMETHASONE SODIUM PHOSPHATE PRN MG: 10 INJECTION, SOLUTION INTRAMUSCULAR; INTRAVENOUS at 08:42

## 2017-11-21 RX ADMIN — IBUPROFEN PRN MG: 400 TABLET ORAL at 14:34

## 2017-11-21 RX ADMIN — METOPROLOL TARTRATE SCH MG: 50 TABLET, FILM COATED ORAL at 20:19

## 2017-11-21 RX ADMIN — ENOXAPARIN SODIUM SCH MG: 100 INJECTION SUBCUTANEOUS at 09:06

## 2017-11-21 RX ADMIN — LEVALBUTEROL HYDROCHLORIDE SCH MG: 0.63 SOLUTION RESPIRATORY (INHALATION) at 14:23

## 2017-11-21 RX ADMIN — ASPIRIN 81 MG SCH MG: 81 TABLET ORAL at 08:37

## 2017-11-21 RX ADMIN — HYDROMORPHONE HYDROCHLORIDE PRN MG: 1 INJECTION, SOLUTION INTRAMUSCULAR; INTRAVENOUS; SUBCUTANEOUS at 02:34

## 2017-11-21 RX ADMIN — VITAMIN D, TAB 1000IU (100/BT) SCH UNIT: 25 TAB at 08:41

## 2017-11-21 RX ADMIN — POTASSIUM CHLORIDE SCH MEQ: 10 TABLET, EXTENDED RELEASE ORAL at 08:38

## 2017-11-21 RX ADMIN — LISINOPRIL SCH MG: 20 TABLET ORAL at 08:41

## 2017-11-21 RX ADMIN — TAMSULOSIN HYDROCHLORIDE SCH MG: 0.4 CAPSULE ORAL at 20:19

## 2017-11-21 RX ADMIN — HYDROMORPHONE HYDROCHLORIDE PRN MG: 1 INJECTION, SOLUTION INTRAMUSCULAR; INTRAVENOUS; SUBCUTANEOUS at 22:23

## 2017-11-21 RX ADMIN — NYSTATIN SCH APPLIC: 100000 POWDER TOPICAL at 08:42

## 2017-11-21 RX ADMIN — IBUPROFEN PRN MG: 400 TABLET ORAL at 18:30

## 2017-11-21 RX ADMIN — NYSTATIN SCH APPLIC: 100000 POWDER TOPICAL at 20:19

## 2017-11-21 RX ADMIN — METOPROLOL TARTRATE SCH MG: 50 TABLET, FILM COATED ORAL at 08:39

## 2017-11-21 RX ADMIN — DEXAMETHASONE SODIUM PHOSPHATE PRN MG: 10 INJECTION, SOLUTION INTRAMUSCULAR; INTRAVENOUS at 22:23

## 2017-11-21 RX ADMIN — HYDROMORPHONE HYDROCHLORIDE PRN MG: 1 INJECTION, SOLUTION INTRAMUSCULAR; INTRAVENOUS; SUBCUTANEOUS at 08:42

## 2017-11-21 RX ADMIN — ATORVASTATIN CALCIUM SCH MG: 20 TABLET, FILM COATED ORAL at 20:19

## 2017-11-21 RX ADMIN — OXYCODONE HYDROCHLORIDE AND ACETAMINOPHEN SCH MG: 500 TABLET ORAL at 08:40

## 2017-11-21 RX ADMIN — AMLODIPINE BESYLATE SCH MG: 10 TABLET ORAL at 08:40

## 2017-11-21 RX ADMIN — THERA TABS SCH TAB: TAB at 08:40

## 2017-11-21 RX ADMIN — DEXAMETHASONE SODIUM PHOSPHATE PRN MG: 10 INJECTION, SOLUTION INTRAMUSCULAR; INTRAVENOUS at 14:33

## 2017-11-21 RX ADMIN — HYDROMORPHONE HYDROCHLORIDE PRN MG: 1 INJECTION, SOLUTION INTRAMUSCULAR; INTRAVENOUS; SUBCUTANEOUS at 18:23

## 2017-11-21 RX ADMIN — LEVALBUTEROL HYDROCHLORIDE SCH MG: 0.63 SOLUTION RESPIRATORY (INHALATION) at 07:56

## 2017-11-21 RX ADMIN — PANTOPRAZOLE SODIUM SCH MG: 40 TABLET, DELAYED RELEASE ORAL at 05:00

## 2017-11-21 RX ADMIN — GABAPENTIN SCH MG: 300 CAPSULE ORAL at 12:48

## 2017-11-21 NOTE — PN
Date/Time of Note


Date/Time of Note


DATE: 11/21/17 


TIME: 16:57





Assessment/Plan


VTE Prophylaxis


VTE Prophylaxis Intervention:  SCD's





Lines/Catheters


IV Catheter Type (from Nrs):  PICC Line


Central line still needed:  Yes


Urinary Cath still in place:  No





Assessment/Plan


Chief Complaint/Hosp Course


Patient complains of shortness of breath on exertion comfortable at rest.  

Continue PT.


Assessment/Plan


-Chest pain, rule out acute coronary syndrome.  Cardiac enzymes are negative 

3. Dr. Manrique is following in cardiology consultation.  Continue aspirin.


-Possible acute on chronic diastolic dysfunction congestive heart failure.  

Continue Lasix, monitor electrolytes.


-Possible bronchitis, continue Rocephin.


-Possible COPD exacerbation, continue breathing treatments and steroids.


-GERD, continue Protonix


-Morbid obesity with BMI of 40





Further recommendations based on clinical course.  Plan of care was discussed 

with Dr. Anaya.


Problems:  





Exam/Review of Systems


Vital Signs


Vitals





 Vital Signs








  Date Time  Temp Pulse Resp B/P Pulse Ox O2 Delivery O2 Flow Rate FiO2


 


11/21/17 15:44 98.0 98 20 119/58 96   


 


11/21/17 14:23        21


 


11/21/17 09:00      Nasal Cannula 2.0 














 Intake and Output   


 


 11/20/17 11/20/17 11/21/17





 15:00 23:00 07:00


 


Intake Total  850 ml 1400 ml


 


Output Total  1050 ml 2200 ml


 


Balance  -200 ml -800 ml











Exam


Constitutional:  alert, oriented


Respiratory:  diminished breath sounds


Cardiovascular:  nl pulses, regular rate and rhythm


Gastrointestinal:  non-tender, soft


Extremities:  edema, normal pulses


Neurological:  nl mental status


Skin:  nl turgor





Results


Result Diagram:  


11/21/17 0429                                                                  

              11/21/17 0429





Results 24 hrs





Laboratory Tests








Test


  11/21/17


04:29


 


White Blood Count 15.3  H


 


Red Blood Count 3.88  L


 


Hemoglobin 9.3  L


 


Hematocrit 33.3  L


 


Mean Corpuscular Volume 85.8  


 


Mean Corpuscular Hemoglobin 24.0  L


 


Mean Corpuscular Hemoglobin


Concent 27.9  L


 


 


Red Cell Distribution Width 18.4  H


 


Platelet Count 239  


 


Mean Platelet Volume 9.0  


 


Neutrophils % 73.8  


 


Lymphocytes % 12.8  L


 


Monocytes % 8.2  


 


Eosinophils % 0.3  


 


Basophils % 0.3  


 


Nucleated Red Blood Cells % 1.4  H


 


Neutrophils # 11.3  H


 


Lymphocytes # 2.0  


 


Monocytes # 1.3  H


 


Eosinophils # 0.0  


 


Basophils # 0.1  


 


Nucleated Red Blood Cells # 0.2  H


 


Sodium Level 141  


 


Potassium Level 4.6  


 


Chloride Level 93  L


 


Carbon Dioxide Level   


 


Anion Gap 9  


 


Blood Urea Nitrogen 20  


 


Creatinine 0.75  


 


Glucose Level 115  #


 


Calcium Level 8.7  











Medications


Medications





 Current Medications


Amlodipine Besylate (Norvasc) 10 mg DAILY PO  Last administered on 11/21/17at 08

:40; Admin Dose 10 MG;  Start 11/8/17 at 09:00


Ascorbic Acid (Vitamin C) 1,000 mg DAILY PO  Last administered on 11/21/17at 08:

40; Admin Dose 1,000 MG;  Start 11/8/17 at 09:00


Aspirin (Aspirin) 81 mg DAILY PO  Last administered on 11/21/17at 08:37; Admin 

Dose 81 MG;  Start 11/8/17 at 09:00


Cholecalciferol (Vitamin D) 1,000 unit DAILY PO  Last administered on 11/21/ 17at 08:41; Admin Dose 1,000 UNIT;  Start 11/8/17 at 09:00


Docusate Sodium (Colace) 250 mg BID PO  Last administered on 11/21/17at 08:37; 

Admin Dose 250 MG;  Start 11/8/17 at 09:00


Gabapentin (Neurontin) 900 mg TID PO  Last administered on 11/21/17at 12:48; 

Admin Dose 900 MG;  Start 11/8/17 at 09:00


Lisinopril (Zestril) 20 mg DAILY PO  Last administered on 11/21/17at 08:41; 

Admin Dose 20 MG;  Start 11/8/17 at 09:00


Multivitamins Therapeutic (Theragran) 1 tab DAILY PO  Last administered on 11/21 /17at 08:40; Admin Dose 1 TAB;  Start 11/8/17 at 09:00


Potassium Chloride (Klor-Con 10) 10 meq DAILY PO  Last administered on 11/21/ 17at 08:38; Admin Dose 10 MEQ;  Start 11/8/17 at 09:00


Tamsulosin HCl (Flomax) 0.4 mg HS PO  Last administered on 11/20/17at 20:18; 

Admin Dose 0.4 MG;  Start 11/8/17 at 21:00


Atorvastatin Calcium (Lipitor) 20 mg DAILY@21 PO  Last administered on 11/20/ 17at 20:18; Admin Dose 20 MG;  Start 11/8/17 at 21:00


Ondansetron HCl (Zofran Inj) 4 mg Q4H  PRN IV NAUSEA AND/OR VOMITING Last 

administered on 11/21/17at 14:33; Admin Dose 4 MG;  Start 11/8/17 at 03:30


Clonidine (Catapres) 0.1 mg Q4H  PRN PO ELEVATED BLOOD PRESSURE;  Start 11/8/17 

at 03:30


Enoxaparin Sodium (Lovenox) 40 mg DAILY SC  Last administered on 11/21/17at 09:

06; Admin Dose 40 MG;  Start 11/9/17 at 09:00


IV Flush (NS 10 ml) 10 ml PRN  PRN IV IV PROTOCOL;  Start 11/8/17 at 19:30


Metoprolol Tartrate (Lopressor) 50 mg BID PO  Last administered on 11/21/17at 08

:39; Admin Dose 50 MG;  Start 11/8/17 at 21:00


Isosorbide Mononitrate (Imdur) 30 mg DAILY PO  Last administered on 11/21/17at 

08:38; Admin Dose 30 MG;  Start 11/10/17 at 09:00


IV Flush (NS 10 ml) 10 ml PRN  PRN IV IV PROTOCOL;  Start 11/10/17 at 15:30


Acetaminophen/ Hydrocodone Bitart (Norco (10/325)) 1 tab Q4H  PRN PO PAIN Last 

administered on 11/21/17at 12:53; Admin Dose 1 TAB;  Start 11/15/17 at 17:30


Methylprednisolone Sodium Succinate (Solu-Medrol) 20 mg Q12 IV  Last 

administered on 11/21/17at 08:37; Admin Dose 20 MG;  Start 11/15/17 at 21:00


Ibuprofen (Motrin) 400 mg Q4H  PRN PO PAIN OR TEMP ABOVE 38C Last administered 

on 11/21/17at 14:34; Admin Dose 400 MG;  Start 11/15/17 at 20:00


Hydromorphone HCl (Dilaudid) 1 mg Q6H  PRN IV PAIN Last administered on 11/21/ 17at 14:34; Admin Dose 1 MG;  Start 11/17/17 at 12:35


Nystatin (Nystatin Powder) 1 applic BID TOP  Last administered on 11/21/17at 08:

42; Admin Dose 1 APPLIC;  Start 11/20/17 at 21:00











DARIO NORRIS Nov 21, 2017 16:59

## 2017-11-21 NOTE — CONS
Date/Time of Note


Date/Time of Note


DATE: 11/21/17 


TIME: 11:45





Assessment/Plan


Assessment/Plan


Additional Assessment/Plan


1.  Chest pain, assess for acute coronary syndrome.-stabbing by description/

negative trop x 3 - no CP now, in intervention planned. 


2.  Shortness of breath, assess for congestive heart failure with low BNP at 

this time-slowly improving - con't to remove fluid as tolerated. 


3.  Diastolic dysfunction by most recent echo 07/2017- chronic


4.  Abnormal electrocardiogram, assess for acute coronary syndrome- off tele 

now. 


5.  Chronic obstructive pulmonary disease - better. 


6.  Hypertension, somewhat labile but reasonable currently


7.  Dyslipidemia.





Consultation Date/Type/Reason


Admit Date/Time


Nov 9, 2017 at 11:53


Type of Consultation:  cardiology


Referring Provider:  DEVAUGHN TALAMANTES MD





24 HR Interval Summary


Free Text/Dictation


NO acute events - pt ambulated with PT. 





ROS: No fever, no chills, no nausea, no vomiting, no diarrhea/constipation


        No recent weight changes


        No chest pain, no PND, no orthopnea


        No dizziness, blurred vision


        No thirst, no heat or cold intolerance





Exam/Review of Systems


Vital Signs


Vitals





 Vital Signs








  Date Time  Temp Pulse Resp B/P Pulse Ox O2 Delivery O2 Flow Rate FiO2


 


11/21/17 10:59 98.0 83 20 108/57 90   


 


11/21/17 09:00      Nasal Cannula 2.0 


 


11/20/17 20:15        21














 Intake and Output   


 


 11/20/17 11/20/17 11/21/17





 14:59 22:59 06:59


 


Intake Total  850 ml 1400 ml


 


Output Total  1050 ml 2200 ml


 


Balance  -200 ml -800 ml











Exam


General: WN/WD/NAD, AOx 2-3


HEENT: Unicetric/atraumatic/EOMI (follow commands)


NECK: JVD elevated, no thyromegaly


Lymph: no lymphadenopathy


HEART: regular with no S3, II/VI systolic murmur at apex


LUNGS: Coarse sounds


ABD: soft, NT, ND, +BS


: Intact


Neuro: non focal


SKIN: chronic changes


EXT: trace edema





Results


Result Diagram:  


11/21/17 0429                                                                  

              11/21/17 0429





Results 24 hrs





Laboratory Tests








Test


  11/21/17


04:29


 


White Blood Count 15.3  H


 


Red Blood Count 3.88  L


 


Hemoglobin 9.3  L


 


Hematocrit 33.3  L


 


Mean Corpuscular Volume 85.8  


 


Mean Corpuscular Hemoglobin 24.0  L


 


Mean Corpuscular Hemoglobin


Concent 27.9  L


 


 


Red Cell Distribution Width 18.4  H


 


Platelet Count 239  


 


Mean Platelet Volume 9.0  


 


Neutrophils % 73.8  


 


Lymphocytes % 12.8  L


 


Monocytes % 8.2  


 


Eosinophils % 0.3  


 


Basophils % 0.3  


 


Nucleated Red Blood Cells % 1.4  H


 


Neutrophils # 11.3  H


 


Lymphocytes # 2.0  


 


Monocytes # 1.3  H


 


Eosinophils # 0.0  


 


Basophils # 0.1  


 


Nucleated Red Blood Cells # 0.2  H


 


Sodium Level 141  


 


Potassium Level 4.6  


 


Chloride Level 93  L


 


Carbon Dioxide Level   


 


Anion Gap 9  


 


Blood Urea Nitrogen 20  


 


Creatinine 0.75  


 


Glucose Level 115  #


 


Calcium Level 8.7  











Medications


Medications





 Current Medications


Amlodipine Besylate (Norvasc) 10 mg DAILY PO  Last administered on 11/21/17at 08

:40; Admin Dose 10 MG;  Start 11/8/17 at 09:00


Ascorbic Acid (Vitamin C) 1,000 mg DAILY PO  Last administered on 11/21/17at 08:

40; Admin Dose 1,000 MG;  Start 11/8/17 at 09:00


Aspirin (Aspirin) 81 mg DAILY PO  Last administered on 11/21/17at 08:37; Admin 

Dose 81 MG;  Start 11/8/17 at 09:00


Cholecalciferol (Vitamin D) 1,000 unit DAILY PO  Last administered on 11/21/ 17at 08:41; Admin Dose 1,000 UNIT;  Start 11/8/17 at 09:00


Docusate Sodium (Colace) 250 mg BID PO  Last administered on 11/21/17at 08:37; 

Admin Dose 250 MG;  Start 11/8/17 at 09:00


Gabapentin (Neurontin) 900 mg TID PO  Last administered on 11/21/17at 08:39; 

Admin Dose 900 MG;  Start 11/8/17 at 09:00


Lisinopril (Zestril) 20 mg DAILY PO  Last administered on 11/21/17at 08:41; 

Admin Dose 20 MG;  Start 11/8/17 at 09:00


Multivitamins Therapeutic (Theragran) 1 tab DAILY PO  Last administered on 11/21 /17at 08:40; Admin Dose 1 TAB;  Start 11/8/17 at 09:00


Potassium Chloride (Klor-Con 10) 10 meq DAILY PO  Last administered on 11/21/ 17at 08:38; Admin Dose 10 MEQ;  Start 11/8/17 at 09:00


Tamsulosin HCl (Flomax) 0.4 mg HS PO  Last administered on 11/20/17at 20:18; 

Admin Dose 0.4 MG;  Start 11/8/17 at 21:00


Atorvastatin Calcium (Lipitor) 20 mg DAILY@21 PO  Last administered on 11/20/ 17at 20:18; Admin Dose 20 MG;  Start 11/8/17 at 21:00


Ondansetron HCl (Zofran Inj) 4 mg Q4H  PRN IV NAUSEA AND/OR VOMITING Last 

administered on 11/21/17at 08:42; Admin Dose 4 MG;  Start 11/8/17 at 03:30


Clonidine (Catapres) 0.1 mg Q4H  PRN PO ELEVATED BLOOD PRESSURE;  Start 11/8/17 

at 03:30


Enoxaparin Sodium (Lovenox) 40 mg DAILY SC  Last administered on 11/21/17at 09:

06; Admin Dose 40 MG;  Start 11/9/17 at 09:00


IV Flush (NS 10 ml) 10 ml PRN  PRN IV IV PROTOCOL;  Start 11/8/17 at 19:30


Metoprolol Tartrate (Lopressor) 50 mg BID PO  Last administered on 11/21/17at 08

:39; Admin Dose 50 MG;  Start 11/8/17 at 21:00


Isosorbide Mononitrate (Imdur) 30 mg DAILY PO  Last administered on 11/21/17at 

08:38; Admin Dose 30 MG;  Start 11/10/17 at 09:00


IV Flush (NS 10 ml) 10 ml PRN  PRN IV IV PROTOCOL;  Start 11/10/17 at 15:30


Acetaminophen/ Hydrocodone Bitart (Norco (10/325)) 1 tab Q4H  PRN PO PAIN Last 

administered on 11/20/17at 10:33; Admin Dose 1 TAB;  Start 11/15/17 at 17:30


Methylprednisolone Sodium Succinate (Solu-Medrol) 20 mg Q12 IV  Last 

administered on 11/21/17at 08:37; Admin Dose 20 MG;  Start 11/15/17 at 21:00


Ibuprofen (Motrin) 400 mg Q4H  PRN PO PAIN OR TEMP ABOVE 38C Last administered 

on 11/21/17at 08:41; Admin Dose 400 MG;  Start 11/15/17 at 20:00


Hydromorphone HCl (Dilaudid) 1 mg Q6H  PRN IV PAIN Last administered on 11/21/ 17at 08:42; Admin Dose 1 MG;  Start 11/17/17 at 12:35


Nystatin (Nystatin Powder) 1 applic BID TOP  Last administered on 11/21/17at 08:

42; Admin Dose 1 APPLIC;  Start 11/20/17 at 21:00











ASH LOW MD Nov 21, 2017 11:46

## 2017-11-22 VITALS — SYSTOLIC BLOOD PRESSURE: 128 MMHG | DIASTOLIC BLOOD PRESSURE: 75 MMHG | HEART RATE: 100 BPM | RESPIRATION RATE: 24 BRPM

## 2017-11-22 VITALS — RESPIRATION RATE: 18 BRPM | SYSTOLIC BLOOD PRESSURE: 120 MMHG | DIASTOLIC BLOOD PRESSURE: 75 MMHG

## 2017-11-22 VITALS — SYSTOLIC BLOOD PRESSURE: 123 MMHG | DIASTOLIC BLOOD PRESSURE: 72 MMHG | RESPIRATION RATE: 18 BRPM

## 2017-11-22 VITALS — DIASTOLIC BLOOD PRESSURE: 63 MMHG | SYSTOLIC BLOOD PRESSURE: 143 MMHG | RESPIRATION RATE: 20 BRPM

## 2017-11-22 VITALS — RESPIRATION RATE: 20 BRPM | SYSTOLIC BLOOD PRESSURE: 116 MMHG | DIASTOLIC BLOOD PRESSURE: 60 MMHG

## 2017-11-22 LAB
ABNORMAL IP MESSAGE: 1
ANION GAP SERPL CALC-SCNC: 9 MMOL/L (ref 8–16)
BASOPHILS # BLD AUTO: 0.1 10^3/UL (ref 0–0.1)
BASOPHILS NFR BLD: 0.4 % (ref 0–2)
BUN SERPL-MCNC: 23 MG/DL (ref 7–20)
CALCIUM SERPL-MCNC: 8.2 MG/DL (ref 8.4–10.2)
CHLORIDE SERPL-SCNC: 95 MMOL/L (ref 97–110)
CO2 SERPL-SCNC: 40 MMOL/L (ref 21–31)
CREAT SERPL-MCNC: 0.72 MG/DL (ref 0.61–1.24)
EOSINOPHIL # BLD: 0.2 10^3/UL (ref 0–0.5)
EOSINOPHIL NFR BLD: 1 % (ref 0–7)
ERYTHROCYTE [DISTWIDTH] IN BLOOD BY AUTOMATED COUNT: 18.7 % (ref 11.5–14.5)
GLUCOSE SERPL-MCNC: 99 MG/DL (ref 70–220)
HCT VFR BLD CALC: 34.5 % (ref 42–52)
HGB BLD-MCNC: 9.4 G/DL (ref 14–18)
LYMPHOCYTES # BLD AUTO: 3.6 10^3/UL (ref 0.8–2.9)
LYMPHOCYTES NFR BLD AUTO: 22.3 % (ref 15–51)
MCH RBC QN AUTO: 23.9 PG (ref 29–33)
MCHC RBC AUTO-ENTMCNC: 27.2 G/DL (ref 32–37)
MCV RBC AUTO: 87.6 FL (ref 82–101)
MONOCYTES # BLD: 1.7 10^3/UL (ref 0.3–0.9)
MONOCYTES NFR BLD: 10.2 % (ref 0–11)
NEUTROPHILS # BLD: 10 10^3/UL (ref 1.6–7.5)
NEUTROPHILS NFR BLD AUTO: 61.7 % (ref 39–77)
NRBC # BLD MANUAL: 0.2 10^3/UL (ref 0–0)
NRBC BLD AUTO-RTO: 1.2 /100WBC (ref 0–0)
PLATELET # BLD: 246 10^3/UL (ref 140–415)
PMV BLD AUTO: 9.1 FL (ref 7.4–10.4)
POSITIVE DIFF: (no result)
POTASSIUM SERPL-SCNC: 4.3 MMOL/L (ref 3.5–5.1)
RBC # BLD AUTO: 3.94 10^6/UL (ref 4.7–6.1)
SODIUM SERPL-SCNC: 140 MMOL/L (ref 135–144)
WBC # BLD AUTO: 16.3 10^3/UL (ref 4.8–10.8)

## 2017-11-22 RX ADMIN — LEVALBUTEROL HYDROCHLORIDE SCH MG: 0.63 SOLUTION RESPIRATORY (INHALATION) at 13:06

## 2017-11-22 RX ADMIN — TAMSULOSIN HYDROCHLORIDE SCH MG: 0.4 CAPSULE ORAL at 20:29

## 2017-11-22 RX ADMIN — DEXAMETHASONE SODIUM PHOSPHATE PRN MG: 10 INJECTION, SOLUTION INTRAMUSCULAR; INTRAVENOUS at 10:38

## 2017-11-22 RX ADMIN — GABAPENTIN SCH MG: 300 CAPSULE ORAL at 09:43

## 2017-11-22 RX ADMIN — ASPIRIN 81 MG SCH MG: 81 TABLET ORAL at 09:42

## 2017-11-22 RX ADMIN — HYDROMORPHONE HYDROCHLORIDE PRN MG: 1 INJECTION, SOLUTION INTRAMUSCULAR; INTRAVENOUS; SUBCUTANEOUS at 06:28

## 2017-11-22 RX ADMIN — THERA TABS SCH TAB: TAB at 09:42

## 2017-11-22 RX ADMIN — IBUPROFEN PRN MG: 400 TABLET ORAL at 14:39

## 2017-11-22 RX ADMIN — GABAPENTIN SCH MG: 300 CAPSULE ORAL at 14:38

## 2017-11-22 RX ADMIN — PANTOPRAZOLE SODIUM SCH MG: 40 TABLET, DELAYED RELEASE ORAL at 06:27

## 2017-11-22 RX ADMIN — HYDROMORPHONE HYDROCHLORIDE PRN MG: 1 INJECTION, SOLUTION INTRAMUSCULAR; INTRAVENOUS; SUBCUTANEOUS at 10:38

## 2017-11-22 RX ADMIN — METOPROLOL TARTRATE SCH MG: 50 TABLET, FILM COATED ORAL at 09:43

## 2017-11-22 RX ADMIN — LEVALBUTEROL HYDROCHLORIDE SCH MG: 0.63 SOLUTION RESPIRATORY (INHALATION) at 01:51

## 2017-11-22 RX ADMIN — AMLODIPINE BESYLATE SCH MG: 10 TABLET ORAL at 09:44

## 2017-11-22 RX ADMIN — NYSTATIN SCH APPLIC: 100000 POWDER TOPICAL at 20:29

## 2017-11-22 RX ADMIN — LEVALBUTEROL HYDROCHLORIDE SCH MG: 0.63 SOLUTION RESPIRATORY (INHALATION) at 08:01

## 2017-11-22 RX ADMIN — METOPROLOL TARTRATE SCH MG: 50 TABLET, FILM COATED ORAL at 20:29

## 2017-11-22 RX ADMIN — POTASSIUM CHLORIDE SCH MEQ: 10 TABLET, EXTENDED RELEASE ORAL at 09:42

## 2017-11-22 RX ADMIN — IBUPROFEN PRN MG: 400 TABLET ORAL at 10:38

## 2017-11-22 RX ADMIN — HYDROMORPHONE HYDROCHLORIDE PRN MG: 1 INJECTION, SOLUTION INTRAMUSCULAR; INTRAVENOUS; SUBCUTANEOUS at 14:39

## 2017-11-22 RX ADMIN — ATORVASTATIN CALCIUM SCH MG: 20 TABLET, FILM COATED ORAL at 20:29

## 2017-11-22 RX ADMIN — IBUPROFEN PRN MG: 400 TABLET ORAL at 06:27

## 2017-11-22 RX ADMIN — DEXAMETHASONE SODIUM PHOSPHATE PRN MG: 10 INJECTION, SOLUTION INTRAMUSCULAR; INTRAVENOUS at 02:28

## 2017-11-22 RX ADMIN — ENOXAPARIN SODIUM SCH MG: 100 INJECTION SUBCUTANEOUS at 10:00

## 2017-11-22 RX ADMIN — LISINOPRIL SCH MG: 20 TABLET ORAL at 09:44

## 2017-11-22 RX ADMIN — LEVALBUTEROL HYDROCHLORIDE SCH MG: 0.63 SOLUTION RESPIRATORY (INHALATION) at 19:53

## 2017-11-22 RX ADMIN — IBUPROFEN PRN MG: 400 TABLET ORAL at 02:28

## 2017-11-22 RX ADMIN — GABAPENTIN SCH MG: 300 CAPSULE ORAL at 20:29

## 2017-11-22 RX ADMIN — HYDROMORPHONE HYDROCHLORIDE PRN MG: 1 INJECTION, SOLUTION INTRAMUSCULAR; INTRAVENOUS; SUBCUTANEOUS at 02:28

## 2017-11-22 RX ADMIN — NYSTATIN SCH APPLIC: 100000 POWDER TOPICAL at 09:46

## 2017-11-22 RX ADMIN — OXYCODONE HYDROCHLORIDE AND ACETAMINOPHEN SCH MG: 500 TABLET ORAL at 09:43

## 2017-11-22 RX ADMIN — DEXAMETHASONE SODIUM PHOSPHATE PRN MG: 10 INJECTION, SOLUTION INTRAMUSCULAR; INTRAVENOUS at 14:38

## 2017-11-22 RX ADMIN — DEXAMETHASONE SODIUM PHOSPHATE PRN MG: 10 INJECTION, SOLUTION INTRAMUSCULAR; INTRAVENOUS at 06:27

## 2017-11-22 RX ADMIN — ISOSORBIDE MONONITRATE SCH MG: 30 TABLET, EXTENDED RELEASE ORAL at 09:44

## 2017-11-22 RX ADMIN — VITAMIN D, TAB 1000IU (100/BT) SCH UNIT: 25 TAB at 09:43

## 2017-11-22 NOTE — CONS
Date/Time of Note


Date/Time of Note


DATE: 11/22/17 


TIME: 12:17





Assessment/Plan


Assessment/Plan


Chief Complaint/Hosp Course


IMPRESSION:


1.  Chest pain, assess for acute coronary syndrome.-stabbing by description/

negative trop x 3


2.  Shortness of breath, assess for congestive heart failure with low BNP at 

this time-slowly improving


3.  Diastolic dysfunction by most recent echo 07/2017.


4.  Abnormal electrocardiogram, assess for acute coronary syndrome.


5.  Chronic obstructive pulmonary disease.


6.  Hypertension, somewhat labile but reasonable currently


7.  Dyslipidemia.





Recc:


-Continue lasix diuresis now PO


-Continue BB/norvasc/oral nitrates/ACEI


-Continue asa/statin


-Continue steroids/bronchodilators


-Follow volume status closely


-D/C planning


Problems:  





Consultation Date/Type/Reason


Admit Date/Time


Nov 9, 2017 at 11:53


Initial Consult Date


11/9/2017


Type of Consultation:  cardiology


Reason for Consultation


CHF


Referring Provider:  DEVAUGHN TALAMANTES MD





Exam/Review of Systems


Vital Signs


Vitals





 Vital Signs








  Date Time  Temp Pulse Resp B/P Pulse Ox O2 Delivery O2 Flow Rate FiO2


 


11/22/17 08:01  91 21  86 Nasal Cannula 2.0 


 


11/22/17 07:34 98.6   143/63    


 


11/22/17 01:53        21














 Intake and Output   


 


 11/21/17 11/21/17 11/22/17





 15:00 23:00 07:00


 


Intake Total  1320 ml 1500 ml


 


Output Total  650 ml 1200 ml


 


Balance  670 ml 300 ml











Exam


Review of Systems:


CONSTITUTIONAL:  No fevers, chills.


PULMONARY:  No sob


CARDIOVASCULAR: No chest pain/palpitations


GASTROINTESTINAL:  No nausea/vomiting.


GENITOURINARY:  No hematuria/dysuria.


MUSCULOSKELETAL:  No myagias/arthalgias.


PSYCHIATRIC:  The patient denies depression.


NEUROLOGIC:   No weakness


Constitutional:  alert, oriented


Psych:  no complaints


Head:  normocephalic


ENMT:  mucosa pink and moist


Neck:  jvd, supple


Respiratory:  diminished breath sounds


Cardiovascular:  regular rate and rhythm


Gastrointestinal:  non-tender, soft


Musculoskeletal:  muscle tone (normal)


Extremities:  other (No focal deficits)





Results


Result Diagram:  


11/22/17 0452                                                                  

              11/22/17 0447





Results 24 hrs





Laboratory Tests








Test


  11/22/17


04:47 11/22/17


04:52


 


Sodium Level 140   


 


Potassium Level 4.3   


 


Chloride Level 95  L 


 


Carbon Dioxide Level 40  H 


 


Anion Gap 9   


 


Blood Urea Nitrogen 23  H 


 


Creatinine 0.72   


 


Glucose Level 99   


 


Calcium Level 8.2  L 


 


White Blood Count  16.3  H


 


Red Blood Count  3.94  L


 


Hemoglobin  9.4  L


 


Hematocrit  34.5  L


 


Mean Corpuscular Volume  87.6  


 


Mean Corpuscular Hemoglobin  23.9  L


 


Mean Corpuscular Hemoglobin


Concent 


  27.2  L


 


 


Red Cell Distribution Width  18.7  H


 


Platelet Count  246  


 


Mean Platelet Volume  9.1  


 


Neutrophils %  61.7  


 


Lymphocytes %  22.3  


 


Monocytes %  10.2  


 


Eosinophils %  1.0  


 


Basophils %  0.4  


 


Nucleated Red Blood Cells %  1.2  H


 


Neutrophils #  10.0  H


 


Lymphocytes #  3.6  H


 


Monocytes #  1.7  H


 


Eosinophils #  0.2  


 


Basophils #  0.1  


 


Nucleated Red Blood Cells #  0.2  H











Medications


Medications





 Current Medications


Amlodipine Besylate (Norvasc) 10 mg DAILY PO  Last administered on 11/22/17at 09

:44; Admin Dose 10 MG;  Start 11/8/17 at 09:00


Ascorbic Acid (Vitamin C) 1,000 mg DAILY PO  Last administered on 11/22/17at 09:

43; Admin Dose 1,000 MG;  Start 11/8/17 at 09:00


Aspirin (Aspirin) 81 mg DAILY PO  Last administered on 11/22/17at 09:42; Admin 

Dose 81 MG;  Start 11/8/17 at 09:00


Cholecalciferol (Vitamin D) 1,000 unit DAILY PO  Last administered on 11/22/ 17at 09:43; Admin Dose 1,000 UNIT;  Start 11/8/17 at 09:00


Docusate Sodium (Colace) 250 mg BID PO  Last administered on 11/22/17at 09:42; 

Admin Dose 250 MG;  Start 11/8/17 at 09:00


Gabapentin (Neurontin) 900 mg TID PO  Last administered on 11/22/17at 09:43; 

Admin Dose 900 MG;  Start 11/8/17 at 09:00


Lisinopril (Zestril) 20 mg DAILY PO  Last administered on 11/22/17at 09:44; 

Admin Dose 20 MG;  Start 11/8/17 at 09:00


Multivitamins Therapeutic (Theragran) 1 tab DAILY PO  Last administered on 11/22 /17at 09:42; Admin Dose 1 TAB;  Start 11/8/17 at 09:00


Potassium Chloride (Klor-Con 10) 10 meq DAILY PO  Last administered on 11/22/ 17at 09:42; Admin Dose 10 MEQ;  Start 11/8/17 at 09:00


Tamsulosin HCl (Flomax) 0.4 mg HS PO  Last administered on 11/21/17at 20:19; 

Admin Dose 0.4 MG;  Start 11/8/17 at 21:00


Atorvastatin Calcium (Lipitor) 20 mg DAILY@21 PO  Last administered on 11/21/ 17at 20:19; Admin Dose 20 MG;  Start 11/8/17 at 21:00


Ondansetron HCl (Zofran Inj) 4 mg Q4H  PRN IV NAUSEA AND/OR VOMITING Last 

administered on 11/22/17at 10:38; Admin Dose 4 MG;  Start 11/8/17 at 03:30


Clonidine (Catapres) 0.1 mg Q4H  PRN PO ELEVATED BLOOD PRESSURE;  Start 11/8/17 

at 03:30


Enoxaparin Sodium (Lovenox) 40 mg DAILY SC  Last administered on 11/22/17at 10:

00; Admin Dose 40 MG;  Start 11/9/17 at 09:00


IV Flush (NS 10 ml) 10 ml PRN  PRN IV IV PROTOCOL;  Start 11/8/17 at 19:30


Metoprolol Tartrate (Lopressor) 50 mg BID PO  Last administered on 11/22/17at 09

:43; Admin Dose 50 MG;  Start 11/8/17 at 21:00


Isosorbide Mononitrate (Imdur) 30 mg DAILY PO  Last administered on 11/22/17at 

09:44; Admin Dose 30 MG;  Start 11/10/17 at 09:00


IV Flush (NS 10 ml) 10 ml PRN  PRN IV IV PROTOCOL;  Start 11/10/17 at 15:30


Acetaminophen/ Hydrocodone Bitart (Norco (10/325)) 1 tab Q4H  PRN PO PAIN Last 

administered on 11/21/17at 12:53; Admin Dose 1 TAB;  Start 11/15/17 at 17:30


Ibuprofen (Motrin) 400 mg Q4H  PRN PO PAIN OR TEMP ABOVE 38C Last administered 

on 11/22/17at 10:38; Admin Dose 400 MG;  Start 11/15/17 at 20:00


Nystatin (Nystatin Powder) 1 applic BID TOP  Last administered on 11/22/17at 09:

46; Admin Dose 1 APPLIC;  Start 11/20/17 at 21:00


Hydromorphone HCl (Dilaudid) 0.5 mg Q4  PRN IV PAIN Last administered on 11/22/ 17at 10:38; Admin Dose 0.5 MG;  Start 11/21/17 at 18:00











HARLEY ADAMS 22, 2017 12:19

## 2018-01-14 ENCOUNTER — HOSPITAL ENCOUNTER (INPATIENT)
Dept: HOSPITAL 91 - TEL | Age: 68
LOS: 15 days | Discharge: NURSING, REHAB OR LONG-TERM CARE | DRG: 292 | End: 2018-01-29
Payer: MEDICARE

## 2018-01-14 DIAGNOSIS — J44.1: ICD-10-CM

## 2018-01-14 DIAGNOSIS — Z87.891: ICD-10-CM

## 2018-01-14 DIAGNOSIS — R07.9: ICD-10-CM

## 2018-01-14 DIAGNOSIS — K21.9: ICD-10-CM

## 2018-01-14 DIAGNOSIS — J40: ICD-10-CM

## 2018-01-14 DIAGNOSIS — I11.0: Primary | ICD-10-CM

## 2018-01-14 DIAGNOSIS — Z79.82: ICD-10-CM

## 2018-01-14 DIAGNOSIS — N40.0: ICD-10-CM

## 2018-01-14 DIAGNOSIS — R06.00: ICD-10-CM

## 2018-01-14 DIAGNOSIS — F33.9: ICD-10-CM

## 2018-01-14 DIAGNOSIS — I50.33: ICD-10-CM

## 2018-01-14 DIAGNOSIS — E78.5: ICD-10-CM

## 2018-01-14 DIAGNOSIS — E66.01: ICD-10-CM

## 2018-01-14 LAB
ABNORMAL IP MESSAGE: 1
ADD MAN DIFF?: NO
ANION GAP: 12 (ref 8–16)
BASOPHIL #: 0 10^3/UL (ref 0–0.1)
BASOPHILS %: 0.4 % (ref 0–2)
BLOOD UREA NITROGEN: 8 MG/DL (ref 7–20)
CALCIUM: 8.6 MG/DL (ref 8.4–10.2)
CARBON DIOXIDE: 32 MMOL/L (ref 21–31)
CHLORIDE: 103 MMOL/L (ref 97–110)
CREATININE: 0.69 MG/DL (ref 0.61–1.24)
EOSINOPHILS #: 0.2 10^3/UL (ref 0–0.5)
EOSINOPHILS %: 2.5 % (ref 0–7)
GLUCOSE: 81 MG/DL (ref 70–220)
HEMATOCRIT: 38.1 % (ref 42–52)
HEMOGLOBIN: 10.7 G/DL (ref 14–18)
LYMPHOCYTES #: 1.8 10^3/UL (ref 0.8–2.9)
LYMPHOCYTES %: 22.8 % (ref 15–51)
MEAN CORPUSCULAR HEMOGLOBIN: 23.8 PG (ref 29–33)
MEAN CORPUSCULAR HGB CONC: 28.1 G/DL (ref 32–37)
MEAN CORPUSCULAR VOLUME: 84.7 FL (ref 82–101)
MEAN PLATELET VOLUME: 9.3 FL (ref 7.4–10.4)
MONOCYTE #: 0.7 10^3/UL (ref 0.3–0.9)
MONOCYTES %: 9.7 % (ref 0–11)
NEUTROPHIL #: 4.9 10^3/UL (ref 1.6–7.5)
NEUTROPHILS %: 64.3 % (ref 39–77)
NUCLEATED RED BLOOD CELLS #: 0 10^3/UL (ref 0–0)
NUCLEATED RED BLOOD CELLS%: 0 /100WBC (ref 0–0)
PLATELET COUNT: 258 10^3/UL (ref 140–415)
POSITIVE DIFF: (no result)
POTASSIUM: 4.7 MMOL/L (ref 3.5–5.1)
RED BLOOD COUNT: 4.5 10^6/UL (ref 4.7–6.1)
RED CELL DISTRIBUTION WIDTH: 17.8 % (ref 11.5–14.5)
SODIUM: 142 MMOL/L (ref 135–144)
TROPONIN-I: < 0.012 NG/ML (ref 0–0.12)
WHITE BLOOD COUNT: 7.7 10^3/UL (ref 4.8–10.8)

## 2018-01-14 PROCEDURE — 80048 BASIC METABOLIC PNL TOTAL CA: CPT

## 2018-01-14 PROCEDURE — 96374 THER/PROPH/DIAG INJ IV PUSH: CPT

## 2018-01-14 PROCEDURE — 84484 ASSAY OF TROPONIN QUANT: CPT

## 2018-01-14 PROCEDURE — 97162 PT EVAL MOD COMPLEX 30 MIN: CPT

## 2018-01-14 PROCEDURE — 80061 LIPID PANEL: CPT

## 2018-01-14 PROCEDURE — 82553 CREATINE MB FRACTION: CPT

## 2018-01-14 PROCEDURE — 94664 DEMO&/EVAL PT USE INHALER: CPT

## 2018-01-14 PROCEDURE — 96375 TX/PRO/DX INJ NEW DRUG ADDON: CPT

## 2018-01-14 PROCEDURE — 84439 ASSAY OF FREE THYROXINE: CPT

## 2018-01-14 PROCEDURE — 71045 X-RAY EXAM CHEST 1 VIEW: CPT

## 2018-01-14 PROCEDURE — 97110 THERAPEUTIC EXERCISES: CPT

## 2018-01-14 PROCEDURE — 99285 EMERGENCY DEPT VISIT HI MDM: CPT

## 2018-01-14 PROCEDURE — 83735 ASSAY OF MAGNESIUM: CPT

## 2018-01-14 PROCEDURE — 82550 ASSAY OF CK (CPK): CPT

## 2018-01-14 PROCEDURE — 85025 COMPLETE CBC W/AUTO DIFF WBC: CPT

## 2018-01-14 PROCEDURE — 83880 ASSAY OF NATRIURETIC PEPTIDE: CPT

## 2018-01-14 PROCEDURE — 93005 ELECTROCARDIOGRAM TRACING: CPT

## 2018-01-14 PROCEDURE — 84443 ASSAY THYROID STIM HORMONE: CPT

## 2018-01-14 PROCEDURE — 94640 AIRWAY INHALATION TREATMENT: CPT

## 2018-01-14 PROCEDURE — 87400 INFLUENZA A/B EACH AG IA: CPT

## 2018-01-14 PROCEDURE — 36415 COLL VENOUS BLD VENIPUNCTURE: CPT

## 2018-01-14 RX ADMIN — ONDANSETRON HYDROCHLORIDE 1 MG: 2 INJECTION, SOLUTION INTRAMUSCULAR; INTRAVENOUS at 19:59

## 2018-01-14 RX ADMIN — ASPIRIN 81 MG CHEWABLE TABLET 1 MG: 81 TABLET CHEWABLE at 19:59

## 2018-01-14 RX ADMIN — TAMSULOSIN HYDROCHLORIDE 1 MG: 0.4 CAPSULE ORAL at 21:57

## 2018-01-14 RX ADMIN — NITROGLYCERIN 1 INCH: 20 OINTMENT TOPICAL at 20:03

## 2018-01-14 RX ADMIN — NITROGLYCERIN 1 TAB: 0.4 TABLET, ORALLY DISINTEGRATING SUBLINGUAL at 20:03

## 2018-01-14 RX ADMIN — FUROSEMIDE 1 MG: 10 INJECTION, SOLUTION INTRAVENOUS at 20:02

## 2018-01-14 RX ADMIN — CEFTRIAXONE 1 MLS/HR: 2 INJECTION, SOLUTION INTRAVENOUS at 22:01

## 2018-01-14 RX ADMIN — ATORVASTATIN CALCIUM 1 MG: 40 TABLET, FILM COATED ORAL at 21:57

## 2018-01-14 RX ADMIN — DOCUSATE SODIUM 1 MG: 100 CAPSULE, LIQUID FILLED ORAL at 21:56

## 2018-01-14 RX ADMIN — METHYLPREDNISOLONE SODIUM SUCCINATE 1 MG: 40 INJECTION, POWDER, FOR SOLUTION INTRAMUSCULAR; INTRAVENOUS at 21:58

## 2018-01-15 ENCOUNTER — HOSPITAL ENCOUNTER (INPATIENT)
Age: 68
LOS: 14 days | Discharge: NURSING, REHAB OR LONG-TERM CARE | DRG: 292 | End: 2018-01-29

## 2018-01-15 LAB
ANION GAP: 11 (ref 8–16)
B-TYPE NATRIURETIC PEPTIDE: 84 PG/ML (ref 0–125)
BLOOD UREA NITROGEN: 11 MG/DL (ref 7–20)
CALCIUM: 8.9 MG/DL (ref 8.4–10.2)
CARBON DIOXIDE: 31 MMOL/L (ref 21–31)
CHLORIDE: 100 MMOL/L (ref 97–110)
CHOL/HDL RATIO: 2.4 RATIO
CHOLESTEROL: 171 MG/DL (ref 100–200)
CK INDEX: 1.6
CK INDEX: 2.2
CK-MB: 0.58 NG/ML (ref 0–2.4)
CK-MB: 0.73 NG/ML (ref 0–2.4)
CREATINE KINASE: 33 IU/L (ref 23–200)
CREATINE KINASE: 36 IU/L (ref 23–200)
CREATININE: 0.78 MG/DL (ref 0.61–1.24)
GLUCOSE: 224 MG/DL (ref 70–220)
HDL CHOLESTEROL: 69 MG/DL (ref 30–78)
LDL CHOLESTEROL,CALCULATED: 84 MG/DL
POTASSIUM: 4.3 MMOL/L (ref 3.5–5.1)
SODIUM: 138 MMOL/L (ref 135–144)
THYROID STIMULATING HORMONE: 0.2 MIU/L (ref 0.47–4.68)
TRIGLYCERIDES: 91 MG/DL (ref 0–149)
TROPONIN-I: < 0.012 NG/ML (ref 0–0.12)
TROPONIN-I: < 0.012 NG/ML (ref 0–0.12)

## 2018-01-15 RX ADMIN — IBUPROFEN 1 MG: 400 TABLET, FILM COATED ORAL at 20:31

## 2018-01-15 RX ADMIN — DOCUSATE SODIUM 1 MG: 250 CAPSULE, LIQUID FILLED ORAL at 08:28

## 2018-01-15 RX ADMIN — METHYLPREDNISOLONE SODIUM SUCCINATE 1 MG: 40 INJECTION, POWDER, FOR SOLUTION INTRAMUSCULAR; INTRAVENOUS at 08:28

## 2018-01-15 RX ADMIN — ENOXAPARIN SODIUM 1 MG: 100 INJECTION SUBCUTANEOUS at 08:25

## 2018-01-15 RX ADMIN — MORPHINE SULFATE 1 MG: 2 INJECTION, SOLUTION INTRAMUSCULAR; INTRAVENOUS at 04:30

## 2018-01-15 RX ADMIN — ASPIRIN 1 MG: 81 TABLET, COATED ORAL at 08:24

## 2018-01-15 RX ADMIN — MORPHINE SULFATE 1 MG: 2 INJECTION, SOLUTION INTRAMUSCULAR; INTRAVENOUS at 08:36

## 2018-01-15 RX ADMIN — ALBUTEROL SULFATE 1 PUFF: 90 AEROSOL, METERED RESPIRATORY (INHALATION) at 01:00

## 2018-01-15 RX ADMIN — DOCUSATE SODIUM 1 MG: 250 CAPSULE, LIQUID FILLED ORAL at 20:31

## 2018-01-15 RX ADMIN — GABAPENTIN 1 MG: 300 CAPSULE ORAL at 08:25

## 2018-01-15 RX ADMIN — POTASSIUM CHLORIDE 1 MEQ: 10 TABLET, EXTENDED RELEASE ORAL at 08:28

## 2018-01-15 RX ADMIN — LISINOPRIL 1 MG: 20 TABLET ORAL at 08:27

## 2018-01-15 RX ADMIN — IBUPROFEN 1 MG: 400 TABLET, FILM COATED ORAL at 00:15

## 2018-01-15 RX ADMIN — ONDANSETRON HYDROCHLORIDE 1 MG: 2 INJECTION, SOLUTION INTRAMUSCULAR; INTRAVENOUS at 00:15

## 2018-01-15 RX ADMIN — TAMSULOSIN HYDROCHLORIDE 1 MG: 0.4 CAPSULE ORAL at 20:32

## 2018-01-15 RX ADMIN — VITAMIN D, TAB 1000IU (100/BT) 1 UNIT: 25 TAB at 08:25

## 2018-01-15 RX ADMIN — MORPHINE SULFATE 1 MG: 2 INJECTION, SOLUTION INTRAMUSCULAR; INTRAVENOUS at 12:34

## 2018-01-15 RX ADMIN — ALBUTEROL SULFATE 1 PUFF: 90 AEROSOL, METERED RESPIRATORY (INHALATION) at 04:32

## 2018-01-15 RX ADMIN — GABAPENTIN 1 MG: 300 CAPSULE ORAL at 12:33

## 2018-01-15 RX ADMIN — FUROSEMIDE 1 MG: 10 INJECTION, SOLUTION INTRAVENOUS at 17:54

## 2018-01-15 RX ADMIN — METOPROLOL TARTRATE 1 MG: 50 TABLET, FILM COATED ORAL at 08:27

## 2018-01-15 RX ADMIN — THERA TABS 1 TAB: TAB at 08:24

## 2018-01-15 RX ADMIN — ALBUTEROL SULFATE 1 PUFF: 90 AEROSOL, METERED RESPIRATORY (INHALATION) at 02:11

## 2018-01-15 RX ADMIN — ATORVASTATIN CALCIUM 1 MG: 40 TABLET, FILM COATED ORAL at 20:31

## 2018-01-15 RX ADMIN — ISOSORBIDE MONONITRATE 1 MG: 30 TABLET, EXTENDED RELEASE ORAL at 08:28

## 2018-01-15 RX ADMIN — AMLODIPINE BESYLATE 1 MG: 10 TABLET ORAL at 08:28

## 2018-01-15 RX ADMIN — CEFTRIAXONE 1 MLS/HR: 2 INJECTION, SOLUTION INTRAVENOUS at 20:32

## 2018-01-15 RX ADMIN — OXYCODONE HYDROCHLORIDE AND ACETAMINOPHEN 1 MG: 500 TABLET ORAL at 08:24

## 2018-01-15 RX ADMIN — ONDANSETRON HYDROCHLORIDE 1 MG: 2 INJECTION, SOLUTION INTRAMUSCULAR; INTRAVENOUS at 12:33

## 2018-01-15 RX ADMIN — GABAPENTIN 1 MG: 300 CAPSULE ORAL at 00:10

## 2018-01-15 RX ADMIN — ALBUTEROL SULFATE 1 PUFF: 90 AEROSOL, METERED RESPIRATORY (INHALATION) at 21:00

## 2018-01-15 RX ADMIN — ALBUTEROL SULFATE 1 PUFF: 90 AEROSOL, METERED RESPIRATORY (INHALATION) at 12:33

## 2018-01-15 RX ADMIN — IBUPROFEN 1 MG: 400 TABLET, FILM COATED ORAL at 12:33

## 2018-01-15 RX ADMIN — METHYLPREDNISOLONE SODIUM SUCCINATE 1 MG: 40 INJECTION, POWDER, FOR SOLUTION INTRAMUSCULAR; INTRAVENOUS at 20:32

## 2018-01-15 RX ADMIN — MORPHINE SULFATE 1 MG: 2 INJECTION, SOLUTION INTRAMUSCULAR; INTRAVENOUS at 00:10

## 2018-01-15 RX ADMIN — GUAIFENESIN AND CODEINE PHOSPHATE 1 ML: 100; 10 SOLUTION ORAL at 13:27

## 2018-01-15 RX ADMIN — FUROSEMIDE 1 MG: 40 TABLET ORAL at 05:37

## 2018-01-15 RX ADMIN — IPRATROPIUM BROMIDE AND ALBUTEROL SULFATE 1 ML: .5; 3 SOLUTION RESPIRATORY (INHALATION) at 13:12

## 2018-01-15 RX ADMIN — PANTOPRAZOLE SODIUM 1 MG: 40 TABLET, DELAYED RELEASE ORAL at 05:37

## 2018-01-15 RX ADMIN — GABAPENTIN 1 MG: 300 CAPSULE ORAL at 20:31

## 2018-01-15 RX ADMIN — ALBUTEROL SULFATE 1 PUFF: 90 AEROSOL, METERED RESPIRATORY (INHALATION) at 17:54

## 2018-01-15 RX ADMIN — ALBUTEROL SULFATE 1 PUFF: 90 AEROSOL, METERED RESPIRATORY (INHALATION) at 08:29

## 2018-01-16 LAB
ABNORMAL IP MESSAGE: 1
ADD MAN DIFF?: NO
ANION GAP: 16 (ref 8–16)
BASOPHIL #: 0 10^3/UL (ref 0–0.1)
BASOPHILS %: 0.2 % (ref 0–2)
BLOOD UREA NITROGEN: 19 MG/DL (ref 7–20)
CALCIUM: 8.5 MG/DL (ref 8.4–10.2)
CARBON DIOXIDE: 31 MMOL/L (ref 21–31)
CHLORIDE: 101 MMOL/L (ref 97–110)
CREATININE: 0.83 MG/DL (ref 0.61–1.24)
EOSINOPHILS #: 0 10^3/UL (ref 0–0.5)
EOSINOPHILS %: 0.2 % (ref 0–7)
GLUCOSE: 148 MG/DL (ref 70–220)
HEMATOCRIT: 36.9 % (ref 42–52)
HEMOGLOBIN: 10.4 G/DL (ref 14–18)
LYMPHOCYTES #: 1.3 10^3/UL (ref 0.8–2.9)
LYMPHOCYTES %: 10.4 % (ref 15–51)
MEAN CORPUSCULAR HEMOGLOBIN: 23.6 PG (ref 29–33)
MEAN CORPUSCULAR HGB CONC: 28.2 G/DL (ref 32–37)
MEAN CORPUSCULAR VOLUME: 83.9 FL (ref 82–101)
MEAN PLATELET VOLUME: 9 FL (ref 7.4–10.4)
MONOCYTE #: 1 10^3/UL (ref 0.3–0.9)
MONOCYTES %: 7.7 % (ref 0–11)
NEUTROPHIL #: 10 10^3/UL (ref 1.6–7.5)
NEUTROPHILS %: 81.1 % (ref 39–77)
NUCLEATED RED BLOOD CELLS #: 0 10^3/UL (ref 0–0)
NUCLEATED RED BLOOD CELLS%: 0 /100WBC (ref 0–0)
PLATELET COUNT: 326 10^3/UL (ref 140–415)
POSITIVE DIFF: (no result)
POTASSIUM: 4.6 MMOL/L (ref 3.5–5.1)
RED BLOOD COUNT: 4.4 10^6/UL (ref 4.7–6.1)
RED CELL DISTRIBUTION WIDTH: 17.3 % (ref 11.5–14.5)
SODIUM: 143 MMOL/L (ref 135–144)
WHITE BLOOD COUNT: 12.3 10^3/UL (ref 4.8–10.8)

## 2018-01-16 RX ADMIN — DOCUSATE SODIUM 1 MG: 250 CAPSULE, LIQUID FILLED ORAL at 21:14

## 2018-01-16 RX ADMIN — METHYLPREDNISOLONE SODIUM SUCCINATE 1 MG: 40 INJECTION, POWDER, FOR SOLUTION INTRAMUSCULAR; INTRAVENOUS at 21:14

## 2018-01-16 RX ADMIN — VITAMIN D, TAB 1000IU (100/BT) 1 UNIT: 25 TAB at 09:09

## 2018-01-16 RX ADMIN — METHYLPREDNISOLONE SODIUM SUCCINATE 1 MG: 40 INJECTION, POWDER, FOR SOLUTION INTRAMUSCULAR; INTRAVENOUS at 09:07

## 2018-01-16 RX ADMIN — GABAPENTIN 1 MG: 300 CAPSULE ORAL at 09:09

## 2018-01-16 RX ADMIN — ENOXAPARIN SODIUM 1 MG: 100 INJECTION SUBCUTANEOUS at 09:13

## 2018-01-16 RX ADMIN — GABAPENTIN 1 MG: 300 CAPSULE ORAL at 21:14

## 2018-01-16 RX ADMIN — ALBUTEROL SULFATE 1 PUFF: 90 AEROSOL, METERED RESPIRATORY (INHALATION) at 22:41

## 2018-01-16 RX ADMIN — ALBUTEROL SULFATE 1 PUFF: 90 AEROSOL, METERED RESPIRATORY (INHALATION) at 16:18

## 2018-01-16 RX ADMIN — TAMSULOSIN HYDROCHLORIDE 1 MG: 0.4 CAPSULE ORAL at 21:14

## 2018-01-16 RX ADMIN — OXYCODONE HYDROCHLORIDE AND ACETAMINOPHEN 1 MG: 500 TABLET ORAL at 09:09

## 2018-01-16 RX ADMIN — FUROSEMIDE 1 MG: 10 INJECTION, SOLUTION INTRAVENOUS at 05:37

## 2018-01-16 RX ADMIN — LISINOPRIL 1 MG: 20 TABLET ORAL at 09:10

## 2018-01-16 RX ADMIN — ALBUTEROL SULFATE 1 PUFF: 90 AEROSOL, METERED RESPIRATORY (INHALATION) at 01:00

## 2018-01-16 RX ADMIN — IPRATROPIUM BROMIDE AND ALBUTEROL SULFATE 1 ML: .5; 3 SOLUTION RESPIRATORY (INHALATION) at 01:52

## 2018-01-16 RX ADMIN — IBUPROFEN 1 MG: 400 TABLET, FILM COATED ORAL at 17:17

## 2018-01-16 RX ADMIN — THERA TABS 1 TAB: TAB at 09:11

## 2018-01-16 RX ADMIN — GABAPENTIN 1 MG: 300 CAPSULE ORAL at 12:41

## 2018-01-16 RX ADMIN — ISOSORBIDE MONONITRATE 1 MG: 30 TABLET, EXTENDED RELEASE ORAL at 09:10

## 2018-01-16 RX ADMIN — IBUPROFEN 1 MG: 400 TABLET, FILM COATED ORAL at 01:08

## 2018-01-16 RX ADMIN — ATORVASTATIN CALCIUM 1 MG: 40 TABLET, FILM COATED ORAL at 21:14

## 2018-01-16 RX ADMIN — GUAIFENESIN AND CODEINE PHOSPHATE 1 ML: 100; 10 SOLUTION ORAL at 17:16

## 2018-01-16 RX ADMIN — ALBUTEROL SULFATE 1 PUFF: 90 AEROSOL, METERED RESPIRATORY (INHALATION) at 12:37

## 2018-01-16 RX ADMIN — DOCUSATE SODIUM 1 MG: 250 CAPSULE, LIQUID FILLED ORAL at 09:08

## 2018-01-16 RX ADMIN — ASPIRIN 1 MG: 81 TABLET, COATED ORAL at 09:09

## 2018-01-16 RX ADMIN — ALBUTEROL SULFATE 1 PUFF: 90 AEROSOL, METERED RESPIRATORY (INHALATION) at 09:00

## 2018-01-16 RX ADMIN — FUROSEMIDE 1 MG: 10 INJECTION, SOLUTION INTRAVENOUS at 17:57

## 2018-01-16 RX ADMIN — AMLODIPINE BESYLATE 1 MG: 10 TABLET ORAL at 12:41

## 2018-01-16 RX ADMIN — ALBUTEROL SULFATE 1 PUFF: 90 AEROSOL, METERED RESPIRATORY (INHALATION) at 21:00

## 2018-01-16 RX ADMIN — CEFTRIAXONE 1 MLS/HR: 2 INJECTION, SOLUTION INTRAVENOUS at 21:13

## 2018-01-16 RX ADMIN — ONDANSETRON HYDROCHLORIDE 1 MG: 2 INJECTION, SOLUTION INTRAMUSCULAR; INTRAVENOUS at 01:08

## 2018-01-16 RX ADMIN — ALBUTEROL SULFATE 1 PUFF: 90 AEROSOL, METERED RESPIRATORY (INHALATION) at 05:00

## 2018-01-16 RX ADMIN — PANTOPRAZOLE SODIUM 1 MG: 40 TABLET, DELAYED RELEASE ORAL at 05:37

## 2018-01-16 RX ADMIN — METOPROLOL TARTRATE 1 MG: 50 TABLET, FILM COATED ORAL at 09:10

## 2018-01-16 RX ADMIN — POTASSIUM CHLORIDE 1 MEQ: 10 TABLET, EXTENDED RELEASE ORAL at 09:09

## 2018-01-16 RX ADMIN — GUAIFENESIN AND CODEINE PHOSPHATE 1 ML: 100; 10 SOLUTION ORAL at 09:56

## 2018-01-17 LAB
ANION GAP: 11 (ref 8–16)
BLOOD UREA NITROGEN: 18 MG/DL (ref 7–20)
CALCIUM: 8.6 MG/DL (ref 8.4–10.2)
CARBON DIOXIDE: 37 MMOL/L (ref 21–31)
CHLORIDE: 98 MMOL/L (ref 97–110)
CREATININE: 0.75 MG/DL (ref 0.61–1.24)
FREE T4 (FREE THYROXINE): 0.78 NG/DL (ref 0.78–2.44)
GLUCOSE: 95 MG/DL (ref 70–220)
POTASSIUM: 3.9 MMOL/L (ref 3.5–5.1)
SODIUM: 142 MMOL/L (ref 135–144)

## 2018-01-17 RX ADMIN — ALBUTEROL SULFATE 1 PUFF: 90 AEROSOL, METERED RESPIRATORY (INHALATION) at 20:40

## 2018-01-17 RX ADMIN — METOPROLOL TARTRATE 1 MG: 25 TABLET ORAL at 20:34

## 2018-01-17 RX ADMIN — VITAMIN D, TAB 1000IU (100/BT) 1 UNIT: 25 TAB at 09:02

## 2018-01-17 RX ADMIN — GABAPENTIN 1 MG: 300 CAPSULE ORAL at 12:47

## 2018-01-17 RX ADMIN — METHYLPREDNISOLONE SODIUM SUCCINATE 1 MG: 40 INJECTION, POWDER, FOR SOLUTION INTRAMUSCULAR; INTRAVENOUS at 20:33

## 2018-01-17 RX ADMIN — ASPIRIN 1 MG: 81 TABLET, COATED ORAL at 09:03

## 2018-01-17 RX ADMIN — FUROSEMIDE 1 MG: 10 INJECTION, SOLUTION INTRAVENOUS at 06:08

## 2018-01-17 RX ADMIN — METOPROLOL TARTRATE 1 MG: 50 TABLET, FILM COATED ORAL at 09:03

## 2018-01-17 RX ADMIN — DOCUSATE SODIUM 1 MG: 250 CAPSULE, LIQUID FILLED ORAL at 09:01

## 2018-01-17 RX ADMIN — FUROSEMIDE 1 MG: 10 INJECTION, SOLUTION INTRAVENOUS at 17:18

## 2018-01-17 RX ADMIN — METHYLPREDNISOLONE SODIUM SUCCINATE 1 MG: 40 INJECTION, POWDER, FOR SOLUTION INTRAMUSCULAR; INTRAVENOUS at 09:03

## 2018-01-17 RX ADMIN — AMLODIPINE BESYLATE 1 MG: 10 TABLET ORAL at 09:02

## 2018-01-17 RX ADMIN — TAMSULOSIN HYDROCHLORIDE 1 MG: 0.4 CAPSULE ORAL at 20:33

## 2018-01-17 RX ADMIN — ATORVASTATIN CALCIUM 1 MG: 40 TABLET, FILM COATED ORAL at 20:33

## 2018-01-17 RX ADMIN — GABAPENTIN 1 MG: 300 CAPSULE ORAL at 09:01

## 2018-01-17 RX ADMIN — GUAIFENESIN AND CODEINE PHOSPHATE 1 ML: 100; 10 SOLUTION ORAL at 06:24

## 2018-01-17 RX ADMIN — ISOSORBIDE MONONITRATE 1 MG: 30 TABLET, EXTENDED RELEASE ORAL at 09:03

## 2018-01-17 RX ADMIN — THERA TABS 1 TAB: TAB at 09:02

## 2018-01-17 RX ADMIN — GUAIFENESIN AND CODEINE PHOSPHATE 1 ML: 100; 10 SOLUTION ORAL at 15:28

## 2018-01-17 RX ADMIN — POTASSIUM CHLORIDE 1 MEQ: 10 TABLET, EXTENDED RELEASE ORAL at 09:02

## 2018-01-17 RX ADMIN — GUAIFENESIN AND CODEINE PHOSPHATE 1 ML: 100; 10 SOLUTION ORAL at 00:02

## 2018-01-17 RX ADMIN — ALBUTEROL SULFATE 1 PUFF: 90 AEROSOL, METERED RESPIRATORY (INHALATION) at 02:11

## 2018-01-17 RX ADMIN — PANTOPRAZOLE SODIUM 1 MG: 40 TABLET, DELAYED RELEASE ORAL at 06:08

## 2018-01-17 RX ADMIN — OXYCODONE HYDROCHLORIDE AND ACETAMINOPHEN 1 MG: 500 TABLET ORAL at 09:02

## 2018-01-17 RX ADMIN — ALBUTEROL SULFATE 1 PUFF: 90 AEROSOL, METERED RESPIRATORY (INHALATION) at 17:00

## 2018-01-17 RX ADMIN — IPRATROPIUM BROMIDE AND ALBUTEROL SULFATE 1 ML: .5; 3 SOLUTION RESPIRATORY (INHALATION) at 21:23

## 2018-01-17 RX ADMIN — ENOXAPARIN SODIUM 1 MG: 100 INJECTION SUBCUTANEOUS at 09:04

## 2018-01-17 RX ADMIN — LISINOPRIL 1 MG: 20 TABLET ORAL at 09:03

## 2018-01-17 RX ADMIN — IBUPROFEN 1 MG: 400 TABLET, FILM COATED ORAL at 20:33

## 2018-01-17 RX ADMIN — CEFTRIAXONE 1 MLS/HR: 2 INJECTION, SOLUTION INTRAVENOUS at 22:27

## 2018-01-17 RX ADMIN — DOCUSATE SODIUM 1 MG: 250 CAPSULE, LIQUID FILLED ORAL at 20:33

## 2018-01-17 RX ADMIN — GABAPENTIN 1 MG: 300 CAPSULE ORAL at 20:33

## 2018-01-17 RX ADMIN — ALBUTEROL SULFATE 1 PUFF: 90 AEROSOL, METERED RESPIRATORY (INHALATION) at 21:00

## 2018-01-17 RX ADMIN — ALBUTEROL SULFATE 1 PUFF: 90 AEROSOL, METERED RESPIRATORY (INHALATION) at 09:00

## 2018-01-18 RX ADMIN — FUROSEMIDE 1 MG: 10 INJECTION, SOLUTION INTRAVENOUS at 18:08

## 2018-01-18 RX ADMIN — FUROSEMIDE 1 MG: 10 INJECTION, SOLUTION INTRAVENOUS at 05:46

## 2018-01-18 RX ADMIN — IPRATROPIUM BROMIDE AND ALBUTEROL SULFATE 1 ML: .5; 3 SOLUTION RESPIRATORY (INHALATION) at 12:15

## 2018-01-18 RX ADMIN — GABAPENTIN 1 MG: 300 CAPSULE ORAL at 20:40

## 2018-01-18 RX ADMIN — DOCUSATE SODIUM 1 MG: 250 CAPSULE, LIQUID FILLED ORAL at 20:40

## 2018-01-18 RX ADMIN — PANTOPRAZOLE SODIUM 1 MG: 40 TABLET, DELAYED RELEASE ORAL at 05:45

## 2018-01-18 RX ADMIN — IBUPROFEN 1 MG: 400 TABLET, FILM COATED ORAL at 20:40

## 2018-01-18 RX ADMIN — ALBUTEROL SULFATE 1 PUFF: 90 AEROSOL, METERED RESPIRATORY (INHALATION) at 01:00

## 2018-01-18 RX ADMIN — METHYLPREDNISOLONE SODIUM SUCCINATE 1 MG: 40 INJECTION, POWDER, FOR SOLUTION INTRAMUSCULAR; INTRAVENOUS at 20:40

## 2018-01-18 RX ADMIN — TAMSULOSIN HYDROCHLORIDE 1 MG: 0.4 CAPSULE ORAL at 20:41

## 2018-01-18 RX ADMIN — VITAMIN D, TAB 1000IU (100/BT) 1 UNIT: 25 TAB at 09:28

## 2018-01-18 RX ADMIN — ALBUTEROL SULFATE 1 PUFF: 90 AEROSOL, METERED RESPIRATORY (INHALATION) at 17:00

## 2018-01-18 RX ADMIN — ALBUTEROL SULFATE 1 PUFF: 90 AEROSOL, METERED RESPIRATORY (INHALATION) at 05:00

## 2018-01-18 RX ADMIN — ALBUTEROL SULFATE 1 PUFF: 90 AEROSOL, METERED RESPIRATORY (INHALATION) at 13:00

## 2018-01-18 RX ADMIN — POTASSIUM CHLORIDE 1 MEQ: 10 TABLET, EXTENDED RELEASE ORAL at 09:28

## 2018-01-18 RX ADMIN — ALBUTEROL SULFATE 1 PUFF: 90 AEROSOL, METERED RESPIRATORY (INHALATION) at 21:00

## 2018-01-18 RX ADMIN — ISOSORBIDE MONONITRATE 1 MG: 30 TABLET, EXTENDED RELEASE ORAL at 09:30

## 2018-01-18 RX ADMIN — CEFTRIAXONE 1 MLS/HR: 2 INJECTION, SOLUTION INTRAVENOUS at 20:40

## 2018-01-18 RX ADMIN — GUAIFENESIN AND CODEINE PHOSPHATE 1 ML: 100; 10 SOLUTION ORAL at 20:40

## 2018-01-18 RX ADMIN — DOCUSATE SODIUM 1 MG: 250 CAPSULE, LIQUID FILLED ORAL at 09:28

## 2018-01-18 RX ADMIN — METOPROLOL TARTRATE 1 MG: 25 TABLET ORAL at 20:41

## 2018-01-18 RX ADMIN — THERA TABS 1 TAB: TAB at 09:28

## 2018-01-18 RX ADMIN — ASPIRIN 1 MG: 81 TABLET, COATED ORAL at 09:28

## 2018-01-18 RX ADMIN — IBUPROFEN 1 MG: 400 TABLET, FILM COATED ORAL at 14:27

## 2018-01-18 RX ADMIN — OXYCODONE HYDROCHLORIDE AND ACETAMINOPHEN 1 MG: 500 TABLET ORAL at 09:28

## 2018-01-18 RX ADMIN — ONDANSETRON HYDROCHLORIDE 1 MG: 2 INJECTION, SOLUTION INTRAMUSCULAR; INTRAVENOUS at 05:44

## 2018-01-18 RX ADMIN — AMLODIPINE BESYLATE 1 MG: 10 TABLET ORAL at 09:29

## 2018-01-18 RX ADMIN — ATORVASTATIN CALCIUM 1 MG: 40 TABLET, FILM COATED ORAL at 20:41

## 2018-01-18 RX ADMIN — ALBUTEROL SULFATE 1 PUFF: 90 AEROSOL, METERED RESPIRATORY (INHALATION) at 09:00

## 2018-01-18 RX ADMIN — GABAPENTIN 1 MG: 300 CAPSULE ORAL at 09:29

## 2018-01-18 RX ADMIN — IBUPROFEN 1 MG: 400 TABLET, FILM COATED ORAL at 05:45

## 2018-01-18 RX ADMIN — ENOXAPARIN SODIUM 1 MG: 100 INJECTION SUBCUTANEOUS at 09:41

## 2018-01-18 RX ADMIN — METOPROLOL TARTRATE 1 MG: 25 TABLET ORAL at 09:29

## 2018-01-18 RX ADMIN — GABAPENTIN 1 MG: 300 CAPSULE ORAL at 12:12

## 2018-01-18 RX ADMIN — LISINOPRIL 1 MG: 20 TABLET ORAL at 09:29

## 2018-01-18 RX ADMIN — METHYLPREDNISOLONE SODIUM SUCCINATE 1 MG: 40 INJECTION, POWDER, FOR SOLUTION INTRAMUSCULAR; INTRAVENOUS at 09:18

## 2018-01-19 RX ADMIN — TAMSULOSIN HYDROCHLORIDE 1 MG: 0.4 CAPSULE ORAL at 20:31

## 2018-01-19 RX ADMIN — PANTOPRAZOLE SODIUM 1 MG: 40 TABLET, DELAYED RELEASE ORAL at 06:34

## 2018-01-19 RX ADMIN — DOCUSATE SODIUM 1 MG: 250 CAPSULE, LIQUID FILLED ORAL at 08:36

## 2018-01-19 RX ADMIN — ISOSORBIDE MONONITRATE 1 MG: 30 TABLET, EXTENDED RELEASE ORAL at 08:37

## 2018-01-19 RX ADMIN — ALBUTEROL SULFATE 1 PUFF: 90 AEROSOL, METERED RESPIRATORY (INHALATION) at 01:00

## 2018-01-19 RX ADMIN — GABAPENTIN 1 MG: 300 CAPSULE ORAL at 12:33

## 2018-01-19 RX ADMIN — IBUPROFEN 1 MG: 400 TABLET, FILM COATED ORAL at 20:30

## 2018-01-19 RX ADMIN — METHYLPREDNISOLONE SODIUM SUCCINATE 1 MG: 40 INJECTION, POWDER, FOR SOLUTION INTRAMUSCULAR; INTRAVENOUS at 20:29

## 2018-01-19 RX ADMIN — METHYLPREDNISOLONE SODIUM SUCCINATE 1 MG: 40 INJECTION, POWDER, FOR SOLUTION INTRAMUSCULAR; INTRAVENOUS at 08:29

## 2018-01-19 RX ADMIN — LISINOPRIL 1 MG: 20 TABLET ORAL at 08:38

## 2018-01-19 RX ADMIN — FUROSEMIDE 1 MG: 10 INJECTION, SOLUTION INTRAVENOUS at 06:35

## 2018-01-19 RX ADMIN — IBUPROFEN 1 MG: 400 TABLET, FILM COATED ORAL at 12:33

## 2018-01-19 RX ADMIN — DOCUSATE SODIUM 1 MG: 250 CAPSULE, LIQUID FILLED ORAL at 20:30

## 2018-01-19 RX ADMIN — ALBUTEROL SULFATE 1 PUFF: 90 AEROSOL, METERED RESPIRATORY (INHALATION) at 17:00

## 2018-01-19 RX ADMIN — GUAIFENESIN AND CODEINE PHOSPHATE 1 ML: 100; 10 SOLUTION ORAL at 06:43

## 2018-01-19 RX ADMIN — OXYCODONE HYDROCHLORIDE AND ACETAMINOPHEN 1 MG: 500 TABLET ORAL at 08:36

## 2018-01-19 RX ADMIN — ALBUTEROL SULFATE 1 PUFF: 90 AEROSOL, METERED RESPIRATORY (INHALATION) at 05:00

## 2018-01-19 RX ADMIN — CEFTRIAXONE 1 MLS/HR: 2 INJECTION, SOLUTION INTRAVENOUS at 20:29

## 2018-01-19 RX ADMIN — GABAPENTIN 1 MG: 300 CAPSULE ORAL at 20:29

## 2018-01-19 RX ADMIN — THERA TABS 1 TAB: TAB at 08:36

## 2018-01-19 RX ADMIN — ASPIRIN 1 MG: 81 TABLET, COATED ORAL at 08:36

## 2018-01-19 RX ADMIN — FUROSEMIDE 1 MG: 10 INJECTION, SOLUTION INTRAVENOUS at 17:51

## 2018-01-19 RX ADMIN — ALBUTEROL SULFATE 1 PUFF: 90 AEROSOL, METERED RESPIRATORY (INHALATION) at 08:45

## 2018-01-19 RX ADMIN — POTASSIUM CHLORIDE 1 MEQ: 10 TABLET, EXTENDED RELEASE ORAL at 08:36

## 2018-01-19 RX ADMIN — METOPROLOL TARTRATE 1 MG: 25 TABLET ORAL at 08:37

## 2018-01-19 RX ADMIN — VITAMIN D, TAB 1000IU (100/BT) 1 UNIT: 25 TAB at 08:36

## 2018-01-19 RX ADMIN — IPRATROPIUM BROMIDE AND ALBUTEROL SULFATE 1 ML: .5; 3 SOLUTION RESPIRATORY (INHALATION) at 11:01

## 2018-01-19 RX ADMIN — METOPROLOL TARTRATE 1 MG: 25 TABLET ORAL at 20:30

## 2018-01-19 RX ADMIN — ONDANSETRON HYDROCHLORIDE 1 MG: 2 INJECTION, SOLUTION INTRAMUSCULAR; INTRAVENOUS at 20:30

## 2018-01-19 RX ADMIN — AMLODIPINE BESYLATE 1 MG: 10 TABLET ORAL at 08:37

## 2018-01-19 RX ADMIN — IBUPROFEN 1 MG: 400 TABLET, FILM COATED ORAL at 06:43

## 2018-01-19 RX ADMIN — GABAPENTIN 1 MG: 300 CAPSULE ORAL at 08:36

## 2018-01-19 RX ADMIN — ALBUTEROL SULFATE 1 PUFF: 90 AEROSOL, METERED RESPIRATORY (INHALATION) at 12:39

## 2018-01-19 RX ADMIN — ATORVASTATIN CALCIUM 1 MG: 40 TABLET, FILM COATED ORAL at 20:30

## 2018-01-19 RX ADMIN — ONDANSETRON HYDROCHLORIDE 1 MG: 2 INJECTION, SOLUTION INTRAMUSCULAR; INTRAVENOUS at 16:31

## 2018-01-19 RX ADMIN — ENOXAPARIN SODIUM 1 MG: 100 INJECTION SUBCUTANEOUS at 08:55

## 2018-01-20 LAB
ABNORMAL IP MESSAGE: 1
ADD MAN DIFF?: NO
ANION GAP: 13 (ref 8–16)
BASOPHIL #: 0 10^3/UL (ref 0–0.1)
BASOPHILS %: 0.2 % (ref 0–2)
BLOOD UREA NITROGEN: 19 MG/DL (ref 7–20)
CALCIUM: 8.2 MG/DL (ref 8.4–10.2)
CARBON DIOXIDE: 33 MMOL/L (ref 21–31)
CHLORIDE: 97 MMOL/L (ref 97–110)
CREATININE: 0.71 MG/DL (ref 0.61–1.24)
EOSINOPHILS #: 0 10^3/UL (ref 0–0.5)
EOSINOPHILS %: 0.3 % (ref 0–7)
GLUCOSE: 156 MG/DL (ref 70–220)
HEMATOCRIT: 35.5 % (ref 42–52)
HEMOGLOBIN: 9.9 G/DL (ref 14–18)
LYMPHOCYTES #: 1.3 10^3/UL (ref 0.8–2.9)
LYMPHOCYTES %: 10.6 % (ref 15–51)
MEAN CORPUSCULAR HEMOGLOBIN: 23.8 PG (ref 29–33)
MEAN CORPUSCULAR HGB CONC: 27.9 G/DL (ref 32–37)
MEAN CORPUSCULAR VOLUME: 85.3 FL (ref 82–101)
MEAN PLATELET VOLUME: 8.5 FL (ref 7.4–10.4)
MONOCYTE #: 0.9 10^3/UL (ref 0.3–0.9)
MONOCYTES %: 7.5 % (ref 0–11)
NEUTROPHIL #: 9.6 10^3/UL (ref 1.6–7.5)
NEUTROPHILS %: 79.8 % (ref 39–77)
NUCLEATED RED BLOOD CELLS #: 0.2 10^3/UL (ref 0–0)
NUCLEATED RED BLOOD CELLS%: 1.4 /100WBC (ref 0–0)
PLATELET COUNT: 412 10^3/UL (ref 140–415)
POSITIVE DIFF: (no result)
POTASSIUM: 4.2 MMOL/L (ref 3.5–5.1)
RED BLOOD COUNT: 4.16 10^6/UL (ref 4.7–6.1)
RED CELL DISTRIBUTION WIDTH: 17.5 % (ref 11.5–14.5)
SODIUM: 139 MMOL/L (ref 135–144)
WHITE BLOOD COUNT: 12.1 10^3/UL (ref 4.8–10.8)

## 2018-01-20 RX ADMIN — GUAIFENESIN AND CODEINE PHOSPHATE 1 ML: 100; 10 SOLUTION ORAL at 14:55

## 2018-01-20 RX ADMIN — GABAPENTIN 1 MG: 300 CAPSULE ORAL at 21:01

## 2018-01-20 RX ADMIN — FUROSEMIDE 1 MG: 10 INJECTION, SOLUTION INTRAVENOUS at 06:25

## 2018-01-20 RX ADMIN — ONDANSETRON HYDROCHLORIDE 1 MG: 2 INJECTION, SOLUTION INTRAMUSCULAR; INTRAVENOUS at 08:45

## 2018-01-20 RX ADMIN — ENOXAPARIN SODIUM 1 MG: 100 INJECTION SUBCUTANEOUS at 10:11

## 2018-01-20 RX ADMIN — PANTOPRAZOLE SODIUM 1 MG: 40 TABLET, DELAYED RELEASE ORAL at 06:24

## 2018-01-20 RX ADMIN — GABAPENTIN 1 MG: 300 CAPSULE ORAL at 13:19

## 2018-01-20 RX ADMIN — METHYLPREDNISOLONE SODIUM SUCCINATE 1 MG: 40 INJECTION, POWDER, FOR SOLUTION INTRAMUSCULAR; INTRAVENOUS at 09:32

## 2018-01-20 RX ADMIN — IPRATROPIUM BROMIDE AND ALBUTEROL SULFATE 1 ML: .5; 3 SOLUTION RESPIRATORY (INHALATION) at 13:27

## 2018-01-20 RX ADMIN — TAMSULOSIN HYDROCHLORIDE 1 MG: 0.4 CAPSULE ORAL at 21:00

## 2018-01-20 RX ADMIN — ISOSORBIDE MONONITRATE 1 MG: 30 TABLET, EXTENDED RELEASE ORAL at 09:32

## 2018-01-20 RX ADMIN — THERA TABS 1 TAB: TAB at 09:31

## 2018-01-20 RX ADMIN — IPRATROPIUM BROMIDE AND ALBUTEROL SULFATE 1 ML: .5; 3 SOLUTION RESPIRATORY (INHALATION) at 17:10

## 2018-01-20 RX ADMIN — IPRATROPIUM BROMIDE AND ALBUTEROL SULFATE 1 ML: .5; 3 SOLUTION RESPIRATORY (INHALATION) at 09:08

## 2018-01-20 RX ADMIN — DOCUSATE SODIUM 1 MG: 250 CAPSULE, LIQUID FILLED ORAL at 09:31

## 2018-01-20 RX ADMIN — GABAPENTIN 1 MG: 300 CAPSULE ORAL at 09:32

## 2018-01-20 RX ADMIN — ONDANSETRON HYDROCHLORIDE 1 MG: 2 INJECTION, SOLUTION INTRAMUSCULAR; INTRAVENOUS at 21:01

## 2018-01-20 RX ADMIN — LISINOPRIL 1 MG: 20 TABLET ORAL at 09:32

## 2018-01-20 RX ADMIN — ATORVASTATIN CALCIUM 1 MG: 40 TABLET, FILM COATED ORAL at 21:00

## 2018-01-20 RX ADMIN — IBUPROFEN 1 MG: 400 TABLET, FILM COATED ORAL at 08:45

## 2018-01-20 RX ADMIN — IBUPROFEN 1 MG: 400 TABLET, FILM COATED ORAL at 14:55

## 2018-01-20 RX ADMIN — METHYLPREDNISOLONE SODIUM SUCCINATE 1 MG: 40 INJECTION, POWDER, FOR SOLUTION INTRAMUSCULAR; INTRAVENOUS at 21:00

## 2018-01-20 RX ADMIN — DOCUSATE SODIUM 1 MG: 250 CAPSULE, LIQUID FILLED ORAL at 21:00

## 2018-01-20 RX ADMIN — ASPIRIN 1 MG: 81 TABLET, COATED ORAL at 09:31

## 2018-01-20 RX ADMIN — METOPROLOL TARTRATE 1 MG: 25 TABLET ORAL at 09:31

## 2018-01-20 RX ADMIN — VITAMIN D, TAB 1000IU (100/BT) 1 UNIT: 25 TAB at 09:32

## 2018-01-20 RX ADMIN — METOPROLOL TARTRATE 1 MG: 25 TABLET ORAL at 21:01

## 2018-01-20 RX ADMIN — AMLODIPINE BESYLATE 1 MG: 10 TABLET ORAL at 09:31

## 2018-01-20 RX ADMIN — CEFTRIAXONE 1 MLS/HR: 2 INJECTION, SOLUTION INTRAVENOUS at 21:00

## 2018-01-20 RX ADMIN — POTASSIUM CHLORIDE 1 MEQ: 10 TABLET, EXTENDED RELEASE ORAL at 09:32

## 2018-01-20 RX ADMIN — FUROSEMIDE 1 MG: 10 INJECTION, SOLUTION INTRAVENOUS at 18:33

## 2018-01-20 RX ADMIN — OXYCODONE HYDROCHLORIDE AND ACETAMINOPHEN 1 MG: 500 TABLET ORAL at 09:31

## 2018-01-20 RX ADMIN — GUAIFENESIN AND CODEINE PHOSPHATE 1 ML: 100; 10 SOLUTION ORAL at 08:44

## 2018-01-21 RX ADMIN — THERA TABS 1 TAB: TAB at 09:07

## 2018-01-21 RX ADMIN — TAMSULOSIN HYDROCHLORIDE 1 MG: 0.4 CAPSULE ORAL at 22:21

## 2018-01-21 RX ADMIN — IBUPROFEN 1 MG: 400 TABLET, FILM COATED ORAL at 09:43

## 2018-01-21 RX ADMIN — PANTOPRAZOLE SODIUM 1 MG: 40 TABLET, DELAYED RELEASE ORAL at 06:27

## 2018-01-21 RX ADMIN — GABAPENTIN 1 MG: 300 CAPSULE ORAL at 13:36

## 2018-01-21 RX ADMIN — FUROSEMIDE 1 MG: 10 INJECTION, SOLUTION INTRAVENOUS at 17:41

## 2018-01-21 RX ADMIN — IPRATROPIUM BROMIDE AND ALBUTEROL SULFATE 1 ML: .5; 3 SOLUTION RESPIRATORY (INHALATION) at 08:18

## 2018-01-21 RX ADMIN — METHYLPREDNISOLONE SODIUM SUCCINATE 1 MG: 40 INJECTION, POWDER, FOR SOLUTION INTRAMUSCULAR; INTRAVENOUS at 22:22

## 2018-01-21 RX ADMIN — DOCUSATE SODIUM 1 MG: 250 CAPSULE, LIQUID FILLED ORAL at 22:20

## 2018-01-21 RX ADMIN — POTASSIUM CHLORIDE 1 MEQ: 10 TABLET, EXTENDED RELEASE ORAL at 09:07

## 2018-01-21 RX ADMIN — OXYCODONE HYDROCHLORIDE AND ACETAMINOPHEN 1 MG: 500 TABLET ORAL at 09:05

## 2018-01-21 RX ADMIN — METHYLPREDNISOLONE SODIUM SUCCINATE 1 MG: 40 INJECTION, POWDER, FOR SOLUTION INTRAMUSCULAR; INTRAVENOUS at 09:07

## 2018-01-21 RX ADMIN — METOPROLOL TARTRATE 1 MG: 25 TABLET ORAL at 09:07

## 2018-01-21 RX ADMIN — GUAIFENESIN AND CODEINE PHOSPHATE 1 ML: 100; 10 SOLUTION ORAL at 17:40

## 2018-01-21 RX ADMIN — METOPROLOL TARTRATE 1 MG: 25 TABLET ORAL at 22:23

## 2018-01-21 RX ADMIN — VITAMIN D, TAB 1000IU (100/BT) 1 UNIT: 25 TAB at 09:07

## 2018-01-21 RX ADMIN — LISINOPRIL 1 MG: 20 TABLET ORAL at 09:06

## 2018-01-21 RX ADMIN — ATORVASTATIN CALCIUM 1 MG: 40 TABLET, FILM COATED ORAL at 22:24

## 2018-01-21 RX ADMIN — FUROSEMIDE 1 MG: 10 INJECTION, SOLUTION INTRAVENOUS at 11:30

## 2018-01-21 RX ADMIN — SODIUM PHOSPHATE, DIBASIC AND SODIUM PHOSPHATE, MONOBASIC 1 ML: 7; 19 ENEMA RECTAL at 11:30

## 2018-01-21 RX ADMIN — ENOXAPARIN SODIUM 1 MG: 100 INJECTION SUBCUTANEOUS at 09:36

## 2018-01-21 RX ADMIN — IBUPROFEN 1 MG: 400 TABLET, FILM COATED ORAL at 22:23

## 2018-01-21 RX ADMIN — DOCUSATE SODIUM 1 MG: 250 CAPSULE, LIQUID FILLED ORAL at 09:06

## 2018-01-21 RX ADMIN — ONDANSETRON HYDROCHLORIDE 1 MG: 2 INJECTION, SOLUTION INTRAMUSCULAR; INTRAVENOUS at 22:23

## 2018-01-21 RX ADMIN — ASPIRIN 1 MG: 81 TABLET, COATED ORAL at 09:07

## 2018-01-21 RX ADMIN — CEFTRIAXONE 1 MLS/HR: 2 INJECTION, SOLUTION INTRAVENOUS at 22:22

## 2018-01-21 RX ADMIN — GUAIFENESIN AND CODEINE PHOSPHATE 1 ML: 100; 10 SOLUTION ORAL at 09:43

## 2018-01-21 RX ADMIN — FUROSEMIDE 1 MG: 10 INJECTION, SOLUTION INTRAVENOUS at 06:27

## 2018-01-21 RX ADMIN — AMLODIPINE BESYLATE 1 MG: 10 TABLET ORAL at 09:06

## 2018-01-21 RX ADMIN — ISOSORBIDE MONONITRATE 1 MG: 30 TABLET, EXTENDED RELEASE ORAL at 09:06

## 2018-01-21 RX ADMIN — IPRATROPIUM BROMIDE AND ALBUTEROL SULFATE 1 ML: .5; 3 SOLUTION RESPIRATORY (INHALATION) at 12:06

## 2018-01-21 RX ADMIN — GABAPENTIN 1 MG: 300 CAPSULE ORAL at 22:21

## 2018-01-21 RX ADMIN — GABAPENTIN 1 MG: 300 CAPSULE ORAL at 09:06

## 2018-01-21 RX ADMIN — IBUPROFEN 1 MG: 400 TABLET, FILM COATED ORAL at 17:40

## 2018-01-21 RX ADMIN — IPRATROPIUM BROMIDE AND ALBUTEROL SULFATE 1 ML: .5; 3 SOLUTION RESPIRATORY (INHALATION) at 16:04

## 2018-01-22 LAB
ABNORMAL IP MESSAGE: 1
ADD MAN DIFF?: NO
ANION GAP: 12 (ref 8–16)
BASOPHIL #: 0 10^3/UL (ref 0–0.1)
BASOPHILS %: 0.2 % (ref 0–2)
BLOOD UREA NITROGEN: 17 MG/DL (ref 7–20)
CALCIUM: 8 MG/DL (ref 8.4–10.2)
CARBON DIOXIDE: 34 MMOL/L (ref 21–31)
CHLORIDE: 96 MMOL/L (ref 97–110)
CREATININE: 0.72 MG/DL (ref 0.61–1.24)
EOSINOPHILS #: 0 10^3/UL (ref 0–0.5)
EOSINOPHILS %: 0.1 % (ref 0–7)
GLUCOSE: 136 MG/DL (ref 70–220)
HEMATOCRIT: 33.3 % (ref 42–52)
HEMOGLOBIN: 9.3 G/DL (ref 14–18)
LYMPHOCYTES #: 1.2 10^3/UL (ref 0.8–2.9)
LYMPHOCYTES %: 9.2 % (ref 15–51)
MEAN CORPUSCULAR HEMOGLOBIN: 24 PG (ref 29–33)
MEAN CORPUSCULAR HGB CONC: 27.9 G/DL (ref 32–37)
MEAN CORPUSCULAR VOLUME: 86 FL (ref 82–101)
MEAN PLATELET VOLUME: 8.5 FL (ref 7.4–10.4)
MONOCYTE #: 1.2 10^3/UL (ref 0.3–0.9)
MONOCYTES %: 8.7 % (ref 0–11)
NEUTROPHIL #: 10.4 10^3/UL (ref 1.6–7.5)
NEUTROPHILS %: 79.4 % (ref 39–77)
NUCLEATED RED BLOOD CELLS #: 0.2 10^3/UL (ref 0–0)
NUCLEATED RED BLOOD CELLS%: 1.3 /100WBC (ref 0–0)
PLATELET COUNT: 401 10^3/UL (ref 140–415)
POSITIVE DIFF: (no result)
POTASSIUM: 4.1 MMOL/L (ref 3.5–5.1)
RED BLOOD COUNT: 3.87 10^6/UL (ref 4.7–6.1)
RED CELL DISTRIBUTION WIDTH: 17.5 % (ref 11.5–14.5)
SODIUM: 138 MMOL/L (ref 135–144)
WHITE BLOOD COUNT: 13.2 10^3/UL (ref 4.8–10.8)

## 2018-01-22 RX ADMIN — ISOSORBIDE MONONITRATE 1 MG: 30 TABLET, EXTENDED RELEASE ORAL at 09:43

## 2018-01-22 RX ADMIN — METOPROLOL TARTRATE 1 MG: 25 TABLET ORAL at 09:44

## 2018-01-22 RX ADMIN — IPRATROPIUM BROMIDE AND ALBUTEROL SULFATE 1 ML: .5; 3 SOLUTION RESPIRATORY (INHALATION) at 12:15

## 2018-01-22 RX ADMIN — LISINOPRIL 1 MG: 20 TABLET ORAL at 09:42

## 2018-01-22 RX ADMIN — ATORVASTATIN CALCIUM 1 MG: 40 TABLET, FILM COATED ORAL at 21:31

## 2018-01-22 RX ADMIN — IPRATROPIUM BROMIDE AND ALBUTEROL SULFATE 1 ML: .5; 3 SOLUTION RESPIRATORY (INHALATION) at 21:27

## 2018-01-22 RX ADMIN — DOCUSATE SODIUM 1 MG: 250 CAPSULE, LIQUID FILLED ORAL at 21:31

## 2018-01-22 RX ADMIN — VITAMIN D, TAB 1000IU (100/BT) 1 UNIT: 25 TAB at 09:43

## 2018-01-22 RX ADMIN — IBUPROFEN 1 MG: 400 TABLET, FILM COATED ORAL at 15:31

## 2018-01-22 RX ADMIN — IPRATROPIUM BROMIDE AND ALBUTEROL SULFATE 1 ML: .5; 3 SOLUTION RESPIRATORY (INHALATION) at 09:00

## 2018-01-22 RX ADMIN — TAMSULOSIN HYDROCHLORIDE 1 MG: 0.4 CAPSULE ORAL at 21:31

## 2018-01-22 RX ADMIN — CEFTRIAXONE 1 MLS/HR: 2 INJECTION, SOLUTION INTRAVENOUS at 21:30

## 2018-01-22 RX ADMIN — IPRATROPIUM BROMIDE AND ALBUTEROL SULFATE 1 ML: .5; 3 SOLUTION RESPIRATORY (INHALATION) at 06:07

## 2018-01-22 RX ADMIN — ASPIRIN 1 MG: 81 TABLET, COATED ORAL at 09:44

## 2018-01-22 RX ADMIN — ONDANSETRON HYDROCHLORIDE 1 MG: 2 INJECTION, SOLUTION INTRAMUSCULAR; INTRAVENOUS at 09:34

## 2018-01-22 RX ADMIN — GABAPENTIN 1 MG: 300 CAPSULE ORAL at 13:00

## 2018-01-22 RX ADMIN — ONDANSETRON HYDROCHLORIDE 1 MG: 2 INJECTION, SOLUTION INTRAMUSCULAR; INTRAVENOUS at 16:56

## 2018-01-22 RX ADMIN — ENOXAPARIN SODIUM 1 MG: 100 INJECTION SUBCUTANEOUS at 09:49

## 2018-01-22 RX ADMIN — DOCUSATE SODIUM 1 MG: 250 CAPSULE, LIQUID FILLED ORAL at 09:43

## 2018-01-22 RX ADMIN — FUROSEMIDE 1 MG: 10 INJECTION, SOLUTION INTRAVENOUS at 09:45

## 2018-01-22 RX ADMIN — IBUPROFEN 1 MG: 400 TABLET, FILM COATED ORAL at 16:56

## 2018-01-22 RX ADMIN — METHYLPREDNISOLONE SODIUM SUCCINATE 1 MG: 40 INJECTION, POWDER, FOR SOLUTION INTRAMUSCULAR; INTRAVENOUS at 21:32

## 2018-01-22 RX ADMIN — AMLODIPINE BESYLATE 1 MG: 10 TABLET ORAL at 09:41

## 2018-01-22 RX ADMIN — FUROSEMIDE 1 MG: 10 INJECTION, SOLUTION INTRAVENOUS at 17:08

## 2018-01-22 RX ADMIN — PANTOPRAZOLE SODIUM 1 MG: 40 TABLET, DELAYED RELEASE ORAL at 05:48

## 2018-01-22 RX ADMIN — THERA TABS 1 TAB: TAB at 09:43

## 2018-01-22 RX ADMIN — FUROSEMIDE 1 MG: 10 INJECTION, SOLUTION INTRAVENOUS at 05:48

## 2018-01-22 RX ADMIN — GABAPENTIN 1 MG: 300 CAPSULE ORAL at 21:31

## 2018-01-22 RX ADMIN — IBUPROFEN 1 MG: 400 TABLET, FILM COATED ORAL at 09:42

## 2018-01-22 RX ADMIN — GABAPENTIN 1 MG: 300 CAPSULE ORAL at 09:44

## 2018-01-22 RX ADMIN — SODIUM PHOSPHATE, DIBASIC AND SODIUM PHOSPHATE, MONOBASIC 1 ML: 7; 19 ENEMA RECTAL at 09:00

## 2018-01-22 RX ADMIN — METHYLPREDNISOLONE SODIUM SUCCINATE 1 MG: 40 INJECTION, POWDER, FOR SOLUTION INTRAMUSCULAR; INTRAVENOUS at 09:45

## 2018-01-22 RX ADMIN — OXYCODONE HYDROCHLORIDE AND ACETAMINOPHEN 1 MG: 500 TABLET ORAL at 09:42

## 2018-01-22 RX ADMIN — GUAIFENESIN AND CODEINE PHOSPHATE 1 ML: 100; 10 SOLUTION ORAL at 21:57

## 2018-01-22 RX ADMIN — POTASSIUM CHLORIDE 1 MEQ: 10 TABLET, EXTENDED RELEASE ORAL at 09:43

## 2018-01-22 RX ADMIN — METOPROLOL TARTRATE 1 MG: 25 TABLET ORAL at 21:34

## 2018-01-23 LAB
ABNORMAL IP MESSAGE: 1
ADD MAN DIFF?: NO
ANION GAP: 10 (ref 8–16)
BASOPHIL #: 0 10^3/UL (ref 0–0.1)
BASOPHILS %: 0.3 % (ref 0–2)
BLOOD UREA NITROGEN: 19 MG/DL (ref 7–20)
CALCIUM: 8.5 MG/DL (ref 8.4–10.2)
CARBON DIOXIDE: 38 MMOL/L (ref 21–31)
CHLORIDE: 95 MMOL/L (ref 97–110)
CREATININE: 0.67 MG/DL (ref 0.61–1.24)
EOSINOPHILS #: 0.1 10^3/UL (ref 0–0.5)
EOSINOPHILS %: 0.6 % (ref 0–7)
GLUCOSE: 119 MG/DL (ref 70–220)
HEMATOCRIT: 32.8 % (ref 42–52)
HEMOGLOBIN: 9.2 G/DL (ref 14–18)
LYMPHOCYTES #: 1.5 10^3/UL (ref 0.8–2.9)
LYMPHOCYTES %: 12.3 % (ref 15–51)
MEAN CORPUSCULAR HEMOGLOBIN: 24 PG (ref 29–33)
MEAN CORPUSCULAR HGB CONC: 28 G/DL (ref 32–37)
MEAN CORPUSCULAR VOLUME: 85.4 FL (ref 82–101)
MEAN PLATELET VOLUME: 8.8 FL (ref 7.4–10.4)
MONOCYTE #: 1.2 10^3/UL (ref 0.3–0.9)
MONOCYTES %: 10.1 % (ref 0–11)
NEUTROPHIL #: 9.1 10^3/UL (ref 1.6–7.5)
NEUTROPHILS %: 73.9 % (ref 39–77)
NUCLEATED RED BLOOD CELLS #: 0.1 10^3/UL (ref 0–0)
NUCLEATED RED BLOOD CELLS%: 1.1 /100WBC (ref 0–0)
PLATELET COUNT: 380 10^3/UL (ref 140–415)
POSITIVE DIFF: (no result)
POTASSIUM: 4.3 MMOL/L (ref 3.5–5.1)
RED BLOOD COUNT: 3.84 10^6/UL (ref 4.7–6.1)
RED CELL DISTRIBUTION WIDTH: 18.1 % (ref 11.5–14.5)
SODIUM: 139 MMOL/L (ref 135–144)
WHITE BLOOD COUNT: 12.3 10^3/UL (ref 4.8–10.8)

## 2018-01-23 RX ADMIN — IPRATROPIUM BROMIDE AND ALBUTEROL SULFATE 1 ML: .5; 3 SOLUTION RESPIRATORY (INHALATION) at 01:44

## 2018-01-23 RX ADMIN — ONDANSETRON HYDROCHLORIDE 1 MG: 2 INJECTION, SOLUTION INTRAMUSCULAR; INTRAVENOUS at 18:40

## 2018-01-23 RX ADMIN — PANTOPRAZOLE SODIUM 1 MG: 40 TABLET, DELAYED RELEASE ORAL at 05:58

## 2018-01-23 RX ADMIN — METHYLPREDNISOLONE SODIUM SUCCINATE 1 MG: 40 INJECTION, POWDER, FOR SOLUTION INTRAMUSCULAR; INTRAVENOUS at 20:34

## 2018-01-23 RX ADMIN — GUAIFENESIN AND CODEINE PHOSPHATE 1 ML: 100; 10 SOLUTION ORAL at 10:46

## 2018-01-23 RX ADMIN — ONDANSETRON HYDROCHLORIDE 1 MG: 2 INJECTION, SOLUTION INTRAMUSCULAR; INTRAVENOUS at 10:46

## 2018-01-23 RX ADMIN — IBUPROFEN 1 MG: 400 TABLET, FILM COATED ORAL at 20:36

## 2018-01-23 RX ADMIN — POTASSIUM CHLORIDE 1 MEQ: 10 TABLET, EXTENDED RELEASE ORAL at 09:29

## 2018-01-23 RX ADMIN — ENOXAPARIN SODIUM 1 MG: 100 INJECTION SUBCUTANEOUS at 09:34

## 2018-01-23 RX ADMIN — FUROSEMIDE 1 MG: 10 INJECTION, SOLUTION INTRAVENOUS at 18:20

## 2018-01-23 RX ADMIN — DOCUSATE SODIUM 1 MG: 250 CAPSULE, LIQUID FILLED ORAL at 20:35

## 2018-01-23 RX ADMIN — TAMSULOSIN HYDROCHLORIDE 1 MG: 0.4 CAPSULE ORAL at 20:35

## 2018-01-23 RX ADMIN — ASPIRIN 1 MG: 81 TABLET, COATED ORAL at 09:29

## 2018-01-23 RX ADMIN — IPRATROPIUM BROMIDE AND ALBUTEROL SULFATE 1 ML: .5; 3 SOLUTION RESPIRATORY (INHALATION) at 09:00

## 2018-01-23 RX ADMIN — CEFTRIAXONE 1 MLS/HR: 2 INJECTION, SOLUTION INTRAVENOUS at 21:07

## 2018-01-23 RX ADMIN — IPRATROPIUM BROMIDE AND ALBUTEROL SULFATE 1 ML: .5; 3 SOLUTION RESPIRATORY (INHALATION) at 12:03

## 2018-01-23 RX ADMIN — METOPROLOL TARTRATE 1 MG: 25 TABLET ORAL at 20:37

## 2018-01-23 RX ADMIN — METHYLPREDNISOLONE SODIUM SUCCINATE 1 MG: 40 INJECTION, POWDER, FOR SOLUTION INTRAMUSCULAR; INTRAVENOUS at 09:31

## 2018-01-23 RX ADMIN — IBUPROFEN 1 MG: 400 TABLET, FILM COATED ORAL at 14:59

## 2018-01-23 RX ADMIN — ISOSORBIDE MONONITRATE 1 MG: 30 TABLET, EXTENDED RELEASE ORAL at 09:30

## 2018-01-23 RX ADMIN — IPRATROPIUM BROMIDE AND ALBUTEROL SULFATE 1 ML: .5; 3 SOLUTION RESPIRATORY (INHALATION) at 16:32

## 2018-01-23 RX ADMIN — GABAPENTIN 1 MG: 300 CAPSULE ORAL at 09:29

## 2018-01-23 RX ADMIN — LISINOPRIL 1 MG: 20 TABLET ORAL at 09:30

## 2018-01-23 RX ADMIN — DOCUSATE SODIUM 1 MG: 250 CAPSULE, LIQUID FILLED ORAL at 09:29

## 2018-01-23 RX ADMIN — AMLODIPINE BESYLATE 1 MG: 10 TABLET ORAL at 09:30

## 2018-01-23 RX ADMIN — VITAMIN D, TAB 1000IU (100/BT) 1 UNIT: 25 TAB at 09:29

## 2018-01-23 RX ADMIN — GABAPENTIN 1 MG: 300 CAPSULE ORAL at 20:36

## 2018-01-23 RX ADMIN — METOPROLOL TARTRATE 1 MG: 25 TABLET ORAL at 09:30

## 2018-01-23 RX ADMIN — THERA TABS 1 TAB: TAB at 09:29

## 2018-01-23 RX ADMIN — GABAPENTIN 1 MG: 300 CAPSULE ORAL at 14:58

## 2018-01-23 RX ADMIN — OXYCODONE HYDROCHLORIDE AND ACETAMINOPHEN 1 MG: 500 TABLET ORAL at 09:33

## 2018-01-23 RX ADMIN — ATORVASTATIN CALCIUM 1 MG: 40 TABLET, FILM COATED ORAL at 20:35

## 2018-01-23 RX ADMIN — FUROSEMIDE 1 MG: 10 INJECTION, SOLUTION INTRAVENOUS at 05:58

## 2018-01-24 LAB
ANION GAP: 11 (ref 8–16)
BLOOD UREA NITROGEN: 19 MG/DL (ref 7–20)
CALCIUM: 8.4 MG/DL (ref 8.4–10.2)
CARBON DIOXIDE: 36 MMOL/L (ref 21–31)
CHLORIDE: 95 MMOL/L (ref 97–110)
CREATININE: 0.67 MG/DL (ref 0.61–1.24)
GLUCOSE: 138 MG/DL (ref 70–220)
MAGNESIUM: 2 MG/DL (ref 1.7–2.5)
POTASSIUM: 4.3 MMOL/L (ref 3.5–5.1)
SODIUM: 138 MMOL/L (ref 135–144)

## 2018-01-24 RX ADMIN — METHYLPREDNISOLONE SODIUM SUCCINATE 1 MG: 40 INJECTION, POWDER, FOR SOLUTION INTRAMUSCULAR; INTRAVENOUS at 08:17

## 2018-01-24 RX ADMIN — GUAIFENESIN AND CODEINE PHOSPHATE 1 ML: 100; 10 SOLUTION ORAL at 21:31

## 2018-01-24 RX ADMIN — ATORVASTATIN CALCIUM 1 MG: 40 TABLET, FILM COATED ORAL at 21:26

## 2018-01-24 RX ADMIN — THERA TABS 1 TAB: TAB at 08:16

## 2018-01-24 RX ADMIN — ONDANSETRON HYDROCHLORIDE 1 MG: 2 INJECTION, SOLUTION INTRAMUSCULAR; INTRAVENOUS at 15:23

## 2018-01-24 RX ADMIN — TAMSULOSIN HYDROCHLORIDE 1 MG: 0.4 CAPSULE ORAL at 21:26

## 2018-01-24 RX ADMIN — FUROSEMIDE 1 MG: 10 INJECTION, SOLUTION INTRAVENOUS at 06:26

## 2018-01-24 RX ADMIN — ONDANSETRON HYDROCHLORIDE 1 MG: 2 INJECTION, SOLUTION INTRAMUSCULAR; INTRAVENOUS at 21:13

## 2018-01-24 RX ADMIN — GABAPENTIN 1 MG: 300 CAPSULE ORAL at 12:26

## 2018-01-24 RX ADMIN — IPRATROPIUM BROMIDE AND ALBUTEROL SULFATE 1 ML: .5; 3 SOLUTION RESPIRATORY (INHALATION) at 17:36

## 2018-01-24 RX ADMIN — ASPIRIN 1 MG: 81 TABLET, COATED ORAL at 08:16

## 2018-01-24 RX ADMIN — IPRATROPIUM BROMIDE AND ALBUTEROL SULFATE 1 ML: .5; 3 SOLUTION RESPIRATORY (INHALATION) at 08:49

## 2018-01-24 RX ADMIN — IBUPROFEN 1 MG: 400 TABLET, FILM COATED ORAL at 15:23

## 2018-01-24 RX ADMIN — IBUPROFEN 1 MG: 400 TABLET, FILM COATED ORAL at 23:10

## 2018-01-24 RX ADMIN — GABAPENTIN 1 MG: 300 CAPSULE ORAL at 08:14

## 2018-01-24 RX ADMIN — ENOXAPARIN SODIUM 1 MG: 100 INJECTION SUBCUTANEOUS at 08:33

## 2018-01-24 RX ADMIN — METHYLPREDNISOLONE SODIUM SUCCINATE 1 MG: 40 INJECTION, POWDER, FOR SOLUTION INTRAMUSCULAR; INTRAVENOUS at 21:20

## 2018-01-24 RX ADMIN — GUAIFENESIN AND CODEINE PHOSPHATE 1 ML: 100; 10 SOLUTION ORAL at 15:23

## 2018-01-24 RX ADMIN — OXYCODONE HYDROCHLORIDE AND ACETAMINOPHEN 1 MG: 500 TABLET ORAL at 08:15

## 2018-01-24 RX ADMIN — FUROSEMIDE 1 MG: 10 INJECTION, SOLUTION INTRAVENOUS at 17:13

## 2018-01-24 RX ADMIN — CEFTRIAXONE 1 MLS/HR: 2 INJECTION, SOLUTION INTRAVENOUS at 21:20

## 2018-01-24 RX ADMIN — DOCUSATE SODIUM 1 MG: 250 CAPSULE, LIQUID FILLED ORAL at 08:14

## 2018-01-24 RX ADMIN — LISINOPRIL 1 MG: 20 TABLET ORAL at 08:16

## 2018-01-24 RX ADMIN — VITAMIN D, TAB 1000IU (100/BT) 1 UNIT: 25 TAB at 08:15

## 2018-01-24 RX ADMIN — METOPROLOL TARTRATE 1 MG: 25 TABLET ORAL at 21:25

## 2018-01-24 RX ADMIN — AMLODIPINE BESYLATE 1 MG: 10 TABLET ORAL at 08:14

## 2018-01-24 RX ADMIN — METOPROLOL TARTRATE 1 MG: 25 TABLET ORAL at 08:16

## 2018-01-24 RX ADMIN — GABAPENTIN 1 MG: 300 CAPSULE ORAL at 21:25

## 2018-01-24 RX ADMIN — POTASSIUM CHLORIDE 1 MEQ: 10 TABLET, EXTENDED RELEASE ORAL at 08:16

## 2018-01-24 RX ADMIN — ISOSORBIDE MONONITRATE 1 MG: 30 TABLET, EXTENDED RELEASE ORAL at 08:15

## 2018-01-24 RX ADMIN — PANTOPRAZOLE SODIUM 1 MG: 40 TABLET, DELAYED RELEASE ORAL at 06:26

## 2018-01-24 RX ADMIN — DOCUSATE SODIUM 1 MG: 250 CAPSULE, LIQUID FILLED ORAL at 21:25

## 2018-01-24 RX ADMIN — IPRATROPIUM BROMIDE AND ALBUTEROL SULFATE 1 ML: .5; 3 SOLUTION RESPIRATORY (INHALATION) at 13:59

## 2018-01-25 LAB
ABNORMAL IP MESSAGE: 1
ADD MAN DIFF?: NO
BASOPHIL #: 0 10^3/UL (ref 0–0.1)
BASOPHILS %: 0.1 % (ref 0–2)
EOSINOPHILS #: 0 10^3/UL (ref 0–0.5)
EOSINOPHILS %: 0.3 % (ref 0–7)
HEMATOCRIT: 32.3 % (ref 42–52)
HEMOGLOBIN: 9 G/DL (ref 14–18)
LYMPHOCYTES #: 1.7 10^3/UL (ref 0.8–2.9)
LYMPHOCYTES %: 12.7 % (ref 15–51)
MEAN CORPUSCULAR HEMOGLOBIN: 24.4 PG (ref 29–33)
MEAN CORPUSCULAR HGB CONC: 27.9 G/DL (ref 32–37)
MEAN CORPUSCULAR VOLUME: 87.5 FL (ref 82–101)
MEAN PLATELET VOLUME: 9 FL (ref 7.4–10.4)
MONOCYTE #: 1.1 10^3/UL (ref 0.3–0.9)
MONOCYTES %: 8.3 % (ref 0–11)
NEUTROPHIL #: 10.4 10^3/UL (ref 1.6–7.5)
NEUTROPHILS %: 76.7 % (ref 39–77)
NUCLEATED RED BLOOD CELLS #: 0.1 10^3/UL (ref 0–0)
NUCLEATED RED BLOOD CELLS%: 0.7 /100WBC (ref 0–0)
PLATELET COUNT: 339 10^3/UL (ref 140–415)
POSITIVE DIFF: (no result)
RED BLOOD COUNT: 3.69 10^6/UL (ref 4.7–6.1)
RED CELL DISTRIBUTION WIDTH: 18.3 % (ref 11.5–14.5)
WHITE BLOOD COUNT: 13.5 10^3/UL (ref 4.8–10.8)

## 2018-01-25 RX ADMIN — METOPROLOL TARTRATE 1 MG: 25 TABLET ORAL at 09:44

## 2018-01-25 RX ADMIN — GABAPENTIN 1 MG: 300 CAPSULE ORAL at 09:41

## 2018-01-25 RX ADMIN — OXYCODONE HYDROCHLORIDE AND ACETAMINOPHEN 1 MG: 500 TABLET ORAL at 09:44

## 2018-01-25 RX ADMIN — THERA TABS 1 TAB: TAB at 09:42

## 2018-01-25 RX ADMIN — IBUPROFEN 1 MG: 400 TABLET, FILM COATED ORAL at 20:25

## 2018-01-25 RX ADMIN — CEFTRIAXONE 1 MLS/HR: 2 INJECTION, SOLUTION INTRAVENOUS at 20:49

## 2018-01-25 RX ADMIN — ATORVASTATIN CALCIUM 1 MG: 40 TABLET, FILM COATED ORAL at 20:25

## 2018-01-25 RX ADMIN — IPRATROPIUM BROMIDE AND ALBUTEROL SULFATE 1 ML: .5; 3 SOLUTION RESPIRATORY (INHALATION) at 13:58

## 2018-01-25 RX ADMIN — FUROSEMIDE 1 MG: 10 INJECTION, SOLUTION INTRAVENOUS at 18:20

## 2018-01-25 RX ADMIN — FUROSEMIDE 1 MG: 10 INJECTION, SOLUTION INTRAVENOUS at 05:24

## 2018-01-25 RX ADMIN — PANTOPRAZOLE SODIUM 1 MG: 40 TABLET, DELAYED RELEASE ORAL at 05:24

## 2018-01-25 RX ADMIN — IPRATROPIUM BROMIDE AND ALBUTEROL SULFATE 1 ML: .5; 3 SOLUTION RESPIRATORY (INHALATION) at 17:11

## 2018-01-25 RX ADMIN — ASPIRIN 1 MG: 81 TABLET, COATED ORAL at 09:42

## 2018-01-25 RX ADMIN — TAMSULOSIN HYDROCHLORIDE 1 MG: 0.4 CAPSULE ORAL at 20:26

## 2018-01-25 RX ADMIN — GUAIFENESIN AND CODEINE PHOSPHATE 1 ML: 100; 10 SOLUTION ORAL at 20:26

## 2018-01-25 RX ADMIN — IPRATROPIUM BROMIDE AND ALBUTEROL SULFATE 1 ML: .5; 3 SOLUTION RESPIRATORY (INHALATION) at 09:56

## 2018-01-25 RX ADMIN — ENOXAPARIN SODIUM 1 MG: 100 INJECTION SUBCUTANEOUS at 10:01

## 2018-01-25 RX ADMIN — DOCUSATE SODIUM 1 MG: 250 CAPSULE, LIQUID FILLED ORAL at 09:42

## 2018-01-25 RX ADMIN — METOPROLOL TARTRATE 1 MG: 25 TABLET ORAL at 20:26

## 2018-01-25 RX ADMIN — POTASSIUM CHLORIDE 1 MEQ: 10 TABLET, EXTENDED RELEASE ORAL at 09:43

## 2018-01-25 RX ADMIN — GABAPENTIN 1 MG: 300 CAPSULE ORAL at 14:17

## 2018-01-25 RX ADMIN — ISOSORBIDE MONONITRATE 1 MG: 30 TABLET, EXTENDED RELEASE ORAL at 09:42

## 2018-01-25 RX ADMIN — LISINOPRIL 1 MG: 20 TABLET ORAL at 09:44

## 2018-01-25 RX ADMIN — METHYLPREDNISOLONE SODIUM SUCCINATE 1 MG: 40 INJECTION, POWDER, FOR SOLUTION INTRAMUSCULAR; INTRAVENOUS at 20:29

## 2018-01-25 RX ADMIN — VITAMIN D, TAB 1000IU (100/BT) 1 UNIT: 25 TAB at 09:45

## 2018-01-25 RX ADMIN — GABAPENTIN 1 MG: 300 CAPSULE ORAL at 20:26

## 2018-01-25 RX ADMIN — AMLODIPINE BESYLATE 1 MG: 10 TABLET ORAL at 09:43

## 2018-01-25 RX ADMIN — METHYLPREDNISOLONE SODIUM SUCCINATE 1 MG: 40 INJECTION, POWDER, FOR SOLUTION INTRAMUSCULAR; INTRAVENOUS at 09:45

## 2018-01-25 RX ADMIN — DOCUSATE SODIUM 1 MG: 250 CAPSULE, LIQUID FILLED ORAL at 20:26

## 2018-01-25 RX ADMIN — ONDANSETRON HYDROCHLORIDE 1 MG: 2 INJECTION, SOLUTION INTRAMUSCULAR; INTRAVENOUS at 20:26

## 2018-01-26 LAB
ANION GAP: 11 (ref 8–16)
BLOOD UREA NITROGEN: 18 MG/DL (ref 7–20)
CALCIUM: 8.6 MG/DL (ref 8.4–10.2)
CARBON DIOXIDE: 38 MMOL/L (ref 21–31)
CHLORIDE: 97 MMOL/L (ref 97–110)
CREATININE: 0.65 MG/DL (ref 0.61–1.24)
GLUCOSE: 113 MG/DL (ref 70–220)
MAGNESIUM: 2.1 MG/DL (ref 1.7–2.5)
POTASSIUM: 4.5 MMOL/L (ref 3.5–5.1)
SODIUM: 141 MMOL/L (ref 135–144)

## 2018-01-26 RX ADMIN — ONDANSETRON HYDROCHLORIDE 1 MG: 2 INJECTION, SOLUTION INTRAMUSCULAR; INTRAVENOUS at 02:37

## 2018-01-26 RX ADMIN — GABAPENTIN 1 MG: 300 CAPSULE ORAL at 21:57

## 2018-01-26 RX ADMIN — LORAZEPAM 1 MG: 0.5 TABLET ORAL at 13:37

## 2018-01-26 RX ADMIN — FUROSEMIDE 1 MG: 10 INJECTION, SOLUTION INTRAVENOUS at 17:38

## 2018-01-26 RX ADMIN — ASPIRIN 1 MG: 81 TABLET, COATED ORAL at 09:38

## 2018-01-26 RX ADMIN — METHYLPREDNISOLONE SODIUM SUCCINATE 1 MG: 40 INJECTION, POWDER, FOR SOLUTION INTRAMUSCULAR; INTRAVENOUS at 21:57

## 2018-01-26 RX ADMIN — IBUPROFEN 1 MG: 400 TABLET, FILM COATED ORAL at 02:36

## 2018-01-26 RX ADMIN — IPRATROPIUM BROMIDE AND ALBUTEROL SULFATE 1 ML: .5; 3 SOLUTION RESPIRATORY (INHALATION) at 17:44

## 2018-01-26 RX ADMIN — SERTRALINE HYDROCHLORIDE 1 MG: 50 TABLET ORAL at 13:37

## 2018-01-26 RX ADMIN — LISINOPRIL 1 MG: 20 TABLET ORAL at 09:40

## 2018-01-26 RX ADMIN — DOCUSATE SODIUM 1 MG: 250 CAPSULE, LIQUID FILLED ORAL at 09:53

## 2018-01-26 RX ADMIN — ONDANSETRON HYDROCHLORIDE 1 MG: 2 INJECTION, SOLUTION INTRAMUSCULAR; INTRAVENOUS at 09:36

## 2018-01-26 RX ADMIN — METOPROLOL TARTRATE 1 MG: 25 TABLET ORAL at 09:40

## 2018-01-26 RX ADMIN — GUAIFENESIN AND CODEINE PHOSPHATE 1 ML: 100; 10 SOLUTION ORAL at 09:37

## 2018-01-26 RX ADMIN — ONDANSETRON HYDROCHLORIDE 1 MG: 4 TABLET, FILM COATED ORAL at 23:46

## 2018-01-26 RX ADMIN — IPRATROPIUM BROMIDE AND ALBUTEROL SULFATE 1 ML: .5; 3 SOLUTION RESPIRATORY (INHALATION) at 13:50

## 2018-01-26 RX ADMIN — OXYCODONE HYDROCHLORIDE AND ACETAMINOPHEN 1 MG: 500 TABLET ORAL at 09:38

## 2018-01-26 RX ADMIN — IBUPROFEN 1 MG: 400 TABLET, FILM COATED ORAL at 17:38

## 2018-01-26 RX ADMIN — FUROSEMIDE 1 MG: 10 INJECTION, SOLUTION INTRAVENOUS at 06:23

## 2018-01-26 RX ADMIN — GUAIFENESIN AND CODEINE PHOSPHATE 1 ML: 100; 10 SOLUTION ORAL at 23:46

## 2018-01-26 RX ADMIN — GUAIFENESIN AND CODEINE PHOSPHATE 1 ML: 100; 10 SOLUTION ORAL at 02:36

## 2018-01-26 RX ADMIN — ATORVASTATIN CALCIUM 1 MG: 40 TABLET, FILM COATED ORAL at 21:57

## 2018-01-26 RX ADMIN — AMLODIPINE BESYLATE 1 MG: 10 TABLET ORAL at 09:39

## 2018-01-26 RX ADMIN — IPRATROPIUM BROMIDE AND ALBUTEROL SULFATE 1 ML: .5; 3 SOLUTION RESPIRATORY (INHALATION) at 10:24

## 2018-01-26 RX ADMIN — CEFTRIAXONE 1 MLS/HR: 2 INJECTION, SOLUTION INTRAVENOUS at 21:57

## 2018-01-26 RX ADMIN — TAMSULOSIN HYDROCHLORIDE 1 MG: 0.4 CAPSULE ORAL at 21:57

## 2018-01-26 RX ADMIN — IBUPROFEN 1 MG: 400 TABLET, FILM COATED ORAL at 09:38

## 2018-01-26 RX ADMIN — DOCUSATE SODIUM 1 MG: 250 CAPSULE, LIQUID FILLED ORAL at 21:58

## 2018-01-26 RX ADMIN — PANTOPRAZOLE SODIUM 1 MG: 40 TABLET, DELAYED RELEASE ORAL at 06:22

## 2018-01-26 RX ADMIN — IBUPROFEN 1 MG: 400 TABLET, FILM COATED ORAL at 23:46

## 2018-01-26 RX ADMIN — ENOXAPARIN SODIUM 1 MG: 100 INJECTION SUBCUTANEOUS at 09:51

## 2018-01-26 RX ADMIN — THERA TABS 1 TAB: TAB at 09:38

## 2018-01-26 RX ADMIN — POTASSIUM CHLORIDE 1 MEQ: 10 TABLET, EXTENDED RELEASE ORAL at 09:38

## 2018-01-26 RX ADMIN — VITAMIN D, TAB 1000IU (100/BT) 1 UNIT: 25 TAB at 09:38

## 2018-01-26 RX ADMIN — METOPROLOL TARTRATE 1 MG: 25 TABLET ORAL at 21:58

## 2018-01-26 RX ADMIN — GABAPENTIN 1 MG: 300 CAPSULE ORAL at 13:37

## 2018-01-26 RX ADMIN — ONDANSETRON HYDROCHLORIDE 1 MG: 2 INJECTION, SOLUTION INTRAMUSCULAR; INTRAVENOUS at 17:38

## 2018-01-26 RX ADMIN — GABAPENTIN 1 MG: 300 CAPSULE ORAL at 09:37

## 2018-01-26 RX ADMIN — GUAIFENESIN AND CODEINE PHOSPHATE 1 ML: 100; 10 SOLUTION ORAL at 17:38

## 2018-01-26 RX ADMIN — ISOSORBIDE MONONITRATE 1 MG: 30 TABLET, EXTENDED RELEASE ORAL at 09:39

## 2018-01-26 RX ADMIN — METHYLPREDNISOLONE SODIUM SUCCINATE 1 MG: 40 INJECTION, POWDER, FOR SOLUTION INTRAMUSCULAR; INTRAVENOUS at 09:53

## 2018-01-27 RX ADMIN — ASPIRIN 1 MG: 81 TABLET, COATED ORAL at 08:36

## 2018-01-27 RX ADMIN — ONDANSETRON HYDROCHLORIDE 1 MG: 4 TABLET, FILM COATED ORAL at 05:40

## 2018-01-27 RX ADMIN — GABAPENTIN 1 MG: 300 CAPSULE ORAL at 13:20

## 2018-01-27 RX ADMIN — ONDANSETRON HYDROCHLORIDE 1 MG: 4 TABLET, FILM COATED ORAL at 21:37

## 2018-01-27 RX ADMIN — THERA TABS 1 TAB: TAB at 08:36

## 2018-01-27 RX ADMIN — GUAIFENESIN AND CODEINE PHOSPHATE 1 ML: 100; 10 SOLUTION ORAL at 21:37

## 2018-01-27 RX ADMIN — GABAPENTIN 1 MG: 300 CAPSULE ORAL at 21:37

## 2018-01-27 RX ADMIN — LISINOPRIL 1 MG: 20 TABLET ORAL at 21:36

## 2018-01-27 RX ADMIN — PANTOPRAZOLE SODIUM 1 MG: 40 TABLET, DELAYED RELEASE ORAL at 05:40

## 2018-01-27 RX ADMIN — DOCUSATE SODIUM 1 MG: 250 CAPSULE, LIQUID FILLED ORAL at 21:36

## 2018-01-27 RX ADMIN — METOPROLOL TARTRATE 1 MG: 25 TABLET ORAL at 21:36

## 2018-01-27 RX ADMIN — METHYLPREDNISOLONE SODIUM SUCCINATE 1 MG: 40 INJECTION, POWDER, FOR SOLUTION INTRAMUSCULAR; INTRAVENOUS at 21:35

## 2018-01-27 RX ADMIN — ENOXAPARIN SODIUM 1 MG: 100 INJECTION SUBCUTANEOUS at 08:47

## 2018-01-27 RX ADMIN — TAMSULOSIN HYDROCHLORIDE 1 MG: 0.4 CAPSULE ORAL at 21:36

## 2018-01-27 RX ADMIN — SERTRALINE HYDROCHLORIDE 1 MG: 50 TABLET ORAL at 08:37

## 2018-01-27 RX ADMIN — IBUPROFEN 1 MG: 400 TABLET, FILM COATED ORAL at 05:40

## 2018-01-27 RX ADMIN — IPRATROPIUM BROMIDE AND ALBUTEROL SULFATE 1 ML: .5; 3 SOLUTION RESPIRATORY (INHALATION) at 09:09

## 2018-01-27 RX ADMIN — IPRATROPIUM BROMIDE AND ALBUTEROL SULFATE 1 ML: .5; 3 SOLUTION RESPIRATORY (INHALATION) at 15:03

## 2018-01-27 RX ADMIN — LISINOPRIL 1 MG: 20 TABLET ORAL at 08:36

## 2018-01-27 RX ADMIN — ATORVASTATIN CALCIUM 1 MG: 40 TABLET, FILM COATED ORAL at 21:37

## 2018-01-27 RX ADMIN — GABAPENTIN 1 MG: 300 CAPSULE ORAL at 08:36

## 2018-01-27 RX ADMIN — AMLODIPINE BESYLATE 1 MG: 10 TABLET ORAL at 08:35

## 2018-01-27 RX ADMIN — POTASSIUM CHLORIDE 1 MEQ: 10 TABLET, EXTENDED RELEASE ORAL at 08:37

## 2018-01-27 RX ADMIN — METOPROLOL TARTRATE 1 MG: 25 TABLET ORAL at 08:36

## 2018-01-27 RX ADMIN — METHYLPREDNISOLONE SODIUM SUCCINATE 1 MG: 40 INJECTION, POWDER, FOR SOLUTION INTRAMUSCULAR; INTRAVENOUS at 08:35

## 2018-01-27 RX ADMIN — FUROSEMIDE 1 MG: 10 INJECTION, SOLUTION INTRAVENOUS at 17:52

## 2018-01-27 RX ADMIN — IPRATROPIUM BROMIDE AND ALBUTEROL SULFATE 1 ML: .5; 3 SOLUTION RESPIRATORY (INHALATION) at 17:00

## 2018-01-27 RX ADMIN — DOCUSATE SODIUM 1 MG: 250 CAPSULE, LIQUID FILLED ORAL at 08:37

## 2018-01-27 RX ADMIN — CEFTRIAXONE 1 MLS/HR: 2 INJECTION, SOLUTION INTRAVENOUS at 21:35

## 2018-01-27 RX ADMIN — VITAMIN D, TAB 1000IU (100/BT) 1 UNIT: 25 TAB at 08:36

## 2018-01-27 RX ADMIN — OXYCODONE HYDROCHLORIDE AND ACETAMINOPHEN 1 MG: 500 TABLET ORAL at 08:35

## 2018-01-27 RX ADMIN — FUROSEMIDE 1 MG: 10 INJECTION, SOLUTION INTRAVENOUS at 05:41

## 2018-01-27 RX ADMIN — ISOSORBIDE MONONITRATE 1 MG: 30 TABLET, EXTENDED RELEASE ORAL at 08:37

## 2018-01-27 RX ADMIN — IBUPROFEN 1 MG: 400 TABLET, FILM COATED ORAL at 21:37

## 2018-01-28 LAB
ABNORMAL IP MESSAGE: 1
ADD MAN DIFF?: NO
ANION GAP: 9 (ref 8–16)
BASOPHIL #: 0 10^3/UL (ref 0–0.1)
BASOPHILS %: 0.2 % (ref 0–2)
BLOOD UREA NITROGEN: 19 MG/DL (ref 7–20)
CALCIUM: 8.1 MG/DL (ref 8.4–10.2)
CARBON DIOXIDE: 40 MMOL/L (ref 21–31)
CHLORIDE: 94 MMOL/L (ref 97–110)
CREATININE: 0.79 MG/DL (ref 0.61–1.24)
EOSINOPHILS #: 0.1 10^3/UL (ref 0–0.5)
EOSINOPHILS %: 0.5 % (ref 0–7)
GLUCOSE: 118 MG/DL (ref 70–220)
HEMATOCRIT: 32.6 % (ref 42–52)
HEMOGLOBIN: 8.9 G/DL (ref 14–18)
LYMPHOCYTES #: 1.2 10^3/UL (ref 0.8–2.9)
LYMPHOCYTES %: 9.5 % (ref 15–51)
MEAN CORPUSCULAR HEMOGLOBIN: 24 PG (ref 29–33)
MEAN CORPUSCULAR HGB CONC: 27.3 G/DL (ref 32–37)
MEAN CORPUSCULAR VOLUME: 87.9 FL (ref 82–101)
MEAN PLATELET VOLUME: 8.7 FL (ref 7.4–10.4)
MONOCYTE #: 0.9 10^3/UL (ref 0.3–0.9)
MONOCYTES %: 7.5 % (ref 0–11)
NEUTROPHIL #: 10.1 10^3/UL (ref 1.6–7.5)
NEUTROPHILS %: 80.5 % (ref 39–77)
NUCLEATED RED BLOOD CELLS #: 0.1 10^3/UL (ref 0–0)
NUCLEATED RED BLOOD CELLS%: 0.9 /100WBC (ref 0–0)
PLATELET COUNT: 264 10^3/UL (ref 140–415)
POSITIVE DIFF: (no result)
POTASSIUM: 4 MMOL/L (ref 3.5–5.1)
RED BLOOD COUNT: 3.71 10^6/UL (ref 4.7–6.1)
RED CELL DISTRIBUTION WIDTH: 18.6 % (ref 11.5–14.5)
SODIUM: 139 MMOL/L (ref 135–144)
WHITE BLOOD COUNT: 12.5 10^3/UL (ref 4.8–10.8)

## 2018-01-28 RX ADMIN — PANTOPRAZOLE SODIUM 1 MG: 40 TABLET, DELAYED RELEASE ORAL at 06:34

## 2018-01-28 RX ADMIN — ENOXAPARIN SODIUM 1 MG: 100 INJECTION SUBCUTANEOUS at 08:18

## 2018-01-28 RX ADMIN — DOCUSATE SODIUM 1 MG: 250 CAPSULE, LIQUID FILLED ORAL at 20:56

## 2018-01-28 RX ADMIN — CEFTRIAXONE 1 MLS/HR: 2 INJECTION, SOLUTION INTRAVENOUS at 20:56

## 2018-01-28 RX ADMIN — SERTRALINE HYDROCHLORIDE 1 MG: 50 TABLET ORAL at 08:15

## 2018-01-28 RX ADMIN — THERA TABS 1 TAB: TAB at 08:14

## 2018-01-28 RX ADMIN — FUROSEMIDE 1 MG: 10 INJECTION, SOLUTION INTRAVENOUS at 18:34

## 2018-01-28 RX ADMIN — POTASSIUM CHLORIDE 1 MEQ: 10 TABLET, EXTENDED RELEASE ORAL at 08:15

## 2018-01-28 RX ADMIN — GABAPENTIN 1 MG: 300 CAPSULE ORAL at 08:14

## 2018-01-28 RX ADMIN — IPRATROPIUM BROMIDE AND ALBUTEROL SULFATE 1 ML: .5; 3 SOLUTION RESPIRATORY (INHALATION) at 08:02

## 2018-01-28 RX ADMIN — GABAPENTIN 1 MG: 300 CAPSULE ORAL at 12:23

## 2018-01-28 RX ADMIN — DOCUSATE SODIUM 1 MG: 250 CAPSULE, LIQUID FILLED ORAL at 08:15

## 2018-01-28 RX ADMIN — GUAIFENESIN AND CODEINE PHOSPHATE 1 ML: 100; 10 SOLUTION ORAL at 18:34

## 2018-01-28 RX ADMIN — METHYLPREDNISOLONE SODIUM SUCCINATE 1 MG: 40 INJECTION, POWDER, FOR SOLUTION INTRAMUSCULAR; INTRAVENOUS at 08:13

## 2018-01-28 RX ADMIN — ISOSORBIDE MONONITRATE 1 MG: 30 TABLET, EXTENDED RELEASE ORAL at 08:15

## 2018-01-28 RX ADMIN — IPRATROPIUM BROMIDE AND ALBUTEROL SULFATE 1 ML: .5; 3 SOLUTION RESPIRATORY (INHALATION) at 14:30

## 2018-01-28 RX ADMIN — FUROSEMIDE 1 MG: 10 INJECTION, SOLUTION INTRAVENOUS at 18:00

## 2018-01-28 RX ADMIN — LORAZEPAM 1 MG: 0.5 TABLET ORAL at 12:28

## 2018-01-28 RX ADMIN — LISINOPRIL 1 MG: 20 TABLET ORAL at 08:14

## 2018-01-28 RX ADMIN — TAMSULOSIN HYDROCHLORIDE 1 MG: 0.4 CAPSULE ORAL at 20:56

## 2018-01-28 RX ADMIN — METOPROLOL TARTRATE 1 MG: 25 TABLET ORAL at 20:56

## 2018-01-28 RX ADMIN — VITAMIN D, TAB 1000IU (100/BT) 1 UNIT: 25 TAB at 08:14

## 2018-01-28 RX ADMIN — LORAZEPAM 1 MG: 0.5 TABLET ORAL at 18:32

## 2018-01-28 RX ADMIN — FUROSEMIDE 1 MG: 10 INJECTION, SOLUTION INTRAVENOUS at 06:35

## 2018-01-28 RX ADMIN — METOPROLOL TARTRATE 1 MG: 25 TABLET ORAL at 08:14

## 2018-01-28 RX ADMIN — LISINOPRIL 1 MG: 20 TABLET ORAL at 20:56

## 2018-01-28 RX ADMIN — ATORVASTATIN CALCIUM 1 MG: 40 TABLET, FILM COATED ORAL at 20:56

## 2018-01-28 RX ADMIN — ONDANSETRON HYDROCHLORIDE 1 MG: 4 TABLET, FILM COATED ORAL at 12:28

## 2018-01-28 RX ADMIN — GUAIFENESIN AND CODEINE PHOSPHATE 1 ML: 100; 10 SOLUTION ORAL at 12:28

## 2018-01-28 RX ADMIN — METHYLPREDNISOLONE SODIUM SUCCINATE 1 MG: 40 INJECTION, POWDER, FOR SOLUTION INTRAMUSCULAR; INTRAVENOUS at 20:55

## 2018-01-28 RX ADMIN — OXYCODONE HYDROCHLORIDE AND ACETAMINOPHEN 1 MG: 500 TABLET ORAL at 08:14

## 2018-01-28 RX ADMIN — GABAPENTIN 1 MG: 300 CAPSULE ORAL at 20:55

## 2018-01-28 RX ADMIN — IPRATROPIUM BROMIDE AND ALBUTEROL SULFATE 1 ML: .5; 3 SOLUTION RESPIRATORY (INHALATION) at 16:58

## 2018-01-28 RX ADMIN — ASPIRIN 1 MG: 81 TABLET, COATED ORAL at 08:15

## 2018-01-28 RX ADMIN — IBUPROFEN 1 MG: 400 TABLET, FILM COATED ORAL at 12:28

## 2018-01-29 RX ADMIN — SERTRALINE HYDROCHLORIDE 1 MG: 50 TABLET ORAL at 09:04

## 2018-01-29 RX ADMIN — GABAPENTIN 1 MG: 300 CAPSULE ORAL at 09:03

## 2018-01-29 RX ADMIN — DOCUSATE SODIUM 1 MG: 250 CAPSULE, LIQUID FILLED ORAL at 09:02

## 2018-01-29 RX ADMIN — FUROSEMIDE 1 MG: 10 INJECTION, SOLUTION INTRAVENOUS at 06:59

## 2018-01-29 RX ADMIN — LISINOPRIL 1 MG: 20 TABLET ORAL at 09:04

## 2018-01-29 RX ADMIN — OXYCODONE HYDROCHLORIDE AND ACETAMINOPHEN 1 MG: 500 TABLET ORAL at 09:03

## 2018-01-29 RX ADMIN — PANTOPRAZOLE SODIUM 1 MG: 40 TABLET, DELAYED RELEASE ORAL at 06:58

## 2018-01-29 RX ADMIN — THERA TABS 1 TAB: TAB at 09:02

## 2018-01-29 RX ADMIN — METOPROLOL TARTRATE 1 MG: 25 TABLET ORAL at 09:05

## 2018-01-29 RX ADMIN — ISOSORBIDE MONONITRATE 1 MG: 30 TABLET, EXTENDED RELEASE ORAL at 09:04

## 2018-01-29 RX ADMIN — ASPIRIN 1 MG: 81 TABLET, COATED ORAL at 09:03

## 2018-01-29 RX ADMIN — ENOXAPARIN SODIUM 1 MG: 100 INJECTION SUBCUTANEOUS at 09:12

## 2018-01-29 RX ADMIN — METHYLPREDNISOLONE SODIUM SUCCINATE 1 MG: 40 INJECTION, POWDER, FOR SOLUTION INTRAMUSCULAR; INTRAVENOUS at 09:03

## 2018-01-29 RX ADMIN — VITAMIN D, TAB 1000IU (100/BT) 1 UNIT: 25 TAB at 09:02

## 2018-01-29 RX ADMIN — POTASSIUM CHLORIDE 1 MEQ: 10 TABLET, EXTENDED RELEASE ORAL at 09:03

## 2023-08-01 NOTE — HP
DATE OF ADMISSION: 04/15/2017

 

HISTORY OF PRESENT ILLNESS:  The patient is a 66-year-old male who has a history of CHF, COPD, sleep
 apnea, obesity, hypertension.  The patient was recently discharged.  Once again, admitted with shor
tness of breath.  Lung congestion, is being admitted for further management.

 

PAST MEDICAL HISTORY:  Positive for congestive heart failure, COPD, anemia, sleep apnea, obesity, hy
pertension.  Patient's other history includes _____ history of obesity, hypoventilation, dyslipidemi
a, diabetes mellitus, history of noncompliance.

 

ALLERGY HISTORY:  HYDROMORPHONE.

 

SOCIAL HISTORY:  Negative.  Ex-smoker.

 

MEDICATION HISTORY:  The patient is on:

1.  Hydrocodone.

2.  Albuterol.

3.  Amlodipine.

4.  Ascorbic acid.

5.  Aspirin.

6.  The patient is on Soma.

7.  Vitamin D3.

8.  Docusate sodium.

9.  Lasix.

10.  Gabapentin.

11.  Atrovent.

12.  Isosorbide.

13.  Lisinopril.

14.  Morphine sulfate.

15.  Multiple vitamin.

16.  Protonix.

17.  Potassium.

18.  Prednisone.

19.  Simvastatin. 

 

REVIEW OF SYSTEMS:

HEENT:  Unremarkable.

RESPIRATORY:  Short of breath, getting better.

CARDIOVASCULAR:  No chest pain right now.

ABDOMEN:  Unremarkable.

EXTREMITIES:  Chronic edema.

 

PHYSICAL EXAMINATION:

GENERAL:  The patient is an obese, overweight male.

VITAL SIGNS:  Pulse 89, blood pressure 120/71.

HEAD:  Atraumatic, normocephalic.  Pupils are equal, reactive.

NECK:  Supple.  No JVD.

LUNGS:  Basilar rales.

CARDIOVASCULAR:  S1, S2 normal.

ABDOMEN:  Soft, obese, bowel sounds present.  No palpable mass.

EXTREMITIES:  No cyanosis, clubbing.  Edema positive.

CENTRAL NERVOUS SYSTEM:  The patient is awake, alert with no focal deficit.

 

LABORATORY DATA:  Hematocrit 36.3.  Potassium 4.4, sodium 137.  The patient had a chest x-ray, shows
 mild failure.

 

IMPRESSION:

1.  Pulmonary edema.

2.  Rule out ischemic heart disease.

3.  EKG shows sinus rhythm, nonspecific ST-T changes.

4.  Diabetes mellitus.

5.  Hypertension.

6.  Obesity.

7.  Sleep apnea.  

8.  The patient has underlying chronic obstructive pulmonary disease.

 

PLAN:  At this point, is to continue amlodipine, ascorbic acid, aspirin, vitamin D, docusate sodium,
 gabapentin, _____, potassium, morphine, Protonix, Lasix.  The patient is to continue breathing su
tment.  Orders were done.

 

 

Dictated By: DELORIS PHILLIPS MD

 

BS/NTS

DD:    04/16/2017 13:33:01

DT:    04/16/2017 18:08:07

Conf#: [id number]

DID#:  [id number] Quality 226: Preventive Care And Screening: Tobacco Use: Screening And Cessation Intervention: Patient screened for tobacco use and is an ex/non-smoker Quality 130: Documentation Of Current Medications In The Medical Record: Current Medications Documented Detail Level: Detailed Quality 431: Preventive Care And Screening: Unhealthy Alcohol Use - Screening: Patient not identified as an unhealthy alcohol user when screened for unhealthy alcohol use using a systematic screening method Quality 402: Tobacco Use And Help With Quitting Among Adolescents: Patient screened for tobacco and is an ex-smoker Quality 47: Advance Care Plan: Advance care planning not documented, reason not otherwise specified.